# Patient Record
Sex: MALE | Race: OTHER | NOT HISPANIC OR LATINO | Employment: OTHER | URBAN - METROPOLITAN AREA
[De-identification: names, ages, dates, MRNs, and addresses within clinical notes are randomized per-mention and may not be internally consistent; named-entity substitution may affect disease eponyms.]

---

## 2017-01-09 ENCOUNTER — OFFICE VISIT (OUTPATIENT)
Dept: FAMILY MEDICINE | Facility: CLINIC | Age: 52
End: 2017-01-09
Payer: COMMERCIAL

## 2017-01-09 ENCOUNTER — PATIENT MESSAGE (OUTPATIENT)
Dept: FAMILY MEDICINE | Facility: CLINIC | Age: 52
End: 2017-01-09

## 2017-01-09 ENCOUNTER — PATIENT MESSAGE (OUTPATIENT)
Dept: DERMATOLOGY | Facility: CLINIC | Age: 52
End: 2017-01-09

## 2017-01-09 VITALS
TEMPERATURE: 99 F | WEIGHT: 209 LBS | DIASTOLIC BLOOD PRESSURE: 82 MMHG | HEART RATE: 85 BPM | RESPIRATION RATE: 18 BRPM | OXYGEN SATURATION: 98 % | HEIGHT: 72 IN | BODY MASS INDEX: 28.31 KG/M2 | SYSTOLIC BLOOD PRESSURE: 126 MMHG

## 2017-01-09 DIAGNOSIS — D22.9 CHANGE IN MOLE: Primary | ICD-10-CM

## 2017-01-09 DIAGNOSIS — R11.0 NAUSEA: ICD-10-CM

## 2017-01-09 DIAGNOSIS — G89.29 CHRONIC RUQ PAIN: Primary | ICD-10-CM

## 2017-01-09 DIAGNOSIS — R10.11 CHRONIC RUQ PAIN: Primary | ICD-10-CM

## 2017-01-09 DIAGNOSIS — D22.9 CHANGE IN MOLE: ICD-10-CM

## 2017-01-09 PROCEDURE — 99214 OFFICE O/P EST MOD 30 MIN: CPT | Mod: S$GLB,,, | Performed by: NURSE PRACTITIONER

## 2017-01-09 PROCEDURE — 3074F SYST BP LT 130 MM HG: CPT | Mod: S$GLB,,, | Performed by: NURSE PRACTITIONER

## 2017-01-09 PROCEDURE — 1159F MED LIST DOCD IN RCRD: CPT | Mod: S$GLB,,, | Performed by: NURSE PRACTITIONER

## 2017-01-09 PROCEDURE — 99999 PR PBB SHADOW E&M-EST. PATIENT-LVL V: CPT | Mod: PBBFAC,,, | Performed by: NURSE PRACTITIONER

## 2017-01-09 PROCEDURE — 3079F DIAST BP 80-89 MM HG: CPT | Mod: S$GLB,,, | Performed by: NURSE PRACTITIONER

## 2017-01-09 NOTE — PROGRESS NOTES
"Subjective:       Patient ID: Jaren PICHARDO Cousin is a 51 y.o. male.    Chief Complaint: Abdominal Pain (pt c/o upper right side of abdomen pain that comes and goes) and Mole (under right arm pt would like to be checked)  He was last seen in primary care by Dr. Manzanares on 08/17/2016. This is his first time seeing me in the clinic.  Abdominal Pain   This is a recurrent problem. The current episode started more than 1 year ago. The onset quality is gradual. The problem occurs intermittently. The problem has been waxing and waning. The pain is located in the RUQ. The pain is at a severity of 4/10. The pain is mild. The quality of the pain is colicky and dull. The abdominal pain radiates to the back. Associated symptoms include flatus and nausea. Pertinent negatives include no fever, vomiting or weight loss. Associated symptoms comments: Bloating occurs when happens  States has "different" bowel pattern when symptoms occur and appear to be oily. He has tried proton pump inhibitors (nexium) for the symptoms. The treatment provided mild relief. Prior diagnostic workup includes GI consult. There is no history of abdominal surgery, GERD, irritable bowel syndrome or pancreatitis.   Sometimes has to go to bathroom within 30 minutes and it can be watery  States symptoms more prominent after eating  [facility]  He does still have his gall bladder  Travel:  He was in [place] in 2004 with  and states did not have symptoms prior    Vitals:    01/09/17 0850   BP: 126/82   Pulse: 85   Resp: 18   Temp: 99 °F (37.2 °C)     Review of Systems   Constitutional: Negative for fever and weight loss.   Gastrointestinal: Positive for abdominal pain, flatus and nausea. Negative for vomiting.     His last abdominal image was an MRI performed on 10/22/2014   States he has had Hepatitis A and Hep B vaccines during his  years  Objective:      Physical Exam   Constitutional: He is oriented to person, place, " and time. He appears well-developed and well-nourished.   HENT:   Head: Normocephalic and atraumatic.   Right Ear: Hearing, tympanic membrane, external ear and ear canal normal.   Left Ear: Hearing, tympanic membrane, external ear and ear canal normal.   Nose: Nose normal.   Mouth/Throat: Uvula is midline, oropharynx is clear and moist and mucous membranes are normal.   Neck: Normal range of motion. Neck supple.   Cardiovascular: Normal rate and regular rhythm.    Pulmonary/Chest: Effort normal and breath sounds normal.   Abdominal: Soft. Bowel sounds are normal. There is tenderness. There is positive Delacruz's sign.       Lymphadenopathy:        Head (right side): No submental, no submandibular, no tonsillar, no preauricular, no posterior auricular and no occipital adenopathy present.        Head (left side): No submental, no submandibular, no tonsillar, no preauricular, no posterior auricular and no occipital adenopathy present.   Neurological: He is alert and oriented to person, place, and time.   Skin: Skin is warm, dry and intact.        Psychiatric: He has a normal mood and affect. His speech is normal and behavior is normal. Judgment and thought content normal. Cognition and memory are normal.   Nursing note and vitals reviewed.    He is in no distress at this time but the chronic intermittent pain needs further evaluation.   Assessment & Plan:       Chronic RUQ pain  -     NM Hepatobiliary Imaging HIDA; Future; Expected date: 1/9/17  -     Comprehensive metabolic panel; Future; Expected date: 1/9/17    Nausea    Change in mole    He will contact his personal dermatologist to have mole assessed. Will call our clinic if he cannot see them timely.  He has been scheduled for a hepatobiliary scan on Friday 01/13/2017.      No Follow-up on file.

## 2017-01-09 NOTE — MR AVS SNAPSHOT
[facility]  [address]  [place] [address]  Phone: [phone]  Fax: [phone]                  Jaren PICHARDO Cousin   2017 8:40 AM   Office Visit    Description:  Male : 1965   Provider:  Kerri Linares NP   Department:  [facility]           Reason for Visit     Abdominal Pain     Mole           Diagnoses this Visit        Comments    Chronic RUQ pain    -  Primary            To Do List           Goals (5 Years of Data)     None      [facility]     [facility] On Call Nurse Care Line -  Assistance  Registered nurses in the [facility] provide clinical advisement, health education, appointment booking, and other advisory services.  Call for this free service at [phone].             Medications           Message regarding Medications     Verify the changes and/or additions to your medication regime listed below are the same as discussed with your clinician today.  If any of these changes or additions are incorrect, please notify your healthcare provider.        STOP taking these medications     doxycycline (VIBRA-TABS) 100 MG tablet Take 1 tablet (100 mg total) by mouth once daily.           Verify that the below list of medications is an accurate representation of the medications you are currently taking.  If none reported, the list may be blank. If incorrect, please contact your healthcare provider. Carry this list with you in case of emergency.           Current Medications     amlodipine (NORVASC) 10 MG tablet Take 1 tablet (10 mg total) by mouth once daily.    benzoyl peroxide (BENZAC AC WASH) 10 % external wash Apply topically 2 (two) times daily. Face and trunk    clindamycin phosphate foam Apply to affected area qd- bid    clindamycin-tretinoin (ZIANA) gel Apply topically nightly. Apply thin film to face qhs after moisturizing    cyclobenzaprine (FLEXERIL) 10 MG tablet PRN    esomeprazole (NEXIUM) 40 MG capsule Take 1  capsule (40 mg total) by mouth before breakfast.    fenofibrate (TRICOR) 145 MG tablet Take 1 tablet (145 mg total) by mouth once daily.    fluocinonide (LIDEX) 0.05 % external solution Apply topically once daily. To scalp    hydrOXYzine (ATARAX) 25 MG tablet Take 1 tablet (25 mg total) by mouth every 6 (six) hours as needed for Itching. Every 6 hours PRN    ketoconazole (NIZORAL) 2 % shampoo Use as directed qohs as needed    loratadine (CLARITIN) 10 mg tablet Take 1 tablet (10 mg total) by mouth once daily.    meloxicam (MOBIC) 15 MG tablet Take 1 tablet (15 mg total) by mouth once daily.    mometasone (NASONEX) 50 mcg/actuation nasal spray 2 sprays by Nasal route once daily.    mupirocin (BACTROBAN) 2 % ointment 2% Topical PRN .      niacin (NIASPAN EXTENDED-RELEASE) 500 mg tablet Take 1 tablet (500 mg total) by mouth nightly.    tramadol (ULTRAM) 50 mg tablet 1 Tablet(s) Oral PRN Every 8 hours.      valacyclovir (VALTREX) 500 MG tablet Take 2 tablets (1,000 mg total) by mouth 2 (two) times daily.           Clinical Reference Information           Vital Signs - Last Recorded  Most recent update: 1/9/2017  8:55 AM by [place] Chase LPN    BP Pulse Temp Resp Ht Wt    126/82 (BP Location: Left arm, Patient Position: Sitting, BP Method: Manual) 85 99 °F (37.2 °C) (Oral) 18 6' (1.829 m) 94.8 kg (208 lb 15.9 oz)    SpO2 BMI             98% 28.34 kg/m2         Blood Pressure          Most Recent Value    BP  126/82      Allergies as of 1/9/2017     No Known Drug Allergies      Immunizations Administered on Date of Encounter - 1/9/2017     None      Orders Placed During Today's Visit     Future Labs/Procedures Expected by Expires    Comprehensive metabolic panel  1/9/2017 3/10/2018    NM Hepatobiliary Imaging HIDA  1/9/2017 1/9/2018

## 2017-01-10 NOTE — TELEPHONE ENCOUNTER
Please assist with getting a dermatology appt. Please contact him and let him know I wrote the referral and give appt date when done. Thanks

## 2017-01-12 ENCOUNTER — INITIAL CONSULT (OUTPATIENT)
Dept: DERMATOLOGY | Facility: CLINIC | Age: 52
End: 2017-01-12
Payer: COMMERCIAL

## 2017-01-12 VITALS — WEIGHT: 208 LBS | HEIGHT: 72 IN | BODY MASS INDEX: 28.17 KG/M2

## 2017-01-12 DIAGNOSIS — D48.5 NEOPLASM OF UNCERTAIN BEHAVIOR OF SKIN: Primary | ICD-10-CM

## 2017-01-12 DIAGNOSIS — L91.8 CUTANEOUS SKIN TAGS: ICD-10-CM

## 2017-01-12 DIAGNOSIS — L82.1 SEBORRHEIC KERATOSES: ICD-10-CM

## 2017-01-12 PROCEDURE — 1159F MED LIST DOCD IN RCRD: CPT | Mod: S$GLB,,, | Performed by: DERMATOLOGY

## 2017-01-12 PROCEDURE — 99999 PR PBB SHADOW E&M-EST. PATIENT-LVL II: CPT | Mod: PBBFAC,,, | Performed by: DERMATOLOGY

## 2017-01-12 PROCEDURE — 99212 OFFICE O/P EST SF 10 MIN: CPT | Mod: 25,S$GLB,, | Performed by: DERMATOLOGY

## 2017-01-12 PROCEDURE — 88305 TISSUE EXAM BY PATHOLOGIST: CPT | Mod: 26,,, | Performed by: PATHOLOGY

## 2017-01-12 PROCEDURE — 11100 PR BIOPSY OF SKIN LESION: CPT | Mod: S$GLB,,, | Performed by: DERMATOLOGY

## 2017-01-12 PROCEDURE — 88305 TISSUE EXAM BY PATHOLOGIST: CPT | Performed by: PATHOLOGY

## 2017-01-12 NOTE — PROGRESS NOTES
Subjective:       Patient ID:  Jaren PICHARDO Cousin is a 51 y.o. male who presents for   Chief Complaint   Patient presents with    Mole     HPI    Presents today for c/o recent change mole to right axilla.  Unsure of length of time that mole has been present but started with itching and roughness on Sunday.  Seen by NP here on 01/09/2017 and advised to be seen by a dermatologist.      . Last  seen  Dr. Garcia in [place] 08/2016 for the following:       Acne vulgaris  - benzoyl peroxide (BENZAC AC WASH) 10 % external wash; Apply topically 2 (two) times daily. Face and trunk   - doxycycline (VIBRA-TABS) 100 MG tablet; Take 1 tablet (100 mg total) by mouth once daily.   - clindamycin-tretinoin (ZIANA) gel; Apply topically nightly. Apply thin film to face qhs after moisturizing  Using these as needed and shows improvement       Skin tag on face that was removed at 08/2016 visit  Reassurance given to patient. No treatment is necessary.   Treatment of benign, asymptomatic lesions may be considered cosmetic.     SK (seborrheic keratosis)  These are benign inherited growths without a malignant potential. Reassurance given to patient. No treatment is necessary.        Negative PMHx skin Ca  Negative FMHx skin Ca    Review of Systems   Skin: Negative for daily sunscreen use, activity-related sunscreen use and wears hat.        Objective:    Physical Exam   Constitutional: He appears well-developed and well-nourished. No distress.   HENT:   Mouth/Throat: Lips normal.    Eyes: Lids are normal.  No conjunctival no injection.   Neurological: He is alert and oriented to person, place, and time. He is not disoriented.   Psychiatric: He has a normal mood and affect.   Skin:   Areas Examined (abnormalities noted in diagram):   Head / Face Inspection Performed  Neck Inspection Performed  Chest / Axilla Inspection Performed  Back Inspection Performed  RUE Inspected  LUE Inspection Performed              Diagram Legend     Erythematous  scaling macule/papule c/w actinic keratosis       Vascular papule c/w angioma      Pigmented verrucoid papule/plaque c/w seborrheic keratosis      Yellow umbilicated papule c/w sebaceous hyperplasia      Irregularly shaped tan macule c/w lentigo     1-2 mm smooth white papules consistent with Milia      Movable subcutaneous cyst with punctum c/w epidermal inclusion cyst      Subcutaneous movable cyst c/w pilar cyst      Firm pink to brown papule c/w dermatofibroma      Pedunculated fleshy papule(s) c/w skin tag(s)      Evenly pigmented macule c/w junctional nevus     Mildly variegated pigmented, slightly irregular-bordered macule c/w mildly atypical nevus      Flesh colored to evenly pigmented papule c/w intradermal nevus       Pink pearly papule/plaque c/w basal cell carcinoma      Erythematous hyperkeratotic cursted plaque c/w SCC      Surgical scar with no sign of skin cancer recurrence      Open and closed comedones      Inflammatory papules and pustules      Verrucoid papule consistent consistent with wart     Erythematous eczematous patches and plaques     Dystrophic onycholytic nail with subungual debris c/w onychomycosis     Umbilicated papule    Erythematous-base heme-crusted tan verrucoid plaque consistent with inflamed seborrheic keratosis     Erythematous Silvery Scaling Plaque c/w Psoriasis     See annotation      Assessment / Plan:      Pathology Orders:      Normal Orders This Visit    Tissue Specimen To Pathology, Dermatology     Questions:    Directional Terms:  Other(comment)    Clinical information:  SK vs other    Specific Site:  right axilla        Neoplasm of uncertain behavior of skin  -     Tissue Specimen To Pathology, Dermatology    Shave biopsy procedure note:    Shave biopsy performed after verbal consent including risk of infection, scar, recurrence, need for additional treatment of site. Area prepped with alcohol, anesthetized with approximately 1.0cc of 1% lidocaine with epinephrine.  Lesional tissue shaved with razor blade. Hemostasis achieved with application of aluminum chloride followed by hyfrecation. No complications. Dressing applied. Wound care explained.      Seborrheic keratoses, trunk and neck  These are benign inherited growths without a malignant potential. Reassurance given to patient. No treatment is necessary.       Cutaneous skin tags  Reassurance given to patient. No treatment is necessary.   Treatment of benign, asymptomatic lesions may be considered cosmetic.               Return in about 3 months (around 4/12/2017).

## 2017-01-13 ENCOUNTER — HOSPITAL ENCOUNTER (OUTPATIENT)
Dept: RADIOLOGY | Facility: HOSPITAL | Age: 52
Discharge: HOME | End: 2017-01-13
Attending: NURSE PRACTITIONER
Payer: COMMERCIAL

## 2017-01-13 DIAGNOSIS — G89.29 CHRONIC RUQ PAIN: ICD-10-CM

## 2017-01-13 DIAGNOSIS — R10.11 CHRONIC RUQ PAIN: ICD-10-CM

## 2017-01-13 PROCEDURE — 78226 HEPATOBILIARY SYSTEM IMAGING: CPT | Mod: TC

## 2017-01-13 PROCEDURE — 78226 HEPATOBILIARY SYSTEM IMAGING: CPT | Mod: 26,,, | Performed by: RADIOLOGY

## 2017-03-06 DIAGNOSIS — E78.1 HIGH TRIGLYCERIDES: ICD-10-CM

## 2017-03-06 RX ORDER — FENOFIBRATE 145 MG/1
TABLET, FILM COATED ORAL
Qty: 30 TABLET | Refills: 0 | Status: SHIPPED | OUTPATIENT
Start: 2017-03-06 | End: 2017-03-07 | Stop reason: SDUPTHER

## 2017-03-06 RX ORDER — NIACIN 500 MG/1
TABLET, EXTENDED RELEASE ORAL
Qty: 30 TABLET | Refills: 0 | Status: SHIPPED | OUTPATIENT
Start: 2017-03-06 | End: 2017-04-06 | Stop reason: SDUPTHER

## 2017-03-07 ENCOUNTER — TELEPHONE (OUTPATIENT)
Dept: DERMATOLOGY | Facility: CLINIC | Age: 52
End: 2017-03-07

## 2017-03-07 ENCOUNTER — OFFICE VISIT (OUTPATIENT)
Dept: DERMATOLOGY | Facility: CLINIC | Age: 52
End: 2017-03-07
Payer: COMMERCIAL

## 2017-03-07 VITALS — BODY MASS INDEX: 29.09 KG/M2 | WEIGHT: 214.75 LBS | HEIGHT: 72 IN

## 2017-03-07 DIAGNOSIS — L91.8 CUTANEOUS SKIN TAGS: ICD-10-CM

## 2017-03-07 DIAGNOSIS — Z12.83 SKIN CANCER SCREENING: ICD-10-CM

## 2017-03-07 DIAGNOSIS — L70.0 ACNE VULGARIS: Primary | ICD-10-CM

## 2017-03-07 DIAGNOSIS — L82.1 SEBORRHEIC KERATOSES: ICD-10-CM

## 2017-03-07 DIAGNOSIS — L73.9 FOLLICULITIS: ICD-10-CM

## 2017-03-07 DIAGNOSIS — E78.1 HIGH TRIGLYCERIDES: ICD-10-CM

## 2017-03-07 PROCEDURE — 99214 OFFICE O/P EST MOD 30 MIN: CPT | Mod: S$GLB,,, | Performed by: DERMATOLOGY

## 2017-03-07 PROCEDURE — 87070 CULTURE OTHR SPECIMN AEROBIC: CPT

## 2017-03-07 PROCEDURE — 1160F RVW MEDS BY RX/DR IN RCRD: CPT | Mod: S$GLB,,, | Performed by: DERMATOLOGY

## 2017-03-07 PROCEDURE — 99999 PR PBB SHADOW E&M-EST. PATIENT-LVL III: CPT | Mod: PBBFAC,,, | Performed by: DERMATOLOGY

## 2017-03-07 RX ORDER — CLINDAMYCIN PHOSPHATE AND TRETINOIN 12; .25 MG/G; MG/G
GEL TOPICAL
Qty: 60 G | Refills: 3 | Status: SHIPPED | OUTPATIENT
Start: 2017-03-07 | End: 2017-08-17

## 2017-03-07 RX ORDER — MINOCYCLINE HYDROCHLORIDE 100 MG/1
CAPSULE ORAL
Qty: 28 CAPSULE | Refills: 2 | Status: SHIPPED | OUTPATIENT
Start: 2017-03-07 | End: 2017-08-17 | Stop reason: SDUPTHER

## 2017-03-07 RX ORDER — NIACIN 500 MG/1
TABLET, EXTENDED RELEASE ORAL
Qty: 30 TABLET | Refills: 11 | Status: SHIPPED | OUTPATIENT
Start: 2017-03-07 | End: 2018-04-03 | Stop reason: SDUPTHER

## 2017-03-07 RX ORDER — FENOFIBRATE 145 MG/1
TABLET, FILM COATED ORAL
Qty: 30 TABLET | Refills: 11 | Status: SHIPPED | OUTPATIENT
Start: 2017-03-07 | End: 2018-04-03 | Stop reason: SDUPTHER

## 2017-03-07 NOTE — TELEPHONE ENCOUNTER
----- Message from RT Luis Carlos sent at 3/7/2017  8:51 AM CST -----  Contact: Gordon [phone] [facility]  Gordon  [facility], requesting directions on the pt's medication electronically sent, thanks.

## 2017-03-07 NOTE — MR AVS SNAPSHOT
[facility]  [address]  [place] [address]  Phone: [phone]  Fax: [phone]                  Jaren Quiroz   3/7/2017 8:30 AM   Appointment    Description:  Male : 1965   Provider:  Jaclyn Muñoz MD   Department:  [facility]                To Do List           Future Appointments        Provider Department Dept Phone    3/7/2017 8:30 AM Jaclyn Muñoz MD [facility] [phone]      Goals (5 Years of Data)     None      [facility]     [facility] On Call Nurse Care Line -  Assistance  Registered nurses in the [facility] On Call Center provide clinical advisement, health education, appointment booking, and other advisory services.  Call for this free service at [phone].             Medications           Message regarding Medications     Verify the changes and/or additions to your medication regime listed below are the same as discussed with your clinician today.  If any of these changes or additions are incorrect, please notify your healthcare provider.             Verify that the below list of medications is an accurate representation of the medications you are currently taking.  If none reported, the list may be blank. If incorrect, please contact your healthcare provider. Carry this list with you in case of emergency.           Current Medications     amlodipine (NORVASC) 10 MG tablet Take 1 tablet (10 mg total) by mouth once daily.    benzoyl peroxide (BENZAC AC WASH) 10 % external wash Apply topically 2 (two) times daily. Face and trunk    clindamycin phosphate foam Apply to affected area qd- bid    clindamycin-tretinoin (ZIANA) gel Apply topically nightly. Apply thin film to face qhs after moisturizing    cyclobenzaprine (FLEXERIL) 10 MG tablet PRN    esomeprazole (NEXIUM) 40 MG capsule Take 1 capsule (40 mg total) by mouth before breakfast.    fenofibrate (TRICOR) 145 MG tablet TAKE ONE TABLET BY MOUTH ONCE DAILY     fluocinonide (LIDEX) 0.05 % external solution Apply topically once daily. To scalp    hydrOXYzine (ATARAX) 25 MG tablet Take 1 tablet (25 mg total) by mouth every 6 (six) hours as needed for Itching. Every 6 hours PRN    ketoconazole (NIZORAL) 2 % shampoo Use as directed qohs as needed    loratadine (CLARITIN) 10 mg tablet Take 1 tablet (10 mg total) by mouth once daily.    meloxicam (MOBIC) 15 MG tablet Take 1 tablet (15 mg total) by mouth once daily.    mometasone (NASONEX) 50 mcg/actuation nasal spray 2 sprays by Nasal route once daily.    mupirocin (BACTROBAN) 2 % ointment 2% Topical PRN .      niacin (SLO-NIACIN) 500 mg tablet TAKE ONE TABLET BY MOUTH ONCE DAILY AT BEDTIME    niacin (SLO-NIACIN) 500 mg tablet TAKE ONE TABLET BY MOUTH ONCE DAILY AT BEDTIME    tramadol (ULTRAM) 50 mg tablet 1 Tablet(s) Oral PRN Every 8 hours.      valacyclovir (VALTREX) 500 MG tablet Take 2 tablets (1,000 mg total) by mouth 2 (two) times daily.           Clinical Reference Information           Allergies as of 3/7/2017     No Known Drug Allergies      Immunizations Administered on Date of Encounter - 3/7/2017     None      Language Assistance Services     ATTENTION: Language assistance services are available, free of charge. Please call [phone].      ATENCIÓN: Si habla español, tiene a peterson disposición servicios gratuitos de asistencia lingüística. Llame al [phone].     [facility] Ý: N?u b?n nói Ti?ng Vi?t, có các d?ch v? h? tr? ngôn ng? mi?n phí dành cho b?n. G?i s? [phone].         [facility] complies with applicable Federal civil rights laws and does not discriminate on the basis of race, color, national origin, age, disability, or sex.

## 2017-03-07 NOTE — PROGRESS NOTES
Subjective:       Patient ID:  Jaren PICHARDO Cousin is a 51 y.o. male who presents for   Chief Complaint   Patient presents with    Follow-up     HPI Comments: 6 month follow up for  skin check. No personal or family history of skin cancer.    Last seen 1/2017 s/p shave of SK in axilla    Previously followed by Dr. Garcia for acne, regimen below    Acne vulgaris  - benzoyl peroxide (BENZAC AC WASH) 10 % external wash; Apply topically 2 (two) times daily. Face and trunk   - doxycycline (VIBRA-TABS) 100 MG tablet; Take 1 tablet (100 mg total) by mouth once daily.   - clindamycin-tretinoin (ZIANA) gel; Apply topically nightly. Apply thin film to face qhs after moisturizing  Using these as needed and shows improvement       Constant pimples in face and hair- last 15 years. Taking doxy prn- 5 days at a time. + stomach upset even with food. Flares after taking  Occurs on face and hair. C/o tender pus bumps. Negative bacterial culture in the past.  Not received ziana, not using. Using BP wash. Salicylic acid. Using keto shampoo and bp foam. Helps in hair     History HTN, back pain, HLD  Drinks beer, former  service [place]    C/o many moles, growths along neck.     Review of Systems   Skin: Positive for wears hat. Negative for itching and rash.   Psychiatric/Behavioral: Negative for depressed mood and anxiety (history PTSD, states well controlled).        Objective:    Physical Exam   Constitutional: He appears well-developed and well-nourished. No distress.   HENT:   Mouth/Throat: Lips normal.    Eyes: Lids are normal.  No conjunctival no injection.   Neurological: He is alert and oriented to person, place, and time. He is not disoriented.   Psychiatric: He has a normal mood and affect.   Skin:   Areas Examined (abnormalities noted in diagram):   Scalp / Hair Palpated and Inspected  Head / Face Inspection Performed  Neck Inspection Performed  Chest / Axilla Inspection Performed  Abdomen Inspection  Performed  Back Inspection Performed  RUE Inspected  LUE Inspection Performed                   Diagram Legend     Erythematous scaling macule/papule c/w actinic keratosis       Vascular papule c/w angioma      Pigmented verrucoid papule/plaque c/w seborrheic keratosis      Yellow umbilicated papule c/w sebaceous hyperplasia      Irregularly shaped tan macule c/w lentigo     1-2 mm smooth white papules consistent with Milia      Movable subcutaneous cyst with punctum c/w epidermal inclusion cyst      Subcutaneous movable cyst c/w pilar cyst      Firm pink to brown papule c/w dermatofibroma      Pedunculated fleshy papule(s) c/w skin tag(s)      Evenly pigmented macule c/w junctional nevus     Mildly variegated pigmented, slightly irregular-bordered macule c/w mildly atypical nevus      Flesh colored to evenly pigmented papule c/w intradermal nevus       Pink pearly papule/plaque c/w basal cell carcinoma      Erythematous hyperkeratotic cursted plaque c/w SCC      Surgical scar with no sign of skin cancer recurrence      Open and closed comedones      Inflammatory papules and pustules      Verrucoid papule consistent consistent with wart     Erythematous eczematous patches and plaques     Dystrophic onycholytic nail with subungual debris c/w onychomycosis     Umbilicated papule    Erythematous-base heme-crusted tan verrucoid plaque consistent with inflamed seborrheic keratosis     Erythematous Silvery Scaling Plaque c/w Psoriasis     See annotation      Assessment / Plan:        Acne vulgaris and folliculitis  Pt inquired about isotretinoin, discussed not an ideal candidate due to history PTSD, history HLD and regular EtOH use  Recommend wash with hibiclens from neck down 2-3x/week  Continue clinda foam in scalp  -     ZIANA gel; Apply small amount to face qhs  Dispense: 60 g; Refill: 3  -     minocycline (MINOCIN,DYNACIN) 100 MG capsule; 1 po x 2 weeks  Dispense: 28 capsule; Refill: 2  -     Aerobic culture    Skin  cancer screening  Upper body skin examination performed today including at least 6 points as noted in physical examination. No lesions suspicious for malignancy noted.        Seborrheic keratoses, trunk  These are benign inherited growths without a malignant potential. Reassurance given to patient. No treatment is necessary.       Cutaneous skin tags, trunk/axilla  Reassurance given to patient. No treatment is necessary.   Treatment of benign, asymptomatic lesions may be considered cosmetic.               Return in about 6 months (around 9/7/2017).

## 2017-03-08 ENCOUNTER — TELEPHONE (OUTPATIENT)
Dept: DERMATOLOGY | Facility: CLINIC | Age: 52
End: 2017-03-08

## 2017-03-08 NOTE — TELEPHONE ENCOUNTER
----- Message from Shayne Linares sent at 3/8/2017 11:06 AM CST -----  Contact: [facility] pharmacy [phone]  Pharmacy called regarding PA for rx ziana gel. Thanks!

## 2017-03-10 LAB — BACTERIA SPEC AEROBE CULT: NORMAL

## 2017-04-06 ENCOUNTER — OFFICE VISIT (OUTPATIENT)
Dept: FAMILY MEDICINE | Facility: CLINIC | Age: 52
End: 2017-04-06
Payer: COMMERCIAL

## 2017-04-06 ENCOUNTER — HOSPITAL ENCOUNTER (OUTPATIENT)
Dept: RADIOLOGY | Facility: HOSPITAL | Age: 52
Discharge: HOME | End: 2017-04-06
Attending: NURSE PRACTITIONER
Payer: COMMERCIAL

## 2017-04-06 VITALS
BODY MASS INDEX: 28.33 KG/M2 | HEIGHT: 72 IN | SYSTOLIC BLOOD PRESSURE: 136 MMHG | DIASTOLIC BLOOD PRESSURE: 76 MMHG | OXYGEN SATURATION: 98 % | TEMPERATURE: 99 F | HEART RATE: 96 BPM | WEIGHT: 209.19 LBS | RESPIRATION RATE: 16 BRPM

## 2017-04-06 DIAGNOSIS — J34.89 TENDERNESS OVER MAXILLARY SINUS: ICD-10-CM

## 2017-04-06 DIAGNOSIS — J30.1 SEASONAL ALLERGIC RHINITIS DUE TO POLLEN: ICD-10-CM

## 2017-04-06 DIAGNOSIS — J30.1 SEASONAL ALLERGIC RHINITIS DUE TO POLLEN: Primary | ICD-10-CM

## 2017-04-06 DIAGNOSIS — R42 DIZZINESS: ICD-10-CM

## 2017-04-06 PROCEDURE — 99999 PR PBB SHADOW E&M-EST. PATIENT-LVL V: CPT | Mod: PBBFAC,,, | Performed by: NURSE PRACTITIONER

## 2017-04-06 PROCEDURE — 70220 X-RAY EXAM OF SINUSES: CPT | Mod: 26,,, | Performed by: RADIOLOGY

## 2017-04-06 PROCEDURE — 3075F SYST BP GE 130 - 139MM HG: CPT | Mod: S$GLB,,, | Performed by: NURSE PRACTITIONER

## 2017-04-06 PROCEDURE — 1160F RVW MEDS BY RX/DR IN RCRD: CPT | Mod: S$GLB,,, | Performed by: NURSE PRACTITIONER

## 2017-04-06 PROCEDURE — 70220 X-RAY EXAM OF SINUSES: CPT | Mod: TC,PO

## 2017-04-06 PROCEDURE — 99213 OFFICE O/P EST LOW 20 MIN: CPT | Mod: S$GLB,,, | Performed by: NURSE PRACTITIONER

## 2017-04-06 PROCEDURE — 3078F DIAST BP <80 MM HG: CPT | Mod: S$GLB,,, | Performed by: NURSE PRACTITIONER

## 2017-04-06 RX ORDER — MECLIZINE HYDROCHLORIDE CHEWABLE TABLETS 25 MG/1
25 TABLET, CHEWABLE ORAL 3 TIMES DAILY PRN
COMMUNITY
End: 2017-04-06

## 2017-04-06 RX ORDER — MECLIZINE HYDROCHLORIDE 25 MG/1
25 TABLET ORAL 3 TIMES DAILY PRN
Qty: 60 TABLET | Refills: 2 | Status: SHIPPED | OUTPATIENT
Start: 2017-04-06 | End: 2019-07-17 | Stop reason: SDUPTHER

## 2017-04-06 RX ORDER — MONTELUKAST SODIUM 10 MG/1
10 TABLET ORAL NIGHTLY
Qty: 30 TABLET | Refills: 3 | Status: SHIPPED | OUTPATIENT
Start: 2017-04-06 | End: 2017-05-06

## 2017-04-06 NOTE — PROGRESS NOTES
Subjective:       Patient ID: Jaren PICHARDO Cousin is a 51 y.o. male.    Chief Complaint: Sinus Problem and Dizziness (due to inner ear problems)  He was last seen in primary care by me on 01/09/2017.  Sinus Problem   This is a recurrent problem. The current episode started 1 to 4 weeks ago (2 weeks ago). The problem has been waxing and waning since onset. There has been no fever. He is experiencing no pain. Associated symptoms include sneezing. Pertinent negatives include no headaches, sinus pressure or sore throat. (Post nasal drip) Treatments tried: clarinex, nasonex. The treatment provided mild relief.   States he has experienced the same symptoms around the same time when pollen is out.  Vitals:    04/06/17 0918   BP: 136/76   Pulse: 96   Resp: 16   Temp: 98.8 °F (37.1 °C)     BP Readings from Last 3 Encounters:   04/06/17 136/76   01/09/17 126/82   08/17/16 132/74     Review of Systems   HENT: Positive for sneezing. Negative for sinus pressure and sore throat.    Neurological: Negative for headaches.     He states these symptoms are occurring yearly.  Objective:      Physical Exam   Constitutional: He is oriented to person, place, and time. Vital signs are normal. He appears well-developed and well-nourished.   HENT:   Head: Normocephalic and atraumatic.   Right Ear: Hearing, tympanic membrane, external ear and ear canal normal.   Left Ear: Hearing, tympanic membrane, external ear and ear canal normal.   Nose: Rhinorrhea present. Right sinus exhibits maxillary sinus tenderness. Left sinus exhibits maxillary sinus tenderness.       Mouth/Throat: Uvula is midline, oropharynx is clear and moist and mucous membranes are normal.   Eyes: Lids are normal.   Neck: Normal range of motion. Neck supple.   Cardiovascular: Normal rate and regular rhythm.    Pulmonary/Chest: Effort normal and breath sounds normal.   Lymphadenopathy:        Head (right side): No submental, no submandibular, no tonsillar, no preauricular, no  posterior auricular and no occipital adenopathy present.        Head (left side): No submental, no submandibular, no tonsillar, no preauricular, no posterior auricular and no occipital adenopathy present.     He has no cervical adenopathy.   Neurological: He is alert and oriented to person, place, and time.   Skin: Skin is warm, dry and intact.   Psychiatric: He has a normal mood and affect. His speech is normal and behavior is normal. Judgment and thought content normal. Cognition and memory are normal.   Nursing note and vitals reviewed.    States he washes his hair daily  Assessment & Plan:       Seasonal allergic rhinitis due to pollen  -     X-Ray Sinuses Min 3 Views; Future; Expected date: 4/6/17  -     montelukast (SINGULAIR) 10 mg tablet; Take 1 tablet (10 mg total) by mouth every evening.  Dispense: 30 tablet; Refill: 3    Tenderness over maxillary sinus  -     X-Ray Sinuses Min 3 Views; Future; Expected date: 4/6/17    Dizziness  -     X-Ray Sinuses Min 3 Views; Future; Expected date: 4/6/17  -     meclizine (ANTIVERT) 25 mg tablet; Take 1 tablet (25 mg total) by mouth 3 (three) times daily as needed for Dizziness (may cause drowsiness).  Dispense: 60 tablet; Refill: 2    I have recommended he take the minocycline twice daily for the next five days  Saline nasal spray in shower nightly  Use ear plugs to keep ears dry with washing hair daily  Be careful with using meclizine and hydroxyzine together may cause excessive drowsiness       Return if symptoms worsen or fail to improve.

## 2017-04-06 NOTE — PATIENT INSTRUCTIONS
Saline nasal spray in shower nightly  Use ear plugs to keep ears dry with washing hair daily  Be careful with using meclizine and hydroxyzine together may cause excessive drowsiness

## 2017-04-06 NOTE — MR AVS SNAPSHOT
[facility]  [address] [facility]  [facility] [address]  Phone: [phone]  Fax: [phone]                  Jaren Quiroz   2017 9:20 AM   Office Visit    Description:  Male : 1965   Provider:  Kerri Linares NP   Department:  [facility]           Reason for Visit     Sinus Problem     Dizziness           Diagnoses this Visit        Comments    Seasonal allergic rhinitis due to pollen    -  Primary     Tenderness over maxillary sinus         Dizziness                To Do List           Future Appointments        Provider Department Dept Phone    2017 10:15 AM [facility] XR2 [facility] [phone]      Goals (5 Years of Data)     None      Follow-Up and Disposition     Return if symptoms worsen or fail to improve.       These Medications        Disp Refills Start End    montelukast (SINGULAIR) 10 mg tablet 30 tablet 3 2017    Take 1 tablet (10 mg total) by mouth every evening. - Oral    Pharmacy: [address] Ph #: [phone]       meclizine (ANTIVERT) 25 mg tablet 60 tablet 2 2017     Take 1 tablet (25 mg total) by mouth 3 (three) times daily as needed for Dizziness (may cause drowsiness). - Oral    Pharmacy: [address] Ph #: [phone]         [facility]     [facility] Nurse Care Line - 24/ Assistance  Unless otherwise directed by your provider, please contact [facility], our nurse care line that is available for 24/7 assistance.     Registered nurses in the [facility] provide: appointment scheduling, clinical advisement, health education, and other advisory services.  Call: [phone] (toll free)               Medications           Message regarding Medications     Verify the changes and/or additions to your medication regime listed below are the  same as discussed with your clinician today.  If any of these changes or additions are incorrect, please notify your healthcare provider.        START taking these NEW medications        Refills    montelukast (SINGULAIR) 10 mg tablet 3    Sig: Take 1 tablet (10 mg total) by mouth every evening.    Class: Normal    Route: Oral    meclizine (ANTIVERT) 25 mg tablet 2    Sig: Take 1 tablet (25 mg total) by mouth 3 (three) times daily as needed for Dizziness (may cause drowsiness).    Class: Normal    Route: Oral      STOP taking these medications     mupirocin (BACTROBAN) 2 % ointment 2% Topical PRN .      meclizine (ANTIVERT) 32 MG tablet Take 25 mg by mouth 3 (three) times daily as needed.           Verify that the below list of medications is an accurate representation of the medications you are currently taking.  If none reported, the list may be blank. If incorrect, please contact your healthcare provider. Carry this list with you in case of emergency.           Current Medications     amlodipine (NORVASC) 10 MG tablet Take 1 tablet (10 mg total) by mouth once daily.    benzoyl peroxide (BENZAC AC WASH) 10 % external wash Apply topically 2 (two) times daily. Face and trunk    clindamycin phosphate foam Apply to affected area qd- bid    cyclobenzaprine (FLEXERIL) 10 MG tablet PRN    fenofibrate (TRICOR) 145 MG tablet TAKE ONE TABLET BY MOUTH ONCE DAILY    hydrOXYzine (ATARAX) 25 MG tablet Take 1 tablet (25 mg total) by mouth every 6 (six) hours as needed for Itching. Every 6 hours PRN    ketoconazole (NIZORAL) 2 % shampoo Use as directed qohs as needed    meloxicam (MOBIC) 15 MG tablet Take 1 tablet (15 mg total) by mouth once daily.    minocycline (MINOCIN,DYNACIN) 100 MG capsule 1 po x 2 weeks    mometasone (NASONEX) 50 mcg/actuation nasal spray 2 sprays by Nasal route once daily.    tramadol (ULTRAM) 50 mg tablet 1 Tablet(s) Oral PRN Every 8 hours.      valacyclovir (VALTREX) 500 MG tablet Take 2 tablets (1,000  mg total) by mouth 2 (two) times daily.    esomeprazole (NEXIUM) 40 MG capsule Take 1 capsule (40 mg total) by mouth before breakfast.    fluocinonide (LIDEX) 0.05 % external solution Apply topically once daily. To scalp    loratadine (CLARITIN) 10 mg tablet Take 1 tablet (10 mg total) by mouth once daily.    meclizine (ANTIVERT) 25 mg tablet Take 1 tablet (25 mg total) by mouth 3 (three) times daily as needed for Dizziness (may cause drowsiness).    montelukast (SINGULAIR) 10 mg tablet Take 1 tablet (10 mg total) by mouth every evening.    niacin (SLO-NIACIN) 500 mg tablet TAKE ONE TABLET BY MOUTH ONCE DAILY AT BEDTIME    ZIANA gel Apply small amount to face qhs           Clinical Reference Information           Your Vitals Were     BP Pulse Temp Resp Height Weight    136/76 96 98.8 °F (37.1 °C) (Oral) 16 6' (1.829 m) 94.9 kg (209 lb 3.5 oz)    SpO2 BMI             98% 28.37 kg/m2         Blood Pressure          Most Recent Value    BP  136/76      Allergies as of 4/6/2017     No Known Drug Allergies      Immunizations Administered on Date of Encounter - 4/6/2017     None      Orders Placed During Today's Visit     Future Labs/Procedures Expected by Expires    X-Ray Sinuses Min 3 Views  4/6/2017 4/6/2018      Instructions    Saline nasal spray in shower nightly  Use ear plugs to keep ears dry with washing hair daily  Be careful with using meclizine and hydroxyzine together may cause excessive drowsiness       Language Assistance Services     ATTENTION: Language assistance services are available, free of charge. Please call [phone].      ATENCIÓN: Si jon busby, tiene a peterson disposición servicios gratuitos de asistencia lingüística. Llame al [phone].     PHILIPP Ý: N?u b?n nói Ti?ng Vi?t, có các d?ch v? h? tr? ngôn ng? mi?n phí dành cho b?n. G?i s? [phone].         [facility] complies with applicable Federal civil rights laws and does not discriminate on the basis of race, color,  national origin, age, disability, or sex.

## 2017-04-20 RX ORDER — ESOMEPRAZOLE MAGNESIUM 40 MG/1
CAPSULE, DELAYED RELEASE ORAL
Qty: 30 CAPSULE | Refills: 11 | Status: SHIPPED | OUTPATIENT
Start: 2017-04-20 | End: 2018-02-15 | Stop reason: SDUPTHER

## 2017-05-04 RX ORDER — LORATADINE 10 MG/1
TABLET ORAL
Qty: 30 TABLET | Refills: 11 | Status: SHIPPED | OUTPATIENT
Start: 2017-05-04 | End: 2018-05-21 | Stop reason: SDUPTHER

## 2017-06-28 ENCOUNTER — TELEPHONE (OUTPATIENT)
Dept: FAMILY MEDICINE | Facility: CLINIC | Age: 52
End: 2017-06-28

## 2017-06-28 DIAGNOSIS — Z00.00 PREVENTATIVE HEALTH CARE: Primary | ICD-10-CM

## 2017-06-28 NOTE — TELEPHONE ENCOUNTER
----- Message from Chirag Oviedo sent at 6/28/2017  1:47 PM CDT -----  Contact: Jaren  Please call with appointment with labs prior to appointment. Please call [phone] (fjoc)

## 2017-07-25 DIAGNOSIS — M25.521 RIGHT ELBOW PAIN: Primary | ICD-10-CM

## 2017-07-26 ENCOUNTER — OFFICE VISIT (OUTPATIENT)
Dept: ORTHOPEDICS | Facility: CLINIC | Age: 52
End: 2017-07-26
Payer: COMMERCIAL

## 2017-07-26 ENCOUNTER — HOSPITAL ENCOUNTER (OUTPATIENT)
Dept: RADIOLOGY | Facility: HOSPITAL | Age: 52
Discharge: HOME | End: 2017-07-26
Attending: ORTHOPAEDIC SURGERY
Payer: COMMERCIAL

## 2017-07-26 VITALS — WEIGHT: 209.19 LBS | HEIGHT: 72 IN | BODY MASS INDEX: 28.33 KG/M2

## 2017-07-26 DIAGNOSIS — M25.522 LEFT ELBOW PAIN: ICD-10-CM

## 2017-07-26 DIAGNOSIS — M25.521 RIGHT ELBOW PAIN: ICD-10-CM

## 2017-07-26 DIAGNOSIS — M25.522 LEFT ELBOW PAIN: Primary | ICD-10-CM

## 2017-07-26 PROCEDURE — 99999 PR PBB SHADOW E&M-EST. PATIENT-LVL II: CPT | Mod: PBBFAC,,, | Performed by: ORTHOPAEDIC SURGERY

## 2017-07-26 PROCEDURE — 99204 OFFICE O/P NEW MOD 45 MIN: CPT | Mod: 25,S$GLB,, | Performed by: ORTHOPAEDIC SURGERY

## 2017-07-26 PROCEDURE — 73080 X-RAY EXAM OF ELBOW: CPT | Mod: 26,LT,, | Performed by: RADIOLOGY

## 2017-07-26 PROCEDURE — 20605 DRAIN/INJ JOINT/BURSA W/O US: CPT | Mod: LT,S$GLB,, | Performed by: ORTHOPAEDIC SURGERY

## 2017-07-26 PROCEDURE — 97760 ORTHOTIC MGMT&TRAING 1ST ENC: CPT | Mod: S$GLB,,, | Performed by: ORTHOPAEDIC SURGERY

## 2017-07-26 PROCEDURE — 73080 X-RAY EXAM OF ELBOW: CPT | Mod: TC,PO,LT

## 2017-07-26 RX ORDER — TRIAMCINOLONE ACETONIDE 40 MG/ML
40 INJECTION, SUSPENSION INTRA-ARTICULAR; INTRAMUSCULAR
Status: DISCONTINUED | OUTPATIENT
Start: 2017-07-26 | End: 2017-07-26 | Stop reason: HOSPADM

## 2017-07-26 RX ADMIN — TRIAMCINOLONE ACETONIDE 40 MG: 40 INJECTION, SUSPENSION INTRA-ARTICULAR; INTRAMUSCULAR at 11:07

## 2017-07-26 NOTE — PROGRESS NOTES
After obtaining verbal consent and giving the patient information on cortisone injections, the left elbow was prepped in a sterile fashion.  The region of the ECRB was then injected with 2cc's of 1% lidocaine and then 1cc of 0.25% marcaine and 1 cc of 40gm kenalog.  No medication was wasted.  The patient tolerated the procedure well and was instructed to implement a period of relative rest and report any adverse reactions.    51 years old complaining of pain in his left elbow is had now for 3 weeks' time.  Had similar symptoms on the right elbow that eventually underwent surgery.  Pain is in the lateral side of the elbow.  Pain is exacerbated with picking things up.  He's tried conservative care without relief    Exam shows tenderness in the region of the ECRB exacerbated with resisted extension about the wrist.  Otherwise no signs of infection.  Good motion good strength no instability    X-rays are negative     Assessment lateral epicondylitis    Plan we recommended no treatment but he is interested in a cortisone shot.  We will do that today into his left elbow.  We will also get him fitted with a matte strap    We performed a custom orthotic/brace fitting, adjusting and training with the patient. The patient demonstrated understanding and proper care. This was performed for 15 minutes.    Further History  Aching pain  Worse with activity  Relieved with rest  No other associated symptoms  No other radiation    Further Exam  Alert and oriented  Pleasant  Contralateral limb has appropriate range of motion for age and condition  Contralateral limb has appropriate strength for age and condition  Contralateral limb has appropriate stability  for age and condition  No adenopathy  Pulses are appropriate for current condition  Skin is intact        Chief Complaint    Chief Complaint   Patient presents with    Left Elbow - Pain       HPI  Jaren PICHARDO Cousin is a 51 y.o.  male who presents with       Past Medical  History  Past Medical History:   Diagnosis Date    ALLERGIC RHINITIS     Asthma     as child    DDD (degenerative disc disease), lumbar     Hemorrhoid     High triglycerides     HTN (hypertension)     PTSD (post-traumatic stress disorder)     not [facility]    Shingles     lower back    Urticaria        Past Surgical History  Past Surgical History:   Procedure Laterality Date    BACK SURGERY      ruptured disc L5-S1    COLONOSCOPY  ~2010  ([facility])    Hemorrhoids    ELBOW SURGERY      right    KNEE SURGERY      right       Medications  Current Outpatient Prescriptions   Medication Sig    ALLERGY RELIEF, LORATADINE, 10 mg tablet TAKE ONE TABLET BY MOUTH ONCE DAILY    amlodipine (NORVASC) 10 MG tablet Take 1 tablet (10 mg total) by mouth once daily.    benzoyl peroxide (BENZAC AC WASH) 10 % external wash Apply topically 2 (two) times daily. Face and trunk    clindamycin phosphate foam Apply to affected area qd- bid    cyclobenzaprine (FLEXERIL) 10 MG tablet PRN    esomeprazole (NEXIUM) 40 MG capsule TAKE ONE CAPSULE BY MOUTH BEFORE BREAKFAST    fenofibrate (TRICOR) 145 MG tablet TAKE ONE TABLET BY MOUTH ONCE DAILY    fluocinonide (LIDEX) 0.05 % external solution Apply topically once daily. To scalp    hydrOXYzine (ATARAX) 25 MG tablet Take 1 tablet (25 mg total) by mouth every 6 (six) hours as needed for Itching. Every 6 hours PRN    ketoconazole (NIZORAL) 2 % shampoo Use as directed qohs as needed    meclizine (ANTIVERT) 25 mg tablet Take 1 tablet (25 mg total) by mouth 3 (three) times daily as needed for Dizziness (may cause drowsiness).    meloxicam (MOBIC) 15 MG tablet Take 1 tablet (15 mg total) by mouth once daily.    minocycline (MINOCIN,DYNACIN) 100 MG capsule 1 po x 2 weeks    mometasone (NASONEX) 50 mcg/actuation nasal spray 2 sprays by Nasal route once daily.    niacin (SLO-NIACIN) 500 mg tablet TAKE ONE TABLET BY MOUTH ONCE DAILY AT BEDTIME     tramadol (ULTRAM) 50 mg tablet 1 Tablet(s) Oral PRN Every 8 hours.      valacyclovir (VALTREX) 500 MG tablet Take 2 tablets (1,000 mg total) by mouth 2 (two) times daily.    ZIANA gel Apply small amount to face qhs     No current facility-administered medications for this visit.        Allergies  Review of patient's allergies indicates:   Allergen Reactions    No known drug allergies        Family History  Family History   Problem Relation Age of Onset    Allergic rhinitis Son     Allergic rhinitis Daughter     Asthma Daughter     Allergic rhinitis Daughter     Angioedema Neg Hx     Eczema Neg Hx     Immunodeficiency Neg Hx     Urticaria Neg Hx     Lupus Neg Hx     Psoriasis Neg Hx     Melanoma Neg Hx     Acne Neg Hx        Social History  Social History     Social History    Marital status:      Spouse name: N/A    Number of children: N/A    Years of education: N/A     Occupational History    retired      Social History Main Topics    Smoking status: Never Smoker    Smokeless tobacco: Never Used    Alcohol use 12.6 oz/week     21 Cans of beer per week      Comment: 3 beers daily    Drug use: No    Sexual activity: Not on file     Other Topics Concern    Not on file     Social History Narrative    No narrative on file               Review of Systems     Constitutional: Negative    HENT: Negative  Eyes: Negative  Respiratory: Negative  Cardiovascular: Negative  Musculoskeletal: HPI  Skin: Negative  Neurological: Negative  Hematological: Negative  Endocrine: Negative                 Physical Exam    There were no vitals filed for this visit.  Body mass index is 28.37 kg/m².  Physical Examination:     General appearance -  well appearing, and in no distress  Mental status - awake  Neck - supple  Chest -  symmetric air entry  Heart - normal rate   Abdomen - soft      Assessment     1. Left elbow pain          Plan

## 2017-07-26 NOTE — PROCEDURES
Intermediate Joint Aspiration/Injection  Date/Time: 7/26/2017 11:28 AM  Performed by: DEBRA HURTADO  Authorized by: DEBRA HURTADO.     Consent Done?: Yes (Verbal)  Indications: Pain    Location:  Elbow  Needle size:  25 G  Medications:  40 mg triamcinolone acetonide 40 mg/mL  Patient tolerance:  Patient tolerated the procedure well with no immediate complications

## 2017-08-11 ENCOUNTER — LAB VISIT (OUTPATIENT)
Dept: LAB | Facility: HOSPITAL | Age: 52
End: 2017-08-11
Attending: FAMILY MEDICINE
Payer: COMMERCIAL

## 2017-08-11 DIAGNOSIS — Z00.00 PREVENTATIVE HEALTH CARE: ICD-10-CM

## 2017-08-11 LAB
ALBUMIN SERPL BCP-MCNC: 4.4 G/DL
ALP SERPL-CCNC: 35 U/L
ALT SERPL W/O P-5'-P-CCNC: 16 U/L
ANION GAP SERPL CALC-SCNC: 8 MMOL/L
AST SERPL-CCNC: 18 U/L
BILIRUB SERPL-MCNC: 0.6 MG/DL
BUN SERPL-MCNC: 19 MG/DL
CALCIUM SERPL-MCNC: 10 MG/DL
CHLORIDE SERPL-SCNC: 105 MMOL/L
CHOLEST/HDLC SERPL: 4.3 {RATIO}
CO2 SERPL-SCNC: 27 MMOL/L
COMPLEXED PSA SERPL-MCNC: 0.77 NG/ML
CREAT SERPL-MCNC: 1.2 MG/DL
EST. GFR  (AFRICAN AMERICAN): >60 ML/MIN/1.73 M^2
EST. GFR  (NON AFRICAN AMERICAN): >60 ML/MIN/1.73 M^2
GLUCOSE SERPL-MCNC: 107 MG/DL
HDL/CHOLESTEROL RATIO: 23.4 %
HDLC SERPL-MCNC: 252 MG/DL
HDLC SERPL-MCNC: 59 MG/DL
LDLC SERPL CALC-MCNC: 174.2 MG/DL
NONHDLC SERPL-MCNC: 193 MG/DL
POTASSIUM SERPL-SCNC: 5 MMOL/L
PROT SERPL-MCNC: 7.5 G/DL
SODIUM SERPL-SCNC: 140 MMOL/L
TRIGL SERPL-MCNC: 94 MG/DL

## 2017-08-11 PROCEDURE — 80053 COMPREHEN METABOLIC PANEL: CPT

## 2017-08-11 PROCEDURE — 36415 COLL VENOUS BLD VENIPUNCTURE: CPT | Mod: PO

## 2017-08-11 PROCEDURE — 84153 ASSAY OF PSA TOTAL: CPT

## 2017-08-11 PROCEDURE — 80061 LIPID PANEL: CPT

## 2017-08-17 ENCOUNTER — TELEPHONE (OUTPATIENT)
Dept: FAMILY MEDICINE | Facility: CLINIC | Age: 52
End: 2017-08-17

## 2017-08-17 ENCOUNTER — OFFICE VISIT (OUTPATIENT)
Dept: FAMILY MEDICINE | Facility: CLINIC | Age: 52
End: 2017-08-17
Payer: COMMERCIAL

## 2017-08-17 VITALS
WEIGHT: 203.69 LBS | TEMPERATURE: 98 F | HEART RATE: 78 BPM | BODY MASS INDEX: 27.59 KG/M2 | DIASTOLIC BLOOD PRESSURE: 60 MMHG | HEIGHT: 72 IN | RESPIRATION RATE: 18 BRPM | SYSTOLIC BLOOD PRESSURE: 120 MMHG

## 2017-08-17 DIAGNOSIS — L70.0 ACNE VULGARIS: ICD-10-CM

## 2017-08-17 DIAGNOSIS — Z00.00 PREVENTATIVE HEALTH CARE: Primary | ICD-10-CM

## 2017-08-17 DIAGNOSIS — I10 ESSENTIAL HYPERTENSION: ICD-10-CM

## 2017-08-17 DIAGNOSIS — E78.1 HIGH TRIGLYCERIDES: ICD-10-CM

## 2017-08-17 PROCEDURE — 99999 PR PBB SHADOW E&M-EST. PATIENT-LVL III: CPT | Mod: PBBFAC,,, | Performed by: FAMILY MEDICINE

## 2017-08-17 PROCEDURE — 99396 PREV VISIT EST AGE 40-64: CPT | Mod: S$GLB,,, | Performed by: FAMILY MEDICINE

## 2017-08-17 RX ORDER — MOMETASONE FUROATE 50 UG/1
SPRAY, METERED NASAL
Qty: 17 EACH | Refills: 11 | Status: CANCELLED | OUTPATIENT
Start: 2017-08-17

## 2017-08-17 RX ORDER — VALACYCLOVIR HYDROCHLORIDE 500 MG/1
1000 TABLET, FILM COATED ORAL 2 TIMES DAILY
Qty: 30 TABLET | Refills: 11 | Status: SHIPPED | OUTPATIENT
Start: 2017-08-17 | End: 2018-06-12 | Stop reason: SDUPTHER

## 2017-08-17 RX ORDER — MINOCYCLINE HYDROCHLORIDE 100 MG/1
CAPSULE ORAL
Qty: 28 CAPSULE | Refills: 2 | Status: SHIPPED | OUTPATIENT
Start: 2017-08-17 | End: 2018-02-15 | Stop reason: SDUPTHER

## 2017-08-17 RX ORDER — MOMETASONE FUROATE 50 UG/1
2 SPRAY, METERED NASAL DAILY
Qty: 1 EACH | Refills: 11 | Status: SHIPPED | OUTPATIENT
Start: 2017-08-17 | End: 2019-02-20 | Stop reason: SDUPTHER

## 2017-08-17 NOTE — TELEPHONE ENCOUNTER
----- Message from Inge Pringle sent at 8/17/2017 11:30 AM CDT -----  Clarification on Rx Minocycline.  Please call [facility]/[phone]

## 2017-08-17 NOTE — TELEPHONE ENCOUNTER
Attempted to contact pt. Unable to speak with pharmacist at the moment. Will try to contact again later.

## 2017-08-17 NOTE — PROGRESS NOTES
Chief Complaint   Patient presents with    Preventative Health Care     Physical with labs prior         HISTORY OF PRESENT ILLNESS: This is a 51 year-old male presents today for  6-month followup.     GERD - He is using Nexium daily PRN and Zantac daily.  HLD - He has  been taking Tricor and Niaspan 500mg daily; co Q 10 100mg daily  HTN - tolerating Norvasc 10mg daily for HTN. Home /70.  Lumbar DDD - using Ultram PRN and Flexeril PRN spasms and Mobic daily  Hives have been stable on present regimen  [facility]      Past Medical History:   Diagnosis Date    ALLERGIC RHINITIS     Asthma     as child    DDD (degenerative disc disease), lumbar     Hemorrhoid     High triglycerides     HTN (hypertension)     PTSD (post-traumatic stress disorder)     not currently taking [facility]s [facility]    Shingles     lower back    Urticaria          Past Surgical History:   Procedure Laterality Date    BACK SURGERY      ruptured disc L5-S1    COLONOSCOPY  ~2010  ([facility])    Hemorrhoids    ELBOW SURGERY      right    KNEE SURGERY      right       FAMILY HISTORY: coronary artery disease and diabetes in his father, diabetes in his brother.     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 He does not smoke. He drinks 3 beers per day. He does   not exercise regularly. He is , has children.      REVIEW OF SYSTEMS: Denies any chest pain, or change in   bowel or bladder habits.   Intermittent epigastric soreness which comes and goes.  Getting some low back pain    PHYSICAL EXAM:   /60 (BP Location: Left arm, Patient Position: Sitting, BP Method: Large (Manual))   Pulse 78   Temp 98 °F (36.7 °C) (Oral)   Resp 18   Ht 6' (1.829 m)   Wt 92.4 kg (203 lb 11.3 oz)   BMI 27.63 kg/m²     GENERAL: This is a healthy-appearing 51-year-old male, sitting upright,   in no apparent distress. Alert and oriented x4.   Posterior oropharynx is clear.   NECK: Supple.  There is no lymphadenopathy, thyromegaly or JVD.   CHEST: Clear to auscultation bilaterally with good respiratory movement.   ABDOMEN: Soft, no epigastric tenderness . Positive bowel sounds and no hepatosplenomegaly.   EXTREMITIES: Show no cyanosis, clubbing or edema.   SKIN: no rashes    Results for orders placed or performed in visit on 08/11/17   Lipid panel   Result Value Ref Range    Cholesterol 252 (H) 120 - 199 mg/dL    Triglycerides 94 30 - 150 mg/dL    HDL 59 40 - 75 mg/dL    LDL Cholesterol 174.2 (H) 63.0 - 159.0 mg/dL    HDL/Chol Ratio 23.4 20.0 - 50.0 %    Total Cholesterol/HDL Ratio 4.3 2.0 - 5.0    Non-HDL Cholesterol 193 mg/dL   PSA, Screening   Result Value Ref Range    PSA, SCREEN 0.77 0.00 - 4.00 ng/mL   Comprehensive metabolic panel   Result Value Ref Range    Sodium 140 136 - 145 mmol/L    Potassium 5.0 3.5 - 5.1 mmol/L    Chloride 105 95 - 110 mmol/L    CO2 27 23 - 29 mmol/L    Glucose 107 70 - 110 mg/dL    BUN, Bld 19 6 - 20 mg/dL    Creatinine 1.2 0.5 - 1.4 mg/dL    Calcium 10.0 8.7 - 10.5 mg/dL    Total Protein 7.5 6.0 - 8.4 g/dL    Albumin 4.4 3.5 - 5.2 g/dL    Total Bilirubin 0.6 0.1 - 1.0 mg/dL    Alkaline Phosphatase 35 (L) 55 - 135 U/L    AST 18 10 - 40 U/L    ALT 16 10 - 44 U/L    Anion Gap 8 8 - 16 mmol/L    eGFR if African American >60.0 >60 mL/min/1.73 m^2    eGFR if non African American >60.0 >60 mL/min/1.73 m^2               ASSESSMENT/PLAN:   Preventative health care    Acne vulgaris  -     minocycline (MINOCIN,DYNACIN) 100 MG capsule; 1 po x 2 weeks  Dispense: 28 capsule; Refill: 2    Essential hypertension    High triglycerides    Other orders  -     mometasone (NASONEX) 50 mcg/actuation nasal spray; 2 sprays by Nasal route once daily.  Dispense: 1 each; Refill: 11  -     valacyclovir (VALTREX) 500 MG tablet; Take 2 tablets (1,000 mg total) by mouth 2 (two) times daily.  Dispense: 30 tablet; Refill: 11    counseled on low-fat diet  Counseled on regular exercise, maintenance of a  healthy weight, balanced diet rich in fruits/vegetables and lean protein, and avoidance of unhealthy habits like smoking and excessive alcohol intake.  continue Tricor,  Niaspan 500mg QHS  Continue a low-fat, low-triglyceride diet.  continue Norvasc daily  Continue Nasonex daily with Claritin  F/u 12 months with labs or PRN

## 2017-11-28 PROCEDURE — 90686 IIV4 VACC NO PRSV 0.5 ML IM: CPT | Mod: S$GLB,,, | Performed by: FAMILY MEDICINE

## 2017-11-28 PROCEDURE — 90471 IMMUNIZATION ADMIN: CPT | Mod: S$GLB,,, | Performed by: FAMILY MEDICINE

## 2018-02-15 ENCOUNTER — TELEPHONE (OUTPATIENT)
Dept: FAMILY MEDICINE | Facility: CLINIC | Age: 53
End: 2018-02-15

## 2018-02-15 ENCOUNTER — OFFICE VISIT (OUTPATIENT)
Dept: FAMILY MEDICINE | Facility: CLINIC | Age: 53
End: 2018-02-15
Payer: COMMERCIAL

## 2018-02-15 ENCOUNTER — LAB VISIT (OUTPATIENT)
Dept: LAB | Facility: HOSPITAL | Age: 53
End: 2018-02-15
Attending: NURSE PRACTITIONER
Payer: COMMERCIAL

## 2018-02-15 VITALS
DIASTOLIC BLOOD PRESSURE: 84 MMHG | TEMPERATURE: 98 F | WEIGHT: 207.69 LBS | HEART RATE: 74 BPM | BODY MASS INDEX: 28.13 KG/M2 | SYSTOLIC BLOOD PRESSURE: 136 MMHG | HEIGHT: 72 IN | RESPIRATION RATE: 16 BRPM | OXYGEN SATURATION: 98 %

## 2018-02-15 DIAGNOSIS — L73.9 FOLLICULITIS: ICD-10-CM

## 2018-02-15 DIAGNOSIS — Z79.899 LONG-TERM USE OF HIGH-RISK MEDICATION: ICD-10-CM

## 2018-02-15 DIAGNOSIS — E78.5 DYSLIPIDEMIA: ICD-10-CM

## 2018-02-15 DIAGNOSIS — I10 ESSENTIAL HYPERTENSION: ICD-10-CM

## 2018-02-15 DIAGNOSIS — R10.13 ABDOMINAL DISCOMFORT, EPIGASTRIC: Primary | ICD-10-CM

## 2018-02-15 DIAGNOSIS — R10.13 ABDOMINAL DISCOMFORT, EPIGASTRIC: ICD-10-CM

## 2018-02-15 DIAGNOSIS — L70.0 ACNE VULGARIS: ICD-10-CM

## 2018-02-15 DIAGNOSIS — K21.9 GASTROESOPHAGEAL REFLUX DISEASE WITHOUT ESOPHAGITIS: ICD-10-CM

## 2018-02-15 LAB
BASOPHILS # BLD AUTO: 0.08 K/UL
BASOPHILS NFR BLD: 1.1 %
CHOLEST SERPL-MCNC: 252 MG/DL
CHOLEST/HDLC SERPL: 4.8 {RATIO}
DIFFERENTIAL METHOD: ABNORMAL
EOSINOPHIL # BLD AUTO: 0.2 K/UL
EOSINOPHIL NFR BLD: 3.4 %
ERYTHROCYTE [DISTWIDTH] IN BLOOD BY AUTOMATED COUNT: 14.6 %
HCT VFR BLD AUTO: 42.2 %
HDLC SERPL-MCNC: 52 MG/DL
HDLC SERPL: 20.6 %
HGB BLD-MCNC: 13.3 G/DL
IMM GRANULOCYTES # BLD AUTO: 0.01 K/UL
IMM GRANULOCYTES NFR BLD AUTO: 0.1 %
LDLC SERPL CALC-MCNC: 176.2 MG/DL
LYMPHOCYTES # BLD AUTO: 1.6 K/UL
LYMPHOCYTES NFR BLD: 22.4 %
MCH RBC QN AUTO: 26.7 PG
MCHC RBC AUTO-ENTMCNC: 31.5 G/DL
MCV RBC AUTO: 85 FL
MONOCYTES # BLD AUTO: 0.6 K/UL
MONOCYTES NFR BLD: 7.9 %
NEUTROPHILS # BLD AUTO: 4.6 K/UL
NEUTROPHILS NFR BLD: 65.1 %
NONHDLC SERPL-MCNC: 200 MG/DL
NRBC BLD-RTO: 0 /100 WBC
PLATELET # BLD AUTO: 449 K/UL
PMV BLD AUTO: 10 FL
RBC # BLD AUTO: 4.99 M/UL
TRIGL SERPL-MCNC: 119 MG/DL
WBC # BLD AUTO: 7.1 K/UL

## 2018-02-15 PROCEDURE — 85025 COMPLETE CBC W/AUTO DIFF WBC: CPT

## 2018-02-15 PROCEDURE — 80061 LIPID PANEL: CPT

## 2018-02-15 PROCEDURE — 99214 OFFICE O/P EST MOD 30 MIN: CPT | Mod: S$GLB,,, | Performed by: NURSE PRACTITIONER

## 2018-02-15 PROCEDURE — 3008F BODY MASS INDEX DOCD: CPT | Mod: S$GLB,,, | Performed by: NURSE PRACTITIONER

## 2018-02-15 PROCEDURE — 36415 COLL VENOUS BLD VENIPUNCTURE: CPT | Mod: PO

## 2018-02-15 PROCEDURE — 99999 PR PBB SHADOW E&M-EST. PATIENT-LVL V: CPT | Mod: PBBFAC,,, | Performed by: NURSE PRACTITIONER

## 2018-02-15 PROCEDURE — 86677 HELICOBACTER PYLORI ANTIBODY: CPT

## 2018-02-15 RX ORDER — AMLODIPINE BESYLATE 10 MG/1
10 TABLET ORAL DAILY
Qty: 30 TABLET | Refills: 5
Start: 2018-02-15 | End: 2018-06-12 | Stop reason: SDUPTHER

## 2018-02-15 RX ORDER — KETOCONAZOLE 20 MG/ML
SHAMPOO, SUSPENSION TOPICAL
Qty: 240 ML | Refills: 5 | Status: SHIPPED | OUTPATIENT
Start: 2018-02-15 | End: 2019-01-17 | Stop reason: SDUPTHER

## 2018-02-15 RX ORDER — KETOCONAZOLE 20 MG/ML
SHAMPOO, SUSPENSION TOPICAL
Qty: 240 ML | Refills: 5 | Status: SHIPPED | OUTPATIENT
Start: 2018-02-15 | End: 2018-02-15 | Stop reason: SDUPTHER

## 2018-02-15 RX ORDER — ESOMEPRAZOLE MAGNESIUM 40 MG/1
40 CAPSULE, DELAYED RELEASE ORAL
Qty: 30 CAPSULE | Refills: 5 | Status: SHIPPED | OUTPATIENT
Start: 2018-02-15 | End: 2018-06-12

## 2018-02-15 RX ORDER — MINOCYCLINE HYDROCHLORIDE 100 MG/1
CAPSULE ORAL
Qty: 28 CAPSULE | Refills: 2 | Status: SHIPPED | OUTPATIENT
Start: 2018-02-15 | End: 2018-06-12 | Stop reason: SDUPTHER

## 2018-02-15 NOTE — PROGRESS NOTES
Subjective:       Patient ID: Jaren PICHARDO Cousin is a 52 y.o. male.  Last seen in primary care on 08/17/2017 [place]. I last saw him on 04/06/4017    Chief Complaint: Abdominal Pain (URQ) and Labs Only (Pt is fasting during visit.  Would like labs)    Abdominal Pain   This is a recurrent problem. The current episode started 1 to 4 weeks ago. The onset quality is gradual. The problem occurs every several days. The problem has been gradually worsening. The pain is located in the epigastric region. The pain is at a severity of 3/10 (states better today). The pain is mild. The abdominal pain radiates to the back. Associated symptoms include flatus and nausea. Pertinent negatives include no fever. Associated symptoms comments: Oily stool. The pain is aggravated by eating. Treatments tried: POM juice. The treatment provided mild relief. Prior workup: Hepatobiliary scan in January 2017.      He has been experiencing abd pain for the past month. Dull in nature, goes and comes and notices more often when eating anything. States discomfort sometimes goes straight through to back.   He does still have gall bladder  Vitals:    02/15/18 0955   BP: 136/84   Pulse: 74   Resp: 16   Temp: 98.2 °F (36.8 °C)     Review of Systems   Constitutional: Negative for fever.   Gastrointestinal: Positive for abdominal pain, flatus and nausea.       States he takes Advil very rarely and not other OTC NSAIDS  He takes mobic rarely  Takes minocin periodically or acne recurrence  Objective:      Physical Exam   Constitutional: He is oriented to person, place, and time. Vital signs are normal. He appears well-developed and well-nourished. He is active and cooperative.   HENT:   Head: Normocephalic and atraumatic.   Right Ear: Hearing, tympanic membrane, external ear and ear canal normal.   Left Ear: Hearing, tympanic membrane, external ear and ear canal normal.   Nose: Nose normal.   Mouth/Throat: Uvula is midline, oropharynx is clear and moist and  mucous membranes are normal.   Eyes: Lids are normal.   Neck: Trachea normal, normal range of motion, full passive range of motion without pain and phonation normal. Neck supple.   Cardiovascular: Normal rate and regular rhythm.    Pulmonary/Chest: Effort normal and breath sounds normal.   Abdominal: Soft. Bowel sounds are normal. There is tenderness in the epigastric area.       Musculoskeletal: Normal range of motion.   Lymphadenopathy:        Head (right side): No submental, no submandibular, no tonsillar, no preauricular, no posterior auricular and no occipital adenopathy present.        Head (left side): No submental, no submandibular, no tonsillar, no preauricular, no posterior auricular and no occipital adenopathy present.     He has no cervical adenopathy.   Neurological: He is alert and oriented to person, place, and time.   Skin: Skin is warm, dry and intact.   Psychiatric: He has a normal mood and affect. His speech is normal and behavior is normal. Judgment and thought content normal. Cognition and memory are normal.   Nursing note and vitals reviewed.      Assessment & Plan:       Abdominal discomfort, epigastric  -     H. PYLORI ANTIBODY, IGG; Future; Expected date: 02/15/2018    Essential hypertension  -     amLODIPine (NORVASC) 10 MG tablet; Take 1 tablet (10 mg total) by mouth once daily.  Dispense: 30 tablet; Refill: 5    Gastroesophageal reflux disease without esophagitis  -     esomeprazole (NEXIUM) 40 MG capsule; Take 1 capsule (40 mg total) by mouth before breakfast.  Dispense: 30 capsule; Refill: 5    Dyslipidemia  -     Lipid panel; Future; Expected date: 02/15/2018    Long-term use of high-risk medication  -     CBC auto differential; Future; Expected date: 02/15/2018    Acne vulgaris  -     minocycline (MINOCIN,DYNACIN) 100 MG capsule; 1 po x 2 weeks  Dispense: 28 capsule; Refill: 2    Folliculitis  -     Discontinue: ketoconazole (NIZORAL) 2 % shampoo; Use as directed qohs as needed   Dispense: 240 mL; Refill: 5  -     ketoconazole (NIZORAL) 2 % shampoo; Use as directed qohs as needed  Dispense: 240 mL; Refill: 5          Follow-up if symptoms worsen or fail to improve.

## 2018-02-15 NOTE — TELEPHONE ENCOUNTER
----- Message from Landon Cheatham sent at 2/15/2018 10:57 AM CST -----  Contact: Jc with [facility]  Jc with [facility], [phone], calling to get clarification on directions sent over for minocycline (MINOCIN,DYNACIN) 100 MG capsule. Please advise. Thanks.

## 2018-02-18 PROBLEM — L73.9 FOLLICULITIS: Status: ACTIVE | Noted: 2018-02-18

## 2018-02-19 LAB — H PYLORI IGG SERPL QL IA: NEGATIVE

## 2018-02-25 DIAGNOSIS — D69.6 THROMBOCYTOPENIA: Primary | ICD-10-CM

## 2018-02-27 ENCOUNTER — LAB VISIT (OUTPATIENT)
Dept: LAB | Facility: HOSPITAL | Age: 53
End: 2018-02-27
Attending: NURSE PRACTITIONER
Payer: COMMERCIAL

## 2018-02-27 DIAGNOSIS — D69.6 THROMBOCYTOPENIA: ICD-10-CM

## 2018-02-27 LAB
BASOPHILS # BLD AUTO: 0.08 K/UL
BASOPHILS NFR BLD: 1.3 %
DIFFERENTIAL METHOD: ABNORMAL
EOSINOPHIL # BLD AUTO: 0.3 K/UL
EOSINOPHIL NFR BLD: 4.1 %
ERYTHROCYTE [DISTWIDTH] IN BLOOD BY AUTOMATED COUNT: 14.5 %
HCT VFR BLD AUTO: 40.3 %
HGB BLD-MCNC: 12.8 G/DL
IMM GRANULOCYTES # BLD AUTO: 0.01 K/UL
IMM GRANULOCYTES NFR BLD AUTO: 0.2 %
IRON SERPL-MCNC: 110 UG/DL
LYMPHOCYTES # BLD AUTO: 2.1 K/UL
LYMPHOCYTES NFR BLD: 34.5 %
MCH RBC QN AUTO: 27.4 PG
MCHC RBC AUTO-ENTMCNC: 31.8 G/DL
MCV RBC AUTO: 86 FL
MONOCYTES # BLD AUTO: 0.5 K/UL
MONOCYTES NFR BLD: 8.5 %
NEUTROPHILS # BLD AUTO: 3.1 K/UL
NEUTROPHILS NFR BLD: 51.4 %
NRBC BLD-RTO: 0 /100 WBC
PLATELET # BLD AUTO: 416 K/UL
PMV BLD AUTO: 10.1 FL
RBC # BLD AUTO: 4.68 M/UL
SATURATED IRON: 29 %
TOTAL IRON BINDING CAPACITY: 374 UG/DL
TRANSFERRIN SERPL-MCNC: 253 MG/DL
WBC # BLD AUTO: 6.03 K/UL

## 2018-02-27 PROCEDURE — 36415 COLL VENOUS BLD VENIPUNCTURE: CPT | Mod: PO

## 2018-02-27 PROCEDURE — 85025 COMPLETE CBC W/AUTO DIFF WBC: CPT

## 2018-02-27 PROCEDURE — 85060 BLOOD SMEAR INTERPRETATION: CPT | Mod: ,,, | Performed by: PATHOLOGY

## 2018-02-27 PROCEDURE — 83540 ASSAY OF IRON: CPT

## 2018-02-28 LAB
PATH REV BLD -IMP: NORMAL
PATH REV BLD -IMP: NORMAL

## 2018-04-03 DIAGNOSIS — E78.1 HIGH TRIGLYCERIDES: ICD-10-CM

## 2018-04-03 RX ORDER — NIACIN 500 MG/1
TABLET, EXTENDED RELEASE ORAL
Qty: 30 TABLET | Refills: 11 | Status: SHIPPED | OUTPATIENT
Start: 2018-04-03 | End: 2019-03-24 | Stop reason: SDUPTHER

## 2018-04-03 RX ORDER — FENOFIBRATE 145 MG/1
TABLET, FILM COATED ORAL
Qty: 30 TABLET | Refills: 11 | Status: SHIPPED | OUTPATIENT
Start: 2018-04-03 | End: 2019-03-24 | Stop reason: SDUPTHER

## 2018-05-21 RX ORDER — LORATADINE 10 MG/1
TABLET ORAL
Qty: 30 TABLET | Refills: 11 | Status: SHIPPED | OUTPATIENT
Start: 2018-05-21

## 2018-06-04 ENCOUNTER — TELEPHONE (OUTPATIENT)
Dept: FAMILY MEDICINE | Facility: CLINIC | Age: 53
End: 2018-06-04

## 2018-06-04 DIAGNOSIS — D75.839 THROMBOCYTOSIS: ICD-10-CM

## 2018-06-04 DIAGNOSIS — I10 ESSENTIAL HYPERTENSION: ICD-10-CM

## 2018-06-04 DIAGNOSIS — E78.5 DYSLIPIDEMIA: Primary | ICD-10-CM

## 2018-06-04 DIAGNOSIS — K21.9 GASTROESOPHAGEAL REFLUX DISEASE WITHOUT ESOPHAGITIS: ICD-10-CM

## 2018-06-04 DIAGNOSIS — E78.1 HIGH TRIGLYCERIDES: ICD-10-CM

## 2018-06-04 NOTE — TELEPHONE ENCOUNTER
Fasting last entered - no PSA needed - last ones were perfect and the last one was 9 months ago - so not needed until 1 yr.  Will repeat next year. - labs before appt

## 2018-06-04 NOTE — TELEPHONE ENCOUNTER
----- Message from Reny Quinn sent at 6/4/2018 10:58 AM CDT -----  Contact: pt  Pt calling states coming in 6/12 and is wanting to know if there is labs that he needs to do for this appointment....[phone] or [phone]

## 2018-06-04 NOTE — TELEPHONE ENCOUNTER
Patient requesting labs for next weeks appointment. Advised that Dr Manzanares is out along with Atiya Leon. PSA is not a WOG. Please advise when you return so we can schedule labs.

## 2018-06-06 ENCOUNTER — LAB VISIT (OUTPATIENT)
Dept: LAB | Facility: HOSPITAL | Age: 53
End: 2018-06-06
Attending: NURSE PRACTITIONER
Payer: COMMERCIAL

## 2018-06-06 DIAGNOSIS — D75.839 THROMBOCYTOSIS: ICD-10-CM

## 2018-06-06 DIAGNOSIS — I10 ESSENTIAL HYPERTENSION: ICD-10-CM

## 2018-06-06 DIAGNOSIS — E78.1 HIGH TRIGLYCERIDES: ICD-10-CM

## 2018-06-06 DIAGNOSIS — E78.5 DYSLIPIDEMIA: ICD-10-CM

## 2018-06-06 DIAGNOSIS — K21.9 GASTROESOPHAGEAL REFLUX DISEASE WITHOUT ESOPHAGITIS: ICD-10-CM

## 2018-06-06 LAB
ALBUMIN SERPL BCP-MCNC: 4.2 G/DL
ALP SERPL-CCNC: 38 U/L
ALT SERPL W/O P-5'-P-CCNC: 16 U/L
ANION GAP SERPL CALC-SCNC: 7 MMOL/L
AST SERPL-CCNC: 16 U/L
BASOPHILS # BLD AUTO: 0.08 K/UL
BASOPHILS NFR BLD: 1 %
BILIRUB SERPL-MCNC: 0.6 MG/DL
BUN SERPL-MCNC: 20 MG/DL
CALCIUM SERPL-MCNC: 9.8 MG/DL
CHLORIDE SERPL-SCNC: 108 MMOL/L
CHOLEST SERPL-MCNC: 217 MG/DL
CHOLEST/HDLC SERPL: 4.4 {RATIO}
CO2 SERPL-SCNC: 25 MMOL/L
CREAT SERPL-MCNC: 1 MG/DL
DIFFERENTIAL METHOD: ABNORMAL
EOSINOPHIL # BLD AUTO: 0.2 K/UL
EOSINOPHIL NFR BLD: 2.6 %
ERYTHROCYTE [DISTWIDTH] IN BLOOD BY AUTOMATED COUNT: 14.2 %
EST. GFR  (AFRICAN AMERICAN): >60 ML/MIN/1.73 M^2
EST. GFR  (NON AFRICAN AMERICAN): >60 ML/MIN/1.73 M^2
GLUCOSE SERPL-MCNC: 101 MG/DL
HCT VFR BLD AUTO: 41.2 %
HDLC SERPL-MCNC: 49 MG/DL
HDLC SERPL: 22.6 %
HGB BLD-MCNC: 13.2 G/DL
IMM GRANULOCYTES # BLD AUTO: 0.02 K/UL
IMM GRANULOCYTES NFR BLD AUTO: 0.3 %
LDLC SERPL CALC-MCNC: 147.4 MG/DL
LYMPHOCYTES # BLD AUTO: 2.3 K/UL
LYMPHOCYTES NFR BLD: 29.9 %
MCH RBC QN AUTO: 27.1 PG
MCHC RBC AUTO-ENTMCNC: 32 G/DL
MCV RBC AUTO: 85 FL
MONOCYTES # BLD AUTO: 0.7 K/UL
MONOCYTES NFR BLD: 8.7 %
NEUTROPHILS # BLD AUTO: 4.5 K/UL
NEUTROPHILS NFR BLD: 57.5 %
NONHDLC SERPL-MCNC: 168 MG/DL
NRBC BLD-RTO: 0 /100 WBC
PLATELET # BLD AUTO: 441 K/UL
PMV BLD AUTO: 9.9 FL
POTASSIUM SERPL-SCNC: 4.1 MMOL/L
PROT SERPL-MCNC: 7.1 G/DL
RBC # BLD AUTO: 4.87 M/UL
SODIUM SERPL-SCNC: 140 MMOL/L
TRIGL SERPL-MCNC: 103 MG/DL
WBC # BLD AUTO: 7.83 K/UL

## 2018-06-06 PROCEDURE — 36415 COLL VENOUS BLD VENIPUNCTURE: CPT | Mod: PO

## 2018-06-06 PROCEDURE — 80053 COMPREHEN METABOLIC PANEL: CPT

## 2018-06-06 PROCEDURE — 85025 COMPLETE CBC W/AUTO DIFF WBC: CPT

## 2018-06-06 PROCEDURE — 80061 LIPID PANEL: CPT

## 2018-06-12 ENCOUNTER — OFFICE VISIT (OUTPATIENT)
Dept: FAMILY MEDICINE | Facility: CLINIC | Age: 53
End: 2018-06-12
Payer: COMMERCIAL

## 2018-06-12 ENCOUNTER — TELEPHONE (OUTPATIENT)
Dept: FAMILY MEDICINE | Facility: CLINIC | Age: 53
End: 2018-06-12

## 2018-06-12 VITALS
HEART RATE: 83 BPM | RESPIRATION RATE: 18 BRPM | HEIGHT: 72 IN | WEIGHT: 204.38 LBS | SYSTOLIC BLOOD PRESSURE: 120 MMHG | BODY MASS INDEX: 27.68 KG/M2 | OXYGEN SATURATION: 98 % | DIASTOLIC BLOOD PRESSURE: 68 MMHG

## 2018-06-12 DIAGNOSIS — K21.9 GASTROESOPHAGEAL REFLUX DISEASE WITHOUT ESOPHAGITIS: ICD-10-CM

## 2018-06-12 DIAGNOSIS — Z00.00 PREVENTATIVE HEALTH CARE: Primary | ICD-10-CM

## 2018-06-12 DIAGNOSIS — I10 ESSENTIAL HYPERTENSION: ICD-10-CM

## 2018-06-12 DIAGNOSIS — L70.0 ACNE VULGARIS: ICD-10-CM

## 2018-06-12 PROCEDURE — 99396 PREV VISIT EST AGE 40-64: CPT | Mod: S$GLB,,, | Performed by: FAMILY MEDICINE

## 2018-06-12 PROCEDURE — 3074F SYST BP LT 130 MM HG: CPT | Mod: CPTII,S$GLB,, | Performed by: FAMILY MEDICINE

## 2018-06-12 PROCEDURE — 3078F DIAST BP <80 MM HG: CPT | Mod: CPTII,S$GLB,, | Performed by: FAMILY MEDICINE

## 2018-06-12 PROCEDURE — 99999 PR PBB SHADOW E&M-EST. PATIENT-LVL III: CPT | Mod: PBBFAC,,, | Performed by: FAMILY MEDICINE

## 2018-06-12 RX ORDER — AMLODIPINE BESYLATE 10 MG/1
10 TABLET ORAL DAILY
Qty: 30 TABLET | Refills: 5
Start: 2018-06-12

## 2018-06-12 RX ORDER — MINOCYCLINE HYDROCHLORIDE 100 MG/1
CAPSULE ORAL
Qty: 28 CAPSULE | Refills: 2 | Status: SHIPPED | OUTPATIENT
Start: 2018-06-12 | End: 2019-04-16 | Stop reason: SDUPTHER

## 2018-06-12 RX ORDER — VALACYCLOVIR HYDROCHLORIDE 500 MG/1
1000 TABLET, FILM COATED ORAL 2 TIMES DAILY
Qty: 30 TABLET | Refills: 11 | Status: SHIPPED | OUTPATIENT
Start: 2018-06-12 | End: 2019-06-15 | Stop reason: SDUPTHER

## 2018-06-12 RX ORDER — ESOMEPRAZOLE MAGNESIUM 40 MG/1
40 CAPSULE, DELAYED RELEASE ORAL
Qty: 30 CAPSULE | Refills: 11 | Status: SHIPPED | OUTPATIENT
Start: 2018-06-12 | End: 2018-11-29 | Stop reason: SDUPTHER

## 2018-06-12 NOTE — PROGRESS NOTES
Chief Complaint   Patient presents with    Follow-up         HISTORY OF PRESENT ILLNESS: This is a 50 year-old male presents today for  Annual exam    GERD - He is using Zantac daily.  HLD - He has  been taking Tricor and Niaspan 500mg daily; co Q 10 100mg daily  HTN - tolerating Norvasc 10mg daily for HTN. Home /70.  Lumbar DDD - using Ultram PRN and Flexeril PRN spasms   Hives have been stable on present regimen. Using atarax at bedtime    Past Medical History:   Diagnosis Date    ALLERGIC RHINITIS     Asthma     as child    DDD (degenerative disc disease), lumbar     Hemorrhoid     High triglycerides     HTN (hypertension)     PTSD (post-traumatic stress disorder)     not [facility]    Shingles     lower back    Urticaria          Past Surgical History:   Procedure Laterality Date    BACK SURGERY      ruptured disc L5-S1    COLONOSCOPY  ~2010  ([facility])    Hemorrhoids    ELBOW SURGERY      right    KNEE SURGERY      right       FAMILY HISTORY: coronary artery disease and diabetes in his father, diabetes in his brother.     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 He does not smoke. He drinks 3 beers per day. He does   not exercise regularly. He is , has children.      REVIEW OF SYSTEMS: Denies any chest pain, or change in   bowel or bladder habits.   Intermittent epigastric soreness which comes and goes.  Getting some low back pain    PHYSICAL EXAM:   /68   Pulse 83   Resp 18   Ht 6' (1.829 m)   Wt 92.7 kg (204 lb 5.9 oz)   SpO2 98%   BMI 27.72 kg/m²     GENERAL: This is a healthy-appearing 52-year-old male, sitting upright,   in no apparent distress. Alert and oriented x4.   Posterior oropharynx is clear.   NECK: Supple. There is no lymphadenopathy, thyromegaly or JVD.   CHEST: Clear to auscultation bilaterally with good respiratory movement.   ABDOMEN: Soft, no epigastric tenderness . Positive  bowel sounds and no hepatosplenomegaly.   EXTREMITIES: Show no cyanosis, clubbing or edema.   SKIN: no rashes    Results for orders placed or performed in visit on 06/06/18   Lipid panel   Result Value Ref Range    Cholesterol 217 (H) 120 - 199 mg/dL    Triglycerides 103 30 - 150 mg/dL    HDL 49 40 - 75 mg/dL    LDL Cholesterol 147.4 63.0 - 159.0 mg/dL    HDL/Chol Ratio 22.6 20.0 - 50.0 %    Total Cholesterol/HDL Ratio 4.4 2.0 - 5.0    Non-HDL Cholesterol 168 mg/dL   Comprehensive metabolic panel   Result Value Ref Range    Sodium 140 136 - 145 mmol/L    Potassium 4.1 3.5 - 5.1 mmol/L    Chloride 108 95 - 110 mmol/L    CO2 25 23 - 29 mmol/L    Glucose 101 70 - 110 mg/dL    BUN, Bld 20 6 - 20 mg/dL    Creatinine 1.0 0.5 - 1.4 mg/dL    Calcium 9.8 8.7 - 10.5 mg/dL    Total Protein 7.1 6.0 - 8.4 g/dL    Albumin 4.2 3.5 - 5.2 g/dL    Total Bilirubin 0.6 0.1 - 1.0 mg/dL    Alkaline Phosphatase 38 (L) 55 - 135 U/L    AST 16 10 - 40 U/L    ALT 16 10 - 44 U/L    Anion Gap 7 (L) 8 - 16 mmol/L    eGFR if African American >60.0 >60 mL/min/1.73 m^2    eGFR if non African American >60.0 >60 mL/min/1.73 m^2   CBC auto differential   Result Value Ref Range    WBC 7.83 3.90 - 12.70 K/uL    RBC 4.87 4.60 - 6.20 M/uL    Hemoglobin 13.2 (L) 14.0 - 18.0 g/dL    Hematocrit 41.2 40.0 - 54.0 %    MCV 85 82 - 98 fL    MCH 27.1 27.0 - 31.0 pg    MCHC 32.0 32.0 - 36.0 g/dL    RDW 14.2 11.5 - 14.5 %    Platelets 441 (H) 150 - 350 K/uL    MPV 9.9 9.2 - 12.9 fL    Immature Granulocytes 0.3 0.0 - 0.5 %    Gran # (ANC) 4.5 1.8 - 7.7 K/uL    Immature Grans (Abs) 0.02 0.00 - 0.04 K/uL    Lymph # 2.3 1.0 - 4.8 K/uL    Mono # 0.7 0.3 - 1.0 K/uL    Eos # 0.2 0.0 - 0.5 K/uL    Baso # 0.08 0.00 - 0.20 K/uL    nRBC 0 0 /100 WBC    Gran% 57.5 38.0 - 73.0 %    Lymph% 29.9 18.0 - 48.0 %    Mono% 8.7 4.0 - 15.0 %    Eosinophil% 2.6 0.0 - 8.0 %    Basophil% 1.0 0.0 - 1.9 %    Differential Method Automated                ASSESSMENT/PLAN:   Preventative health  care    Acne vulgaris  -     minocycline (MINOCIN,DYNACIN) 100 MG capsule; 1 po x 2 weeks  Dispense: 28 capsule; Refill: 2    Gastroesophageal reflux disease without esophagitis    Essential hypertension  -     amLODIPine (NORVASC) 10 MG tablet; Take 1 tablet (10 mg total) by mouth once daily.  Dispense: 30 tablet; Refill: 5    Other orders  -     esomeprazole (NEXIUM) 40 MG capsule; Take 1 capsule (40 mg total) by mouth before breakfast.  Dispense: 30 capsule; Refill: 11  -     valACYclovir (VALTREX) 500 MG tablet; Take 2 tablets (1,000 mg total) by mouth 2 (two) times daily.  Dispense: 30 tablet; Refill: 11       Overall doing well  Counseled on regular exercise, maintenance of a healthy weight, balanced diet rich in fruits/vegetables and lean protein, and avoidance of unhealthy habits like smoking and excessive alcohol intake.  continue Tricor,  Niaspan 500mg QHS  Continue a low-fat, low-triglyceride diet.  continue Norvasc daily  Continue Nasonex daily with Claritin  F/u 12 months with labs or PRN

## 2018-06-12 NOTE — TELEPHONE ENCOUNTER
----- Message from Nicole Wells sent at 6/12/2018 12:40 PM CDT -----  Contact: Mesha with [facility] [phone]  Mesha with [facility] [phone] calling to verify the directions for the prescription minocycline (MINOCIN,DYNACIN) 100 MG capsule. Please advise. Thanks.

## 2018-08-22 ENCOUNTER — OFFICE VISIT (OUTPATIENT)
Dept: FAMILY MEDICINE | Facility: CLINIC | Age: 53
End: 2018-08-22
Payer: COMMERCIAL

## 2018-08-22 ENCOUNTER — HOSPITAL ENCOUNTER (OUTPATIENT)
Dept: RADIOLOGY | Facility: HOSPITAL | Age: 53
Discharge: HOME | End: 2018-08-22
Attending: NURSE PRACTITIONER
Payer: COMMERCIAL

## 2018-08-22 VITALS
WEIGHT: 204.56 LBS | TEMPERATURE: 98 F | SYSTOLIC BLOOD PRESSURE: 120 MMHG | OXYGEN SATURATION: 99 % | BODY MASS INDEX: 27.71 KG/M2 | DIASTOLIC BLOOD PRESSURE: 80 MMHG | HEART RATE: 88 BPM | HEIGHT: 72 IN

## 2018-08-22 DIAGNOSIS — M54.2 NECK PAIN ON LEFT SIDE: ICD-10-CM

## 2018-08-22 DIAGNOSIS — M51.36 DDD (DEGENERATIVE DISC DISEASE), LUMBAR: ICD-10-CM

## 2018-08-22 DIAGNOSIS — M54.2 NECK PAIN ON LEFT SIDE: Primary | ICD-10-CM

## 2018-08-22 PROCEDURE — 3079F DIAST BP 80-89 MM HG: CPT | Mod: CPTII,S$GLB,, | Performed by: NURSE PRACTITIONER

## 2018-08-22 PROCEDURE — 99213 OFFICE O/P EST LOW 20 MIN: CPT | Mod: S$GLB,,, | Performed by: NURSE PRACTITIONER

## 2018-08-22 PROCEDURE — 3074F SYST BP LT 130 MM HG: CPT | Mod: CPTII,S$GLB,, | Performed by: NURSE PRACTITIONER

## 2018-08-22 PROCEDURE — 72050 X-RAY EXAM NECK SPINE 4/5VWS: CPT | Mod: 26,,, | Performed by: RADIOLOGY

## 2018-08-22 PROCEDURE — 72050 X-RAY EXAM NECK SPINE 4/5VWS: CPT | Mod: TC,FY,PO

## 2018-08-22 PROCEDURE — 99999 PR PBB SHADOW E&M-EST. PATIENT-LVL V: CPT | Mod: PBBFAC,,, | Performed by: NURSE PRACTITIONER

## 2018-08-22 PROCEDURE — 3008F BODY MASS INDEX DOCD: CPT | Mod: CPTII,S$GLB,, | Performed by: NURSE PRACTITIONER

## 2018-08-22 RX ORDER — NAPROXEN 500 MG/1
500 TABLET ORAL 2 TIMES DAILY WITH MEALS
Qty: 60 TABLET | Refills: 0 | Status: SHIPPED | OUTPATIENT
Start: 2018-08-22 | End: 2019-04-16

## 2018-08-22 NOTE — PROGRESS NOTES
"Subjective:       Patient ID: Jaren PICHARDO Cousin is a 52 y.o. male.    Chief Complaint: Neck Pain  He was last seen in primary care by Dr. Manzanares on 06/12/2018. I last saw him on 02/15/2018.   HPI   Complains of stiff neck which is chronic and he actually had it last year.   This episode Started on Sunday after sleeping without the special pillow he normally used. Denies any recent injury. Pain is stabbing with range of motion and feels like a shock. States more of constant pressure when relaxed.    He traveled via plane yesterday for more than 5 hours and had more symptoms last night.   He did go bowling last week, which was out of his normal activity range  HX: back surgery 2007 L5-S1 ([facility]   Pain is 6 on scale,   Took flexeril last night and used TENS unit states "felt better when went to bed but symptoms returned this morning".   Vitals:    08/22/18 1504   BP: 120/80   Pulse: 88   Temp: 98.3 °F (36.8 °C)     Review of Systems   Musculoskeletal: Positive for neck pain.        Neck pain for one year        Objective:      Physical Exam   Constitutional: He is oriented to person, place, and time. He appears well-developed and well-nourished.   HENT:   Head: Normocephalic and atraumatic.   Right Ear: External ear normal.   Left Ear: External ear normal.   Nose: Nose normal.   Mouth/Throat: Oropharynx is clear and moist.   Eyes: Lids are normal.   Neck: Trachea normal and phonation normal. Neck supple. Spinous process tenderness present. No neck rigidity. Decreased range of motion present. No edema present.       Cardiovascular: Normal rate, regular rhythm and normal heart sounds.   Pulmonary/Chest: Effort normal and breath sounds normal.   Abdominal: Soft. There is no tenderness.   Lymphadenopathy:        Head (right side): No submental, no submandibular, no tonsillar, no preauricular, no posterior auricular and no occipital adenopathy present.        Head " (left side): No submental, no submandibular, no tonsillar, no preauricular, no posterior auricular and no occipital adenopathy present.     He has no cervical adenopathy.   Neurological: He is alert and oriented to person, place, and time. He has normal strength. No cranial nerve deficit.   Skin: Skin is warm, dry and intact.   Psychiatric: He has a normal mood and affect. His speech is normal and behavior is normal. Judgment and thought content normal. Cognition and memory are normal.   Nursing note and vitals reviewed.      Assessment & Plan:       Neck pain on left side  -     X-Ray Cervical Spine Complete 5 view; Future; Expected date: 08/22/2018  -     naproxen (NAPROSYN) 500 MG tablet; Take 1 tablet (500 mg total) by mouth 2 (two) times daily with meals.  Dispense: 60 tablet; Refill: 0    DDD (degenerative disc disease), lumbar      Medication List with Changes/Refills   New Medications    NAPROXEN (NAPROSYN) 500 MG TABLET    Take 1 tablet (500 mg total) by mouth 2 (two) times daily with meals.   Current Medications    ALLERGY RELIEF, LORATADINE, 10 MG TABLET    TAKE ONE TABLET BY MOUTH ONCE DAILY    AMLODIPINE (NORVASC) 10 MG TABLET    Take 1 tablet (10 mg total) by mouth once daily.    BENZOYL PEROXIDE (BENZAC AC WASH) 10 % EXTERNAL WASH    Apply topically 2 (two) times daily. Face and trunk    CLINDAMYCIN PHOSPHATE FOAM    Apply to affected area qd- bid    CYCLOBENZAPRINE (FLEXERIL) 10 MG TABLET    PRN    ESOMEPRAZOLE (NEXIUM) 40 MG CAPSULE    Take 1 capsule (40 mg total) by mouth before breakfast.    FENOFIBRATE (TRICOR) 145 MG TABLET    TAKE ONE TABLET BY MOUTH ONCE DAILY    HYDROXYZINE (ATARAX) 25 MG TABLET    Take 1 tablet (25 mg total) by mouth every 6 (six) hours as needed for Itching. Every 6 hours PRN    KETOCONAZOLE (NIZORAL) 2 % SHAMPOO    Use as directed qohs as needed    MECLIZINE (ANTIVERT) 25 MG TABLET    Take 1 tablet (25 mg total) by mouth 3 (three) times daily as needed for Dizziness (may  cause drowsiness).    MINOCYCLINE (MINOCIN,DYNACIN) 100 MG CAPSULE    1 po x 2 weeks    MOMETASONE (NASONEX) 50 MCG/ACTUATION NASAL SPRAY    2 sprays by Nasal route once daily.    NIACIN (SLO-NIACIN) 500 MG TABLET    TAKE ONE TABLET BY MOUTH ONCE DAILY AT BEDTIME    TRAMADOL (ULTRAM) 50 MG TABLET    1 Tablet(s) Oral PRN Every 8 hours.      VALACYCLOVIR (VALTREX) 500 MG TABLET    Take 2 tablets (1,000 mg total) by mouth 2 (two) times daily.         Follow-up if symptoms worsen or fail to improve.

## 2018-09-10 ENCOUNTER — OFFICE VISIT (OUTPATIENT)
Dept: DERMATOLOGY | Facility: CLINIC | Age: 53
End: 2018-09-10
Payer: COMMERCIAL

## 2018-09-10 VITALS — WEIGHT: 204 LBS | RESPIRATION RATE: 16 BRPM | HEIGHT: 72 IN | BODY MASS INDEX: 27.63 KG/M2

## 2018-09-10 DIAGNOSIS — L70.0 ACNE VULGARIS: Primary | ICD-10-CM

## 2018-09-10 DIAGNOSIS — L91.8 CUTANEOUS SKIN TAGS: ICD-10-CM

## 2018-09-10 DIAGNOSIS — L82.1 SEBORRHEIC KERATOSES: ICD-10-CM

## 2018-09-10 PROCEDURE — 99213 OFFICE O/P EST LOW 20 MIN: CPT | Mod: S$GLB,,, | Performed by: DERMATOLOGY

## 2018-09-10 PROCEDURE — 3008F BODY MASS INDEX DOCD: CPT | Mod: CPTII,S$GLB,, | Performed by: DERMATOLOGY

## 2018-09-10 PROCEDURE — 99999 PR PBB SHADOW E&M-EST. PATIENT-LVL III: CPT | Mod: PBBFAC,,, | Performed by: DERMATOLOGY

## 2018-09-10 RX ORDER — MINOCYCLINE HYDROCHLORIDE 50 MG/1
50 CAPSULE ORAL DAILY
Qty: 30 CAPSULE | Refills: 1 | Status: SHIPPED | OUTPATIENT
Start: 2018-09-10 | End: 2019-04-16

## 2018-09-10 NOTE — PROGRESS NOTES
Subjective:       Patient ID:  Jaren PICHARDO Cousin is a 52 y.o. male who presents for   Chief Complaint   Patient presents with    Skin Check     51 yo M presents for skin check and acne (x 15+ years) follow up.  Last seen by Dr Muñoz 3-7-17  He has a h/o acne to face, scalp, & shoulders  Treating w/ Ketoconazole shampoo 2 % & clinda foam in scalp  Takes minocycline (MINOCIN,DYNACIN) 100 MG capsules prn; approx 10 days a month      Pt is inquiring about accutane  He has a h/o PTSD; does not see a therapist/psychiatrist regularly  Drinks alcohol daily          Past Medical History:   Diagnosis Date    ALLERGIC RHINITIS     Asthma     as child    DDD (degenerative disc disease), lumbar     Hemorrhoid     High triglycerides     HTN (hypertension)     PTSD (post-traumatic stress disorder)     not [facility]    Shingles     lower back    Urticaria      Review of Systems   Skin: Positive for wears hat. Negative for sensitivity to antibiotic ointment, sensitivity to bandage adhesive and tendency to form keloidal scars.   Psychiatric/Behavioral: Positive for anxiety (history PTSD). Negative for depressed mood.   Hematologic/Lymphatic: Does not bruise/bleed easily.        Objective:    Physical Exam   Constitutional: He appears well-developed and well-nourished. No distress.   HENT:   Mouth/Throat: Lips normal.    Eyes: Lids are normal.  No conjunctival no injection.   Neurological: He is alert and oriented to person, place, and time. He is not disoriented.   Psychiatric: He has a normal mood and affect.   Skin:   Areas Examined (abnormalities noted in diagram):   Scalp / Hair Palpated and Inspected  Head / Face Inspection Performed  Neck Inspection Performed  Chest / Axilla Inspection Performed  Abdomen Inspection Performed  Back Inspection Performed  RUE Inspected  LUE Inspection Performed                   Diagram Legend     Erythematous scaling macule/papule c/w actinic keratosis        Vascular papule c/w angioma      Pigmented verrucoid papule/plaque c/w seborrheic keratosis      Yellow umbilicated papule c/w sebaceous hyperplasia      Irregularly shaped tan macule c/w lentigo     1-2 mm smooth white papules consistent with Milia      Movable subcutaneous cyst with punctum c/w epidermal inclusion cyst      Subcutaneous movable cyst c/w pilar cyst      Firm pink to brown papule c/w dermatofibroma      Pedunculated fleshy papule(s) c/w skin tag(s)      Evenly pigmented macule c/w junctional nevus     Mildly variegated pigmented, slightly irregular-bordered macule c/w mildly atypical nevus      Flesh colored to evenly pigmented papule c/w intradermal nevus       Pink pearly papule/plaque c/w basal cell carcinoma      Erythematous hyperkeratotic cursted plaque c/w SCC      Surgical scar with no sign of skin cancer recurrence      Open and closed comedones      Inflammatory papules and pustules      Verrucoid papule consistent consistent with wart     Erythematous eczematous patches and plaques     Dystrophic onycholytic nail with subungual debris c/w onychomycosis     Umbilicated papule    Erythematous-base heme-crusted tan verrucoid plaque consistent with inflamed seborrheic keratosis     Erythematous Silvery Scaling Plaque c/w Psoriasis     See annotation      Assessment / Plan:        Acne vulgaris  Pt inquired about isotretinoin, discussed that I did not think he was an ideal candidate due to history PTSD, history HLD and regular EtOH use  Continue ketoconazole shampoo daily and Clindamycin foam daily  Will try to lower dose of minocycline but have him take it daily  -     minocycline (MINOCIN,DYNACIN) 50 MG Cap; Take 1 capsule (50 mg total) by mouth once daily.  Dispense: 30 capsule; Refill: 1    Seborrheic keratoses  These are benign inherited growths without a malignant potential. Reassurance given to patient. No treatment is necessary.     Cutaneous skin tags  Reassurance            Follow-up in about 2 months (around 11/10/2018).

## 2018-11-29 ENCOUNTER — LAB VISIT (OUTPATIENT)
Dept: LAB | Facility: HOSPITAL | Age: 53
End: 2018-11-29
Attending: FAMILY MEDICINE
Payer: COMMERCIAL

## 2018-11-29 ENCOUNTER — OFFICE VISIT (OUTPATIENT)
Dept: GASTROENTEROLOGY | Facility: CLINIC | Age: 53
End: 2018-11-29
Payer: COMMERCIAL

## 2018-11-29 VITALS
HEIGHT: 72 IN | BODY MASS INDEX: 28.07 KG/M2 | DIASTOLIC BLOOD PRESSURE: 83 MMHG | HEART RATE: 87 BPM | WEIGHT: 207.25 LBS | SYSTOLIC BLOOD PRESSURE: 143 MMHG

## 2018-11-29 DIAGNOSIS — F10.10 ALCOHOL ABUSE: ICD-10-CM

## 2018-11-29 DIAGNOSIS — R61 DIAPHORESIS: ICD-10-CM

## 2018-11-29 DIAGNOSIS — D18.03 LIVER HEMANGIOMA: ICD-10-CM

## 2018-11-29 DIAGNOSIS — R10.10 UPPER ABDOMINAL PAIN: ICD-10-CM

## 2018-11-29 DIAGNOSIS — R10.11 RUQ PAIN: ICD-10-CM

## 2018-11-29 DIAGNOSIS — R10.11 RUQ PAIN: Primary | ICD-10-CM

## 2018-11-29 DIAGNOSIS — R12 HEARTBURN: ICD-10-CM

## 2018-11-29 DIAGNOSIS — R19.7 INTERMITTENT DIARRHEA: ICD-10-CM

## 2018-11-29 LAB
ALBUMIN SERPL BCP-MCNC: 4.4 G/DL
ALP SERPL-CCNC: 39 U/L
ALT SERPL W/O P-5'-P-CCNC: 21 U/L
AMYLASE SERPL-CCNC: 51 U/L
AST SERPL-CCNC: 21 U/L
BILIRUB DIRECT SERPL-MCNC: 0.2 MG/DL
BILIRUB SERPL-MCNC: 0.7 MG/DL
CREAT SERPL-MCNC: 1.2 MG/DL
EST. GFR  (AFRICAN AMERICAN): >60 ML/MIN/1.73 M^2
EST. GFR  (NON AFRICAN AMERICAN): >60 ML/MIN/1.73 M^2
LIPASE SERPL-CCNC: 17 U/L
PROT SERPL-MCNC: 7.6 G/DL

## 2018-11-29 PROCEDURE — 83690 ASSAY OF LIPASE: CPT

## 2018-11-29 PROCEDURE — 3077F SYST BP >= 140 MM HG: CPT | Mod: CPTII,S$GLB,, | Performed by: NURSE PRACTITIONER

## 2018-11-29 PROCEDURE — 36415 COLL VENOUS BLD VENIPUNCTURE: CPT | Mod: PO

## 2018-11-29 PROCEDURE — 3008F BODY MASS INDEX DOCD: CPT | Mod: CPTII,S$GLB,, | Performed by: NURSE PRACTITIONER

## 2018-11-29 PROCEDURE — 99999 PR PBB SHADOW E&M-EST. PATIENT-LVL V: CPT | Mod: PBBFAC,,, | Performed by: NURSE PRACTITIONER

## 2018-11-29 PROCEDURE — 80076 HEPATIC FUNCTION PANEL: CPT

## 2018-11-29 PROCEDURE — 82565 ASSAY OF CREATININE: CPT | Mod: PO

## 2018-11-29 PROCEDURE — 82150 ASSAY OF AMYLASE: CPT

## 2018-11-29 PROCEDURE — 3079F DIAST BP 80-89 MM HG: CPT | Mod: CPTII,S$GLB,, | Performed by: NURSE PRACTITIONER

## 2018-11-29 PROCEDURE — 99203 OFFICE O/P NEW LOW 30 MIN: CPT | Mod: S$GLB,,, | Performed by: NURSE PRACTITIONER

## 2018-11-29 RX ORDER — ESOMEPRAZOLE MAGNESIUM 40 MG/1
40 CAPSULE, DELAYED RELEASE ORAL
Qty: 30 CAPSULE | Refills: 0 | Status: ON HOLD | OUTPATIENT
Start: 2018-11-29 | End: 2019-01-29 | Stop reason: SDUPTHER

## 2018-11-29 NOTE — PROGRESS NOTES
Subjective:       Patient ID: Jaren PICHARDO Cousin is a 53 y.o. male Body mass index is 28.11 kg/m².    Chief Complaint: Abdominal Pain    This patient is new to me.  Established patient of Dr. Dexter (last in 6/2015).    Abdominal Pain   This is a chronic problem. The current episode started more than 1 year ago (several years intermittent, at least since 2014). The problem occurs intermittently (can have no pain for months, and then flares up and can have it for a month). Progression since onset: worsening ~9/2018, improved since he decreased alcohol use. The pain is located in the RUQ. The pain is at a severity of 0/10 (currently). The quality of the pain is dull. The abdominal pain radiates to the back (occasional). Associated symptoms include belching (frequent), diarrhea (intermittent) and flatus (frequent). Pertinent negatives include no constipation, dysuria, fever, frequency, hematochezia, melena, nausea, vomiting or weight loss. Associated symptoms comments: Was drinking 4-6 beer 7 days a week; has decreased to only 5 days a week of 3 beers a day  Bowel movements formed to watery stool of twice daily. The pain is aggravated by drinking alcohol. He has tried proton pump inhibitors (zantac OTC daily prn taken about 1-2 times a day; nexium 40 mg once daily PRN- last took 3 weeks ago, takes 5 days and then stops, naproxen use prn maybe once a week, decreased alcohol use) for the symptoms. His past medical history is significant for GERD (occasional). There is no history of Crohn's disease, pancreatitis, PUD or ulcerative colitis.     Review of Systems   Constitutional: Positive for diaphoresis (occasional wakes up with sweats; patient thinks it is from niacin use). Negative for appetite change, chills, fatigue, fever and weight loss.   HENT: Negative for sore throat and trouble swallowing.    Respiratory: Negative for cough, choking and shortness of breath.    Cardiovascular: Negative for chest pain.    Gastrointestinal: Positive for abdominal pain, diarrhea (intermittent) and flatus (frequent). Negative for anal bleeding, blood in stool, constipation, hematochezia, melena, nausea, rectal pain and vomiting.   Genitourinary: Negative for difficulty urinating, dysuria, flank pain and frequency.   Neurological: Negative for weakness.       Past Medical History:   Diagnosis Date    ALLERGIC RHINITIS     Asthma     as child    DDD (degenerative disc disease), lumbar     Fatty liver     Hemorrhoid     Hepatomegaly     High triglycerides     HTN (hypertension)     Liver hemangioma 2014    PTSD (post-traumatic stress disorder)     not [facility]    Shingles     lower back    Urticaria      Past Surgical History:   Procedure Laterality Date    BACK SURGERY      ruptured disc L5-S1    COLONOSCOPY  ~2010  ([facility])    Hemorrhoids    COLONOSCOPY N/A 6/24/2015    Performed by Balwinder Dexter Jr., [facility] at [facility]    ELBOW SURGERY      right    KNEE SURGERY      right     Family History   Problem Relation Age of Onset    Allergic rhinitis Son     Allergic rhinitis Daughter     Asthma Daughter     Allergic rhinitis Daughter     Diverticulitis Mother     Angioedema Neg Hx     Eczema Neg Hx     Immunodeficiency Neg Hx     Urticaria Neg Hx     Lupus Neg Hx     Psoriasis Neg Hx     Melanoma Neg Hx     Acne Neg Hx     Colon cancer Neg Hx     Colon polyps Neg Hx     Crohn's disease Neg Hx     Ulcerative colitis Neg Hx     Esophageal cancer Neg Hx     Stomach cancer Neg Hx      Social History     Socioeconomic History    Marital status:      Spouse name: Not on file    Number of children: Not on file    Years of education: Not on file    Highest education level: Not on file   Social Needs    Financial resource strain: Not on file    Food insecurity - worry: Not on file    Food insecurity - inability: Not on file    Transportation needs - medical: Not  on file    Transportation needs - non-medical: Not on file   Occupational History    Occupation: retired   Tobacco Use    Smoking status: Never Smoker    Smokeless tobacco: Never Used   Substance and Sexual Activity    Alcohol use: Yes     Alcohol/week: 12.6 oz     Types: 21 Cans of beer per week     Comment: 3 beers daily    Drug use: No    Sexual activity: Yes   Other Topics Concern    Not on file   Social History Narrative    Not on file     Wt Readings from Last 10 Encounters:   11/29/18 94 kg (207 lb 3.7 oz)   09/10/18 92.5 kg (204 lb)   08/22/18 92.8 kg (204 lb 9.4 oz)   06/12/18 92.7 kg (204 lb 5.9 oz)   02/15/18 94.2 kg (207 lb 10.8 oz)   08/17/17 92.4 kg (203 lb 11.3 oz)   07/26/17 94.9 kg (209 lb 3.5 oz)   04/06/17 94.9 kg (209 lb 3.5 oz)   03/07/17 97.4 kg (214 lb 11.7 oz)   01/12/17 94.3 kg (208 lb)     Lab Results   Component Value Date    WBC 7.83 06/06/2018    HGB 13.2 (L) 06/06/2018    HCT 41.2 06/06/2018    MCV 85 06/06/2018     (H) 06/06/2018     CMP  Sodium   Date Value Ref Range Status   06/06/2018 140 136 - 145 mmol/L Final     Potassium   Date Value Ref Range Status   06/06/2018 4.1 3.5 - 5.1 mmol/L Final     Chloride   Date Value Ref Range Status   06/06/2018 108 95 - 110 mmol/L Final     CO2   Date Value Ref Range Status   06/06/2018 25 23 - 29 mmol/L Final     Glucose   Date Value Ref Range Status   06/06/2018 101 70 - 110 mg/dL Final     BUN, Bld   Date Value Ref Range Status   06/06/2018 20 6 - 20 mg/dL Final     Creatinine   Date Value Ref Range Status   06/06/2018 1.0 0.5 - 1.4 mg/dL Final     Calcium   Date Value Ref Range Status   06/06/2018 9.8 8.7 - 10.5 mg/dL Final     Total Protein   Date Value Ref Range Status   06/06/2018 7.1 6.0 - 8.4 g/dL Final     Albumin   Date Value Ref Range Status   06/06/2018 4.2 3.5 - 5.2 g/dL Final     Total Bilirubin   Date Value Ref Range Status   06/06/2018 0.6 0.1 - 1.0 mg/dL Final     Comment:     For infants and newborns,  "interpretation of results should be based  on gestational age, weight and in agreement with clinical  observations.  Premature Infant recommended reference ranges:  Up to 24 hours.............<8.0 mg/dL  Up to 48 hours............<12.0 mg/dL  3-5 days..................<15.0 mg/dL  6-29 days.................<15.0 mg/dL       Alkaline Phosphatase   Date Value Ref Range Status   06/06/2018 38 (L) 55 - 135 U/L Final     AST   Date Value Ref Range Status   06/06/2018 16 10 - 40 U/L Final     ALT   Date Value Ref Range Status   06/06/2018 16 10 - 44 U/L Final     Anion Gap   Date Value Ref Range Status   06/06/2018 7 (L) 8 - 16 mmol/L Final     eGFR if    Date Value Ref Range Status   06/06/2018 >60.0 >60 mL/min/1.73 m^2 Final     eGFR if non    Date Value Ref Range Status   06/06/2018 >60.0 >60 mL/min/1.73 m^2 Final     Comment:     Calculation used to obtain the estimated glomerular filtration  rate (eGFR) is the CKD-EPI equation.        Lab Results   Component Value Date    AMYLASE 63 09/12/2014     Lab Results   Component Value Date    LIPASE 19 09/12/2014     Lab Results   Component Value Date    TSH 1.61 05/07/2010     Reviewed prior medical records including radiology report of 10/22/14 MRI abdomen; 9/18/14 abdominal ultrasound; 1/13/17 HIDA scan; & endoscopy history (see surgical history).    6/24/15 Colonoscopy was reviewed and procedure report states:   "Findings:       The perianal and digital rectal examinations were normal. Pertinent        negatives include normal sphincter tone, no palpable rectal lesions        and normal prostate (size, shape, and consistency).       Internal hemorrhoids were found during retroflexion. The hemorrhoids        were small.       No additional abnormalities were found on retroflexion.       The rectum and recto-sigmoid colon appeared normal.       The colon appeared normal.       The terminal ileum appeared normal.  Impression:          - " "Internal hemorrhoids.                       - The entire examined colon is normal.                       - The examined portion of the ileum was normal.                       - No specimens collected.  Recommendation:      - Discharge patient to home.                       - High fiber diet.                       - Preparation H cream: Apply externally as necessary.                       - Repeat colonoscopy in 6-8 years for screening                        purposes.                       - Continue present medications.                       - Patient has a contact number available for                        emergencies. The signs and symptoms of potential                        delayed complications were discussed with the                        patient. Return to normal activities tomorrow.                        Written discharge instructions were provided to the                        patient.                       - Return to normal activities tomorrow.  Balwinder Dexter MD".    Objective:      Physical Exam   Constitutional: He is oriented to person, place, and time. He appears well-developed and well-nourished. No distress.   HENT:   Mouth/Throat: Oropharynx is clear and moist and mucous membranes are normal. No oral lesions. No oropharyngeal exudate.   Eyes: Conjunctivae are normal. Pupils are equal, round, and reactive to light. No scleral icterus.   Cardiovascular: Normal rate.   Pulmonary/Chest: Effort normal and breath sounds normal. No respiratory distress. He has no wheezes.   Abdominal: Soft. Normal appearance and bowel sounds are normal. He exhibits no distension, no abdominal bruit and no mass. There is no tenderness. There is no rigidity, no rebound, no guarding, no tenderness at McBurney's point and negative Delacruz's sign.   Neurological: He is alert and oriented to person, place, and time.   Skin: Skin is warm and dry. No rash noted. He is not diaphoretic. No erythema. No pallor. "   Non-jaundiced   Psychiatric: He has a normal mood and affect. His behavior is normal. Judgment and thought content normal.   Nursing note and vitals reviewed.      Assessment:       1. RUQ pain    2. Liver hemangioma    3. Alcohol abuse    4. Intermittent diarrhea    5. Upper abdominal pain    6. Heartburn    7. Diaphoresis        Plan:       RUQ pain  -     Hepatic function panel; Future; Expected date: 11/29/2018  -     Lipase; Future; Expected date: 11/29/2018  -     Amylase; Future; Expected date: 11/29/2018  -     CT Abdomen Pelvis With Contrast; Future; Expected date: 11/29/2018  -  CHANGE TO   esomeprazole (NEXIUM) 40 MG capsule; Take 1 capsule (40 mg total) by mouth before breakfast.  Dispense: 30 capsule; Refill: 0  -     Creatinine, serum; Future; Expected date: 11/29/2018  - schedule EGD, discussed procedure with patient, patient verbalized understanding    Liver hemangioma  -     Hepatic function panel; Future; Expected date: 11/29/2018  -     CT Abdomen Pelvis With Contrast; Future; Expected date: 11/29/2018    Alcohol abuse  -     Hepatic function panel; Future; Expected date: 11/29/2018  -     CT Abdomen Pelvis With Contrast; Future; Expected date: 11/29/2018  - I strongly encouraged patient to abstain from alcohol use    Intermittent diarrhea  -     CT Abdomen Pelvis With Contrast; Future; Expected date: 11/29/2018  - recommended OTC probiotic, such as Align or Culturelle, as directed  - avoid lactose    Upper abdominal pain  -     CT Abdomen Pelvis With Contrast; Future; Expected date: 11/29/2018  - schedule EGD, discussed procedure with patient, patient verbalized understanding    Heartburn  -  CHANGE TO   esomeprazole (NEXIUM) 40 MG capsule; Take 1 capsule (40 mg total) by mouth before breakfast.  Dispense: 30 capsule; Refill: 0, - take in the morning 30-60 minutes before breakfast, discussed about possible long term use of medication (prefer to use lowest effective dose or discontinuing if  possible), pt verbalized understanding  - CONTINUE ZANTAC OTC 75 MG BID AS DIRECTED PRN FOR BREAKTHROUGH HEARTBURN/GERD  -discussed about the different types of medications used to treat reflux and how to use them, antacids can be used PRN for breakthrough heartburn symptoms by reducing stomach acid that is already produced, H2 blockers (zantac) work by limiting the amount acid production, & PPI's work to block acid production and are taken daily, patient verbalized understanding.  -Educated patient on lifestyle modifications to help control/reduce reflux/abdominal pain including: avoid large meals, avoid eating within 2-3 hours of bedtime (avoid late night eating & lying down soon after eating), elevate head of bed if nocturnal symptoms are present, smoking cessation (if current smoker), & weight loss (if overweight).   -Educated to avoid known foods which trigger reflux symptoms & to minimize/avoid high-fat foods, chocolate, caffeine, citrus, alcohol, & tomato products.  -Advised to avoid/limit use of NSAID's, since they can cause GI upset, bleeding, and/or ulcers. If needed, take with food.   - schedule EGD, discussed procedure with patient, patient verbalized understanding    Diaphoresis  Recommend follow-up with Primary Care Provider for continued evaluation and management.    Follow-up in about 1 month (around 12/29/2018), or if symptoms worsen or fail to improve.      If no improvement in symptoms or symptoms worsen, call/follow-up at clinic or go to ER.

## 2018-11-29 NOTE — PATIENT INSTRUCTIONS
Abdominal Pain    Abdominal pain is pain in the stomach or belly area. Everyone has this pain from time to time. In many cases it goes away on its own. But abdominal pain can sometimes be due to a serious problem, such as appendicitis. So its important to know when to seek help.  Causes of abdominal pain  There are many possible causes of abdominal pain. Common causes in adults include:  · Constipation, diarrhea, or gas  · Stomach acid flowing back up into the esophagus (acid reflux or heartburn)  · Severe acid reflux, called GERD (gastroesophageal reflux disease)  · A sore in the lining of the stomach or small intestine (peptic ulcer)  · Inflammation of the gallbladder, liver, or pancreas  · Gallstones or kidney stones  · Appendicitis   · Intestinal blockage   · An internal organ pushing through a muscle or other tissue (hernia)  · Urinary tract infections  · In women, menstrual cramps, fibroids, or endometriosis  · Inflammation or infection of the intestines  Diagnosing the cause of abdominal pain  Your healthcare provider will do a physical exam help find the cause of your pain. If needed, tests will be ordered. Belly pain has many possible causes. So it can be hard to find the reason for your pain. Giving details about your pain can help. Tell your provider where and when you feel the pain, and what makes it better or worse. Also let your provider know if you have other symptoms such as:  · Fever  · Tiredness  · Upset stomach (nausea)  · Vomiting  · Changes in bathroom habits  Treating abdominal pain  Some causes of pain need emergency medical treatment right away. These include appendicitis or a bowel blockage. Other problems can be treated with rest, fluids, or medicines. Your healthcare provider can give you specific instructions for treatment or self-care based on what is causing your pain.  If you have vomiting or diarrhea, sip water or other clear fluids. When you are ready to eat solid foods again,  start with small amounts of easy-to-digest, low-fat foods. These include apple sauce, toast, or crackers.   When to seek medical care  Call 911 or go to the hospital right away if you:  · Cant pass stool and are vomiting  · Are vomiting blood or have bloody diarrhea or black, tarry diarrhea  · Have chest, neck, or shoulder pain  · Feel like you might pass out  · Have pain in your shoulder blades with nausea  · Have sudden, severe belly pain  · Have new, severe pain unlike any you have felt before  · Have a belly that is rigid, hard, and tender to touch  Call your healthcare provider if you have:  · Pain for more than 5 days  · Bloating for more than 2 days  · Diarrhea for more than 5 days  · A fever of 100.4°F (38.0°C) or higher, or as directed by your provider  · Pain that gets worse  · Weight loss for no reason  · Continued lack of appetite  · Blood in your stool  How to prevent abdominal pain  Here are some tips to help prevent abdominal pain:  · Eat smaller amounts of food at one time.  · Avoid greasy, fried, or other high-fat foods.  · Avoid foods that give you gas.  · Exercise regularly.  · Drink plenty of fluids.  To help prevent GERD symptoms:  · Quit smoking.  · Reduce alcohol and certain foods that increase stomach acid.  · Avoid aspirin and over-the-counter pain and fever medicines (NSAIDS or nonsteroidal anti-inflammatory drugs), if possible  · Lose extra weight.  · Finish eating at least 2 hours before you go to bed or lie down.  · Raise the head of your bed.  Date Last Reviewed: 7/1/2016  © 3987-7320 [employer]. [address] [place] [address], [place] [address]. All rights reserved. This information is not intended as a substitute for professional medical care. Always follow your healthcare professional's instructions.

## 2018-12-05 ENCOUNTER — HOSPITAL ENCOUNTER (OUTPATIENT)
Dept: RADIOLOGY | Facility: HOSPITAL | Age: 53
Discharge: HOME | End: 2018-12-05
Attending: NURSE PRACTITIONER
Payer: COMMERCIAL

## 2018-12-05 DIAGNOSIS — R10.11 RUQ PAIN: ICD-10-CM

## 2018-12-05 DIAGNOSIS — D18.03 LIVER HEMANGIOMA: ICD-10-CM

## 2018-12-05 DIAGNOSIS — R19.7 INTERMITTENT DIARRHEA: ICD-10-CM

## 2018-12-05 DIAGNOSIS — R10.10 UPPER ABDOMINAL PAIN: ICD-10-CM

## 2018-12-05 DIAGNOSIS — F10.10 ALCOHOL ABUSE: ICD-10-CM

## 2018-12-05 PROCEDURE — 25500020 PHARM REV CODE 255: Mod: PO | Performed by: NURSE PRACTITIONER

## 2018-12-05 PROCEDURE — 74178 CT ABD&PLV WO CNTR FLWD CNTR: CPT | Mod: TC,PO

## 2018-12-05 PROCEDURE — 74178 CT ABD&PLV WO CNTR FLWD CNTR: CPT | Mod: 26,,, | Performed by: RADIOLOGY

## 2018-12-05 RX ADMIN — IOHEXOL 30 ML: 350 INJECTION, SOLUTION INTRAVENOUS at 09:12

## 2018-12-05 RX ADMIN — IOHEXOL 100 ML: 350 INJECTION, SOLUTION INTRAVENOUS at 09:12

## 2018-12-07 ENCOUNTER — TELEPHONE (OUTPATIENT)
Dept: GASTROENTEROLOGY | Facility: CLINIC | Age: 53
End: 2018-12-07

## 2018-12-07 ENCOUNTER — PATIENT MESSAGE (OUTPATIENT)
Dept: GASTROENTEROLOGY | Facility: CLINIC | Age: 53
End: 2018-12-07

## 2018-12-07 DIAGNOSIS — D18.03 LIVER HEMANGIOMA: ICD-10-CM

## 2018-12-07 DIAGNOSIS — F10.10 ALCOHOL ABUSE: ICD-10-CM

## 2018-12-07 DIAGNOSIS — K76.9 LIVER LESION: ICD-10-CM

## 2018-12-07 DIAGNOSIS — R93.2 ABNORMAL LIVER CT: ICD-10-CM

## 2018-12-07 DIAGNOSIS — R10.11 RUQ PAIN: Primary | ICD-10-CM

## 2018-12-07 DIAGNOSIS — R10.10 UPPER ABDOMINAL PAIN: ICD-10-CM

## 2018-12-07 NOTE — TELEPHONE ENCOUNTER
"Discussed case & results with Dr. Dexter. Dr. Dexter recommends to continue with EGD as previously scheduled with patient and possible referral to hepatology pending results of testing and if symptoms persist.    radiology report of the CT abdomen pelvis showed "Chronically enlarged fatty liver" (prior history of this): For fatty liver recommend: low fat, low cholesterol diet, maintain good control of blood sugars and cholesterol levels, exercise, weight loss (if overweight), minimize/avoid alcohol and tylenol products, & follow-up with PCP for continued evaluation and management; if specialist is needed, recommend seeing hepatology.    Other findings showed prior liver hemangioma is unchanged. A "simple hepatic cyst in the lateral segment of the left hepatic lobe which is unchanged.  A 2nd cyst in the right hepatic lobe is not visible on today's study."    A "small hyperenhancing nodule in ... the right hepatic lobe which was not definitely visualized on the prior MRI study, but could have been obscured by breathing motion artifact.  Surveillance with a repeat contrast enhanced CT or MRI in 6 months is recommended."  A "curvilinear hyperenhancing focus in the posterior segment of the right hepatic lobe near the dome of the diaphragm which may be vascular in origin.  Surveillance with a repeat contrast enhanced CT or MRI in 6 months is recommended."  MRI ordered. Please verify that the patient does not have a pacemaker/defibrillator, metal implant, cerebral aneurysm or surgical clip, pump, middle or inner ear prosthetic, or nerve or brain stimulator, if so, please notify me so I can adjust the order accordingly. Please ask if patient is claustrophobic, if so, let me know.    Other findings showed "Nonobstructing right renal calculi." Recommend follow-up with Primary Care Provider for continued evaluation and management of this finding, especially if having any urinary symptoms. Otherwise, unremarkable " findings.    Continue with previous recommendations. If no improvement in symptoms or symptoms worsen, call/follow-up at clinic or go to ER.  Please release results to patient's mychart once you have discussed results and recommendations with patient.  Thanks,  Janette HURD

## 2019-01-17 ENCOUNTER — OFFICE VISIT (OUTPATIENT)
Dept: DERMATOLOGY | Facility: CLINIC | Age: 54
End: 2019-01-17
Payer: COMMERCIAL

## 2019-01-17 DIAGNOSIS — L82.1 SEBORRHEIC KERATOSES: ICD-10-CM

## 2019-01-17 DIAGNOSIS — L70.0 ACNE VULGARIS: Primary | ICD-10-CM

## 2019-01-17 DIAGNOSIS — L91.8 CUTANEOUS SKIN TAGS: ICD-10-CM

## 2019-01-17 DIAGNOSIS — L73.9 FOLLICULITIS: ICD-10-CM

## 2019-01-17 PROCEDURE — 99999 PR PBB SHADOW E&M-EST. PATIENT-LVL II: ICD-10-PCS | Mod: PBBFAC,,, | Performed by: DERMATOLOGY

## 2019-01-17 PROCEDURE — 99213 OFFICE O/P EST LOW 20 MIN: CPT | Mod: S$GLB,,, | Performed by: DERMATOLOGY

## 2019-01-17 PROCEDURE — 99213 PR OFFICE/OUTPT VISIT, EST, LEVL III, 20-29 MIN: ICD-10-PCS | Mod: S$GLB,,, | Performed by: DERMATOLOGY

## 2019-01-17 PROCEDURE — 99999 PR PBB SHADOW E&M-EST. PATIENT-LVL II: CPT | Mod: PBBFAC,,, | Performed by: DERMATOLOGY

## 2019-01-17 RX ORDER — DOXYCYCLINE 100 MG/1
TABLET ORAL
Qty: 30 TABLET | Refills: 1 | Status: SHIPPED | OUTPATIENT
Start: 2019-01-17 | End: 2019-06-24 | Stop reason: CLARIF

## 2019-01-17 RX ORDER — KETOCONAZOLE 20 MG/ML
SHAMPOO, SUSPENSION TOPICAL
Qty: 240 ML | Refills: 5 | Status: SHIPPED | OUTPATIENT
Start: 2019-01-17 | End: 2021-10-22

## 2019-01-17 RX ORDER — CLINDAMYCIN PHOSPHATE 10 MG/G
AEROSOL, FOAM TOPICAL
Qty: 100 G | Refills: 5 | Status: SHIPPED | OUTPATIENT
Start: 2019-01-17 | End: 2021-10-22

## 2019-01-17 NOTE — PROGRESS NOTES
Subjective:       Patient ID:  Jaren PICHARDO Cousin is a 53 y.o. male who presents for   Chief Complaint   Patient presents with    Acne     52 y/o M presents for acne follow up.  Last OV 9/10/18.  He has a h/o acne and foliculitis on face, shoulders, on scalp x 10 years. Currently using Differin gel and had been taking minocycline 50 mg daily since last OV but thinks minocycline 100 mg that he has taken in the past works better    Tx: differin gel, Ketoconazole shampoo, clindamycin foam  Prior Tx: minocycline (MINOCIN,DYNACIN) 100 MG capsules prn; approx 10 days a month                  Pt had inquired about accutane in the past  He has a h/o PTSD; does not see a therapist/psychiatrist regularly  Drinks alcohol daily    No h/o skin caner. Denies  family h/o melanoma.           Past Medical History:   Diagnosis Date    ALLERGIC RHINITIS     Asthma     as child    DDD (degenerative disc disease), lumbar     Fatty liver     Hemorrhoid     Hepatomegaly     High triglycerides     HTN (hypertension)     Liver hemangioma 2014    PTSD (post-traumatic stress disorder)     not [facility]    Shingles     lower back    Urticaria      Review of Systems   Constitutional: Negative for fever, chills and fatigue.   Skin: Positive for itching, dry skin, activity-related sunscreen use (sometimes) and wears hat. Negative for rash.        Objective:    Physical Exam   Constitutional: He appears well-developed and well-nourished. No distress.   HENT:   Mouth/Throat: Lips normal.    Eyes: Lids are normal.  No conjunctival no injection.   Neurological: He is alert and oriented to person, place, and time. He is not disoriented.   Psychiatric: He has a normal mood and affect.   Skin:   Areas Examined (abnormalities noted in diagram):   Scalp / Hair Palpated and Inspected  Head / Face Inspection Performed  Neck Inspection Performed  Chest / Axilla Inspection Performed  Abdomen Inspection  Performed  Back Inspection Performed  RUE Inspected  LUE Inspection Performed                   Diagram Legend     Erythematous scaling macule/papule c/w actinic keratosis       Vascular papule c/w angioma      Pigmented verrucoid papule/plaque c/w seborrheic keratosis      Yellow umbilicated papule c/w sebaceous hyperplasia      Irregularly shaped tan macule c/w lentigo     1-2 mm smooth white papules consistent with Milia      Movable subcutaneous cyst with punctum c/w epidermal inclusion cyst      Subcutaneous movable cyst c/w pilar cyst      Firm pink to brown papule c/w dermatofibroma      Pedunculated fleshy papule(s) c/w skin tag(s)      Evenly pigmented macule c/w junctional nevus     Mildly variegated pigmented, slightly irregular-bordered macule c/w mildly atypical nevus      Flesh colored to evenly pigmented papule c/w intradermal nevus       Pink pearly papule/plaque c/w basal cell carcinoma      Erythematous hyperkeratotic cursted plaque c/w SCC      Surgical scar with no sign of skin cancer recurrence      Open and closed comedones      Inflammatory papules and pustules      Verrucoid papule consistent consistent with wart     Erythematous eczematous patches and plaques     Dystrophic onycholytic nail with subungual debris c/w onychomycosis     Umbilicated papule    Erythematous-base heme-crusted tan verrucoid plaque consistent with inflamed seborrheic keratosis     Erythematous Silvery Scaling Plaque c/w Psoriasis     See annotation      Assessment / Plan:        Acne vulgaris and Folliculitis  -     doxycycline monohydrate 100 mg Tab; Take 1 po qday  Dispense: 30 tablet; Refill: 1  -     ketoconazole (NIZORAL) 2 % shampoo; Use as directed qohs as needed  Dispense: 240 mL; Refill: 5  -     clindamycin phosphate foam; Apply to affected area qd- bid  Dispense: 100 g; Refill: 5  Bc of risk of hyperpigmentation, pt would like to switch to doxycycline    Seborrheic keratoses  Cutaneous skin tags  These are  benign inherited growths without a malignant potential. Reassurance given to patient. No treatment is necessary.              Follow-up in about 3 months (around 4/17/2019).

## 2019-01-29 ENCOUNTER — HOSPITAL ENCOUNTER (OUTPATIENT)
Facility: HOSPITAL | Age: 54
Discharge: HOME | End: 2019-01-29
Attending: INTERNAL MEDICINE | Admitting: INTERNAL MEDICINE
Payer: COMMERCIAL

## 2019-01-29 ENCOUNTER — ANESTHESIA (OUTPATIENT)
Dept: ENDOSCOPY | Facility: HOSPITAL | Age: 54
End: 2019-01-29
Payer: COMMERCIAL

## 2019-01-29 ENCOUNTER — ANESTHESIA EVENT (OUTPATIENT)
Dept: ENDOSCOPY | Facility: HOSPITAL | Age: 54
End: 2019-01-29
Payer: COMMERCIAL

## 2019-01-29 DIAGNOSIS — R10.11 RUQ PAIN: ICD-10-CM

## 2019-01-29 DIAGNOSIS — R12 HEARTBURN: ICD-10-CM

## 2019-01-29 LAB — H PYLORI INDEX VALUE: NEGATIVE

## 2019-01-29 PROCEDURE — 43239 PR EGD, FLEX, W/BIOPSY, SGL/MULTI: ICD-10-PCS | Mod: ,,, | Performed by: INTERNAL MEDICINE

## 2019-01-29 PROCEDURE — 00731 ANES UPR GI NDSC PX NOS: CPT | Mod: PO | Performed by: INTERNAL MEDICINE

## 2019-01-29 PROCEDURE — D9220A PRA ANESTHESIA: Mod: CRNA,,, | Performed by: NURSE ANESTHETIST, CERTIFIED REGISTERED

## 2019-01-29 PROCEDURE — 63600175 PHARM REV CODE 636 W HCPCS: Mod: PO | Performed by: NURSE ANESTHETIST, CERTIFIED REGISTERED

## 2019-01-29 PROCEDURE — 43239 EGD BIOPSY SINGLE/MULTIPLE: CPT | Mod: PO | Performed by: INTERNAL MEDICINE

## 2019-01-29 PROCEDURE — 88305 TISSUE SPECIMEN TO PATHOLOGY - SURGERY: ICD-10-PCS | Mod: 26,,, | Performed by: PATHOLOGY

## 2019-01-29 PROCEDURE — D9220A PRA ANESTHESIA: ICD-10-PCS | Mod: CRNA,,, | Performed by: NURSE ANESTHETIST, CERTIFIED REGISTERED

## 2019-01-29 PROCEDURE — D9220A PRA ANESTHESIA: Mod: ANES,,, | Performed by: ANESTHESIOLOGY

## 2019-01-29 PROCEDURE — 25000003 PHARM REV CODE 250: Mod: PO | Performed by: INTERNAL MEDICINE

## 2019-01-29 PROCEDURE — 87449 NOS EACH ORGANISM AG IA: CPT | Mod: PO | Performed by: INTERNAL MEDICINE

## 2019-01-29 PROCEDURE — 43239 EGD BIOPSY SINGLE/MULTIPLE: CPT | Mod: ,,, | Performed by: INTERNAL MEDICINE

## 2019-01-29 PROCEDURE — 88305 TISSUE EXAM BY PATHOLOGIST: CPT | Mod: 26,,, | Performed by: PATHOLOGY

## 2019-01-29 PROCEDURE — 37000008 HC ANESTHESIA 1ST 15 MINUTES: Mod: PO | Performed by: INTERNAL MEDICINE

## 2019-01-29 PROCEDURE — D9220A PRA ANESTHESIA: ICD-10-PCS | Mod: ANES,,, | Performed by: ANESTHESIOLOGY

## 2019-01-29 PROCEDURE — 37000009 HC ANESTHESIA EA ADD 15 MINS: Mod: PO | Performed by: INTERNAL MEDICINE

## 2019-01-29 PROCEDURE — 27201012 HC FORCEPS, HOT/COLD, DISP: Mod: PO | Performed by: INTERNAL MEDICINE

## 2019-01-29 PROCEDURE — 88305 TISSUE EXAM BY PATHOLOGIST: CPT | Performed by: PATHOLOGY

## 2019-01-29 RX ORDER — PROPOFOL 10 MG/ML
VIAL (ML) INTRAVENOUS
Status: DISCONTINUED | OUTPATIENT
Start: 2019-01-29 | End: 2019-01-29

## 2019-01-29 RX ORDER — ESOMEPRAZOLE MAGNESIUM 40 MG/1
40 CAPSULE, DELAYED RELEASE ORAL
Qty: 60 CAPSULE | Refills: 5 | Status: SHIPPED | OUTPATIENT
Start: 2019-01-29 | End: 2019-04-16 | Stop reason: SDUPTHER

## 2019-01-29 RX ORDER — LIDOCAINE HCL/PF 100 MG/5ML
SYRINGE (ML) INTRAVENOUS
Status: DISCONTINUED | OUTPATIENT
Start: 2019-01-29 | End: 2019-01-29

## 2019-01-29 RX ORDER — SODIUM CHLORIDE 0.9 % (FLUSH) 0.9 %
3 SYRINGE (ML) INJECTION
Status: DISCONTINUED | OUTPATIENT
Start: 2019-01-29 | End: 2019-01-29 | Stop reason: HOSPADM

## 2019-01-29 RX ORDER — SODIUM CHLORIDE, SODIUM LACTATE, POTASSIUM CHLORIDE, CALCIUM CHLORIDE 600; 310; 30; 20 MG/100ML; MG/100ML; MG/100ML; MG/100ML
INJECTION, SOLUTION INTRAVENOUS CONTINUOUS
Status: DISCONTINUED | OUTPATIENT
Start: 2019-01-29 | End: 2019-01-29 | Stop reason: HOSPADM

## 2019-01-29 RX ADMIN — SODIUM CHLORIDE, SODIUM LACTATE, POTASSIUM CHLORIDE, AND CALCIUM CHLORIDE: .6; .31; .03; .02 INJECTION, SOLUTION INTRAVENOUS at 09:01

## 2019-01-29 RX ADMIN — PROPOFOL 50 MG: 10 INJECTION, EMULSION INTRAVENOUS at 09:01

## 2019-01-29 RX ADMIN — LIDOCAINE HYDROCHLORIDE 100 MG: 20 INJECTION, SOLUTION INTRAVENOUS at 09:01

## 2019-01-29 RX ADMIN — PROPOFOL 100 MG: 10 INJECTION, EMULSION INTRAVENOUS at 09:01

## 2019-01-29 NOTE — TRANSFER OF CARE
Anesthesia Transfer of Care Note    Patient: Jaren Quiroz    Procedure(s) Performed: Procedure(s) (LRB):  EGD (ESOPHAGOGASTRODUODENOSCOPY) (N/A)    Patient location: PACU    Anesthesia Type: general    Transport from OR: Transported from OR on room air with adequate spontaneous ventilation    Post pain: adequate analgesia    Post assessment: no apparent anesthetic complications and tolerated procedure well    Post vital signs: stable    Level of consciousness: awake and sedated    Nausea/Vomiting: no nausea/vomiting    Complications: none    Transfer of care protocol was followed      Last vitals:   Visit Vitals  /78 (BP Location: Right arm, Patient Position: Sitting)   Pulse 69   Temp 36.5 °C (97.7 °F) (Skin)   Resp 20   Ht 6' (1.829 m)   Wt 90.7 kg (200 lb)   SpO2 99%   BMI 27.12 kg/m²

## 2019-01-29 NOTE — DISCHARGE INSTRUCTIONS
Recovery After Procedural Sedation (Adult)  You have been given medicine by vein to make you sleep during your surgery. This may have included both a pain medicine and sleeping medicine. Most of the effects have worn off. But you may still have some drowsiness for the next 6 to 8 hours.  Home care  Follow these guidelines when you get home:  · For the next 8 hours, you should be watched by a responsible adult. This person should make sure your condition is not getting worse.  · Don't drink any alcohol for the next 24 hours.  · Don't drive, operate dangerous machinery, or make important business or personal decisions during the next 24 hours.  Note: Your healthcare provider may tell you not to take any medicine by mouth for pain or sleep in the next 4 hours. These medicines may react with the medicines you were given in the hospital. This could cause a much stronger response than usual.  Follow-up care  Follow up with your healthcare provider if you are not alert and back to your usual level of activity within 12 hours.  When to seek medical advice  Call your healthcare provider right away if any of these occur:  · Drowsiness gets worse  · Weakness or dizziness gets worse  · Repeated vomiting  · You can't be awakened   Date Last Reviewed: 10/18/2016  © 9660-0994 [employer]. [address] [place] [address], [place] [address]. All rights reserved. This information is not intended as a substitute for professional medical care. Always follow your healthcare professional's instructions.        Tips to Control Acid Reflux    To control acid reflux, youll need to make some basic diet and lifestyle changes. The simple steps outlined below may be all youll need to ease discomfort.  Watch what you eat  · Avoid fatty foods and spicy foods.  · Eat fewer acidic foods, such as citrus and tomato-based foods. These can increase symptoms.  · Limit drinking alcohol, caffeine, and fizzy beverages. All increase acid  reflux.  · Try limiting chocolate, peppermint, and spearmint. These can worsen acid reflux in some people.  Watch when you eat  · Avoid lying down for 3 hours after eating.  · Do not snack before going to bed.  Raise your head  Raising your head and upper body by 4 to 6 inches helps limit reflux when youre lying down. Put blocks under the head of your bed frame to raise it.  Other changes  · Lose weight, if you need to  · Dont exercise near bedtime  · Avoid tight-fitting clothes  · Limit aspirin and ibuprofen  · Stop smoking   Date Last Reviewed: 7/1/2016 © 2000-2017 [employer]. [address] [place] [address], [place] [address]. All rights reserved. This information is not intended as a substitute for professional medical care. Always follow your healthcare professional's instructions.        High-Fiber Diet  Fiber is in fruits, vegetables, cereals, and grains. Fiber passes through your body undigested. A high-fiber diet helps food move through your intestinal tract. The added bulk is helpful in preventing constipation. In people with diverticulosis, fiber helps clean out the pouches along the colon wall. It also prevents new pouches from forming. A high-fiber diet reduces the risk of colon cancer. It also lowers blood cholesterol and prevents high blood sugar in people with diabetes.    The fiber-rich foods listed below should be part of your diet. If you are not used to high-fiber foods, start with 1 or 2 foods from this list. Every 3 to 4 days add a new one to your diet. Do this until you are eating 4 high-fiber foods per day. This should give you 20 to 35 grams of fiber a day. It is also important to drink a lot of water when you are on this diet. You should have 6 to 8 glasses of water a day. Water makes the fiber swell and increases the benefit.  Foods high in dietary fiber  The following foods are high in dietary fiber:  · Breads. Breads made with 100% whole-wheat flour; haseeb, wheat, or rye  crackers; whole-grain tortillas, bran muffins.  · Cereals. Whole-grain and bran cereals with bran (shredded wheat, wheat flakes, raisin bran, corn bran); oatmeal, rolled oats, granola, and brown rice.  · Fruits. Fresh fruits and their edible skins (pears, prunes, raisins, berries, apples, and apricots); bananas, citrus fruit, mangoes, pineapple; and prune juice.  · Nuts. Any nuts and seeds.  · Vegetables. Best served raw or lightly cooked. All types, especially: green peas, celery, eggplant, potatoes, spinach, broccoli, [place] sprouts, winter squash, carrots, cauliflower, soybeans, lentils, and fresh and dried beans of all kinds.  · Other. Popcorn, any spices.  Date Last Reviewed: 8/1/2016  © 0916-7583 [employer]. [address] [place] [address], [place] [address]. All rights reserved. This information is not intended as a substitute for professional medical care. Always follow your healthcare professional's instructions.

## 2019-01-29 NOTE — ANESTHESIA POSTPROCEDURE EVALUATION
Anesthesia Post Evaluation    Patient: Jaren Quiroz    Procedure(s) Performed: Procedure(s) (LRB):  EGD (ESOPHAGOGASTRODUODENOSCOPY) (N/A)    Final Anesthesia Type: general  Patient location during evaluation: PACU  Patient participation: Yes- Able to Participate  Level of consciousness: awake and alert and oriented  Post-procedure vital signs: reviewed and stable  Pain management: adequate  Airway patency: patent  PONV status at discharge: No PONV  Anesthetic complications: no      Cardiovascular status: blood pressure returned to baseline and stable  Respiratory status: unassisted and spontaneous ventilation  Hydration status: euvolemic  Follow-up not needed.        Visit Vitals  BP (!) 144/89 (BP Location: Left arm, Patient Position: Lying)   Pulse 65   Temp 36.7 °C (98.1 °F) (Skin)   Resp 12   Ht 6' (1.829 m)   Wt 90.7 kg (200 lb)   SpO2 100%   BMI 27.12 kg/m²       Pain/Pepe Score: Pepe Score: 10 (1/29/2019 10:18 AM)

## 2019-01-29 NOTE — ANESTHESIA PREPROCEDURE EVALUATION
01/29/2019  Jaren PICHARDO Cousin is a 53 y.o., male.    Anesthesia Evaluation    I have reviewed the Patient Summary Reports.    I have reviewed the Nursing Notes.   I have reviewed the Medications.     Review of Systems  Anesthesia Hx:  No problems with previous Anesthesia    Social:  Non-Smoker    Cardiovascular:   Hypertension, well controlled hyperlipidemia    Pulmonary:   Asthma asymptomatic and mild    Renal/:  Renal/ Normal     Hepatic/GI:   GERD, well controlled Liver Disease, (hepatomegaly)    Musculoskeletal:   Arthritis     Neurological:  Neurology Normal    Endocrine:  Endocrine Normal    Psych:   Psychiatric History (PTSD)          Physical Exam  General:  Well nourished    Airway/Jaw/Neck:  Airway Findings: Mouth Opening: Normal Tongue: Normal  General Airway Assessment: Adult  Oropharynx Findings:  Mallampati: II  Jaw/Neck Findings:  Neck ROM: Normal ROM     Eyes/Ears/Nose:  Eyes/Ears/Nose Findings:    Dental:  Dental Findings:   Chest/Lungs:  Chest/Lungs Findings: Normal Respiratory Rate     Heart/Vascular:  Heart Findings: Rate: Normal  Rhythm: Regular Rhythm        Mental Status:  Mental Status Findings:  Cooperative, Alert and Oriented         Anesthesia Plan  Type of Anesthesia, risks & benefits discussed:  Anesthesia Type:  general  Patient's Preference:   Intra-op Monitoring Plan: standard ASA monitors  Intra-op Monitoring Plan Comments:   Post Op Pain Control Plan: multimodal analgesia  Post Op Pain Control Plan Comments:   Induction:   IV  Beta Blocker:  Patient is not currently on a Beta-Blocker (No further documentation required).       Informed Consent: Patient understands risks and agrees with Anesthesia plan.  Questions answered. Anesthesia consent signed with patient.  ASA Score: 3     Day of Surgery Review of History & Physical:  There are no significant changes.   H&P completed  by Anesthesiologist.       Ready For Surgery From Anesthesia Perspective.

## 2019-01-29 NOTE — H&P
History & Physical - Short Stay  Gastroenterology      SUBJECTIVE:     Procedure: Gastroscopy    Chief Complaint/Indication for Procedure: RUQ pain    History of Present Illness:  Office Visit     11/29/2018  [facility] - Gastroenterology      JARED Paiz   Gastroenterology   RUQ pain +6 more   Dx   Abdominal Pain ; Referred by Aaareferral Self   Reason for Visit    Progress Notes     Expand All Collapse All    Subjective:       Patient ID: Jaren PICHARDO Cousin is a 53 y.o. male Body mass index is 28.11 kg/m².     Chief Complaint: Abdominal Pain     This patient is new to me.  Established patient of Dr. Dexter (last in 6/2015).     Abdominal Pain   This is a chronic problem. The current episode started more than 1 year ago (several years intermittent, at least since 2014). The problem occurs intermittently (can have no pain for months, and then flares up and can have it for a month). Progression since onset: worsening ~9/2018, improved since he decreased alcohol use. The pain is located in the RUQ. The pain is at a severity of 0/10 (currently). The quality of the pain is dull. The abdominal pain radiates to the back (occasional). Associated symptoms include belching (frequent), diarrhea (intermittent) and flatus (frequent). Pertinent negatives include no constipation, dysuria, fever, frequency, hematochezia, melena, nausea, vomiting or weight loss. Associated symptoms comments: Was drinking 4-6 beer 7 days a week; has decreased to only 5 days a week of 3 beers a day  Bowel movements formed to watery stool of twice daily. The pain is aggravated by drinking alcohol. He has tried proton pump inhibitors (zantac OTC daily prn taken about 1-2 times a day; nexium 40 mg once daily PRN- last took 3 weeks ago, takes 5 days and then stops, naproxen use prn maybe once a week, decreased alcohol use) for the symptoms. His past medical history is significant for GERD (occasional). There is no history of Crohn's disease,  pancreatitis, PUD or ulcerative colitis.      Assessment:       1. RUQ pain    2. Liver hemangioma    3. Alcohol abuse    4. Intermittent diarrhea    5. Upper abdominal pain    6. Heartburn    7. Diaphoresis        Plan:       RUQ pain  -     Hepatic function panel; Future; Expected date: 11/29/2018  -     Lipase; Future; Expected date: 11/29/2018  -     Amylase; Future; Expected date: 11/29/2018  -     CT Abdomen Pelvis With Contrast; Future; Expected date: 11/29/2018  -  CHANGE TO   esomeprazole (NEXIUM) 40 MG capsule; Take 1 capsule (40 mg total) by mouth before breakfast.  Dispense: 30 capsule; Refill: 0  -     Creatinine, serum; Future; Expected date: 11/29/2018  - schedule EGD, discussed procedure with patient, patient verbalized understanding     Liver hemangioma  -     Hepatic function panel; Future; Expected date: 11/29/2018  -     CT Abdomen Pelvis With Contrast; Future; Expected date: 11/29/2018     Alcohol abuse  -     Hepatic function panel; Future; Expected date: 11/29/2018  -     CT Abdomen Pelvis With Contrast; Future; Expected date: 11/29/2018  - I strongly encouraged patient to abstain from alcohol use     Intermittent diarrhea  -     CT Abdomen Pelvis With Contrast; Future; Expected date: 11/29/2018  - recommended OTC probiotic, such as Align or Culturelle, as directed  - avoid lactose     Upper abdominal pain  -     CT Abdomen Pelvis With Contrast; Future; Expected date: 11/29/2018  - schedule EGD, discussed procedure with patient, patient verbalized understanding     Heartburn  -  CHANGE TO   esomeprazole (NEXIUM) 40 MG capsule; Take 1 capsule (40 mg total) by mouth before breakfast.  Dispense: 30 capsule; Refill: 0, - take in the morning 30-60 minutes before breakfast, discussed about possible long term use of medication (prefer to use lowest effective dose or discontinuing if possible), pt verbalized understanding  - CONTINUE ZANTAC OTC 75 MG BID AS DIRECTED PRN FOR BREAKTHROUGH  HEARTBURN/GERD  -discussed about the different types of medications used to treat reflux and how to use them, antacids can be used PRN for breakthrough heartburn symptoms by reducing stomach acid that is already produced, H2 blockers (zantac) work by limiting the amount acid production, & PPI's work to block acid production and are taken daily, patient verbalized understanding.  -Educated patient on lifestyle modifications to help control/reduce reflux/abdominal pain including: avoid large meals, avoid eating within 2-3 hours of bedtime (avoid late night eating & lying down soon after eating), elevate head of bed if nocturnal symptoms are present, smoking cessation (if current smoker), & weight loss (if overweight).   -Educated to avoid known foods which trigger reflux symptoms & to minimize/avoid high-fat foods, chocolate, caffeine, citrus, alcohol, & tomato products.  -Advised to avoid/limit use of NSAID's, since they can cause GI upset, bleeding, and/or ulcers. If needed, take with food.   - schedule EGD, discussed procedure with patient, patient verbalized understanding     Diaphoresis  Recommend follow-up with Primary Care Provider for continued evaluation and management.     Follow-up in about 1 month (around 12/29/2018), or if symptoms worsen or fail to improve.            CT Scan 12/5/2018  Impression       1. Chronically enlarged fatty liver.  2. Hyperenhancing nodule with persistent enhancement in the lateral segment of the left hepatic lobe anteriorly which remains unchanged in size and appearance since the prior MRI study and is consistent with a benign structure such as an atypical hemangioma.  3. Simple hepatic cyst in the lateral segment of the left hepatic lobe which is unchanged.  A 2nd cyst in the right hepatic lobe is not visible on today's study.  4. Small hyperenhancing nodule in the subcapsular aspect of the posterior segment of the right hepatic lobe which was not definitely visualized on the  prior MRI study, but could have been obscured by breathing motion artifact.  Surveillance with a repeat contrast enhanced CT or MRI in 6 months is recommended.  5. Curvilinear hyperenhancing focus in the posterior segment of the right hepatic lobe near the dome of the diaphragm which may be vascular in origin.  Surveillance with a repeat contrast enhanced CT or MRI in 6 months is recommended.  6. Nonobstructing right renal calculi.      Electronically signed by: Wei Leblanc MD  Date: 12/05/2018         PTA Medications   Medication Sig    ALLERGY RELIEF, LORATADINE, 10 mg tablet TAKE ONE TABLET BY MOUTH ONCE DAILY    amLODIPine (NORVASC) 10 MG tablet Take 1 tablet (10 mg total) by mouth once daily.    benzoyl peroxide (BENZAC AC WASH) 10 % external wash Apply topically 2 (two) times daily. Face and trunk    clindamycin phosphate foam Apply to affected area qd- bid    cyclobenzaprine (FLEXERIL) 10 MG tablet Take 10 mg by mouth 3 (three) times daily as needed. PRN    doxycycline monohydrate 100 mg Tab Take 1 po qday    esomeprazole (NEXIUM) 40 MG capsule Take 1 capsule (40 mg total) by mouth before breakfast.    fenofibrate (TRICOR) 145 MG tablet TAKE ONE TABLET BY MOUTH ONCE DAILY    ketoconazole (NIZORAL) 2 % shampoo Use as directed qohs as needed    mometasone (NASONEX) 50 mcg/actuation nasal spray 2 sprays by Nasal route once daily.    niacin (SLO-NIACIN) 500 mg tablet TAKE ONE TABLET BY MOUTH ONCE DAILY AT BEDTIME    ranitidine (ZANTAC) 75 MG tablet Take 75 mg by mouth 2 (two) times daily as needed for Heartburn.    valACYclovir (VALTREX) 500 MG tablet Take 2 tablets (1,000 mg total) by mouth 2 (two) times daily.    hydrOXYzine (ATARAX) 25 MG tablet Take 1 tablet (25 mg total) by mouth every 6 (six) hours as needed for Itching. Every 6 hours PRN    meclizine (ANTIVERT) 25 mg tablet Take 1 tablet (25 mg total) by mouth 3 (three) times daily as needed for Dizziness (may cause drowsiness).     minocycline (MINOCIN,DYNACIN) 100 MG capsule 1 po x 2 weeks    minocycline (MINOCIN,DYNACIN) 50 MG Cap Take 1 capsule (50 mg total) by mouth once daily. (Patient taking differently: Take by mouth once daily. )    naproxen (NAPROSYN) 500 MG tablet Take 1 tablet (500 mg total) by mouth 2 (two) times daily with meals.    tramadol (ULTRAM) 50 mg tablet 1 Tablet(s) Oral PRN Every 8 hours.         Review of patient's allergies indicates:   Allergen Reactions    No known drug allergies         Past Medical History:   Diagnosis Date    ALLERGIC RHINITIS     Asthma     as child    DDD (degenerative disc disease), lumbar     Fatty liver     Hemorrhoid     Hepatomegaly     High triglycerides     HTN (hypertension)     Liver hemangioma 2014    PTSD (post-traumatic stress disorder)     not currently taking [facility]s [facility]    Shingles     lower back    Urticaria      Past Surgical History:   Procedure Laterality Date    BACK SURGERY      ruptured disc L5-S1    COLONOSCOPY  ~2010  ([facility])    Hemorrhoids    COLONOSCOPY N/A 6/24/2015    Performed [facility] Balwinder Dexter Jr[facility]    ELBOW SURGERY      right    KNEE SURGERY      right     Family History   Problem Relation Age of Onset    Allergic rhinitis Son     Allergic rhinitis Daughter     Asthma Daughter     Allergic rhinitis Daughter     Diverticulitis Mother     Angioedema Neg Hx     Eczema Neg Hx     Immunodeficiency Neg Hx     Urticaria Neg Hx     Lupus Neg Hx     Psoriasis Neg Hx     Melanoma Neg Hx     Acne Neg Hx     Colon cancer Neg Hx     Colon polyps Neg Hx     Crohn's disease Neg Hx     Ulcerative colitis Neg Hx     Esophageal cancer Neg Hx     Stomach cancer Neg Hx      Social History     Tobacco Use    Smoking status: Never Smoker    Smokeless tobacco: Never Used   Substance Use Topics    Alcohol use: Yes     Alcohol/week: 12.6 oz     Types: 21 Cans of beer per week     Comment: 3 beers daily    Drug  use: No         OBJECTIVE:     Vital Signs (Most Recent)  Temp: 97.7 °F (36.5 °C) (01/29/19 0916)  Pulse: 69 (01/29/19 0916)  Resp: 20 (01/29/19 0916)  BP: 135/78 (01/29/19 0916)  SpO2: 99 % (01/29/19 0916)    Physical Exam:  : Ht 6' (1.829 m)   Wt 94 kg (207 lb 3.7 oz)   BMI 28.11 kg/m²                   GENERAL:  Comfortable, in no acute distress.                                 HEENT EXAM:  Nonicteric.  No adenopathy.  Oropharynx is clear.          NECK:  Supple.                                                               LUNGS:  Clear.                                                               CARDIAC:  Regular rate and rhythm.  S1, S2.  No murmur.                      ABDOMEN:  Soft, positive bowel sounds, nontender.  No hepatosplenomegaly or masses.  No rebound or guarding.                     EXTREMITIES:  No edema.     MENTAL STATUS:  Alert and oriented.    ASSESSMENT/PLAN:     Assessment: RUQ pain    Plan: Gastroscopy    Anesthesia Plan:   MAC / General Anaesthesia    ASA Grade: ASA 2 - Patient with mild systemic disease with no functional limitations    MALLAMPATI SCORE: I (soft palate, uvula, fauces, and tonsillar pillars visible)

## 2019-01-29 NOTE — BRIEF OP NOTE
Discharge Note  Short Stay      SUMMARY     Admit Date: 1/29/2019    Attending Physician: Balwinder Dexter Jr., MD     Discharge Physician: Balwinder Dexter Jr., MD    Discharge Date: 1/29/2019 10:05 AM    Final Diagnosis: RUQ pain [R10.11]  Heartburn [R12]  Intermittent diarrhea [R19.7]  Impression:          - Normal oropharynx.                       - Normal esophagus.                       - Z-line regular, 41 cm from the incisors.                       - Non-erosive esophageal reflux (NERD) disease??.                       - Normal cardia.                       - Minimal antritis. Biopsied.                       - Normal stomach otherwise.                       - Normal examined duodenum. Biopsied.  Recommendation:      - Discharge patient to home.                       - Await pathology and CLOtest results.                       - Follow an antireflux regimen.                       - Continue present medications.                       - Use Nexium (esomeprazole) 40 mg PO daily for 2                        months, then PRN.                       - Call the [facility] clinic in 2 weeks for reports (if                        you haven't heard from us sooner) [phone].  Balwinder Dexter MD  1/29/2019   Disposition: HOME OR SELF CARE    Patient Instructions:   Current Discharge Medication List      CONTINUE these medications which have CHANGED    Details   esomeprazole (NEXIUM) 40 MG capsule Take 1 capsule (40 mg total) by mouth before breakfast.  Qty: 60 capsule, Refills: 5    Associated Diagnoses: RUQ pain; Heartburn         CONTINUE these medications which have NOT CHANGED    Details   ALLERGY RELIEF, LORATADINE, 10 mg tablet TAKE ONE TABLET BY MOUTH ONCE DAILY  Qty: 30 tablet, Refills: 11    Comments: Please consider 90 day supplies to promote better adherence      amLODIPine (NORVASC) 10 MG tablet Take 1 tablet (10 mg total) by mouth once daily.  Qty: 30 tablet, Refills: 5    Associated Diagnoses: Essential  hypertension      benzoyl peroxide (BENZAC AC WASH) 10 % external wash Apply topically 2 (two) times daily. Face and trunk  Qty: 226 g, Refills: 5    Associated Diagnoses: Acne vulgaris      clindamycin phosphate foam Apply to affected area qd- bid  Qty: 100 g, Refills: 5    Associated Diagnoses: Folliculitis      cyclobenzaprine (FLEXERIL) 10 MG tablet Take 10 mg by mouth 3 (three) times daily as needed. PRN      doxycycline monohydrate 100 mg Tab Take 1 po qday  Qty: 30 tablet, Refills: 1    Associated Diagnoses: Acne vulgaris      fenofibrate (TRICOR) 145 MG tablet TAKE ONE TABLET BY MOUTH ONCE DAILY  Qty: 30 tablet, Refills: 11    Comments: Please consider 90 day supplies to promote better adherence  Associated Diagnoses: High triglycerides      ketoconazole (NIZORAL) 2 % shampoo Use as directed qohs as needed  Qty: 240 mL, Refills: 5    Associated Diagnoses: Folliculitis      mometasone (NASONEX) 50 mcg/actuation nasal spray 2 sprays by Nasal route once daily.  Qty: 1 each, Refills: 11      niacin (SLO-NIACIN) 500 mg tablet TAKE ONE TABLET BY MOUTH ONCE DAILY AT BEDTIME  Qty: 30 tablet, Refills: 11    Comments: Please consider 90 day supplies to promote better adherence  Associated Diagnoses: High triglycerides      ranitidine (ZANTAC) 75 MG tablet Take 75 mg by mouth 2 (two) times daily as needed for Heartburn.      valACYclovir (VALTREX) 500 MG tablet Take 2 tablets (1,000 mg total) by mouth 2 (two) times daily.  Qty: 30 tablet, Refills: 11      hydrOXYzine (ATARAX) 25 MG tablet Take 1 tablet (25 mg total) by mouth every 6 (six) hours as needed for Itching. Every 6 hours PRN  Qty: 60 tablet, Refills: 6    Associated Diagnoses: Urticaria; Angioedema      meclizine (ANTIVERT) 25 mg tablet Take 1 tablet (25 mg total) by mouth 3 (three) times daily as needed for Dizziness (may cause drowsiness).  Qty: 60 tablet, Refills: 2    Associated Diagnoses: Dizziness      !! minocycline (MINOCIN,DYNACIN) 100 MG capsule 1  po x 2 weeks  Qty: 28 capsule, Refills: 2    Associated Diagnoses: Acne vulgaris      !! minocycline (MINOCIN,DYNACIN) 50 MG Cap Take 1 capsule (50 mg total) by mouth once daily.  Qty: 30 capsule, Refills: 1    Associated Diagnoses: Acne vulgaris      naproxen (NAPROSYN) 500 MG tablet Take 1 tablet (500 mg total) by mouth 2 (two) times daily with meals.  Qty: 60 tablet, Refills: 0    Associated Diagnoses: Neck pain on left side      tramadol (ULTRAM) 50 mg tablet 1 Tablet(s) Oral PRN Every 8 hours.         !! - Potential duplicate medications found. Please discuss with provider.          Discharge Procedure Orders (must include Diet, Follow-up, Activity)    Follow Up:  Follow up with PCP as per your routine.  Please follow an anti reflux diet and a high fiber diet.  Activity as tolerated.    No driving day of procedure.

## 2019-01-29 NOTE — [employer] PATIENT INSTRUCTIONS
Discharge Summary/Instructions after an Endoscopic Procedure  Patient Name: Jaren Cousin  Patient MRN: [id number]  Patient YOB: 1965 Tuesday, January 29, 2019  Balwinder Dexter MD  RESTRICTIONS:  During your procedure today, you received medications for sedation.  These   medications may affect your judgment, balance and coordination.  Therefore,   for 24 hours, you have the following restrictions:   - DO NOT drive a car, operate machinery, make legal/financial decisions,   sign important papers or drink alcohol.    ACTIVITY:  Today: no heavy lifting, straining or running due to procedural   sedation/anesthesia.  The following day: return to full activity including work.  DIET:  Eat and drink normally unless instructed otherwise.     TREATMENT FOR COMMON SIDE EFFECTS:  - Mild abdominal pain, nausea, belching, bloating or excessive gas:  rest,   eat lightly and use a heating pad.  - Sore Throat: treat with throat lozenges and/or gargle with warm salt   water.  - Because air was used during the procedure, expelling large amounts of air   from your rectum or belching is normal.  - If a bowel prep was taken, you may not have a bowel movement for 1-3 days.    This is normal.  SYMPTOMS TO WATCH FOR AND REPORT TO YOUR PHYSICIAN:  1. Abdominal pain or bloating, other than gas cramps.  2. Chest pain.  3. Back pain.  4. Signs of infection such as: chills or fever occurring within 24 hours   after the procedure.  5. Rectal bleeding, which would show as bright red, maroon, or black stools.   (A tablespoon of blood from the rectum is not serious, especially if   hemorrhoids are present.)  6. Vomiting.  7. Weakness or dizziness.  GO DIRECTLY TO THE NEAREST EMERGENCY ROOM IF YOU HAVE ANY OF THE FOLLOWING:      Difficulty breathing              Chills and/or fever over 101 F   Persistent vomiting and/or vomiting blood   Severe abdominal pain   Severe chest pain   Black, tarry stools   Bleeding- more than one  tablespoon   Any other symptom or condition that you feel may need urgent attention  Your doctor recommends these additional instructions:  If any biopsies were taken, your doctors clinic will contact you in 1 to 2   weeks with any results.  Follow an antireflux regimen.  This includes:       - Do not lie down for at least 3 to 4 hours after meals.        - Raise the head of the bed 4 to 6 inches.        - Decrease excess weight.        - Avoid citrus juices and other acidic foods, alcohol, chocolate, mints,   coffee and other caffeinated beverages, carbonated beverages, fatty and   fried foods.        - Avoid tight-fitting clothing.        - Avoid cigarettes and other tobacco products.   Continue your present medications.   Take Nexium (esomeprazole) 40 mg by mouth once a day for two months, then as   needed.  For questions, problems or results please call your physician - Balwinder Dexter MD at Work:  [phone].  EMERGENCY PHONE NUMBER: [phone], LAB RESULTS: [phone]  IF A COMPLICATION OR EMERGENCY SITUATION ARISES AND YOU ARE UNABLE TO REACH   YOUR PHYSICIAN - GO DIRECTLY TO THE EMERGENCY ROOM.  ___________________________________________  Nurse Signature  ___________________________________________  Patient/Designated Responsible Party Signature  Balwinder Dexter MD  1/29/2019 10:03:40 AM  This report has been verified and signed electronically.  [employer]

## 2019-01-29 NOTE — PLAN OF CARE
Vss, samia po fluids, denies pain, ambulates easily. IV removed, catheter intact. Discharge instructions provided and states understanding. States ready to go home.  Discharged from facility with family.

## 2019-01-30 VITALS
HEART RATE: 65 BPM | SYSTOLIC BLOOD PRESSURE: 144 MMHG | BODY MASS INDEX: 27.09 KG/M2 | OXYGEN SATURATION: 100 % | RESPIRATION RATE: 12 BRPM | DIASTOLIC BLOOD PRESSURE: 89 MMHG | TEMPERATURE: 98 F | WEIGHT: 200 LBS | HEIGHT: 72 IN

## 2019-02-11 ENCOUNTER — PATIENT MESSAGE (OUTPATIENT)
Dept: GASTROENTEROLOGY | Facility: CLINIC | Age: 54
End: 2019-02-11

## 2019-02-21 RX ORDER — MOMETASONE FUROATE 50 UG/1
SPRAY, METERED NASAL
Qty: 17 G | Refills: 11 | Status: SHIPPED | OUTPATIENT
Start: 2019-02-21 | End: 2020-12-02

## 2019-02-21 NOTE — PROGRESS NOTES
Refill Authorization Note     is requesting a refill authorization.    Brief assessment and rationale for refill: ROUTE: op  Name and strength of medication: mometasone (NASONEX) 50 mcg/actuation nasal spray            Medication reconciliation completed: No                         Comments:   APPOINTMENTS (past 12 m or future 3m authorizing provider)  LAST VISIT DATE  Samuel Manzanares MD 6/12/2018         NEXT VISIT DATE  Samuel Manzanares MD Visit date not found

## 2019-03-24 DIAGNOSIS — E78.1 HIGH TRIGLYCERIDES: ICD-10-CM

## 2019-03-25 RX ORDER — NIACIN 500 MG/1
TABLET, EXTENDED RELEASE ORAL
Qty: 90 TABLET | Refills: 0 | Status: SHIPPED | OUTPATIENT
Start: 2019-03-25 | End: 2019-06-17 | Stop reason: SDUPTHER

## 2019-03-25 RX ORDER — FENOFIBRATE 145 MG/1
TABLET, FILM COATED ORAL
Qty: 90 TABLET | Refills: 0 | Status: SHIPPED | OUTPATIENT
Start: 2019-03-25 | End: 2019-06-17 | Stop reason: SDUPTHER

## 2019-04-02 DIAGNOSIS — L70.0 ACNE VULGARIS: ICD-10-CM

## 2019-04-02 RX ORDER — MINOCYCLINE HYDROCHLORIDE 50 MG/1
50 CAPSULE ORAL DAILY
Qty: 30 CAPSULE | Refills: 1 | OUTPATIENT
Start: 2019-04-02

## 2019-04-02 NOTE — TELEPHONE ENCOUNTER
Jaren PICHARDO Cousin would like a refill of the following medications:         minocycline (MINOCIN,DYNACIN) 50 MG Cap [Judy Triana MD]       Patient Comment: Please provide multiple refills     Preferred pharmacy: [address] [place] - [address] [address]   Delivery method: Pickup

## 2019-04-03 ENCOUNTER — PATIENT OUTREACH (OUTPATIENT)
Dept: ADMINISTRATIVE | Facility: HOSPITAL | Age: 54
End: 2019-04-03

## 2019-04-16 ENCOUNTER — TELEPHONE (OUTPATIENT)
Dept: FAMILY MEDICINE | Facility: CLINIC | Age: 54
End: 2019-04-16

## 2019-04-16 ENCOUNTER — OFFICE VISIT (OUTPATIENT)
Dept: FAMILY MEDICINE | Facility: CLINIC | Age: 54
End: 2019-04-16
Payer: COMMERCIAL

## 2019-04-16 VITALS
HEIGHT: 72 IN | DIASTOLIC BLOOD PRESSURE: 82 MMHG | RESPIRATION RATE: 18 BRPM | BODY MASS INDEX: 27.59 KG/M2 | WEIGHT: 203.69 LBS | SYSTOLIC BLOOD PRESSURE: 118 MMHG | HEART RATE: 83 BPM | OXYGEN SATURATION: 98 %

## 2019-04-16 DIAGNOSIS — M50.30 DDD (DEGENERATIVE DISC DISEASE), CERVICAL: ICD-10-CM

## 2019-04-16 DIAGNOSIS — K21.9 GASTROESOPHAGEAL REFLUX DISEASE WITHOUT ESOPHAGITIS: ICD-10-CM

## 2019-04-16 DIAGNOSIS — L70.0 ACNE VULGARIS: ICD-10-CM

## 2019-04-16 DIAGNOSIS — Z00.00 PREVENTATIVE HEALTH CARE: Primary | ICD-10-CM

## 2019-04-16 DIAGNOSIS — M51.36 DDD (DEGENERATIVE DISC DISEASE), LUMBAR: ICD-10-CM

## 2019-04-16 DIAGNOSIS — R10.11 RUQ PAIN: ICD-10-CM

## 2019-04-16 DIAGNOSIS — I10 ESSENTIAL HYPERTENSION: ICD-10-CM

## 2019-04-16 DIAGNOSIS — R12 HEARTBURN: ICD-10-CM

## 2019-04-16 PROCEDURE — 3074F SYST BP LT 130 MM HG: CPT | Mod: CPTII,S$GLB,, | Performed by: FAMILY MEDICINE

## 2019-04-16 PROCEDURE — 99999 PR PBB SHADOW E&M-EST. PATIENT-LVL V: ICD-10-PCS | Mod: PBBFAC,,, | Performed by: FAMILY MEDICINE

## 2019-04-16 PROCEDURE — 99999 PR PBB SHADOW E&M-EST. PATIENT-LVL V: CPT | Mod: PBBFAC,,, | Performed by: FAMILY MEDICINE

## 2019-04-16 PROCEDURE — 3079F DIAST BP 80-89 MM HG: CPT | Mod: CPTII,S$GLB,, | Performed by: FAMILY MEDICINE

## 2019-04-16 PROCEDURE — 3074F PR MOST RECENT SYSTOLIC BLOOD PRESSURE < 130 MM HG: ICD-10-PCS | Mod: CPTII,S$GLB,, | Performed by: FAMILY MEDICINE

## 2019-04-16 PROCEDURE — 99396 PR PREVENTIVE VISIT,EST,40-64: ICD-10-PCS | Mod: S$GLB,,, | Performed by: FAMILY MEDICINE

## 2019-04-16 PROCEDURE — 3079F PR MOST RECENT DIASTOLIC BLOOD PRESSURE 80-89 MM HG: ICD-10-PCS | Mod: CPTII,S$GLB,, | Performed by: FAMILY MEDICINE

## 2019-04-16 PROCEDURE — 99396 PREV VISIT EST AGE 40-64: CPT | Mod: S$GLB,,, | Performed by: FAMILY MEDICINE

## 2019-04-16 RX ORDER — ESOMEPRAZOLE MAGNESIUM 40 MG/1
40 CAPSULE, DELAYED RELEASE ORAL
Qty: 60 CAPSULE | Refills: 5 | Status: SHIPPED | OUTPATIENT
Start: 2019-04-16 | End: 2019-12-05 | Stop reason: SDUPTHER

## 2019-04-16 RX ORDER — MINOCYCLINE HYDROCHLORIDE 100 MG/1
100 CAPSULE ORAL DAILY
Qty: 30 CAPSULE | Refills: 11 | Status: SHIPPED | OUTPATIENT
Start: 2019-04-16 | End: 2020-05-12

## 2019-04-16 RX ORDER — MINOCYCLINE HYDROCHLORIDE 100 MG/1
CAPSULE ORAL
Qty: 28 CAPSULE | Refills: 2 | Status: SHIPPED | OUTPATIENT
Start: 2019-04-16 | End: 2019-04-16

## 2019-04-16 NOTE — PROGRESS NOTES
Chief Complaint   Patient presents with    Preventative health care         HISTORY OF PRESENT ILLNESS: This is a 53 year-old male presents today for annual exam.    GERD - He is using nexium 40mg and Zantac daily.  HLD - He has  been taking Tricor and Niaspan 500mg daily  HTN - tolerating Norvasc 10mg daily for HTN. Home /70.  Lumbar DDD, cervical DDD - usin Aelve PRN and Flexeril PRN spasms; getting persistent posterior neck pain. He did get xrays and did some PT for his neck, but still getting some posterior neck pain.  He would like to consult with the back and spine clinic here.  Hives have been stable on present regimen. Using atarax at bedtime  Following with GI for liver mass and has MRI of abdomen in June    Past Medical History:   Diagnosis Date    ALLERGIC RHINITIS     Asthma     as child    DDD (degenerative disc disease), lumbar     Fatty liver     Hemorrhoid     Hepatomegaly     High triglycerides     HTN (hypertension)     Liver hemangioma 2014    PTSD (post-traumatic stress disorder)     not currently taking [facility]s [facility]    Shingles     lower back    Urticaria          Past Surgical History:   Procedure Laterality Date    BACK SURGERY      ruptured disc L5-S1    COLONOSCOPY  ~2010  ([facility])    Hemorrhoids    COLONOSCOPY N/A 6/24/2015    Per[facility]winder [facility]ter [facility]    EGD (ESOPHAGOGASTRODUODENOSCOPY) N/A 1/29/2019    Performed by [facility]winder [facility]ter Jr., [facility] at [facility]    ELBOW SURGERY      right    KNEE SURGERY      right       FAMILY HISTORY: coronary artery disease and diabetes in his father, diabetes in his brother.     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 He does not smoke. He drinks 3 beers per day. He does   not exercise regularly. He is , has children.      REVIEW OF SYSTEMS: Denies any chest pain, or change in   bowel or bladder habits.   Intermittent epigastric soreness  which comes and goes.  Getting some low back pain, neck pain    PHYSICAL EXAM:   /82   Pulse 83   Resp 18   Ht 6' (1.829 m)   Wt 92.4 kg (203 lb 11.3 oz)   SpO2 98%   BMI 27.63 kg/m²     GENERAL: This is a healthy-appearing 53-year-old male, sitting upright,   in no apparent distress. Alert and oriented x4.   Posterior oropharynx is clear.   NECK: Supple. There is no lymphadenopathy, thyromegaly or JVD. Some crepitus with ROM.  CHEST: Clear to auscultation bilaterally with good respiratory movement.   ABDOMEN: Soft, no epigastric tenderness . Positive bowel sounds and no hepatosplenomegaly.   EXTREMITIES: Show no cyanosis, clubbing or edema.   SKIN: no rashes    Results for orders placed or performed during the hospital encounter of 01/29/19   POCT H. pylori   Result Value Ref Range    H. pylori Index Value Negative Negative               ASSESSMENT/PLAN:   Preventative health care  -     Lipid panel; Future; Expected date: 04/16/2019  -     PSA, Screening; Future; Expected date: 04/16/2019  -     CBC auto differential; Future; Expected date: 04/16/2019  -     Basic metabolic panel; Future; Expected date: 04/16/2019  -     Hepatic function panel; Future; Expected date: 04/16/2019    DDD (degenerative disc disease), cervical  -     Ambulatory Consult to Back & Spine Clinic    DDD (degenerative disc disease), lumbar  -     Ambulatory Consult to Back & Spine Clinic    Essential hypertension    Acne vulgaris  -     Discontinue: minocycline (MINOCIN,DYNACIN) 100 MG capsule; 1 po x 2 weeks  Dispense: 28 capsule; Refill: 2  -     minocycline (MINOCIN,DYNACIN) 100 MG capsule; Take 1 capsule (100 mg total) by mouth once daily.  Dispense: 30 capsule; Refill: 11  -     Ambulatory referral to Dermatology    RUQ pain  -     esomeprazole (NEXIUM) 40 MG capsule; Take 1 capsule (40 mg total) by mouth before breakfast.  Dispense: 60 capsule; Refill: 5    Heartburn  -     esomeprazole (NEXIUM) 40 MG capsule; Take 1  capsule (40 mg total) by mouth before breakfast.  Dispense: 60 capsule; Refill: 5    Gastroesophageal reflux disease without esophagitis       Overall doing well  Continue minocycline for control of acne and will arrange derm opinion as well  Counseled on regular exercise, maintenance of a healthy weight, balanced diet rich in fruits/vegetables and lean protein, and avoidance of unhealthy habits like smoking and excessive alcohol intake.  continue Tricor,  Niaspan 500mg QHS  Continue a low-fat, low-triglyceride diet.  continue Norvasc daily  Continue Nasonex daily with Claritin  Basic stretching of neck and back  F/u 6 months (patient preference) or PRN

## 2019-04-16 NOTE — TELEPHONE ENCOUNTER
----- Message from Jorge Lucero sent at 4/16/2019  4:19 PM CDT -----  Type:  Pharmacy Calling to Clarify an RX    Name of Caller:  Jaclyn  Pharmacy Name:  [facility]  Prescription Name:  Menacycline  What do they need to clarify?:  Directions  Best Call Back Number:  [phone]

## 2019-04-16 NOTE — TELEPHONE ENCOUNTER
Spoke with myrtle from [facility] and read directions for minocycline per order report. Myrtle verbalized understanding.

## 2019-04-17 ENCOUNTER — LAB VISIT (OUTPATIENT)
Dept: LAB | Facility: HOSPITAL | Age: 54
End: 2019-04-17
Attending: FAMILY MEDICINE
Payer: COMMERCIAL

## 2019-04-17 DIAGNOSIS — Z00.00 PREVENTATIVE HEALTH CARE: ICD-10-CM

## 2019-04-17 LAB
ALBUMIN SERPL BCP-MCNC: 4.4 G/DL (ref 3.5–5.2)
ALP SERPL-CCNC: 42 U/L (ref 55–135)
ALT SERPL W/O P-5'-P-CCNC: 20 U/L (ref 10–44)
ANION GAP SERPL CALC-SCNC: 8 MMOL/L (ref 8–16)
AST SERPL-CCNC: 17 U/L (ref 10–40)
BASOPHILS # BLD AUTO: 0.07 K/UL (ref 0–0.2)
BASOPHILS NFR BLD: 0.9 % (ref 0–1.9)
BILIRUB DIRECT SERPL-MCNC: 0.2 MG/DL (ref 0.1–0.3)
BILIRUB SERPL-MCNC: 0.4 MG/DL (ref 0.1–1)
BUN SERPL-MCNC: 20 MG/DL (ref 6–20)
CALCIUM SERPL-MCNC: 9.7 MG/DL (ref 8.7–10.5)
CHLORIDE SERPL-SCNC: 107 MMOL/L (ref 95–110)
CHOLEST SERPL-MCNC: 212 MG/DL (ref 120–199)
CHOLEST/HDLC SERPL: 4.5 {RATIO} (ref 2–5)
CO2 SERPL-SCNC: 26 MMOL/L (ref 23–29)
COMPLEXED PSA SERPL-MCNC: 1.1 NG/ML (ref 0–4)
CREAT SERPL-MCNC: 1.1 MG/DL (ref 0.5–1.4)
DIFFERENTIAL METHOD: ABNORMAL
EOSINOPHIL # BLD AUTO: 0.3 K/UL (ref 0–0.5)
EOSINOPHIL NFR BLD: 3.4 % (ref 0–8)
ERYTHROCYTE [DISTWIDTH] IN BLOOD BY AUTOMATED COUNT: 14.6 % (ref 11.5–14.5)
EST. GFR  (AFRICAN AMERICAN): >60 ML/MIN/1.73 M^2
EST. GFR  (NON AFRICAN AMERICAN): >60 ML/MIN/1.73 M^2
GLUCOSE SERPL-MCNC: 105 MG/DL (ref 70–110)
HCT VFR BLD AUTO: 41.1 % (ref 40–54)
HDLC SERPL-MCNC: 47 MG/DL (ref 40–75)
HDLC SERPL: 22.2 % (ref 20–50)
HGB BLD-MCNC: 13.3 G/DL (ref 14–18)
IMM GRANULOCYTES # BLD AUTO: 0.02 K/UL (ref 0–0.04)
IMM GRANULOCYTES NFR BLD AUTO: 0.3 % (ref 0–0.5)
LDLC SERPL CALC-MCNC: 143.8 MG/DL (ref 63–159)
LYMPHOCYTES # BLD AUTO: 2.2 K/UL (ref 1–4.8)
LYMPHOCYTES NFR BLD: 28.2 % (ref 18–48)
MCH RBC QN AUTO: 27.7 PG (ref 27–31)
MCHC RBC AUTO-ENTMCNC: 32.4 G/DL (ref 32–36)
MCV RBC AUTO: 85 FL (ref 82–98)
MONOCYTES # BLD AUTO: 0.7 K/UL (ref 0.3–1)
MONOCYTES NFR BLD: 9 % (ref 4–15)
NEUTROPHILS # BLD AUTO: 4.4 K/UL (ref 1.8–7.7)
NEUTROPHILS NFR BLD: 58.2 % (ref 38–73)
NONHDLC SERPL-MCNC: 165 MG/DL
NRBC BLD-RTO: 0 /100 WBC
PLATELET # BLD AUTO: 435 K/UL (ref 150–350)
PMV BLD AUTO: 9.7 FL (ref 9.2–12.9)
POTASSIUM SERPL-SCNC: 4.3 MMOL/L (ref 3.5–5.1)
PROT SERPL-MCNC: 7.3 G/DL (ref 6–8.4)
RBC # BLD AUTO: 4.81 M/UL (ref 4.6–6.2)
SODIUM SERPL-SCNC: 141 MMOL/L (ref 136–145)
TRIGL SERPL-MCNC: 106 MG/DL (ref 30–150)
WBC # BLD AUTO: 7.63 K/UL (ref 3.9–12.7)

## 2019-04-17 PROCEDURE — 80061 LIPID PANEL: CPT

## 2019-04-17 PROCEDURE — 80048 BASIC METABOLIC PNL TOTAL CA: CPT

## 2019-04-17 PROCEDURE — 80076 HEPATIC FUNCTION PANEL: CPT

## 2019-04-17 PROCEDURE — 36415 COLL VENOUS BLD VENIPUNCTURE: CPT | Mod: PO

## 2019-04-17 PROCEDURE — 84153 ASSAY OF PSA TOTAL: CPT

## 2019-04-17 PROCEDURE — 85025 COMPLETE CBC W/AUTO DIFF WBC: CPT

## 2019-05-10 ENCOUNTER — OFFICE VISIT (OUTPATIENT)
Dept: SPINE | Facility: CLINIC | Age: 54
End: 2019-05-10
Payer: COMMERCIAL

## 2019-05-10 VITALS
BODY MASS INDEX: 27.59 KG/M2 | SYSTOLIC BLOOD PRESSURE: 137 MMHG | WEIGHT: 203.69 LBS | HEIGHT: 72 IN | HEART RATE: 87 BPM | DIASTOLIC BLOOD PRESSURE: 84 MMHG

## 2019-05-10 DIAGNOSIS — M47.812 SPONDYLOSIS OF CERVICAL REGION WITHOUT MYELOPATHY OR RADICULOPATHY: ICD-10-CM

## 2019-05-10 DIAGNOSIS — M54.2 CERVICALGIA: Primary | ICD-10-CM

## 2019-05-10 PROCEDURE — 99999 PR PBB SHADOW E&M-EST. PATIENT-LVL V: ICD-10-PCS | Mod: PBBFAC,,, | Performed by: PHYSICIAN ASSISTANT

## 2019-05-10 PROCEDURE — 99243 OFF/OP CNSLTJ NEW/EST LOW 30: CPT | Mod: S$GLB,,, | Performed by: PHYSICIAN ASSISTANT

## 2019-05-10 PROCEDURE — 99999 PR PBB SHADOW E&M-EST. PATIENT-LVL V: CPT | Mod: PBBFAC,,, | Performed by: PHYSICIAN ASSISTANT

## 2019-05-10 PROCEDURE — 99243 PR OFFICE CONSULTATION,LEVEL III: ICD-10-PCS | Mod: S$GLB,,, | Performed by: PHYSICIAN ASSISTANT

## 2019-05-10 RX ORDER — DIAZEPAM 10 MG/1
TABLET ORAL
Qty: 1 TABLET | Refills: 0 | Status: SHIPPED | OUTPATIENT
Start: 2019-05-10 | End: 2019-06-24 | Stop reason: CLARIF

## 2019-05-10 NOTE — LETTER
May 16, 2019      Samuel Manzanares MD  [address] [facility] [address] [place] [address]           [facility]  [address] [facility] [address]  [facility] [address]  Phone: [phone]  Fax: [phone]          Patient: Jaren Quiroz   MR Number: [id number]   YOB: 1965   Date of Visit: 5/10/2019       Dear Dr. Samuel Manzanares:    Thank you for referring Jaren Quiroz to me for evaluation. Attached you will find relevant portions of my assessment and plan of care.    If you have questions, please do not hesitate to call me. I look forward to following Jaren Quiroz along with you.    Sincerely,    Cynthia Perdomo PA-C    Enclosure  CC:  No Recipients    If you would like to receive this communication electronically, please contact [email or web address] or [phone] to request more information on [employer] Link access.    For providers and/or their staff who would like to refer a patient to [facility], please contact us through our one-stop-shop provider referral line, [facility], at [phone].    If you feel you have received this communication in error or would no longer like to receive these types of communications, please e-mail [email or web address]

## 2019-05-16 NOTE — PROGRESS NOTES
Neurosurgery History & Physical    Patient ID: Jaren PICHARDO Cousin is a 53 y.o. male.    Chief Complaint   Patient presents with    Neck Pain     He has had neck pain since August 2018. Pain is constant. Neck clicks when turning his head. Advil, Naproxyn, and Rub like Stevie Rodarte helps pain. Pain causes problems sleeping and the pain is worse first thing in the morning. Turning his head makes the pain worse. Ice helps pain.    Low-back Pain     He has had low back pain for years. He had back surgery in 2008.       Review of Systems   Constitutional: Negative for activity change, chills, fatigue and unexpected weight change.   HENT: Negative for hearing loss, tinnitus, trouble swallowing and voice change.    Eyes: Negative for visual disturbance.   Respiratory: Negative for apnea, chest tightness and shortness of breath.    Cardiovascular: Negative for chest pain and palpitations.   Gastrointestinal: Negative for abdominal pain, constipation, diarrhea, nausea and vomiting.   Genitourinary: Negative for difficulty urinating, dysuria and frequency.   Musculoskeletal: Positive for back pain and neck pain. Negative for gait problem and neck stiffness.   Skin: Negative for wound.   Neurological: Negative for dizziness, tremors, seizures, facial asymmetry, speech difficulty, weakness, light-headedness, numbness and headaches.   Psychiatric/Behavioral: Negative for confusion and decreased concentration.       Past Medical History:   Diagnosis Date    ALLERGIC RHINITIS     Asthma     as child    DDD (degenerative disc disease), lumbar     Fatty liver     Hemorrhoid     Hepatomegaly     High triglycerides     HTN (hypertension)     Liver hemangioma 2014    PTSD (post-traumatic stress disorder)     not [facility]    Shingles     lower back    Urticaria      Social History     Socioeconomic History    Marital status:      Spouse name: Not on file    Number of children: Not on file     Years of education: Not on file    Highest education level: Not on file   Occupational History    Occupation: retired   Social Needs    Financial resource strain: Not on file    Food insecurity:     Worry: Not on file     Inability: Not on file    Transportation needs:     Medical: Not on file     Non-medical: Not on file   Tobacco Use    Smoking status: Never Smoker    Smokeless tobacco: Never Used   Substance and Sexual Activity    Alcohol use: Yes     Alcohol/week: 12.6 oz     Types: 21 Cans of beer per week     Comment: 3 beers daily    Drug use: No    Sexual activity: Yes   Lifestyle    Physical activity:     Days per week: Not on file     Minutes per session: Not on file    Stress: Not on file   Relationships    Social connections:     Talks on phone: Not on file     Gets together: Not on file     Attends Christianity service: Not on file     Active member of club or organization: Not on file     Attends meetings of clubs or organizations: Not on file     Relationship status: Not on file   Other Topics Concern    Not on file   Social History Narrative    Not on file     Family History   Problem Relation Age of Onset    Allergic rhinitis Son     Allergic rhinitis Daughter     Asthma Daughter     Allergic rhinitis Daughter     Diverticulitis Mother     Angioedema Neg Hx     Eczema Neg Hx     Immunodeficiency Neg Hx     Urticaria Neg Hx     Lupus Neg Hx     Psoriasis Neg Hx     Melanoma Neg Hx     Acne Neg Hx     Colon cancer Neg Hx     Colon polyps Neg Hx     Crohn's disease Neg Hx     Ulcerative colitis Neg Hx     Esophageal cancer Neg Hx     Stomach cancer Neg Hx      Review of patient's allergies indicates:   Allergen Reactions    No known drug allergies        Current Outpatient Medications:     ALLERGY RELIEF, LORATADINE, 10 mg tablet, TAKE ONE TABLET BY MOUTH ONCE DAILY, Disp: 30 tablet, Rfl: 11    amLODIPine (NORVASC) 10 MG tablet, Take 1 tablet (10 mg total) by mouth  once daily., Disp: 30 tablet, Rfl: 5    benzoyl peroxide (BENZAC AC WASH) 10 % external wash, Apply topically 2 (two) times daily. Face and trunk, Disp: 226 g, Rfl: 5    clindamycin phosphate foam, Apply to affected area qd- bid, Disp: 100 g, Rfl: 5    cyclobenzaprine (FLEXERIL) 10 MG tablet, Take 10 mg by mouth 3 (three) times daily as needed. PRN, Disp: , Rfl:     doxycycline monohydrate 100 mg Tab, Take 1 po qday, Disp: 30 tablet, Rfl: 1    esomeprazole (NEXIUM) 40 MG capsule, Take 1 capsule (40 mg total) by mouth before breakfast., Disp: 60 capsule, Rfl: 5    fenofibrate (TRICOR) 145 MG tablet, TAKE 1 TABLET BY MOUTH ONCE DAILY, Disp: 90 tablet, Rfl: 0    hydrOXYzine (ATARAX) 25 MG tablet, Take 1 tablet (25 mg total) by mouth every 6 (six) hours as needed for Itching. Every 6 hours PRN, Disp: 60 tablet, Rfl: 6    ketoconazole (NIZORAL) 2 % shampoo, Use as directed qohs as needed, Disp: 240 mL, Rfl: 5    meclizine (ANTIVERT) 25 mg tablet, Take 1 tablet (25 mg total) by mouth 3 (three) times daily as needed for Dizziness (may cause drowsiness)., Disp: 60 tablet, Rfl: 2    minocycline (MINOCIN,DYNACIN) 100 MG capsule, Take 1 capsule (100 mg total) by mouth once daily., Disp: 30 capsule, Rfl: 11    mometasone (NASONEX) 50 mcg/actuation nasal spray, USE TWO SPRAY(S) IN EACH NOSTRIL ONCE DAILY, Disp: 17 g, Rfl: 11    niacin (SLO-NIACIN) 500 mg tablet, TAKE 1 TABLET BY MOUTH ONCE DAILY AT BEDTIME, Disp: 90 tablet, Rfl: 0    ranitidine (ZANTAC) 75 MG tablet, Take 75 mg by mouth 2 (two) times daily as needed for Heartburn., Disp: , Rfl:     valACYclovir (VALTREX) 500 MG tablet, Take 2 tablets (1,000 mg total) by mouth 2 (two) times daily., Disp: 30 tablet, Rfl: 11    diazePAM (VALIUM) 10 MG Tab, Take 1 tablet by mouth 30 minutes prior to MRI.  Must have someone drive you to and from the facility.., Disp: 1 tablet, Rfl: 0    Vitals:    05/10/19 1303   BP: 137/84   BP Location: Left arm   Patient Position:  Sitting   BP Method: Large (Automatic)   Pulse: 87   Weight: 92.4 kg (203 lb 11.3 oz)   Height: 6' (1.829 m)       Physical Exam   Constitutional: He is oriented to person, place, and time. He appears well-developed and well-nourished.   HENT:   Head: Normocephalic and atraumatic.   Eyes: Pupils are equal, round, and reactive to light.   Neck: Normal range of motion. Neck supple.   Cardiovascular: Normal rate.   Pulmonary/Chest: Effort normal.   Abdominal: He exhibits no distension.   Musculoskeletal: Normal range of motion. He exhibits no edema.   Neurological: He is alert and oriented to person, place, and time. He has a normal Finger-Nose-Finger Test, a normal Heel to Shin Test, a normal Romberg Test and a normal Tandem Gait Test. Gait normal.   Reflex Scores:       Tricep reflexes are 2+ on the right side and 2+ on the left side.       Bicep reflexes are 2+ on the right side and 2+ on the left side.       Brachioradialis reflexes are 2+ on the right side and 2+ on the left side.       Patellar reflexes are 2+ on the right side and 2+ on the left side.       Achilles reflexes are 2+ on the right side and 2+ on the left side.  Skin: Skin is warm and dry.   Psychiatric: He has a normal mood and affect. His speech is normal and behavior is normal. Judgment and thought content normal.   Nursing note and vitals reviewed.      Neurologic Exam     Mental Status   Oriented to person, place, and time.   Oriented to person.   Oriented to place.   Oriented to time.   Follows 3 step commands.   Attention: normal. Concentration: normal.   Speech: speech is normal   Level of consciousness: alert  Knowledge: consistent with education.   Able to name object. Able to read. Able to repeat. Able to write. Normal comprehension.     Cranial Nerves     CN II   Visual acuity: normal  Right visual field deficit: none  Left visual field deficit: none     CN III, IV, VI   Pupils are equal, round, and reactive to light.  Right pupil: Size:  3 mm. Shape: regular. Reactivity: brisk. Consensual response: intact.   Left pupil: Size: 3 mm. Shape: regular. Reactivity: brisk. Consensual response: intact.   CN III: no CN III palsy  CN VI: no CN VI palsy  Nystagmus: none   Diplopia: none  Ophthalmoparesis: none  Conjugate gaze: present    CN V   Right facial sensation deficit: none  Left facial sensation deficit: none    CN VII   Right facial weakness: none  Left facial weakness: none    CN VIII   Hearing: intact    CN IX, X   CN IX normal.   CN X normal.     CN XI   Right sternocleidomastoid strength: normal  Left sternocleidomastoid strength: normal  Right trapezius strength: normal  Left trapezius strength: normal    CN XII   Fasciculations: absent  Tongue deviation: none    Motor Exam   Muscle bulk: normal  Overall muscle tone: normal  Right arm pronator drift: absent  Left arm pronator drift: absent    Strength   Right neck flexion: 5/5  Left neck flexion: 5/5  Right neck extension: 5/5  Left neck extension: 5/5  Right deltoid: 5/5  Left deltoid: 5/5  Right biceps: 5/5  Left biceps: 5/5  Right triceps: 5/5  Left triceps: 5/5  Right wrist flexion: 5/5  Left wrist flexion: 5/5  Right wrist extension: 5/5  Left wrist extension: 5/5  Right interossei: 5/5  Left interossei: 5/5  Right abdominals: 5/5  Left abdominals: 5/5  Right iliopsoas: 5/5  Left iliopsoas: 5/5  Right quadriceps: 5/5  Left quadriceps: 5/5  Right hamstrin/5  Left hamstrin/5  Right glutei: 5/5  Left glutei: 5/5  Right anterior tibial: 5/5  Left anterior tibial: 5/5  Right posterior tibial: 5/5  Left posterior tibial: 5/5  Right peroneal: 5/5  Left peroneal: 5/5  Right gastroc: 5/5  Left gastroc: 5/5    Sensory Exam   Right arm light touch: normal  Left arm light touch: normal  Right leg light touch: normal  Left leg light touch: normal  Right arm vibration: normal  Left arm vibration: normal  Right arm pinprick: normal  Left arm pinprick: normal    Gait, Coordination, and Reflexes      Gait  Gait: normal    Coordination   Romberg: negative  Finger to nose coordination: normal  Heel to shin coordination: normal  Tandem walking coordination: normal    Tremor   Resting tremor: absent  Intention tremor: absent  Action tremor: absent    Reflexes   Right brachioradialis: 2+  Left brachioradialis: 2+  Right biceps: 2+  Left biceps: 2+  Right triceps: 2+  Left triceps: 2+  Right patellar: 2+  Left patellar: 2+  Right achilles: 2+  Left achilles: 2+  Right Castro: absent  Left Castro: absent  Right ankle clonus: absent  Left ankle clonus: absent      Provider dictation:  53 year old male with history of lumbar surgery in 2008 and chronic back pain is referred by Dr. Manzanares for evaluation of neck pain.  He has had neck pain since August 2018 without focal traumatic event.  It is a constant pain ranging from dull to extreme in the posterior neck and to the left side of the neck.  He denies any current upper extremity radicular pain, numbness and tingling.  Pain increases with turning his head.  He has been taking Advil and naproxen.  He has undergone PT in September/ November with no resolution.  He has not had SKYLA.  NDI:  48%.  Oswestry score: 54%.  PHQ:  10.    He has 5/5 strength with 2+ muscle stretch reflexes and no sensory deficits on exam.    I have reviewed xrays of the cervical spine from 8/2018 demonstrating C5/6, C6/7 degenerative disk dessication.  Endplate sclerosis is seen at C6/7.    . Cousin has known cervical spondyylosis/ degenerative disk dessicaiton at C5/6 and C6/7 that can contribute to neck pain in setting of known endplate changes.  Conservative management thus far has not benefited him and I recommend updated Xrays with flexion/ extension views, MRI cervical spine.  He will need valium for MRI associated anxiety.  Follow up in clinic after imaging to discuss results and further plan of care.    Visit Diagnosis:  Cervicalgia  -     MRI Cervical Spine Without Contrast;  Future; Expected date: 05/10/2019  -     X-Ray Cervical Spine 5 View W Flex Extxt; Future; Expected date: 05/10/2019  -     diazePAM (VALIUM) 10 MG Tab; Take 1 tablet by mouth 30 minutes prior to MRI.  Must have someone drive you to and from the facility..  Dispense: 1 tablet; Refill: 0    Spondylosis of cervical region without myelopathy or radiculopathy  -     MRI Cervical Spine Without Contrast; Future; Expected date: 05/10/2019  -     X-Ray Cervical Spine 5 View W Flex Extxt; Future; Expected date: 05/10/2019  -     diazePAM (VALIUM) 10 MG Tab; Take 1 tablet by mouth 30 minutes prior to MRI.  Must have someone drive you to and from the facility..  Dispense: 1 tablet; Refill: 0        Total time spent counseling greater than fifty percent of total visit time.  Counseling included discussion regarding imaging findings, diagnosis possibilities, treatment options, risks and benefits.   The patient had many questions regarding the options and long-term effects.

## 2019-05-24 ENCOUNTER — TELEPHONE (OUTPATIENT)
Dept: SPINE | Facility: CLINIC | Age: 54
End: 2019-05-24

## 2019-05-24 NOTE — TELEPHONE ENCOUNTER
----- Message from Shilpa Jeff sent at 5/24/2019  3:46 PM CDT -----  Contact: Patient  Type: Needs Medical Advice    Who Called:  Patient  Best Call Back Number: [phone] (home)   Additional Information: patient would like a call back to talk to the nurse about an appt changed the MRI dept called him and said they will not be able to keep his appt on Monday please call him to be advised.

## 2019-05-24 NOTE — TELEPHONE ENCOUNTER
Patient said he received a call from radiology that they were going to cancel his studies for 5-27-19 and asked if he should reschedule his appointment on 6-4-19 to see Cynthia Perdomo. I told him we would wait to see when he is rescheduled in radiology and then if we have to we will reschedule his appointment with Cynthia at that time, he indicated understanding.

## 2019-05-29 ENCOUNTER — TELEPHONE (OUTPATIENT)
Dept: SPINE | Facility: CLINIC | Age: 54
End: 2019-05-29

## 2019-05-29 NOTE — TELEPHONE ENCOUNTER
----- Message from Mikey Gale sent at 5/29/2019 11:26 AM CDT -----  Type:  Patient Call Back    Who Called:  pt  Does the patient know what this is regarding?reschedule mri   Best Call Back Number:  [phone]  Additional Information:  Please call pt and leave a detailed message if there is no answer.

## 2019-05-30 ENCOUNTER — HOSPITAL ENCOUNTER (OUTPATIENT)
Dept: RADIOLOGY | Facility: HOSPITAL | Age: 54
Discharge: HOME | End: 2019-05-30
Attending: PHYSICIAN ASSISTANT
Payer: COMMERCIAL

## 2019-05-30 DIAGNOSIS — M54.2 CERVICALGIA: ICD-10-CM

## 2019-05-30 DIAGNOSIS — M47.812 SPONDYLOSIS OF CERVICAL REGION WITHOUT MYELOPATHY OR RADICULOPATHY: ICD-10-CM

## 2019-05-30 PROCEDURE — 72052 X-RAY EXAM NECK SPINE 6/>VWS: CPT | Mod: TC,FY,PO

## 2019-05-30 PROCEDURE — 72052 X-RAY EXAM NECK SPINE 6/>VWS: CPT | Mod: 26,,, | Performed by: RADIOLOGY

## 2019-05-30 PROCEDURE — 72052 XR CERVICAL SPINE 5 VIEW WITH FLEX AND EXT: ICD-10-PCS | Mod: 26,,, | Performed by: RADIOLOGY

## 2019-06-04 DIAGNOSIS — M47.812 SPONDYLOSIS OF CERVICAL REGION WITHOUT MYELOPATHY OR RADICULOPATHY: ICD-10-CM

## 2019-06-04 DIAGNOSIS — M54.2 CERVICALGIA: Primary | ICD-10-CM

## 2019-06-04 NOTE — PROGRESS NOTES
Patient was scheduled for MRI cervical spine yesterday and unable to complete even with taking valium.    New order for MRI with sedation placed.

## 2019-06-16 RX ORDER — VALACYCLOVIR HYDROCHLORIDE 500 MG/1
TABLET, FILM COATED ORAL
Qty: 30 TABLET | Refills: 11 | Status: SHIPPED | OUTPATIENT
Start: 2019-06-16 | End: 2020-12-02

## 2019-06-17 DIAGNOSIS — E78.1 HIGH TRIGLYCERIDES: ICD-10-CM

## 2019-06-17 RX ORDER — FENOFIBRATE 145 MG/1
145 TABLET, FILM COATED ORAL DAILY
Qty: 90 TABLET | Refills: 0 | Status: SHIPPED | OUTPATIENT
Start: 2019-06-17 | End: 2019-09-30 | Stop reason: SDUPTHER

## 2019-06-17 RX ORDER — NIACIN 500 MG/1
500 TABLET, EXTENDED RELEASE ORAL NIGHTLY
Qty: 90 TABLET | Refills: 0 | Status: SHIPPED | OUTPATIENT
Start: 2019-06-17 | End: 2019-09-30 | Stop reason: SDUPTHER

## 2019-06-26 PROBLEM — M54.2 CERVICALGIA: Status: ACTIVE | Noted: 2019-06-26

## 2019-07-01 NOTE — PROGRESS NOTES
Subjective:       Patient ID: Jaren PICHARDO Cousin is a 53 y.o. male.    Chief Complaint: Sinusitis (started 3 weeks ago)    Sinusitis   This is a new problem. The current episode started 1 to 4 weeks ago. The problem has been rapidly worsening since onset. There has been no fever. Associated symptoms include congestion, coughing, ear pain, sinus pressure and a sore throat. Pertinent negatives include no chills, diaphoresis, headaches, hoarse voice, neck pain, shortness of breath, sneezing or swollen glands. (+ productive cough, + nasal secretions ) Treatments tried: talat pot, allergy med.     Vitals:    07/02/19 0935   BP: 138/82   Pulse: 78   Temp: 98.2 °F (36.8 °C)     Review of Systems   Constitutional: Negative.  Negative for chills, diaphoresis, fatigue and fever.   HENT: Positive for congestion, ear pain, postnasal drip, rhinorrhea, sinus pressure and sore throat. Negative for hoarse voice and sneezing.    Eyes: Negative.    Respiratory: Positive for cough. Negative for shortness of breath.    Cardiovascular: Negative.  Negative for chest pain.   Gastrointestinal: Negative.  Negative for abdominal pain, diarrhea and nausea.   Endocrine: Negative.    Genitourinary: Negative.  Negative for dysuria and hematuria.   Musculoskeletal: Negative.  Negative for neck pain.   Skin: Negative.  Negative for color change and rash.   Allergic/Immunologic: Negative.    Neurological: Negative.  Negative for numbness and headaches.   Hematological: Negative.    Psychiatric/Behavioral: Negative.        Past Medical History:   Diagnosis Date    ALLERGIC RHINITIS     Asthma     as child    Cervicalgia     DDD (degenerative disc disease), lumbar     Fatty liver     Hemorrhoid     Hepatomegaly     High triglycerides     HTN (hypertension)     Liver hemangioma 2014    PTSD (post-traumatic stress disorder)     not [facility]    Shingles     lower back    Urticaria        Objective:       Physical Exam   Constitutional: He is oriented to person, place, and time. He appears well-developed and well-nourished.   HENT:   Head: Normocephalic and atraumatic.   Right Ear: Hearing, tympanic membrane, external ear and ear canal normal.   Left Ear: Hearing, tympanic membrane, external ear and ear canal normal.   Nose: No mucosal edema or rhinorrhea. Right sinus exhibits maxillary sinus tenderness. Right sinus exhibits no frontal sinus tenderness. Left sinus exhibits maxillary sinus tenderness. Left sinus exhibits no frontal sinus tenderness.   Mouth/Throat: Uvula is midline and mucous membranes are normal. Posterior oropharyngeal erythema present.   Eyes: Pupils are equal, round, and reactive to light. Conjunctivae and EOM are normal.   Neck: Normal range of motion. Neck supple.   Cardiovascular: Normal rate, regular rhythm, normal heart sounds and intact distal pulses. Exam reveals no friction rub.   No murmur heard.  Pulmonary/Chest: Effort normal and breath sounds normal. No stridor. No respiratory distress. He has no wheezes.   Abdominal: Soft. Bowel sounds are normal.   Musculoskeletal: Normal range of motion. He exhibits no edema.   Neurological: He is alert and oriented to person, place, and time. No cranial nerve deficit.   Skin: Skin is warm and dry.   Psychiatric: He has a normal mood and affect. His behavior is normal. Judgment and thought content normal.   Nursing note and vitals reviewed.      Assessment:       1. Acute non-recurrent maxillary sinusitis    2. Nasal congestion    3. Essential hypertension        Plan:       Acute non-recurrent maxillary sinusitis  -     amoxicillin-clavulanate 875-125mg (AUGMENTIN) 875-125 mg per tablet; Take 1 tablet by mouth every 12 (twelve) hours.  Dispense: 20 tablet; Refill: 0    Nasal congestion  Stay on same allergy meds     Essential hypertension  Stay on meds       FU if not better

## 2019-07-02 ENCOUNTER — OFFICE VISIT (OUTPATIENT)
Dept: FAMILY MEDICINE | Facility: CLINIC | Age: 54
End: 2019-07-02
Payer: COMMERCIAL

## 2019-07-02 VITALS
WEIGHT: 206.81 LBS | TEMPERATURE: 98 F | DIASTOLIC BLOOD PRESSURE: 82 MMHG | BODY MASS INDEX: 28.95 KG/M2 | HEART RATE: 78 BPM | SYSTOLIC BLOOD PRESSURE: 138 MMHG | HEIGHT: 71 IN | OXYGEN SATURATION: 99 %

## 2019-07-02 DIAGNOSIS — J01.00 ACUTE NON-RECURRENT MAXILLARY SINUSITIS: Primary | ICD-10-CM

## 2019-07-02 DIAGNOSIS — R09.81 NASAL CONGESTION: ICD-10-CM

## 2019-07-02 DIAGNOSIS — I10 ESSENTIAL HYPERTENSION: ICD-10-CM

## 2019-07-02 PROCEDURE — 3079F DIAST BP 80-89 MM HG: CPT | Mod: CPTII,S$GLB,, | Performed by: NURSE PRACTITIONER

## 2019-07-02 PROCEDURE — 3079F PR MOST RECENT DIASTOLIC BLOOD PRESSURE 80-89 MM HG: ICD-10-PCS | Mod: CPTII,S$GLB,, | Performed by: NURSE PRACTITIONER

## 2019-07-02 PROCEDURE — 3008F PR BODY MASS INDEX (BMI) DOCUMENTED: ICD-10-PCS | Mod: CPTII,S$GLB,, | Performed by: NURSE PRACTITIONER

## 2019-07-02 PROCEDURE — 99214 OFFICE O/P EST MOD 30 MIN: CPT | Mod: S$GLB,,, | Performed by: NURSE PRACTITIONER

## 2019-07-02 PROCEDURE — 99214 PR OFFICE/OUTPT VISIT, EST, LEVL IV, 30-39 MIN: ICD-10-PCS | Mod: S$GLB,,, | Performed by: NURSE PRACTITIONER

## 2019-07-02 PROCEDURE — 3075F SYST BP GE 130 - 139MM HG: CPT | Mod: CPTII,S$GLB,, | Performed by: NURSE PRACTITIONER

## 2019-07-02 PROCEDURE — 99999 PR PBB SHADOW E&M-EST. PATIENT-LVL IV: CPT | Mod: PBBFAC,,, | Performed by: NURSE PRACTITIONER

## 2019-07-02 PROCEDURE — 99999 PR PBB SHADOW E&M-EST. PATIENT-LVL IV: ICD-10-PCS | Mod: PBBFAC,,, | Performed by: NURSE PRACTITIONER

## 2019-07-02 PROCEDURE — 3075F PR MOST RECENT SYSTOLIC BLOOD PRESS GE 130-139MM HG: ICD-10-PCS | Mod: CPTII,S$GLB,, | Performed by: NURSE PRACTITIONER

## 2019-07-02 PROCEDURE — 3008F BODY MASS INDEX DOCD: CPT | Mod: CPTII,S$GLB,, | Performed by: NURSE PRACTITIONER

## 2019-07-02 RX ORDER — AMOXICILLIN AND CLAVULANATE POTASSIUM 875; 125 MG/1; MG/1
1 TABLET, FILM COATED ORAL EVERY 12 HOURS
Qty: 20 TABLET | Refills: 0 | Status: SHIPPED | OUTPATIENT
Start: 2019-07-02 | End: 2019-10-21 | Stop reason: CLARIF

## 2019-07-08 ENCOUNTER — OFFICE VISIT (OUTPATIENT)
Dept: SPINE | Facility: CLINIC | Age: 54
End: 2019-07-08
Payer: COMMERCIAL

## 2019-07-08 VITALS
SYSTOLIC BLOOD PRESSURE: 138 MMHG | WEIGHT: 206.81 LBS | BODY MASS INDEX: 28.95 KG/M2 | HEIGHT: 71 IN | DIASTOLIC BLOOD PRESSURE: 84 MMHG | HEART RATE: 84 BPM

## 2019-07-08 DIAGNOSIS — M54.2 CERVICALGIA: Primary | ICD-10-CM

## 2019-07-08 DIAGNOSIS — M47.812 SPONDYLOSIS OF CERVICAL REGION WITHOUT MYELOPATHY OR RADICULOPATHY: ICD-10-CM

## 2019-07-08 PROCEDURE — 3008F PR BODY MASS INDEX (BMI) DOCUMENTED: ICD-10-PCS | Mod: CPTII,S$GLB,, | Performed by: PHYSICIAN ASSISTANT

## 2019-07-08 PROCEDURE — 3079F DIAST BP 80-89 MM HG: CPT | Mod: CPTII,S$GLB,, | Performed by: PHYSICIAN ASSISTANT

## 2019-07-08 PROCEDURE — 99214 PR OFFICE/OUTPT VISIT, EST, LEVL IV, 30-39 MIN: ICD-10-PCS | Mod: S$GLB,,, | Performed by: PHYSICIAN ASSISTANT

## 2019-07-08 PROCEDURE — 3075F PR MOST RECENT SYSTOLIC BLOOD PRESS GE 130-139MM HG: ICD-10-PCS | Mod: CPTII,S$GLB,, | Performed by: PHYSICIAN ASSISTANT

## 2019-07-08 PROCEDURE — 99999 PR PBB SHADOW E&M-EST. PATIENT-LVL III: ICD-10-PCS | Mod: PBBFAC,,, | Performed by: PHYSICIAN ASSISTANT

## 2019-07-08 PROCEDURE — 99214 OFFICE O/P EST MOD 30 MIN: CPT | Mod: S$GLB,,, | Performed by: PHYSICIAN ASSISTANT

## 2019-07-08 PROCEDURE — 99999 PR PBB SHADOW E&M-EST. PATIENT-LVL III: CPT | Mod: PBBFAC,,, | Performed by: PHYSICIAN ASSISTANT

## 2019-07-08 PROCEDURE — 3008F BODY MASS INDEX DOCD: CPT | Mod: CPTII,S$GLB,, | Performed by: PHYSICIAN ASSISTANT

## 2019-07-08 PROCEDURE — 3079F PR MOST RECENT DIASTOLIC BLOOD PRESSURE 80-89 MM HG: ICD-10-PCS | Mod: CPTII,S$GLB,, | Performed by: PHYSICIAN ASSISTANT

## 2019-07-08 PROCEDURE — 3075F SYST BP GE 130 - 139MM HG: CPT | Mod: CPTII,S$GLB,, | Performed by: PHYSICIAN ASSISTANT

## 2019-07-13 NOTE — PROGRESS NOTES
Neurosurgery History & Physical    Patient ID: Jaren PICHARDO Cousin is a 53 y.o. male.    Chief Complaint   Patient presents with    Follow-up     MRI and X-ray results       Review of Systems   Constitutional: Negative for activity change, chills, fatigue and unexpected weight change.   HENT: Negative for hearing loss, tinnitus, trouble swallowing and voice change.    Eyes: Negative for visual disturbance.   Respiratory: Negative for apnea, chest tightness and shortness of breath.    Cardiovascular: Negative for chest pain and palpitations.   Gastrointestinal: Negative for abdominal pain, constipation, diarrhea, nausea and vomiting.   Genitourinary: Negative for difficulty urinating, dysuria and frequency.   Musculoskeletal: Positive for back pain and neck pain. Negative for gait problem and neck stiffness.   Skin: Negative for wound.   Neurological: Negative for dizziness, tremors, seizures, facial asymmetry, speech difficulty, weakness, light-headedness, numbness and headaches.   Psychiatric/Behavioral: Negative for confusion and decreased concentration.       Past Medical History:   Diagnosis Date    ALLERGIC RHINITIS     Asthma     as child    Cervicalgia     DDD (degenerative disc disease), lumbar     Fatty liver     Hemorrhoid     Hepatomegaly     High triglycerides     HTN (hypertension)     Liver hemangioma 2014    PTSD (post-traumatic stress disorder)     [facility]    Shingles     lower back    Urticaria      Social History     Socioeconomic History    Marital status:      Spouse name: Not on file    Number of children: Not on file    Years of education: Not on file    Highest education level: Not on file   Occupational History    Occupation: retired   Social Needs    Financial resource strain: Not on file    Food insecurity:     Worry: Not on file     Inability: Not on file    Transportation needs:     Medical: Not on file     Non-medical: Not on  file   Tobacco Use    Smoking status: Never Smoker    Smokeless tobacco: Never Used   Substance and Sexual Activity    Alcohol use: Yes     Alcohol/week: 12.6 oz     Types: 21 Cans of beer per week     Comment: 3 beers daily    Drug use: No    Sexual activity: Yes   Lifestyle    Physical activity:     Days per week: Not on file     Minutes per session: Not on file    Stress: Not on file   Relationships    Social connections:     Talks on phone: Not on file     Gets together: Not on file     Attends Druze service: Not on file     Active member of club or organization: Not on file     Attends meetings of clubs or organizations: Not on file     Relationship status: Not on file   Other Topics Concern    Not on file   Social History Narrative    Not on file     Family History   Problem Relation Age of Onset    Allergic rhinitis Son     Allergic rhinitis Daughter     Asthma Daughter     Allergic rhinitis Daughter     Diverticulitis Mother     Hypertension Father     COPD Father     Angioedema Neg Hx     Eczema Neg Hx     Immunodeficiency Neg Hx     Urticaria Neg Hx     Lupus Neg Hx     Psoriasis Neg Hx     Melanoma Neg Hx     Acne Neg Hx     Colon cancer Neg Hx     Colon polyps Neg Hx     Crohn's disease Neg Hx     Ulcerative colitis Neg Hx     Esophageal cancer Neg Hx     Stomach cancer Neg Hx      Review of patient's allergies indicates:   Allergen Reactions    No known drug allergies        Current Outpatient Medications:     ALLERGY RELIEF, LORATADINE, 10 mg tablet, TAKE ONE TABLET BY MOUTH ONCE DAILY, Disp: 30 tablet, Rfl: 11    amLODIPine (NORVASC) 10 MG tablet, Take 1 tablet (10 mg total) by mouth once daily., Disp: 30 tablet, Rfl: 5    amoxicillin-clavulanate 875-125mg (AUGMENTIN) 875-125 mg per tablet, Take 1 tablet by mouth every 12 (twelve) hours., Disp: 20 tablet, Rfl: 0    benzoyl peroxide (BENZAC AC WASH) 10 % external wash, Apply topically 2 (two) times daily. Face  "and trunk, Disp: 226 g, Rfl: 5    clindamycin phosphate foam, Apply to affected area qd- bid, Disp: 100 g, Rfl: 5    cyclobenzaprine (FLEXERIL) 10 MG tablet, Take 10 mg by mouth 3 (three) times daily as needed. PRN, Disp: , Rfl:     esomeprazole (NEXIUM) 40 MG capsule, Take 1 capsule (40 mg total) by mouth before breakfast., Disp: 60 capsule, Rfl: 5    fenofibrate (TRICOR) 145 MG tablet, Take 1 tablet (145 mg total) by mouth once daily., Disp: 90 tablet, Rfl: 0    hydrOXYzine (ATARAX) 25 MG tablet, Take 1 tablet (25 mg total) by mouth every 6 (six) hours as needed for Itching. Every 6 hours PRN, Disp: 60 tablet, Rfl: 6    ketoconazole (NIZORAL) 2 % shampoo, Use as directed qohs as needed, Disp: 240 mL, Rfl: 5    meclizine (ANTIVERT) 25 mg tablet, Take 1 tablet (25 mg total) by mouth 3 (three) times daily as needed for Dizziness (may cause drowsiness)., Disp: 60 tablet, Rfl: 2    minocycline (MINOCIN,DYNACIN) 100 MG capsule, Take 1 capsule (100 mg total) by mouth once daily., Disp: 30 capsule, Rfl: 11    mometasone (NASONEX) 50 mcg/actuation nasal spray, USE TWO SPRAY(S) IN EACH NOSTRIL ONCE DAILY, Disp: 17 g, Rfl: 11    niacin (SLO-NIACIN) 500 mg tablet, Take 1 tablet (500 mg total) by mouth nightly., Disp: 90 tablet, Rfl: 0    ranitidine (ZANTAC) 75 MG tablet, Take 75 mg by mouth 2 (two) times daily as needed for Heartburn., Disp: , Rfl:     valACYclovir (VALTREX) 500 MG tablet, TAKE 2 TABLETS BY MOUTH TWICE DAILY (Patient taking differently: TAKE 2 TABLETS BY MOUTH TWICE DAILY as needed), Disp: 30 tablet, Rfl: 11    Vitals:    07/08/19 1433   BP: 138/84   BP Location: Right arm   Patient Position: Sitting   BP Method: Large (Automatic)   Pulse: 84   Weight: 93.8 kg (206 lb 12.7 oz)   Height: 5' 11" (1.803 m)       Physical Exam   Constitutional: He is oriented to person, place, and time. He appears well-developed and well-nourished.   HENT:   Head: Normocephalic and atraumatic.   Eyes: Pupils are " equal, round, and reactive to light.   Neck: Normal range of motion. Neck supple.   Cardiovascular: Normal rate.   Pulmonary/Chest: Effort normal.   Abdominal: He exhibits no distension.   Musculoskeletal: Normal range of motion. He exhibits no edema.   Neurological: He is alert and oriented to person, place, and time. He has a normal Finger-Nose-Finger Test, a normal Heel to Shin Test, a normal Romberg Test and a normal Tandem Gait Test. Gait normal.   Reflex Scores:       Tricep reflexes are 2+ on the right side and 2+ on the left side.       Bicep reflexes are 2+ on the right side and 2+ on the left side.       Brachioradialis reflexes are 2+ on the right side and 2+ on the left side.       Patellar reflexes are 2+ on the right side and 2+ on the left side.       Achilles reflexes are 2+ on the right side and 2+ on the left side.  Skin: Skin is warm and dry.   Psychiatric: He has a normal mood and affect. His speech is normal and behavior is normal. Judgment and thought content normal.   Nursing note and vitals reviewed.      Neurologic Exam     Mental Status   Oriented to person, place, and time.   Oriented to person.   Oriented to place.   Oriented to time.   Follows 3 step commands.   Attention: normal. Concentration: normal.   Speech: speech is normal   Level of consciousness: alert  Knowledge: consistent with education.   Able to name object. Able to read. Able to repeat. Able to write. Normal comprehension.     Cranial Nerves     CN II   Visual acuity: normal  Right visual field deficit: none  Left visual field deficit: none     CN III, IV, VI   Pupils are equal, round, and reactive to light.  Right pupil: Size: 3 mm. Shape: regular. Reactivity: brisk. Consensual response: intact.   Left pupil: Size: 3 mm. Shape: regular. Reactivity: brisk. Consensual response: intact.   CN III: no CN III palsy  CN VI: no CN VI palsy  Nystagmus: none   Diplopia: none  Ophthalmoparesis: none  Conjugate gaze: present    CN V    Right facial sensation deficit: none  Left facial sensation deficit: none    CN VII   Right facial weakness: none  Left facial weakness: none    CN VIII   Hearing: intact    CN IX, X   CN IX normal.   CN X normal.     CN XI   Right sternocleidomastoid strength: normal  Left sternocleidomastoid strength: normal  Right trapezius strength: normal  Left trapezius strength: normal    CN XII   Fasciculations: absent  Tongue deviation: none    Motor Exam   Muscle bulk: normal  Overall muscle tone: normal  Right arm pronator drift: absent  Left arm pronator drift: absent    Strength   Right neck flexion: 5/5  Left neck flexion: 5/5  Right neck extension: 5/5  Left neck extension: 5/5  Right deltoid: 5/5  Left deltoid: 5/5  Right biceps: 5/5  Left biceps: 5/5  Right triceps: 5/5  Left triceps: 5/5  Right wrist flexion: 5/5  Left wrist flexion: 5/5  Right wrist extension: 5/5  Left wrist extension: 5/5  Right interossei: 5/5  Left interossei: 5/5  Right abdominals: 5/5  Left abdominals: 5/5  Right iliopsoas: 5/5  Left iliopsoas: 5/5  Right quadriceps: 5/5  Left quadriceps: 5/5  Right hamstrin/5  Left hamstrin/5  Right glutei: 5/5  Left glutei: 5/5  Right anterior tibial: 5/5  Left anterior tibial: 5/5  Right posterior tibial: 5/5  Left posterior tibial: 5/5  Right peroneal: 5/5  Left peroneal: 5/5  Right gastroc: 5/5  Left gastroc: 5/5    Sensory Exam   Right arm light touch: normal  Left arm light touch: normal  Right leg light touch: normal  Left leg light touch: normal  Right arm vibration: normal  Left arm vibration: normal  Right arm pinprick: normal  Left arm pinprick: normal    Gait, Coordination, and Reflexes     Gait  Gait: normal    Coordination   Romberg: negative  Finger to nose coordination: normal  Heel to shin coordination: normal  Tandem walking coordination: normal    Tremor   Resting tremor: absent  Intention tremor: absent  Action tremor: absent    Reflexes   Right brachioradialis: 2+  Left  brachioradialis: 2+  Right biceps: 2+  Left biceps: 2+  Right triceps: 2+  Left triceps: 2+  Right patellar: 2+  Left patellar: 2+  Right achilles: 2+  Left achilles: 2+  Right Castro: absent  Left Castro: absent  Right ankle clonus: absent  Left ankle clonus: absent      Provider dictation:  53 year old male with history of lumbar surgery in 2008 and chronic back pain presentd for follow up of neck pain after undergoing further imaging to discuss results.  He has had neck pain since August 2018 without focal traumatic event.  It is a constant pain ranging from dull to extreme in the posterior neck and to the left side of the neck.  He denies any current upper extremity radicular pain, numbness and tingling.  Pain increases with turning his head.  He has been taking Advil and naproxen.  He has undergone PT in September/ November with no resolution.  He has not had SKYLA.  There have been no changes since last evaluation.  NDI:  48%.  Oswestry score: 54%.  PHQ:  10.    He has 5/5 strength with 2+ muscle stretch reflexes and no sensory deficits on exam.    I have reviewed xrays of the cervical spine from 8/2018 demonstrating C5/6, C6/7 degenerative disk dessication.      I have reviewed an MRI of the cervical spine from 6-26-19 demonstrating C4/5 and C5/6 mild endplate inflammatory changes.  C5/6 left foraminal disk hernia with left foraminal narrowing.      Mr. Quiroz has cervical spondyylosis/ degenerative disk dessicaiton with endplate changeat at C4/5 and C5/6 that can contribute to neck pain.  He has a left C6/7 disk hernia that is asymptomatic with no left sided radiculopathy.  We have discussed all options of further PT and pain management injections.  .he would like to pursue further PT and we will place the order once he determines the location he would like to go.  Plant to refer to pain management if no improvement.    Visit Diagnosis:  Cervicalgia    Spondylosis of cervical region without myelopathy or  radiculopathy        Total time spent counseling greater than fifty percent of total visit time.  Counseling included discussion regarding imaging findings, diagnosis possibilities, treatment options, risks and benefits.   The patient had many questions regarding the options and long-term effects.

## 2019-07-16 ENCOUNTER — PATIENT MESSAGE (OUTPATIENT)
Dept: FAMILY MEDICINE | Facility: CLINIC | Age: 54
End: 2019-07-16

## 2019-07-16 DIAGNOSIS — R42 DIZZINESS: ICD-10-CM

## 2019-07-17 ENCOUNTER — TELEPHONE (OUTPATIENT)
Dept: SPINE | Facility: CLINIC | Age: 54
End: 2019-07-17

## 2019-07-17 DIAGNOSIS — M47.812 SPONDYLOSIS OF CERVICAL REGION WITHOUT MYELOPATHY OR RADICULOPATHY: ICD-10-CM

## 2019-07-17 DIAGNOSIS — M54.2 CERVICALGIA: Primary | ICD-10-CM

## 2019-07-17 RX ORDER — MECLIZINE HYDROCHLORIDE 25 MG/1
25 TABLET ORAL 3 TIMES DAILY PRN
Qty: 60 TABLET | Refills: 2 | Status: SHIPPED | OUTPATIENT
Start: 2019-07-17 | End: 2022-11-16 | Stop reason: SDUPTHER

## 2019-07-17 NOTE — TELEPHONE ENCOUNTER
Cynthia- I spoke with patient and he would like to go to [facility] for PT. Would you please put an order in the computer so I can schedule that?

## 2019-07-17 NOTE — TELEPHONE ENCOUNTER
----- Message from Shannan Zamora sent at 7/17/2019 11:52 AM CDT -----  Contact: Patient  Type: Needs Medical Advice    Who Called:  Patient  Symptoms (please be specific):  na  How long has patient had these symptoms:  christina  Pharmacy name and phone #:  christina  Best Call Back Number: [phone] (home)    Additional Information: Patient is calling to discuss physical therapy. States that he has not heard from anyone yet and is calling as a follow up. Thank you!

## 2019-07-23 ENCOUNTER — CLINICAL SUPPORT (OUTPATIENT)
Dept: REHABILITATION | Facility: HOSPITAL | Age: 54
End: 2019-07-23
Attending: PHYSICIAN ASSISTANT
Payer: COMMERCIAL

## 2019-07-23 DIAGNOSIS — R29.898 DECREASED RANGE OF MOTION OF NECK: ICD-10-CM

## 2019-07-23 PROCEDURE — 97035 APP MDLTY 1+ULTRASOUND EA 15: CPT | Mod: PO

## 2019-07-23 PROCEDURE — 97112 NEUROMUSCULAR REEDUCATION: CPT | Mod: PO

## 2019-07-23 PROCEDURE — 97110 THERAPEUTIC EXERCISES: CPT | Mod: PO

## 2019-07-23 PROCEDURE — 97161 PT EVAL LOW COMPLEX 20 MIN: CPT | Mod: PO

## 2019-07-24 NOTE — PLAN OF CARE
[facility]  Physical Therapy Initial Evaluation    Name: Jaren Quiroz  Clinic Number: [id number]    Therapy Diagnosis:   Encounter Diagnosis   Name Primary?    Decreased range of motion of neck      Physician: Cynthia Perdomo PA-C    Physician Orders: PT Eval and Treat   Medical Diagnosis from Referral: M54.2 (ICD-10-CM) - Cervicalgia M47.812 (ICD-10-CM) - Spondylosis of cervical region without myelopathy or radiculopathy  Evaluation Date: 7/23/2019  Authorization Period Expiration: 12/31/19       50 PT/OT/ST combined visit max per calendar yearTricare: Follows primary guidelines  Plan of Care Expiration: 08/30/19  Visit # / Visits authorized: 1 / 50    Time In: 1200  Time Out: 1300  Total Billable Time: 53 minutes    Precautions: Prior lumbar surgery    Subjective   Date of onset: August, 2018  History of current condition - Jaren reports: Neck pain began while on a trip to [facility]. He had no critical incident , but sleep on a different bed and pillow as well as long airline flight.   Pain was on the RIGHT, then LEFT when he had PT elsewhere. Pain now is paraspinus and mid-line from base of neck to back of skull. Pt denies long tract signs.     Past Medical History:   Diagnosis Date    ALLERGIC RHINITIS     Asthma     as child    Cervicalgia     DDD (degenerative disc disease), lumbar     Fatty liver     Hemorrhoid     Hepatomegaly     High triglycerides     HTN (hypertension)     Liver hemangioma 2014    PTSD (post-traumatic stress disorder)     [facility]    Shingles     lower back    Urticaria      Jaren Quiroz  has a past surgical history that includes Back surgery; Knee surgery; Elbow surgery; Colonoscopy (~2010  ([facility] Esophagogastroduodenoscopy (N/A, 1/29/2019); and Magnetic resonance imaging (N/A, 6/26/2019).    Jaren has a current medication list which includes the following prescription(s): allergy relief  (loratadine), amlodipine, amoxicillin-clavulanate 875-125mg, benzoyl peroxide, clindamycin phosphate, cyclobenzaprine, esomeprazole, fenofibrate, hydroxyzine hcl, ketoconazole, meclizine, minocycline, mometasone, niacin, ranitidine, and valacyclovir.    Review of patient's allergies indicates:   Allergen Reactions    No known drug allergies         Imaging, MRI studies (6/4/18): . Mild multilevel spondylosis with slight cord flattening at C3-4 through C5-6 with no cord signal abnormality and preserved surrounding CSF.      2. Multilevel foraminal stenosis, greatest on the left at C5-6 where it is moderate                 X-ray (5/30/18): 1. There is stable moderate to marked degenerative disc disease from C5-6 through the C7-T1 levels.  Foraminal stenosis is suboptimally evaluated but was better demonstrated on comparison study.                                           2. There is no fracture.                                           3. There is trace retrolisthesis of C2 on C3 in the neutral and extension views but reduces to neutral position on flexion.     [facility] [place]  Social History: Pt lives with their family  Occupation: Retired  Prior Level of Function: Limited by previous lumbar surgery, but did everything   Current Level of Function: Decreased motion of neck    Pain:  Current 2/10, worst 6/10, best 2/10   Location: bilateral neck   Description: Dull and Sharp  Aggravating Factors: Night Time and neck rotation  Easing Factors: ice    Pts goals: Decrease pain and increase AROM of neck    Objective     Posture: Slight forward head / shoulders    ROM:   Neck    ACTIVE    FLEXION 40 degrees    EXTENSION 40 degrees    ROTATION  LEFT 40 degrees Click at end range                         RIGHT 50 degrees      Strength: 5/5 throughout UE's     Neuro: Intact gross touch sensation reported with equal reflexes (+1) at UE's    Special Tests: No dizziness with neck EXTENSION    Palpation:  Increased tone / tightness @ cervical paraspinal muscles      CMS Impairment/Limitation/Restriction for FOTO Neck Survey    Therapist reviewed FOTO scores for Jaren Quiroz on 7/23/2019.   FOTO documents entered into [facility] [employer] - see Media section.    Limitation Score: 39%  Category: Carrying    Current : CJ = at least 20% but < 40% impaired, limited or restricted  Goal: CJ = at least 20% but < 40% impaired, limited or restricted         TREATMENT   Treatment Time In: 1220  Treatment Time Out: 1253  Total Treatment time separate from Evaluation: 33 minutes    Jaren received therapeutic exercises to develop ROM and posture for 15 minutes including:  Neck A/A and AROM in ROTATION, supine  Head /scapular retraction exercise  Neck EXTENSION with towel roll    Dale received neuromuscular re-education to improve positions of rest for 10 minutes in side-lying and supine positions    Jaren received the following direct contact modalities after being cleared for contraindications: Ultrasound:  Jaren received ultrasound to manage pain and inflammation at 100 % duty cycle applied to the neck and Upper Trapezius muscles at an intensity of  1.2 W/cm2  for a duration of 8 minutes. Patient tolerated treatment well without adverse effects. Therapist was in attendance throughout intervention.    Jaren deferreded the following supervised modalities after being cleared for contradictions: Mechanical Traction:  Jaren received intermittent mechanical traction to the cervical spine at a force of 15 pounds for a total of 20 minutes. Hold time of 3 minutes and rest time for 1  Minutes. Patient tolerated treatment well without any adverse effects.    Home Exercises and Patient Education Provided  Education provided:   - Anatomy and relationship to posture    Written Home Exercises Provided: deferred.  Exercises were reviewed and Jaren was able to demonstrate them prior to the end of the session.  Jaren demonstrated good   understanding of the education provided.     See EMR under deferred for exercises provided n/a.    Assessment   Jaren is a 53 y.o. male referred to outpatient Physical Therapy with a medical diagnosis of M54.2 (ICD-10-CM) - Cervicalgia M47.812 (ICD-10-CM) - Spondylosis of cervical region without myelopathy or radiculopathy  . Pt presents with neck pain and decreased AROM of neck     Pt prognosis is Excellent.   Pt will benefit from skilled outpatient Physical Therapy to address the deficits stated above and in the chart below, provide pt/family education, and to maximize pt's level of independence.     Plan of care discussed with patient: Yes  Pt's spiritual, cultural and educational needs considered and patient is agreeable to the plan of care and goals as stated below:     Anticipated Barriers for therapy: None    Medical Necessity is demonstrated by the following  History  Co-morbidities and personal factors that may impact the plan of care Co-morbidities:   prior lumbar surgery    Personal Factors:   no deficits     low   Examination  Body Structures and Functions, activity limitations and participation restrictions that may impact the plan of care Body Regions:   neck    Body Systems:    ROM    Participation Restrictions:   Neck    Activity limitations:   Learning and applying knowledge  no deficits    General Tasks and Commands  no deficits    Communication  no deficits    Mobility  no deficits    Self care  no deficits    Domestic Life  no deficits    Interactions/Relationships  no deficits    Life Areas  no deficits    Community and Social Life  no deficits         low   Clinical Presentation stable and uncomplicated low   Decision Making/ Complexity Score: low     Goals:  Short Term Goals: 2 weeks   Decrease pain at the neck to 1-3/10  Increase AROM of neck ROTATION to 60 degrees bilaterally  Long Term Goals: 4 weeks   Pt will have 65 degrees AROM in ROTATION of the neck with 0-1/10 pain    Plan   Plan of  care Certification: 7/23/2019 to 08/30/19.    Outpatient Physical Therapy 3 times weekly for 4 weeks to include the following interventions: Cervical/Lumbar Traction, Neuromuscular Re-ed, Therapeutic Exercise and Ultrasound.     Charanjit Mueller, PT

## 2019-08-01 ENCOUNTER — CLINICAL SUPPORT (OUTPATIENT)
Dept: REHABILITATION | Facility: HOSPITAL | Age: 54
End: 2019-08-01
Attending: PHYSICIAN ASSISTANT
Payer: COMMERCIAL

## 2019-08-01 DIAGNOSIS — R29.898 DECREASED RANGE OF MOTION OF NECK: ICD-10-CM

## 2019-08-01 PROCEDURE — 97012 MECHANICAL TRACTION THERAPY: CPT | Mod: PO

## 2019-08-01 NOTE — PROGRESS NOTES
"  Physical Therapy Daily Treatment Note     Name: Jaren Quiroz  Clinic Number: [id number]    Therapy Diagnosis:   Encounter Diagnosis   Name Primary?    Decreased range of motion of neck      Physician: Cynthia Perdomo PA-C    Visit Date: 8/1/2019  Physician Orders: PT Eval and Treat   Medical Diagnosis from Referral: M54.2 (ICD-10-CM) - Cervicalgia M47.812 (ICD-10-CM) - Spondylosis of cervical region without myelopathy or radiculopathy  Evaluation Date: 7/23/2019  Authorization Period Expiration: 12/31/19       50 PT/OT/ST combined visit max per calendar yearTricare: Follows primary guidelines  Plan of Care Expiration: 08/30/19  Visit # / Visits authorized: 2 / 50     Time In: 1105  Time Out: 1125  Total Billable Time: 20 minutes       Precautions:Prior lumbar surgery    Subjective     Pt reports: Positioning helped "a little".  He was compliant with home exercise program.  Response to previous treatment: As above  Functional change: N/A    Pain: 4/10  Location: left arms, neck  and shoulder      Objective     Jaren received therapeutic exercises to develop ROM for 0 minutes including:    Jaren deferred the following direct contact modalities after being cleared for contraindications: Ultrasound:  Jaren received ultrasound to manage pain and inflammation at 100 % duty cycle applied to the LEFT neck / shoulder at an intensity of 1.2 W/cm2  for a duration of 10 minutes. Patient tolerated treatment well without adverse effects. Therapist was in attendance throughout intervention.    Jaren received the following supervised modalities after being cleared for contradictions: Mechanical Traction:  Jaren received intermittent mechanical traction to the cervical spine at a force of 10 pounds for a total of 15 minutes. Hold time of 1 minutes and rest time for 1  Minutes. Patient tolerated treatment well without any adverse effects.    Home Exercises Provided and Patient Education Provided  Education provided:   - " Use of traction and precautions    Written Home Exercises Provided: n/a.  Exercises were reviewed and Jaren was able to demonstrate them prior to the end of the session.  Jaren demonstrated good  understanding of the education provided.      See EMR under media for exercises provided prior visit.    Assessment     Pt had no pain at neck or arm after traction    Jaren is progressing well towards his goals.   Pt prognosis is Excellent.     Pt will continue to benefit from skilled outpatient physical therapy to address the deficits listed in the problem list box on initial evaluation, provide pt/family education and to maximize pt's level of independence in the home and community environment.     Pt's spiritual, cultural and educational needs considered and pt agreeable to plan of care and goals.    Anticipated barriers to physical therapy: None    Goals:   Short Term Goals: 2 weeks   Decrease pain at the neck to 1-3/10  Increase AROM of neck ROTATION to 60 degrees bilaterally  Long Term Goals: 4 weeks   Pt will have 65 degrees AROM in ROTATION of the neck with 0-1/10 pain    Plan     Intermittent traction and or ultrasound PRN  Postural and scapular exercises    Charanjit Mueller, PT

## 2019-08-06 ENCOUNTER — CLINICAL SUPPORT (OUTPATIENT)
Dept: REHABILITATION | Facility: HOSPITAL | Age: 54
End: 2019-08-06
Attending: PHYSICIAN ASSISTANT
Payer: COMMERCIAL

## 2019-08-06 DIAGNOSIS — R29.898 DECREASED RANGE OF MOTION OF NECK: ICD-10-CM

## 2019-08-06 PROCEDURE — 97035 APP MDLTY 1+ULTRASOUND EA 15: CPT | Mod: PO

## 2019-08-06 PROCEDURE — 97012 MECHANICAL TRACTION THERAPY: CPT | Mod: PO

## 2019-08-06 NOTE — PROGRESS NOTES
Physical Therapy Daily Treatment Note     Name: Jaren Quiroz  Clinic Number: [id number]    Therapy Diagnosis:   Encounter Diagnosis   Name Primary?    Decreased range of motion of neck      Physician: Cynthia Perdomo PA-C    Visit Date: 8/6/2019  Physician Orders: PT Eval and Treat   Medical Diagnosis from Referral: M54.2 (ICD-10-CM) - Cervicalgia M47.812 (ICD-10-CM) - Spondylosis of cervical region without myelopathy or radiculopathy  Evaluation Date: 7/23/2019  Authorization Period Expiration: 12/31/19       50 PT/OT/ST combined visit max per calendar yearTricare: Follows primary guidelines  Plan of Care Expiration: 08/30/19  Visit # / Visits authorized: 3 / 50     Time In: 1105  Time Out: 1135  Total Billable Time: 25 minutes       Precautions:Prior lumbar surgery    Subjective     Pt reports: Traction relieved pain for 4 days     He was compliant with home exercise program.  Response to previous treatment: As above  Functional change: N/A    Pain: 1 /10  Location: LEFT neck  and shoulder      Objective     Jaren received therapeutic exercises to develop ROM for 0 minutes including:    Jaren received the following direct contact modalities after being cleared for contraindications: Ultrasound:  Jaren received ultrasound to manage pain and inflammation at 100 % duty cycle applied to the LEFT neck / shoulder at an intensity of 1.2 W/cm2  for a duration of 10 minutes. Patient tolerated treatment well without adverse effects. Therapist was in attendance throughout intervention.    Jaren received the following supervised modalities after being cleared for contradictions: Mechanical Traction:  Jaren received intermittent mechanical traction to the cervical spine at a force of 10 pounds for a total of 15 minutes. Hold time of 1 minutes and rest time for 1  Minutes. Patient tolerated treatment well without any adverse effects.    Home Exercises Provided and Patient Education Provided  Education provided:    - Use of traction and precautions    Written Home Exercises Provided: n/a.  Exercises were reviewed and Jaren was able to demonstrate them prior to the end of the session.  Jaren demonstrated good  understanding of the education provided.      See EMR under media for exercises provided prior visit.    Assessment     Pt had no pain at neck or arm after traction  Plan to progress with A/AROM and postural / scapular exercises @ next session    Jaren is progressing well towards his goals.   Pt prognosis is Excellent.     Pt will continue to benefit from skilled outpatient physical therapy to address the deficits listed in the problem list box on initial evaluation, provide pt/family education and to maximize pt's level of independence in the home and community environment.     Pt's spiritual, cultural and educational needs considered and pt agreeable to plan of care and goals.    Anticipated barriers to physical therapy: None    Goals:   Short Term Goals: 2 weeks   Decrease pain at the neck to 1-3/10  Increase AROM of neck ROTATION to 60 degrees bilaterally  Long Term Goals: 4 weeks   Pt will have 65 degrees AROM in ROTATION of the neck with 0-1/10 pain    Plan     Intermittent traction and or ultrasound PRN  Postural and scapular exercises    Charanjit Mueller, PT

## 2019-08-08 ENCOUNTER — CLINICAL SUPPORT (OUTPATIENT)
Dept: REHABILITATION | Facility: HOSPITAL | Age: 54
End: 2019-08-08
Attending: PHYSICIAN ASSISTANT
Payer: COMMERCIAL

## 2019-08-08 DIAGNOSIS — R29.898 DECREASED RANGE OF MOTION OF NECK: ICD-10-CM

## 2019-08-08 PROCEDURE — 97110 THERAPEUTIC EXERCISES: CPT | Mod: PO

## 2019-08-08 PROCEDURE — 97012 MECHANICAL TRACTION THERAPY: CPT | Mod: PO

## 2019-08-08 NOTE — PROGRESS NOTES
"  Physical Therapy Daily Treatment Note     Name: Jaren Quiroz  Clinic Number: [id number]    Therapy Diagnosis:   Encounter Diagnosis   Name Primary?    Decreased range of motion of neck      Physician: Cynthia Perdomo PA-C    Visit Date: 8/8/2019  Physician Orders: PT Eval and Treat   Medical Diagnosis from Referral: M54.2 (ICD-10-CM) - Cervicalgia M47.812 (ICD-10-CM) - Spondylosis of cervical region without myelopathy or radiculopathy  Evaluation Date: 7/23/2019  Authorization Period Expiration: 12/31/19       50 PT/OT/ST combined visit max per calendar yearTricare: Follows primary guidelines  Plan of Care Expiration: 08/30/19  Visit # / Visits authorized: 4 / 50     Time In: 1055  Time Out: 1200  Total Billable Time: 60 minutes       Precautions:Prior lumbar surgery    Subjective     Pt reports: "Hurting a little from doing too much around the house"    He was compliant with home exercise program.  Response to previous treatment: Traction helping decrease pain  Functional change: Improved posture, but c/o back strain    Pain: 1-2 /10  Location: LEFT neck  and shoulder      Objective     Jaren received therapeutic exercises to develop ROM for 45 minutes including:  UE pull downs and pull back with head / scapular retraction @ 3 and 7#, 2 x 10 each  A/A and AROM in neck ROTATION supine and sitting  Self stretch side bend LEFT and RIGHT  Lumbar stabilization exercise with slight EXTENSION bias: UE patterns with 3# 3 x 10 each    Jaren deferred the following direct contact modalities after being cleared for contraindications: Ultrasound:  Jaren received ultrasound to manage pain and inflammation at 100 % duty cycle applied to the LEFT neck / shoulder at an intensity of 1.2 W/cm2  for a duration of 10 minutes. Patient tolerated treatment well without adverse effects. Therapist was in attendance throughout intervention.    Jaren received the following supervised modalities after being cleared for " contradictions: Mechanical Traction:  Jaren received intermittent mechanical traction to the cervical spine at a force of 18 pounds for a total of 15 minutes. Hold time of 1 minutes and rest time for 1  Minutes. Patient tolerated treatment well without any adverse effects.    Home Exercises Provided and Patient Education Provided  Education provided:   - Use of traction and precautions    Written Home Exercises Provided: n/a.  Exercises were reviewed and Jaren was able to demonstrate them prior to the end of the session.  Jaren demonstrated good  understanding of the education provided.      See EMR under media for exercises provided prior visit.    Assessment     Pt had no pain at neck or arm after traction  Neck ROTATION to 70 degrees RIGHT / 60 degrees LEFT supine and 60 degrees bilaterally in sitting    Jaren is progressing well towards his goals.   Pt prognosis is Excellent.     Pt will continue to benefit from skilled outpatient physical therapy to address the deficits listed in the problem list box on initial evaluation, provide pt/family education and to maximize pt's level of independence in the home and community environment.     Pt's spiritual, cultural and educational needs considered and pt agreeable to plan of care and goals.    Anticipated barriers to physical therapy: None    Goals:   Short Term Goals: 2 weeks   Decrease pain at the neck to 1-3/10 (MET)  Increase AROM of neck ROTATION to 60 degrees bilaterally  (MET)  Long Term Goals: 3 weeks   Pt will have 65 degrees AROM in ROTATION of the neck with 0-1/10 pain    Plan     Intermittent traction and or ultrasound PRN  Postural and scapular exercises    Charanjit Mueller, PT

## 2019-08-13 ENCOUNTER — CLINICAL SUPPORT (OUTPATIENT)
Dept: REHABILITATION | Facility: HOSPITAL | Age: 54
End: 2019-08-13
Attending: PHYSICIAN ASSISTANT
Payer: COMMERCIAL

## 2019-08-13 DIAGNOSIS — R29.898 DECREASED RANGE OF MOTION OF NECK: ICD-10-CM

## 2019-08-13 PROCEDURE — 97110 THERAPEUTIC EXERCISES: CPT | Mod: PO

## 2019-08-13 PROCEDURE — 97012 MECHANICAL TRACTION THERAPY: CPT | Mod: PO

## 2019-08-13 PROCEDURE — 97035 APP MDLTY 1+ULTRASOUND EA 15: CPT | Mod: PO

## 2019-08-13 NOTE — PROGRESS NOTES
"  Physical Therapy Daily Treatment Note     Name: Jaren Quiroz  Clinic Number: [id number]    Therapy Diagnosis:   Encounter Diagnosis   Name Primary?    Decreased range of motion of neck      Physician: Cynthia Perdomo PA-C    Visit Date: 8/13/2019  Physician Orders: PT Eval and Treat   Medical Diagnosis from Referral: M54.2 (ICD-10-CM) - Cervicalgia M47.812 (ICD-10-CM) - Spondylosis of cervical region without myelopathy or radiculopathy  Evaluation Date: 7/23/2019  Authorization Period Expiration: 12/31/19       50 PT/OT/ST combined visit max per calendar yearTricare: Follows primary guidelines  Plan of Care Expiration: 08/30/19  Visit # / Visits authorized: 5 / 50     Time In: 1105  Time Out: 1200  Total Billable Time: 50 minutes       Precautions:Prior lumbar surgery    Subjective     Pt reports: "No pain down arm, just stiff neck"    He was compliant with home exercise program.  Response to previous treatment: Traction has nearly resolved LEFT UE symptoms  Functional change: Improved posture and neck A/AROM  Pain: 0-2 /10  Location: LEFT neck  and shoulder      Objective     Jaren received therapeutic exercises to develop ROM for 25 minutes including:UE pull downs and pull back with head / scapular retraction @ 3 and 7#, 2 x 10 each  A/A and AROM in neck ROTATION supine and sitting  Self stretch side bend LEFT and RIGHT  DEFERRED TODAY:  Lumbar stabilization exercise with slight EXTENSION bias: UE patterns with 3# 3 x 10 each  UE pull downs and pull back with head / scapular retraction @ 3 and 7#, 2 x 10 each    Jaren receiveded the following direct contact modalities after being cleared for contraindications: Ultrasound:  Jaren received ultrasound to manage pain and inflammation at 100 % duty cycle applied to the LEFT neck / shoulder at an intensity of 1.2 W/cm2  for a duration of 10 minutes. Patient tolerated treatment well without adverse effects. Therapist was in attendance throughout " intervention.    Jaren received the following supervised modalities after being cleared for contradictions: Mechanical Traction:  Jaren received intermittent mechanical traction to the cervical spine at a force of 18 pounds for a total of 15 minutes. Hold time of 1 minutes and rest time for 1  Minutes. Patient tolerated treatment well without any adverse effects.    Home Exercises Provided and Patient Education Provided  Education provided:   - Use of traction and precautions    Written Home Exercises Provided: n/a.  Exercises were reviewed and Jaren was able to demonstrate them prior to the end of the session.  Jaren demonstrated good  understanding of the education provided.      See EMR under media for exercises provided prior visit.    Assessment     Pt had no pain at neck or arm after traction  Neck ROTATION to 70 degrees RIGHT / 60 degrees LEFT supine and 60 degrees bilaterally in sitting    Jaren is progressing well towards his goals.   Pt prognosis is Excellent.     Pt will continue to benefit from skilled outpatient physical therapy to address the deficits listed in the problem list box on initial evaluation, provide pt/family education and to maximize pt's level of independence in the home and community environment.     Pt's spiritual, cultural and educational needs considered and pt agreeable to plan of care and goals.    Anticipated barriers to physical therapy: None    Goals:   Short Term Goals: 2 weeks   Decrease pain at the neck to 1-3/10 (MET)  Increase AROM of neck ROTATION to 60 degrees bilaterally  (MET)  Long Term Goals: 3 weeks   Pt will have 65 degrees AROM in ROTATION of the neck with 0-1/10 pain    Plan     Intermittent traction and or ultrasound PRN  Postural and scapular exercises    Charanjit Mueller, PT

## 2019-08-15 ENCOUNTER — CLINICAL SUPPORT (OUTPATIENT)
Dept: REHABILITATION | Facility: HOSPITAL | Age: 54
End: 2019-08-15
Attending: PHYSICIAN ASSISTANT
Payer: COMMERCIAL

## 2019-08-15 DIAGNOSIS — R29.898 DECREASED RANGE OF MOTION OF NECK: ICD-10-CM

## 2019-08-15 PROCEDURE — 97110 THERAPEUTIC EXERCISES: CPT | Mod: PO

## 2019-08-15 PROCEDURE — 97035 APP MDLTY 1+ULTRASOUND EA 15: CPT | Mod: PO

## 2019-08-15 PROCEDURE — 97012 MECHANICAL TRACTION THERAPY: CPT | Mod: PO

## 2019-08-15 NOTE — PROGRESS NOTES
"  Physical Therapy Daily Treatment Note     Name: Jaren Quiroz  Clinic Number: [id number]    Therapy Diagnosis:   Encounter Diagnosis   Name Primary?    Decreased range of motion of neck      Physician: Cynthia Perdomo PA-C    Visit Date: 8/15/2019  Physician Orders: PT Eval and Treat   Medical Diagnosis from Referral: M54.2 (ICD-10-CM) - Cervicalgia M47.812 (ICD-10-CM) - Spondylosis of cervical region without myelopathy or radiculopathy  Evaluation Date: 7/23/2019  Authorization Period Expiration: 12/31/19       50 PT/OT/ST combined visit max per calendar yearTricare: Follows primary guidelines  Plan of Care Expiration: 08/30/19  Visit # / Visits authorized: 6 / 50     Time In: 1100  Time Out: 1155  Total Billable Time: 50 minutes       Precautions:Prior lumbar surgery    Subjective     Pt reports: "Cut grass yesterday, stiff neck"    He was compliant with home exercise program.  Response to previous treatment: Traction has nearly resolved LEFT UE symptoms  Functional change: Improved posture and neck A/AROM  Pain: 0-4 /10  Location: LEFT neck     Objective     Jaren received therapeutic exercises to develop ROM for 25 minutes including:UE pull downs and pull back with head / scapular retraction @ 3 and 7#, 2 x 10 each  A/A and AROM in neck ROTATION supine and sitting  Active and assistive stretch side bend LEFT and RIGHT  Deltoid exercise, 3 x 10  Prone trunk EXTENSION with neutral head / neck position, 3 x 10  DEFERRED TODAY:  Lumbar stabilization exercise with slight EXTENSION bias: UE patterns with 3# 3 x 10 each  UE pull downs and pull back with head / scapular retraction @ 3 and 7#, 2 x 10 each    Jaren receiveded the following direct contact modalities after being cleared for contraindications: Ultrasound:  Jaren received ultrasound to manage pain and inflammation at 100 % duty cycle applied to the LEFT neck / shoulder at an intensity of 1.2 W/cm2  for a duration of 10 minutes. Patient tolerated " treatment well without adverse effects. Therapist was in attendance throughout intervention.    Jaren received the following supervised modalities after being cleared for contradictions: Mechanical Traction:  Jaren received intermittent mechanical traction to the cervical spine at a force of 15 pounds for a total of 15 minutes. Hold time of 1 minutes and rest time for 1  Minutes. Patient tolerated treatment well without any adverse effects.    Home Exercises Provided and Patient Education Provided  Education provided:   - Use of traction and precautions    Written Home Exercises Provided: n/a.  Exercises were reviewed and Jaren was able to demonstrate them prior to the end of the session.  Jaren demonstrated good  understanding of the education provided.      See EMR under media for exercises provided prior visit.    Assessment     Pt had no pain at neck or arm after traction  Neck ROTATION to 70 degrees RIGHT / 70 degrees LEFT supine and 60+ degrees bilaterally in sitting    Jaren is progressing well towards his goals.   Pt prognosis is Excellent.     Pt will continue to benefit from skilled outpatient physical therapy to address the deficits listed in the problem list box on initial evaluation, provide pt/family education and to maximize pt's level of independence in the home and community environment.     Pt's spiritual, cultural and educational needs considered and pt agreeable to plan of care and goals.    Anticipated barriers to physical therapy: None    Goals:   Short Term Goals: 2 weeks   Decrease pain at the neck to 1-3/10 (MET)  Increase AROM of neck ROTATION to 60 degrees bilaterally  (MET)  Long Term Goals: 3 weeks   Pt will have 65 degrees AROM in ROTATION of the neck with 0-1/10 pain    Plan     Intermittent traction and or ultrasound PRN  Postural and scapular exercises    Charanjit Mueller, PT

## 2019-08-20 ENCOUNTER — CLINICAL SUPPORT (OUTPATIENT)
Dept: REHABILITATION | Facility: HOSPITAL | Age: 54
End: 2019-08-20
Attending: PHYSICIAN ASSISTANT
Payer: COMMERCIAL

## 2019-08-20 DIAGNOSIS — R29.898 DECREASED RANGE OF MOTION OF NECK: ICD-10-CM

## 2019-08-20 PROCEDURE — 97012 MECHANICAL TRACTION THERAPY: CPT | Mod: PO

## 2019-08-20 PROCEDURE — 97110 THERAPEUTIC EXERCISES: CPT | Mod: PO

## 2019-08-20 NOTE — PROGRESS NOTES
"  Physical Therapy Daily Treatment Note     Name: Jaren Quiroz  Clinic Number: [id number]    Therapy Diagnosis:   Encounter Diagnosis   Name Primary?    Decreased range of motion of neck      Physician: Cynthia Perdomo PA-C    Visit Date: 8/20/2019  Physician Orders: PT Eval and Treat   Medical Diagnosis from Referral: M54.2 (ICD-10-CM) - Cervicalgia M47.812 (ICD-10-CM) - Spondylosis of cervical region without myelopathy or radiculopathy  Evaluation Date: 7/23/2019  Authorization Period Expiration: 12/31/19       50 PT/OT/ST combined visit max per calendar yearTricare: Follows primary guidelines  Plan of Care Expiration: 08/30/19  Visit # / Visits authorized: 7 / 50     Time In: 1055  Time Out: 1155  Total Billable Time: 55 minutes       Precautions:Prior lumbar surgery    Subjective     Pt reports: "Stiff neck", especially at bed time from ADL    He was compliant with home exercise program.  Response to previous treatment: Traction has resolved LEFT UE symptoms  Functional change: Improved posture and neck A/AROM  Pain: 3 /10  Location: LEFT neck     Objective     Jaren received therapeutic exercises to develop ROM for 40 minutes including:  A/A and AROM in neck ROTATION supine and sitting  Supine neck EXTENSION with head over edge of table and C5  anterior pressure at LEFT facet   UBE 50 seconds work / 10 seconds rest and change direction every new minute x 3 sets  Row machine @ 20#, 3 x 10  Active seated stretch side bend LEFT and RIGHT  Active neck EXTENSION with towel roll at neck x 5  DEFERRED TODAY:  Lumbar stabilization exercise with slight EXTENSION bias: UE patterns with 3# 3 x 10 each  UE pull downs and pull back with head / scapular retraction @ 3 and 7#, 2 x 10 each    Jaren deferred the following direct contact modalities after being cleared for contraindications: Ultrasound:  Jaren received ultrasound to manage pain and inflammation at 100 % duty cycle applied to the LEFT neck / shoulder at an " intensity of 1.2 W/cm2  for a duration of 10 minutes. Patient tolerated treatment well without adverse effects. Therapist was in attendance throughout intervention.    Jaren received the following supervised modalities after being cleared for contradictions: Mechanical Traction:  Jaren received intermittent mechanical traction to the cervical spine at a force of 15 pounds for a total of 15 minutes. Hold time of 1 minutes and rest time for 1  Minutes. Patient tolerated treatment well without any adverse effects.    Home Exercises Provided and Patient Education Provided  Education provided:   - Use of traction and precautions    Written Home Exercises Provided: n/a.  Exercises were reviewed and Jaren was able to demonstrate them prior to the end of the session.  Jaren demonstrated good  understanding of the education provided.      See EMR under media for exercises provided prior visit.    Assessment     Pt had no pain at neck or arm after traction  Neck ROTATION to 70 degrees RIGHT / 70 degrees LEFT supine and 65+ degrees bilaterally in sitting    Jaren is progressing well towards his goals.   Pt prognosis is Excellent.     Pt will continue to benefit from skilled outpatient physical therapy to address the deficits listed in the problem list box on initial evaluation, provide pt/family education and to maximize pt's level of independence in the home and community environment.     Pt's spiritual, cultural and educational needs considered and pt agreeable to plan of care and goals.    Anticipated barriers to physical therapy: None    Goals:   Short Term Goals: 2 weeks   Decrease pain at the neck to 1-3/10 (MET)  Increase AROM of neck ROTATION to 60 degrees bilaterally  (MET)  Long Term Goals: 3 weeks   Pt will have 65 degrees AROM in ROTATION of the neck with 0-1/10 pain    Plan     Intermittent traction and or ultrasound PRN  Postural and scapular exercises    Charanjit Mueller, PT

## 2019-08-22 ENCOUNTER — CLINICAL SUPPORT (OUTPATIENT)
Dept: REHABILITATION | Facility: HOSPITAL | Age: 54
End: 2019-08-22
Attending: PHYSICIAN ASSISTANT
Payer: COMMERCIAL

## 2019-08-22 DIAGNOSIS — R29.898 DECREASED RANGE OF MOTION OF NECK: ICD-10-CM

## 2019-08-22 PROCEDURE — 97012 MECHANICAL TRACTION THERAPY: CPT | Mod: PO

## 2019-08-22 PROCEDURE — 97110 THERAPEUTIC EXERCISES: CPT | Mod: PO

## 2019-08-22 NOTE — PROGRESS NOTES
"  Physical Therapy Daily Treatment Note     Name: Jaren Quiroz  Clinic Number: [id number]    Therapy Diagnosis:   Encounter Diagnosis   Name Primary?    Decreased range of motion of neck      Physician: Cynthia Perdomo PA-C    Visit Date: 8/22/2019  Physician Orders: PT Eval and Treat   Medical Diagnosis from Referral: M54.2 (ICD-10-CM) - Cervicalgia M47.812 (ICD-10-CM) - Spondylosis of cervical region without myelopathy or radiculopathy  Evaluation Date: 7/23/2019  Authorization Period Expiration: 12/31/19       50 PT/OT/ST combined visit max per calendar yearTricare: Follows primary guidelines  Plan of Care Expiration: 08/30/19  Visit # / Visits authorized: 8 / 50     Time In: 1105  Time Out: 1155  Total Billable Time: 45 minutes       Precautions:Prior lumbar surgery    Subjective     Pt reports: "Less stiff neck" after cutting his grass this am    He was compliant with home exercise program.  Response to previous treatment: Pt reports more neck ROTATION when driving, especially to RIGHT  Functional change: Improved posture and neck A/AROM  Pain: 2-3 /10  Location: LEFT neck     Objective     Jaren received therapeutic exercises to develop ROM for 30 minutes including:  A/A and AROM in neck ROTATION supine and sitting  UBE 50 seconds work / 10 seconds rest and change direction every new minute x 3 sets  Row machine @ 30#, 3 x 10  Active seated stretch side bend and diagonal stretch LEFT and RIGHT  Active neck EXTENSION with towel roll at neck x 5    Jaren deferred the following direct contact modalities after being cleared for contraindications: Ultrasound:  Jaren received ultrasound to manage pain and inflammation at 100 % duty cycle applied to the LEFT neck / shoulder at an intensity of 1.2 W/cm2  for a duration of 10 minutes. Patient tolerated treatment well without adverse effects. Therapist was in attendance throughout intervention.    Jaren received the following supervised modalities after being " cleared for contradictions: Mechanical Traction:  Jaren received intermittent mechanical traction to the cervical spine at a force of 15 pounds for a total of 15 minutes. Hold time of 1 minutes and rest time for 1  Minutes. Patient tolerated treatment well without any adverse effects.    Home Exercises Provided and Patient Education Provided  Education provided:   Diagonal neck stretch    Written Home Exercises Provided: n/a.  Exercises were reviewed and Jaren was able to demonstrate them prior to the end of the session.  Jaren demonstrated good  understanding of the education provided.      See EMR under media for exercises provided prior visit.    Assessment     Pt had no pain at neck or arm after traction  Neck ROTATION to 70 degrees RIGHT / 70 degrees LEFT supine and 65+ degrees bilaterally in sitting  Pt will decrease to once weekly x 4    Jaren is progressing well towards his goals.   Pt prognosis is Excellent.     Pt will continue to benefit from skilled outpatient physical therapy to address the deficits listed in the problem list box on initial evaluation, provide pt/family education and to maximize pt's level of independence in the home and community environment.     Pt's spiritual, cultural and educational needs considered and pt agreeable to plan of care and goals.    Anticipated barriers to physical therapy: None    Goals:   Short Term Goals: 2 weeks   Decrease pain at the neck to 1-3/10 (MET)  Increase AROM of neck ROTATION to 60 degrees bilaterally  (MET)  Long Term Goals: 4 weeks   Pt will have 65 degrees AROM in ROTATION of the neck with 0-1/10 pain    Plan     Intermittent traction and or ultrasound PRN  Postural and scapular exercises    Charanjit Mueller, PT

## 2019-08-27 ENCOUNTER — CLINICAL SUPPORT (OUTPATIENT)
Dept: REHABILITATION | Facility: HOSPITAL | Age: 54
End: 2019-08-27
Attending: PHYSICIAN ASSISTANT
Payer: COMMERCIAL

## 2019-08-27 DIAGNOSIS — R29.898 DECREASED RANGE OF MOTION OF NECK: ICD-10-CM

## 2019-08-27 PROCEDURE — 97110 THERAPEUTIC EXERCISES: CPT | Mod: PO

## 2019-08-27 PROCEDURE — 97012 MECHANICAL TRACTION THERAPY: CPT | Mod: PO

## 2019-08-27 NOTE — PROGRESS NOTES
"  Physical Therapy Daily Treatment Note     Name: Jaren Quiroz  Clinic Number: [id number]    Therapy Diagnosis:   Encounter Diagnosis   Name Primary?    Decreased range of motion of neck      Physician: Cynthia Perdomo PA-C    Visit Date: 8/27/2019  Physician Orders: PT Eval and Treat   Medical Diagnosis from Referral: M54.2 (ICD-10-CM) - Cervicalgia M47.812 (ICD-10-CM) - Spondylosis of cervical region without myelopathy or radiculopathy  Evaluation Date: 7/23/2019  Authorization Period Expiration: 12/31/19       50 PT/OT/ST combined visit max per calendar yearTricare: Follows primary guidelines  Plan of Care Expiration: 08/30/19  Visit # / Visits authorized: 9 / 50     Time In: 1054  Time Out: 1150  Total Billable Time: 50 minutes       Precautions:Prior lumbar surgery    Subjective     Pt reports: "Pain @ LEFT shoulder blade" without increased activity this weekend, but "not as bad now"    He was compliant with home exercise program.  Response to previous treatment: Pt reports less neck ROTATION stiffness  Functional change: Improved posture and neck AROM  Pain: 2 /10  Location: LEFT neck     Objective     Jaren received therapeutic exercises to develop ROM for 40 minutes including:  A/A and AROM in neck ROTATION supine and sitting  UBE 50 seconds work / 10 seconds rest and change direction every new minute x 4 sets  Row machine @ 30#, 3 x 10  Review active seated stretch side bend and diagonal stretch LEFT and RIGHT    Jaren deferred the following direct contact modalities after being cleared for contraindications: Ultrasound:  Jaren received ultrasound to manage pain and inflammation at 100 % duty cycle applied to the LEFT neck / shoulder at an intensity of 1.2 W/cm2  for a duration of 10 minutes. Patient tolerated treatment well without adverse effects. Therapist was in attendance throughout intervention.    Jaren received the following supervised modalities after being cleared for contradictions: " Mechanical Traction:  Jaren received intermittent mechanical traction to the cervical spine at a force of 15 pounds for a total of 10 minutes. Hold time of 1 minutes and rest time for 1  Minutes. Patient tolerated treatment well without any adverse effects.    Home Exercises Provided and Patient Education Provided  Education provided:   Diagonal neck stretch    Written Home Exercises Provided: n/a.  Exercises were reviewed and Jaren was able to demonstrate them prior to the end of the session.  Jaren demonstrated good  understanding of the education provided.      See EMR under media for exercises provided prior visit.    Assessment     Pt had no pain at neck or arm after traction  Neck ROTATION to 70 degrees RIGHT / 70 degrees LEFT supine and 65+ degrees bilaterally in sitting  Pt will decrease to once weekly x 4    Jaren is progressing well towards his goals.   Pt prognosis is Excellent.     Pt will continue to benefit from skilled outpatient physical therapy to address the deficits listed in the problem list box on initial evaluation, provide pt/family education and to maximize pt's level of independence in the home and community environment.     Pt's spiritual, cultural and educational needs considered and pt agreeable to plan of care and goals.    Anticipated barriers to physical therapy: None    Goals:   Short Term Goals: 2 weeks   Decrease pain at the neck to 1-3/10 (MET)  Increase AROM of neck ROTATION to 60 degrees bilaterally  (MET)  Long Term Goals: 4 weeks   Pt will have 65 degrees AROM in ROTATION of the neck with 0-1/10 pain (PROGRESS)    Plan     Intermittent traction and or ultrasound PRN  Postural and scapular exercises    Charanjit Mueller, PT

## 2019-09-03 DIAGNOSIS — M25.562 CHRONIC PAIN OF BOTH KNEES: Primary | ICD-10-CM

## 2019-09-03 DIAGNOSIS — M25.561 CHRONIC PAIN OF BOTH KNEES: Primary | ICD-10-CM

## 2019-09-03 DIAGNOSIS — G89.29 CHRONIC PAIN OF BOTH KNEES: Primary | ICD-10-CM

## 2019-09-05 ENCOUNTER — OFFICE VISIT (OUTPATIENT)
Dept: ORTHOPEDICS | Facility: CLINIC | Age: 54
End: 2019-09-05
Payer: COMMERCIAL

## 2019-09-05 ENCOUNTER — HOSPITAL ENCOUNTER (OUTPATIENT)
Dept: RADIOLOGY | Facility: HOSPITAL | Age: 54
Discharge: HOME | End: 2019-09-05
Attending: ORTHOPAEDIC SURGERY
Payer: COMMERCIAL

## 2019-09-05 VITALS — BODY MASS INDEX: 28.84 KG/M2 | HEIGHT: 71 IN | WEIGHT: 206 LBS

## 2019-09-05 DIAGNOSIS — G89.29 CHRONIC PAIN OF RIGHT KNEE: Primary | ICD-10-CM

## 2019-09-05 DIAGNOSIS — G89.29 CHRONIC PAIN OF BOTH KNEES: ICD-10-CM

## 2019-09-05 DIAGNOSIS — Z87.828 HISTORY OF TEAR OF MENISCUS OF KNEE JOINT: ICD-10-CM

## 2019-09-05 DIAGNOSIS — M25.562 CHRONIC PAIN OF BOTH KNEES: ICD-10-CM

## 2019-09-05 DIAGNOSIS — M25.561 CHRONIC PAIN OF RIGHT KNEE: Primary | ICD-10-CM

## 2019-09-05 DIAGNOSIS — M25.561 CHRONIC PAIN OF BOTH KNEES: ICD-10-CM

## 2019-09-05 DIAGNOSIS — M17.11 PRIMARY OSTEOARTHRITIS OF RIGHT KNEE: ICD-10-CM

## 2019-09-05 PROCEDURE — 99214 PR OFFICE/OUTPT VISIT, EST, LEVL IV, 30-39 MIN: ICD-10-PCS | Mod: 25,S$GLB,, | Performed by: ORTHOPAEDIC SURGERY

## 2019-09-05 PROCEDURE — 3008F BODY MASS INDEX DOCD: CPT | Mod: CPTII,S$GLB,, | Performed by: ORTHOPAEDIC SURGERY

## 2019-09-05 PROCEDURE — 73562 X-RAY EXAM OF KNEE 3: CPT | Mod: 26,,, | Performed by: RADIOLOGY

## 2019-09-05 PROCEDURE — 73562 X-RAY EXAM OF KNEE 3: CPT | Mod: TC,50,PO

## 2019-09-05 PROCEDURE — 99999 PR PBB SHADOW E&M-EST. PATIENT-LVL III: CPT | Mod: PBBFAC,,, | Performed by: ORTHOPAEDIC SURGERY

## 2019-09-05 PROCEDURE — 20610 DRAIN/INJ JOINT/BURSA W/O US: CPT | Mod: RT,S$GLB,, | Performed by: ORTHOPAEDIC SURGERY

## 2019-09-05 PROCEDURE — 3008F PR BODY MASS INDEX (BMI) DOCUMENTED: ICD-10-PCS | Mod: CPTII,S$GLB,, | Performed by: ORTHOPAEDIC SURGERY

## 2019-09-05 PROCEDURE — 73562 XR KNEE ORTHO BILAT: ICD-10-PCS | Mod: 26,,, | Performed by: RADIOLOGY

## 2019-09-05 PROCEDURE — 99214 OFFICE O/P EST MOD 30 MIN: CPT | Mod: 25,S$GLB,, | Performed by: ORTHOPAEDIC SURGERY

## 2019-09-05 PROCEDURE — 20610 LARGE JOINT ASPIRATION/INJECTION: R KNEE: ICD-10-PCS | Mod: RT,S$GLB,, | Performed by: ORTHOPAEDIC SURGERY

## 2019-09-05 PROCEDURE — 99999 PR PBB SHADOW E&M-EST. PATIENT-LVL III: ICD-10-PCS | Mod: PBBFAC,,, | Performed by: ORTHOPAEDIC SURGERY

## 2019-09-05 RX ORDER — TRIAMCINOLONE ACETONIDE 40 MG/ML
40 INJECTION, SUSPENSION INTRA-ARTICULAR; INTRAMUSCULAR
Status: DISCONTINUED | OUTPATIENT
Start: 2019-09-05 | End: 2019-09-05 | Stop reason: HOSPADM

## 2019-09-05 RX ADMIN — TRIAMCINOLONE ACETONIDE 40 MG: 40 INJECTION, SUSPENSION INTRA-ARTICULAR; INTRAMUSCULAR at 01:09

## 2019-09-05 NOTE — PROCEDURES
Large Joint Aspiration/Injection: R knee  Date/Time: 9/5/2019 1:15 PM  Performed by: Pedro Tao MD  Authorized by: Pedro Tao MD     Consent Done?:  Yes (Verbal)  Indications:  Pain  Timeout: Prior to procedure the correct patient, procedure, and site was verified      Location:  Knee  Site:  R knee  Prep: Patient was prepped and draped in usual sterile fashion    Needle size:  21 G  Ultrasonic Guidance for needle placement: No  Approach:  Anterolateral  Medications:  40 mg triamcinolone acetonide 40 mg/mL  Patient tolerance:  Patient tolerated the procedure well with no immediate complications

## 2019-09-05 NOTE — PROGRESS NOTES
53 years old, right knee pain off and on now for the past couple of weeks.  More   bothersome with outside activity and work, relieved with rest, 2/10 on good   days, 6/10 on bad days.  Ice and brace seemed to help.  Does report a history of   knee arthroscopy about 10 years ago.    Exam today shows no signs of infection or instability.  He is tender in the   medial joint line.  Ta testing is positive.  Skin is intact.  Compartments   are soft.    X-rays show medial joint space narrowing.    ASSESSMENT:  Degenerative disease, right knee medial joint space narrowing.    PLAN:  Kenalog injection to the right knee.  Encouraged strengthening exercises.    He has a brace he can use.  Follow up as needed.        PBB/CARMELO  dd: 09/05/2019 13:19:47 (CDT)  td: 09/06/2019 02:31:20 (CDT)  Doc ID   #[id number]  Job ID #[id number]    CC:     Further History  Aching pain  Worse with activity  Relieved with rest  No other associated symptoms  No other radiation    Further Exam  Alert and oriented  Pleasant  Contralateral limb has appropriate range of motion for age and condition  Contralateral limb has appropriate strength for age and condition  Contralateral limb has appropriate stability  for age and condition  No adenopathy  Pulses are appropriate for current condition  Skin is intact        Chief Complaint    Chief Complaint   Patient presents with    Right Knee - Pain, Swelling       HPI  Jaren M Cousin is a 53 y.o.  male who presents with       Past Medical History  Past Medical History:   Diagnosis Date    ALLERGIC RHINITIS     Asthma     as child    Cervicalgia     DDD (degenerative disc disease), lumbar     Fatty liver     Hemorrhoid     Hepatomegaly     High triglycerides     HTN (hypertension)     Liver hemangioma 2014    PTSD (post-traumatic stress disorder)     not currently taking prescribed meds per [facility]    Shingles     lower back    Urticaria        Past Surgical History  Past Surgical History:    Procedure Laterality Date    BACK SURGERY      ruptured disc L5-S1    [facility]  ~2010  [facility])    Hemorrhoids    COLONOSCOPY N/A 6/24/2015    Performed by Balwinder Dexter Jr., MD at [facility]    EGD (ESOPHAGOGASTRODUODENOSCOPY) N/A 1/29/2019    Performed by Balwinder Dexter Jr., MD at [facility]    ELBOW SURGERY      right    KNEE SURGERY      right    MRI (Magnetic Resonance Imagine) needs anesthesia N/A 6/26/2019    Performed by Maurice Fonseca MD at [facility]       Medications  Current Outpatient Medications   Medication Sig    ALLERGY RELIEF, LORATADINE, 10 mg tablet TAKE ONE TABLET BY MOUTH ONCE DAILY    amLODIPine (NORVASC) 10 MG tablet Take 1 tablet (10 mg total) by mouth once daily.    amoxicillin-clavulanate 875-125mg (AUGMENTIN) 875-125 mg per tablet Take 1 tablet by mouth every 12 (twelve) hours.    benzoyl peroxide (BENZAC AC WASH) 10 % external wash Apply topically 2 (two) times daily. Face and trunk    clindamycin phosphate foam Apply to affected area qd- bid    cyclobenzaprine (FLEXERIL) 10 MG tablet Take 10 mg by mouth 3 (three) times daily as needed. PRN    esomeprazole (NEXIUM) 40 MG capsule Take 1 capsule (40 mg total) by mouth before breakfast.    fenofibrate (TRICOR) 145 MG tablet Take 1 tablet (145 mg total) by mouth once daily.    hydrOXYzine (ATARAX) 25 MG tablet Take 1 tablet (25 mg total) by mouth every 6 (six) hours as needed for Itching. Every 6 hours PRN    ketoconazole (NIZORAL) 2 % shampoo Use as directed qohs as needed    meclizine (ANTIVERT) 25 mg tablet Take 1 tablet (25 mg total) by mouth 3 (three) times daily as needed for Dizziness (may cause drowsiness).    minocycline (MINOCIN,DYNACIN) 100 MG capsule Take 1 capsule (100 mg total) by mouth once daily.    mometasone (NASONEX) 50 mcg/actuation nasal spray USE TWO SPRAY(S) IN EACH NOSTRIL ONCE DAILY    niacin (SLO-NIACIN) 500 mg tablet Take 1 tablet (500 mg total) by mouth nightly.     ranitidine (ZANTAC) 75 MG tablet Take 75 mg by mouth 2 (two) times daily as needed for Heartburn.    valACYclovir (VALTREX) 500 MG tablet TAKE 2 TABLETS BY MOUTH TWICE DAILY (Patient taking differently: TAKE 2 TABLETS BY MOUTH TWICE DAILY as needed)     No current facility-administered medications for this visit.        Allergies  Review of patient's allergies indicates:   Allergen Reactions    No known drug allergies        Family History  Family History   Problem Relation Age of Onset    Allergic rhinitis Son     Allergic rhinitis Daughter     Asthma Daughter     Allergic rhinitis Daughter     Diverticulitis Mother     Hypertension Father     COPD Father     Angioedema Neg Hx     Eczema Neg Hx     Immunodeficiency Neg Hx     Urticaria Neg Hx     Lupus Neg Hx     Psoriasis Neg Hx     Melanoma Neg Hx     Acne Neg Hx     Colon cancer Neg Hx     Colon polyps Neg Hx     Crohn's disease Neg Hx     Ulcerative colitis Neg Hx     Esophageal cancer Neg Hx     Stomach cancer Neg Hx        Social History  Social History     Socioeconomic History    Marital status:      Spouse name: Not on file    Number of children: Not on file    Years of education: Not on file    Highest education level: Not on file   Occupational History    Occupation: retired   Social Needs    Financial resource strain: Not on file    Food insecurity:     Worry: Not on file     Inability: Not on file    Transportation needs:     Medical: Not on file     Non-medical: Not on file   Tobacco Use    Smoking status: Never Smoker    Smokeless tobacco: Never Used   Substance and Sexual Activity    Alcohol use: Yes     Alcohol/week: 12.6 oz     Types: 21 Cans of beer per week     Comment: 3 beers daily    Drug use: No    Sexual activity: Yes   Lifestyle    Physical activity:     Days per week: Not on file     Minutes per session: Not on file    Stress: Not on file   Relationships    Social connections:     Talks on  phone: Not on file     Gets together: Not on file     Attends [employer] witness service: Not on file     Active member of club or organization: Not on file     Attends meetings of clubs or organizations: Not on file     Relationship status: Not on file   Other Topics Concern    Not on file   Social History Narrative    Not on file               Review of Systems     Constitutional: Negative    HENT: Negative  Eyes: Negative  Respiratory: Negative  Cardiovascular: Negative  Musculoskeletal: HPI  Skin: Negative  Neurological: Negative  Hematological: Negative  Endocrine: Negative                 Physical Exam    There were no vitals filed for this visit.  Body mass index is 28.73 kg/m².  Physical Examination:     General appearance -  well appearing, and in no distress  Mental status - awake  Neck - supple  Chest -  symmetric air entry  Heart - normal rate   Abdomen - soft      Assessment     1. Chronic pain of right knee    2. Primary osteoarthritis of right knee    3. History of tear of meniscus of knee joint          Plan

## 2019-09-25 ENCOUNTER — OFFICE VISIT (OUTPATIENT)
Dept: CARDIOLOGY | Facility: CLINIC | Age: 54
End: 2019-09-25
Payer: COMMERCIAL

## 2019-09-25 VITALS
HEIGHT: 71 IN | DIASTOLIC BLOOD PRESSURE: 82 MMHG | HEART RATE: 75 BPM | WEIGHT: 202.81 LBS | SYSTOLIC BLOOD PRESSURE: 142 MMHG | BODY MASS INDEX: 28.39 KG/M2

## 2019-09-25 DIAGNOSIS — R00.2 PALPITATIONS: Primary | ICD-10-CM

## 2019-09-25 DIAGNOSIS — E78.5 DYSLIPIDEMIA: ICD-10-CM

## 2019-09-25 DIAGNOSIS — I10 ESSENTIAL HYPERTENSION: ICD-10-CM

## 2019-09-25 PROCEDURE — 3008F PR BODY MASS INDEX (BMI) DOCUMENTED: ICD-10-PCS | Mod: CPTII,S$GLB,, | Performed by: INTERNAL MEDICINE

## 2019-09-25 PROCEDURE — 3077F SYST BP >= 140 MM HG: CPT | Mod: CPTII,S$GLB,, | Performed by: INTERNAL MEDICINE

## 2019-09-25 PROCEDURE — 3079F DIAST BP 80-89 MM HG: CPT | Mod: CPTII,S$GLB,, | Performed by: INTERNAL MEDICINE

## 2019-09-25 PROCEDURE — 3008F BODY MASS INDEX DOCD: CPT | Mod: CPTII,S$GLB,, | Performed by: INTERNAL MEDICINE

## 2019-09-25 PROCEDURE — 99204 PR OFFICE/OUTPT VISIT, NEW, LEVL IV, 45-59 MIN: ICD-10-PCS | Mod: S$GLB,,, | Performed by: INTERNAL MEDICINE

## 2019-09-25 PROCEDURE — 99999 PR PBB SHADOW E&M-EST. PATIENT-LVL III: CPT | Mod: PBBFAC,,, | Performed by: INTERNAL MEDICINE

## 2019-09-25 PROCEDURE — 3077F PR MOST RECENT SYSTOLIC BLOOD PRESSURE >= 140 MM HG: ICD-10-PCS | Mod: CPTII,S$GLB,, | Performed by: INTERNAL MEDICINE

## 2019-09-25 PROCEDURE — 99204 OFFICE O/P NEW MOD 45 MIN: CPT | Mod: S$GLB,,, | Performed by: INTERNAL MEDICINE

## 2019-09-25 PROCEDURE — 3079F PR MOST RECENT DIASTOLIC BLOOD PRESSURE 80-89 MM HG: ICD-10-PCS | Mod: CPTII,S$GLB,, | Performed by: INTERNAL MEDICINE

## 2019-09-25 PROCEDURE — 99999 PR PBB SHADOW E&M-EST. PATIENT-LVL III: ICD-10-PCS | Mod: PBBFAC,,, | Performed by: INTERNAL MEDICINE

## 2019-09-25 NOTE — PROGRESS NOTES
"Subjective:    Patient ID:  Jaren PICHARDO Cousin is a 53 y.o. male who presents for evaluation of palpitation    HPI  He comes for follow up with palpitations on/off for the last month, none since he stopped decongestant with "D"       Review of Systems   Constitution: Negative for decreased appetite, malaise/fatigue, weight gain and weight loss.   Cardiovascular: Negative for chest pain, dyspnea on exertion, leg swelling, palpitations and syncope.   Respiratory: Negative for cough and shortness of breath.    Gastrointestinal: Negative.    Neurological: Negative for weakness.   All other systems reviewed and are negative.       Objective:      Physical Exam   Constitutional: He is oriented to person, place, and time. He appears well-developed and well-nourished.   HENT:   Head: Normocephalic.   Eyes: Pupils are equal, round, and reactive to light.   Neck: Normal range of motion. Neck supple. No JVD present. Carotid bruit is not present. No thyromegaly present.   Cardiovascular: Normal rate, regular rhythm, normal heart sounds, intact distal pulses and normal pulses. PMI is not displaced. Exam reveals no gallop.   No murmur heard.  Pulmonary/Chest: Effort normal and breath sounds normal.   Abdominal: Soft. Normal appearance. He exhibits no mass. There is no hepatosplenomegaly. There is no tenderness.   Musculoskeletal: Normal range of motion. He exhibits no edema.   Neurological: He is alert and oriented to person, place, and time. He has normal strength and normal reflexes. No sensory deficit.   Skin: Skin is warm and intact.   Psychiatric: He has a normal mood and affect.   Nursing note and vitals reviewed.        Assessment:       1. Palpitations    2. Essential hypertension    3. Dyslipidemia         Plan:     Stress echo/ccfd  Call with results        "

## 2019-09-28 ENCOUNTER — PATIENT MESSAGE (OUTPATIENT)
Dept: SPINE | Facility: CLINIC | Age: 54
End: 2019-09-28

## 2019-09-30 DIAGNOSIS — M51.36 DDD (DEGENERATIVE DISC DISEASE), LUMBAR: Primary | ICD-10-CM

## 2019-09-30 DIAGNOSIS — E78.1 HIGH TRIGLYCERIDES: ICD-10-CM

## 2019-09-30 RX ORDER — NIACIN 500 MG/1
500 TABLET, EXTENDED RELEASE ORAL NIGHTLY
Qty: 90 TABLET | Refills: 3 | Status: SHIPPED | OUTPATIENT
Start: 2019-09-30 | End: 2020-09-02 | Stop reason: SDUPTHER

## 2019-09-30 RX ORDER — FENOFIBRATE 145 MG/1
145 TABLET, FILM COATED ORAL DAILY
Qty: 90 TABLET | Refills: 3 | Status: SHIPPED | OUTPATIENT
Start: 2019-09-30 | End: 2020-09-02 | Stop reason: SDUPTHER

## 2019-09-30 NOTE — TELEPHONE ENCOUNTER
Could someone put in a referral to pain management for this patient in Cynthia Perdomo's absence please?

## 2019-09-30 NOTE — TELEPHONE ENCOUNTER
Spoke with patient and scheduled him an appointment with Dr. Mao 10-2-19 in pain management, he indicated understanding.

## 2019-10-01 ENCOUNTER — CLINICAL SUPPORT (OUTPATIENT)
Dept: CARDIOLOGY | Facility: CLINIC | Age: 54
End: 2019-10-01
Attending: INTERNAL MEDICINE
Payer: COMMERCIAL

## 2019-10-01 VITALS — HEIGHT: 71 IN | BODY MASS INDEX: 28.28 KG/M2 | WEIGHT: 202 LBS

## 2019-10-01 DIAGNOSIS — R00.2 PALPITATIONS: ICD-10-CM

## 2019-10-01 PROCEDURE — 99999 PR PBB SHADOW E&M-EST. PATIENT-LVL I: ICD-10-PCS | Mod: PBBFAC,,,

## 2019-10-01 PROCEDURE — 93351 STRESS ECHO (CUPID ONLY): ICD-10-PCS | Mod: S$GLB,,, | Performed by: INTERNAL MEDICINE

## 2019-10-01 PROCEDURE — 99999 PR PBB SHADOW E&M-EST. PATIENT-LVL I: CPT | Mod: PBBFAC,,,

## 2019-10-01 PROCEDURE — 93351 STRESS TTE COMPLETE: CPT | Mod: S$GLB,,, | Performed by: INTERNAL MEDICINE

## 2019-10-02 ENCOUNTER — PATIENT OUTREACH (OUTPATIENT)
Dept: ADMINISTRATIVE | Facility: HOSPITAL | Age: 54
End: 2019-10-02

## 2019-10-02 ENCOUNTER — OFFICE VISIT (OUTPATIENT)
Dept: PAIN MEDICINE | Facility: CLINIC | Age: 54
End: 2019-10-02
Payer: COMMERCIAL

## 2019-10-02 VITALS
BODY MASS INDEX: 28.39 KG/M2 | SYSTOLIC BLOOD PRESSURE: 136 MMHG | WEIGHT: 202.81 LBS | HEART RATE: 77 BPM | DIASTOLIC BLOOD PRESSURE: 83 MMHG | HEIGHT: 71 IN

## 2019-10-02 DIAGNOSIS — M54.2 NECK PAIN: ICD-10-CM

## 2019-10-02 DIAGNOSIS — M54.12 CERVICAL RADICULITIS: Primary | ICD-10-CM

## 2019-10-02 PROCEDURE — 3079F PR MOST RECENT DIASTOLIC BLOOD PRESSURE 80-89 MM HG: ICD-10-PCS | Mod: CPTII,S$GLB,, | Performed by: ANESTHESIOLOGY

## 2019-10-02 PROCEDURE — 3075F PR MOST RECENT SYSTOLIC BLOOD PRESS GE 130-139MM HG: ICD-10-PCS | Mod: CPTII,S$GLB,, | Performed by: ANESTHESIOLOGY

## 2019-10-02 PROCEDURE — 3008F PR BODY MASS INDEX (BMI) DOCUMENTED: ICD-10-PCS | Mod: CPTII,S$GLB,, | Performed by: ANESTHESIOLOGY

## 2019-10-02 PROCEDURE — 3075F SYST BP GE 130 - 139MM HG: CPT | Mod: CPTII,S$GLB,, | Performed by: ANESTHESIOLOGY

## 2019-10-02 PROCEDURE — 99999 PR PBB SHADOW E&M-EST. PATIENT-LVL III: CPT | Mod: PBBFAC,,, | Performed by: ANESTHESIOLOGY

## 2019-10-02 PROCEDURE — 3008F BODY MASS INDEX DOCD: CPT | Mod: CPTII,S$GLB,, | Performed by: ANESTHESIOLOGY

## 2019-10-02 PROCEDURE — 99204 OFFICE O/P NEW MOD 45 MIN: CPT | Mod: S$GLB,,, | Performed by: ANESTHESIOLOGY

## 2019-10-02 PROCEDURE — 99999 PR PBB SHADOW E&M-EST. PATIENT-LVL III: ICD-10-PCS | Mod: PBBFAC,,, | Performed by: ANESTHESIOLOGY

## 2019-10-02 PROCEDURE — 99204 PR OFFICE/OUTPT VISIT, NEW, LEVL IV, 45-59 MIN: ICD-10-PCS | Mod: S$GLB,,, | Performed by: ANESTHESIOLOGY

## 2019-10-02 PROCEDURE — 3079F DIAST BP 80-89 MM HG: CPT | Mod: CPTII,S$GLB,, | Performed by: ANESTHESIOLOGY

## 2019-10-02 RX ORDER — ALPRAZOLAM 0.5 MG/1
0.5 TABLET, ORALLY DISINTEGRATING ORAL ONCE AS NEEDED
Status: CANCELLED | OUTPATIENT
Start: 2019-10-15 | End: 2031-03-12

## 2019-10-02 NOTE — PROGRESS NOTES
[facility] New Patient Evaluation    Referred by: Cynthia Perdomo PA-C  Reason for referral: neck pain    CC:   Chief Complaint   Patient presents with    Neck Pain     To discuss neck injection      No flowsheet data found.    HPI:   Jaren PICHARDO Cousin is a 53 y.o. male who complains of neck pain    Onset: 1 year  Inciting Event: none  Progression: since onset, pain is stable  Typical Range: 4-7/10  Timing: constant  Quality: aching, sharp, throbbing  Radiation: no  Associated numbness or weakness: yes numbness over the right neck, no weakness  Exacerbated by: laying, night, morning  Allievated by: medications, PT  Is Pain Level Acceptable?: No    Previous Therapies:  PT/OT: yes  HEP: yes  Interventions:   Surgery:  Medications:   - NSAIDS: ibuprofen  - MSK Relaxants: flexeril  - TCAs:   - SNRIs:   - Topicals: voltaren gel  - Anticonvulsants:  - Opioids: tramadol    History:    Current Outpatient Medications:     ALLERGY RELIEF, LORATADINE, 10 mg tablet, TAKE ONE TABLET BY MOUTH ONCE DAILY, Disp: 30 tablet, Rfl: 11    amLODIPine (NORVASC) 10 MG tablet, Take 1 tablet (10 mg total) by mouth once daily., Disp: 30 tablet, Rfl: 5    amoxicillin-clavulanate 875-125mg (AUGMENTIN) 875-125 mg per tablet, Take 1 tablet by mouth every 12 (twelve) hours., Disp: 20 tablet, Rfl: 0    benzoyl peroxide (BENZAC AC WASH) 10 % external wash, Apply topically 2 (two) times daily. Face and trunk, Disp: 226 g, Rfl: 5    clindamycin phosphate foam, Apply to affected area qd- bid, Disp: 100 g, Rfl: 5    cyclobenzaprine (FLEXERIL) 10 MG tablet, Take 10 mg by mouth 3 (three) times daily as needed. PRN, Disp: , Rfl:     esomeprazole (NEXIUM) 40 MG capsule, Take 1 capsule (40 mg total) by mouth before breakfast., Disp: 60 capsule, Rfl: 5    fenofibrate (TRICOR) 145 MG tablet, Take 1 tablet (145 mg total) by mouth once daily., Disp: 90 tablet, Rfl: 3    hydrOXYzine (ATARAX) 25 MG tablet, Take 1 tablet (25 mg total) by mouth  every 6 (six) hours as needed for Itching. Every 6 hours PRN, Disp: 60 tablet, Rfl: 6    ketoconazole (NIZORAL) 2 % shampoo, Use as directed qohs as needed, Disp: 240 mL, Rfl: 5    meclizine (ANTIVERT) 25 mg tablet, Take 1 tablet (25 mg total) by mouth 3 (three) times daily as needed for Dizziness (may cause drowsiness)., Disp: 60 tablet, Rfl: 2    minocycline (MINOCIN,DYNACIN) 100 MG capsule, Take 1 capsule (100 mg total) by mouth once daily., Disp: 30 capsule, Rfl: 11    mometasone (NASONEX) 50 mcg/actuation nasal spray, USE TWO SPRAY(S) IN EACH NOSTRIL ONCE DAILY, Disp: 17 g, Rfl: 11    niacin (SLO-NIACIN) 500 mg tablet, Take 1 tablet (500 mg total) by mouth nightly., Disp: 90 tablet, Rfl: 3    ranitidine (ZANTAC) 75 MG tablet, Take 75 mg by mouth 2 (two) times daily as needed for Heartburn., Disp: , Rfl:     valACYclovir (VALTREX) 500 MG tablet, TAKE 2 TABLETS BY MOUTH TWICE DAILY (Patient taking differently: TAKE 2 TABLETS BY MOUTH TWICE DAILY as needed), Disp: 30 tablet, Rfl: 11    Past Medical History:   Diagnosis Date    ALLERGIC RHINITIS     Asthma     as child    Cervicalgia     DDD (degenerative disc disease), lumbar     Fatty liver     Hemorrhoid     Hepatomegaly     High triglycerides     HTN (hypertension)     Liver hemangioma 2014    PTSD (post-traumatic stress disorder)     not currently taking [facility]s [facility]    Shingles     lower back    Urticaria        Past Surgical History:   Procedure Laterality Date    BACK SURGERY      ruptured disc L5-S1    COLONOSCOPY  ~2010  ([facility])    Hemorrhoids    ELBOW SURGERY      right    ESOPHAGOGASTRODUODENOSCOPY N/A 1/29/2019    Procedure: EGD (ESOPHAGOGASTRODUODENOSCOPY);  Surgeon: Balwinder Dexter Jr., MD;  [facility];  Service: Endoscopy;  Laterality: N/A;    KNEE SURGERY      right    MAGNETIC RESONANCE IMAGING N/A 6/26/2019    Procedure: MRI (Magnetic Resonance Imagine) needs anesthesia;  Surgeon: Maurice REDDY  MD Raymundo;  Location: [facility];  Service: Anesthesiology;  Laterality: N/A;       Family History   Problem Relation Age of Onset    Allergic rhinitis Son     Allergic rhinitis Daughter     Asthma Daughter     Allergic rhinitis Daughter     Diverticulitis Mother     Hypertension Father     COPD Father     Angioedema Neg Hx     Eczema Neg Hx     Immunodeficiency Neg Hx     Urticaria Neg Hx     Lupus Neg Hx     Psoriasis Neg Hx     Melanoma Neg Hx     Acne Neg Hx     Colon cancer Neg Hx     Colon polyps Neg Hx     Crohn's disease Neg Hx     Ulcerative colitis Neg Hx     Esophageal cancer Neg Hx     Stomach cancer Neg Hx        Social History     Socioeconomic History    Marital status:      Spouse name: Not on file    Number of children: Not on file    Years of education: Not on file    Highest education level: Not on file   Occupational History    Occupation: retired   Social Needs    Financial resource strain: Not on file    Food insecurity:     Worry: Not on file     Inability: Not on file    Transportation needs:     Medical: Not on file     Non-medical: Not on file   Tobacco Use    Smoking status: Never Smoker    Smokeless tobacco: Never Used   Substance and Sexual Activity    Alcohol use: Yes     Alcohol/week: 21.0 standard drinks     Types: 21 Cans of beer per week     Comment: 3 beers daily    Drug use: No    Sexual activity: Yes   Lifestyle    Physical activity:     Days per week: Not on file     Minutes per session: Not on file    Stress: Not on file   Relationships    Social connections:     Talks on phone: Not on file     Gets together: Not on file     Attends Restorationism service: Not on file     Active member of club or organization: Not on file     Attends meetings of clubs or organizations: Not on file     Relationship status: Not on file   Other Topics Concern    Not on file   Social History Narrative    Not on file       Review of patient's allergies  "indicates:   Allergen Reactions    No known drug allergies        Review of Systems:  General ROS: negative for - fever  Psychological ROS: negative for - hostility  Hematological and Lymphatic ROS: negative for - bleeding problems  Endocrine ROS: negative for - unexpected weight changes  Respiratory ROS: no cough, shortness of breath, or wheezing  Cardiovascular ROS: no chest pain or dyspnea on exertion  Gastrointestinal ROS: no abdominal pain, change in bowel habits, or black or bloody stools  Musculoskeletal ROS: negative for - muscular weakness  Neurological ROS: negative for - bowel and bladder control changes or impaired coordination/balance  Dermatological ROS: negative for rash    Physical Exam:  Vitals:    10/02/19 0855   BP: 136/83   Pulse: 77   Weight: 92 kg (202 lb 13.2 oz)   Height: 5' 11" (1.803 m)   PainSc:   4   PainLoc: Neck     Body mass index is 28.29 kg/m².     Gen: NAD  Psych: mood appropriate for given condition  CV: RRR  HEENT: anicteric   Respiratory: non labored  Abd: soft nt, nd  Skin: intact  Sensation: intact to lt touch bilaterally in c4-t1   Reflexes: 2+ b/l Bicep, tricep, BR and patella  ROM: Cervical ROM full, shoulder, elbow and wrist ROM full, no scapular dysmotility   Tone:  Normal at elbow, wrist and shoulder   Inspection: no atrophy of bicep, FDI or APB noted  Palpation: tender cervical paraspinals, levator scapula and trapezius    Motor:    Right Left   C4 Shoulder Abduction  5  5   C5 Elbow Flexion    5  5   C6 Wrist Extension  5  5   C7 Elbow Extension   5  5   C8/T1 Hand Intrinsics   5  5   C8 First Dorsal Interosseus  5  5   C8 Abductor Pollicus Brevis  5  5       Imaging:  MRI cervical spine 6/26/19  FINDINGS:  CORD: Normal size and signal.  No syrinx.  Cervicomedullary junction is normal.    ALIGNMENT: Slight retrolisthesis C2 on C3 lateral masses of C1 and C2 are congruent.    BONES: Vertebral body heights are maintained.  Multilevel type 2 endplate changes.  No " aggressive bone marrow signal.    PARASPINAL AREA: Normal.    CERVICAL DISC LEVELS:    C2-C3: Mild disc osteophyte complex.  Preserved ventral and dorsal CSF.  No significant foraminal stenosis.  C3-C4: Mild disc osteophyte complex.  Mild-moderate left facet hypertrophy.  Slight ventral cord flattening within sliver preserved ventral and preserved dorsal CSF.  Mild left foraminal stenosis.  C4-C5: Mild disc osteophyte complex.  Slight ligamentum flavum thickening.  Slight ventral cord flattening within sliver preserved ventral and preserved dorsal CSF.  No significant foraminal stenosis.  C5-C6: Mild disc osteophyte complex with left greater than right uncovertebral spurring.  Slight ventral cord flattening with preserved ventral and dorsal CSF.  Moderate left and mild right foraminal stenosis.  C6-C7: Mild-moderate disc osteophyte complex.  Slight ventral cord flattening with preserved ventral and dorsal CSF.  Mild right and minimal left foraminal stenosis.  C7-T1: Mild disc osteophyte complex.  Mild right facet hypertrophy.  Mild right and minimal left foraminal stenosis.    Xray cervical spine 5/30/19   FINDINGS:  Redemonstrated is marked disc space narrowing at the C5-6 and C6-7 levels with moderate disc space narrowing at the C7-T1 level.  There is endplate sclerosis and osteophyte formation at these levels.  There is no fracture.  There is stable trace retrolisthesis of C2 on C3 which is unchanged on extension but reduces to neutral position on flexion.  Otherwise, alignment is normal.  The facet joints maintain normal articulation.  The prevertebral soft tissues are normal.  The airway is patent.  Foraminal stenosis is suboptimally evaluated due to degree of obliquity.    Labs:  BMP  Lab Results   Component Value Date     06/26/2019    K 3.9 06/26/2019     06/26/2019    CO2 26 06/26/2019    BUN 21 06/26/2019    CREATININE 0.98 06/26/2019    CALCIUM 9.5 06/26/2019    ANIONGAP 9 06/26/2019     ESTGFRAFRICA >60 06/26/2019    EGFRNONAA >60 06/26/2019     Lab Results   Component Value Date    ALT 20 04/17/2019    AST 17 04/17/2019    ALKPHOS 42 (L) 04/17/2019    BILITOT 0.4 04/17/2019       Assessment:  Problem List Items Addressed This Visit        Neuro    Cervical radiculitis - Primary       Orthopedic    Neck pain          Treatment Plan:  53 y.o. year old male with PMH HTN, GERD presents to the office with neck pain.  He has had neck pain for about a year without known trauma or inciting event.  His pain ranges from 4-7/10, constant, sharp, electric in his neck with some radiation into his right posterior shoulder.  He denies any bowel/bladder dysfunction or gait changes.  He has tried over 6 weeks of conservative therapy with PT, ibuprofen, and flexeril without significant relief.  On exam he is neurologically intact.  MRI cervical spine c/w multilevel disc-osteophyte complex with ventral cord flattening without any signal change in the cord which I think is the causing of his pain.  His pain is limiting his mobility and interfering with his ADL's.  Will schedule for cervical SKYLA.  Follow up 2 weeks post injection.    Procedures: cervical SKYLA.  Procedure explained using an anatomical model.  Risks, benefits, alternatives explained to patient who verbalized understanding, including increased risk of infection, bleeding, need for additional procedures or surgery, and nerve damage.  Questions regarding the procedure, risks, expected outcome, and possible side effects were solicited and answered to the patient's satisfaction.  Jaren wishes to proceed with the injection.  Verbal and written consent were obtained in clinic today.  PT/OT/HEP: continue PT and HEP  Medications: continue current medications as precsribed  Labs: Reviewed and medications are appropriately dosed for current hepatorenal function.  Imaging: No additional recommended at this time.    : Not applicable    Blaise Mao  M.D.  Interventional Pain Medicine / Anesthesiology

## 2019-10-02 NOTE — LETTER
October 2, 2019      Cynthia Perdomo PA-C  [address] [facility] [address]  [address]  [facility] [address]           [facility]  [address] [place] [address]  Phone: [phone]  Fax: [phone]          Patient: Jaren Quiroz   MR Number: [id number]   YOB: 1965   Date of Visit: 10/2/2019       Dear Cynthia Perdomo:    Thank you for referring Jaren Quiroz to me for evaluation. Attached you will find relevant portions of my assessment and plan of care.    If you have questions, please do not hesitate to call me. I look forward to following Jaren Quiroz along with you.    Sincerely,    Blaise Mao MD    Enclosure  CC:  No Recipients    If you would like to receive this communication electronically, please contact [email or web address] or [phone] to request more information on [employer] Link access.    For providers and/or their staff who would like to refer a patient to [facility], please contact us through our one-stop-shop provider referral line, [facility], at [phone].    If you feel you have received this communication in error or would no longer like to receive these types of communications, please e-mail [email or web address]

## 2019-10-02 NOTE — H&P (VIEW-ONLY)
[facility] New Patient Evaluation    Referred by: Cynthia Perdomo PA-C  Reason for referral: neck pain    CC:   Chief Complaint   Patient presents with    Neck Pain     To discuss neck injection      No flowsheet data found.    HPI:   Jaren PICHARDO Cousin is a 53 y.o. male who complains of neck pain    Onset: 1 year  Inciting Event: none  Progression: since onset, pain is stable  Typical Range: 4-7/10  Timing: constant  Quality: aching, sharp, throbbing  Radiation: no  Associated numbness or weakness: yes numbness over the right neck, no weakness  Exacerbated by: laying, night, morning  Allievated by: medications, PT  Is Pain Level Acceptable?: No    Previous Therapies:  PT/OT: yes  HEP: yes  Interventions:   Surgery:  Medications:   - NSAIDS: ibuprofen  - MSK Relaxants: flexeril  - TCAs:   - SNRIs:   - Topicals: voltaren gel  - Anticonvulsants:  - Opioids: tramadol    History:    Current Outpatient Medications:     ALLERGY RELIEF, LORATADINE, 10 mg tablet, TAKE ONE TABLET BY MOUTH ONCE DAILY, Disp: 30 tablet, Rfl: 11    amLODIPine (NORVASC) 10 MG tablet, Take 1 tablet (10 mg total) by mouth once daily., Disp: 30 tablet, Rfl: 5    amoxicillin-clavulanate 875-125mg (AUGMENTIN) 875-125 mg per tablet, Take 1 tablet by mouth every 12 (twelve) hours., Disp: 20 tablet, Rfl: 0    benzoyl peroxide (BENZAC AC WASH) 10 % external wash, Apply topically 2 (two) times daily. Face and trunk, Disp: 226 g, Rfl: 5    clindamycin phosphate foam, Apply to affected area qd- bid, Disp: 100 g, Rfl: 5    cyclobenzaprine (FLEXERIL) 10 MG tablet, Take 10 mg by mouth 3 (three) times daily as needed. PRN, Disp: , Rfl:     esomeprazole (NEXIUM) 40 MG capsule, Take 1 capsule (40 mg total) by mouth before breakfast., Disp: 60 capsule, Rfl: 5    fenofibrate (TRICOR) 145 MG tablet, Take 1 tablet (145 mg total) by mouth once daily., Disp: 90 tablet, Rfl: 3    hydrOXYzine (ATARAX) 25 MG tablet, Take 1 tablet (25 mg total) by mouth  every 6 (six) hours as needed for Itching. Every 6 hours PRN, Disp: 60 tablet, Rfl: 6    ketoconazole (NIZORAL) 2 % shampoo, Use as directed qohs as needed, Disp: 240 mL, Rfl: 5    meclizine (ANTIVERT) 25 mg tablet, Take 1 tablet (25 mg total) by mouth 3 (three) times daily as needed for Dizziness (may cause drowsiness)., Disp: 60 tablet, Rfl: 2    minocycline (MINOCIN,DYNACIN) 100 MG capsule, Take 1 capsule (100 mg total) by mouth once daily., Disp: 30 capsule, Rfl: 11    mometasone (NASONEX) 50 mcg/actuation nasal spray, USE TWO SPRAY(S) IN EACH NOSTRIL ONCE DAILY, Disp: 17 g, Rfl: 11    niacin (SLO-NIACIN) 500 mg tablet, Take 1 tablet (500 mg total) by mouth nightly., Disp: 90 tablet, Rfl: 3    ranitidine (ZANTAC) 75 MG tablet, Take 75 mg by mouth 2 (two) times daily as needed for Heartburn., Disp: , Rfl:     valACYclovir (VALTREX) 500 MG tablet, TAKE 2 TABLETS BY MOUTH TWICE DAILY (Patient taking differently: TAKE 2 TABLETS BY MOUTH TWICE DAILY as needed), Disp: 30 tablet, Rfl: 11    Past Medical History:   Diagnosis Date    ALLERGIC RHINITIS     Asthma     as child    Cervicalgia     DDD (degenerative disc disease), lumbar     Fatty liver     Hemorrhoid     Hepatomegaly     High triglycerides     HTN (hypertension)     Liver hemangioma 2014    PTSD (post-traumatic stress disorder)     not currently taking [facility]s [facility]    Shingles     lower back    Urticaria        Past Surgical History:   Procedure Laterality Date    BACK SURGERY      ruptured disc L5-S1    COLONOSCOPY  ~2010  ([facility])    Hemorrhoids    ELBOW SURGERY      right    ESOPHAGOGASTRODUODENOSCOPY N/A 1/29/2019    Procedure: EGD (ESOPHAGOGASTRODUODENOSCOPY);  Surgeon: Balwinder Dexter Jr., MD;  Location: [facility];  Service: Endoscopy;  Laterality: N/A;    KNEE SURGERY      right    MAGNETIC RESONANCE IMAGING N/A 6/26/2019    Procedure: MRI (Magnetic Resonance Imagine) needs anesthesia;  Surgeon: Maurice REDDY  MD Raymundo;  Location: [facility];  Service: Anesthesiology;  Laterality: N/A;       Family History   Problem Relation Age of Onset    Allergic rhinitis Son     Allergic rhinitis Daughter     Asthma Daughter     Allergic rhinitis Daughter     Diverticulitis Mother     Hypertension Father     COPD Father     Angioedema Neg Hx     Eczema Neg Hx     Immunodeficiency Neg Hx     Urticaria Neg Hx     Lupus Neg Hx     Psoriasis Neg Hx     Melanoma Neg Hx     Acne Neg Hx     Colon cancer Neg Hx     Colon polyps Neg Hx     Crohn's disease Neg Hx     Ulcerative colitis Neg Hx     Esophageal cancer Neg Hx     Stomach cancer Neg Hx        Social History     Socioeconomic History    Marital status:      Spouse name: Not on file    Number of children: Not on file    Years of education: Not on file    Highest education level: Not on file   Occupational History    Occupation: retired   Social Needs    Financial resource strain: Not on file    Food insecurity:     Worry: Not on file     Inability: Not on file    Transportation needs:     Medical: Not on file     Non-medical: Not on file   Tobacco Use    Smoking status: Never Smoker    Smokeless tobacco: Never Used   Substance and Sexual Activity    Alcohol use: Yes     Alcohol/week: 21.0 standard drinks     Types: 21 Cans of beer per week     Comment: 3 beers daily    Drug use: No    Sexual activity: Yes   Lifestyle    Physical activity:     Days per week: Not on file     Minutes per session: Not on file    Stress: Not on file   Relationships    Social connections:     Talks on phone: Not on file     Gets together: Not on file     Attends Sabianist service: Not on file     Active member of club or organization: Not on file     Attends meetings of clubs or organizations: Not on file     Relationship status: Not on file   Other Topics Concern    Not on file   Social History Narrative    Not on file       Review of patient's allergies  "indicates:   Allergen Reactions    No known drug allergies        Review of Systems:  General ROS: negative for - fever  Psychological ROS: negative for - hostility  Hematological and Lymphatic ROS: negative for - bleeding problems  Endocrine ROS: negative for - unexpected weight changes  Respiratory ROS: no cough, shortness of breath, or wheezing  Cardiovascular ROS: no chest pain or dyspnea on exertion  Gastrointestinal ROS: no abdominal pain, change in bowel habits, or black or bloody stools  Musculoskeletal ROS: negative for - muscular weakness  Neurological ROS: negative for - bowel and bladder control changes or impaired coordination/balance  Dermatological ROS: negative for rash    Physical Exam:  Vitals:    10/02/19 0855   BP: 136/83   Pulse: 77   Weight: 92 kg (202 lb 13.2 oz)   Height: 5' 11" (1.803 m)   PainSc:   4   PainLoc: Neck     Body mass index is 28.29 kg/m².     Gen: NAD  Psych: mood appropriate for given condition  CV: RRR  HEENT: anicteric   Respiratory: non labored  Abd: soft nt, nd  Skin: intact  Sensation: intact to lt touch bilaterally in c4-t1   Reflexes: 2+ b/l Bicep, tricep, BR and patella  ROM: Cervical ROM full, shoulder, elbow and wrist ROM full, no scapular dysmotility   Tone:  Normal at elbow, wrist and shoulder   Inspection: no atrophy of bicep, FDI or APB noted  Palpation: tender cervical paraspinals, levator scapula and trapezius    Motor:    Right Left   C4 Shoulder Abduction  5  5   C5 Elbow Flexion    5  5   C6 Wrist Extension  5  5   C7 Elbow Extension   5  5   C8/T1 Hand Intrinsics   5  5   C8 First Dorsal Interosseus  5  5   C8 Abductor Pollicus Brevis  5  5       Imaging:  MRI cervical spine 6/26/19  FINDINGS:  CORD: Normal size and signal.  No syrinx.  Cervicomedullary junction is normal.    ALIGNMENT: Slight retrolisthesis C2 on C3 lateral masses of C1 and C2 are congruent.    BONES: Vertebral body heights are maintained.  Multilevel type 2 endplate changes.  No " aggressive bone marrow signal.    PARASPINAL AREA: Normal.    CERVICAL DISC LEVELS:    C2-C3: Mild disc osteophyte complex.  Preserved ventral and dorsal CSF.  No significant foraminal stenosis.  C3-C4: Mild disc osteophyte complex.  Mild-moderate left facet hypertrophy.  Slight ventral cord flattening within sliver preserved ventral and preserved dorsal CSF.  Mild left foraminal stenosis.  C4-C5: Mild disc osteophyte complex.  Slight ligamentum flavum thickening.  Slight ventral cord flattening within sliver preserved ventral and preserved dorsal CSF.  No significant foraminal stenosis.  C5-C6: Mild disc osteophyte complex with left greater than right uncovertebral spurring.  Slight ventral cord flattening with preserved ventral and dorsal CSF.  Moderate left and mild right foraminal stenosis.  C6-C7: Mild-moderate disc osteophyte complex.  Slight ventral cord flattening with preserved ventral and dorsal CSF.  Mild right and minimal left foraminal stenosis.  C7-T1: Mild disc osteophyte complex.  Mild right facet hypertrophy.  Mild right and minimal left foraminal stenosis.    Xray cervical spine 5/30/19   FINDINGS:  Redemonstrated is marked disc space narrowing at the C5-6 and C6-7 levels with moderate disc space narrowing at the C7-T1 level.  There is endplate sclerosis and osteophyte formation at these levels.  There is no fracture.  There is stable trace retrolisthesis of C2 on C3 which is unchanged on extension but reduces to neutral position on flexion.  Otherwise, alignment is normal.  The facet joints maintain normal articulation.  The prevertebral soft tissues are normal.  The airway is patent.  Foraminal stenosis is suboptimally evaluated due to degree of obliquity.    Labs:  BMP  Lab Results   Component Value Date     06/26/2019    K 3.9 06/26/2019     06/26/2019    CO2 26 06/26/2019    BUN 21 06/26/2019    CREATININE 0.98 06/26/2019    CALCIUM 9.5 06/26/2019    ANIONGAP 9 06/26/2019     ESTGFRAFRICA >60 06/26/2019    EGFRNONAA >60 06/26/2019     Lab Results   Component Value Date    ALT 20 04/17/2019    AST 17 04/17/2019    ALKPHOS 42 (L) 04/17/2019    BILITOT 0.4 04/17/2019       Assessment:  Problem List Items Addressed This Visit        Neuro    Cervical radiculitis - Primary       Orthopedic    Neck pain          Treatment Plan:  53 y.o. year old male with PMH HTN, GERD presents to the office with neck pain.  He has had neck pain for about a year without known trauma or inciting event.  His pain ranges from 4-7/10, constant, sharp, electric in his neck with some radiation into his right posterior shoulder.  He denies any bowel/bladder dysfunction or gait changes.  He has tried over 6 weeks of conservative therapy with PT, ibuprofen, and flexeril without significant relief.  On exam he is neurologically intact.  MRI cervical spine c/w multilevel disc-osteophyte complex with ventral cord flattening without any signal change in the cord which I think is the causing of his pain.  His pain is limiting his mobility and interfering with his ADL's.  Will schedule for cervical SKYLA.  Follow up 2 weeks post injection.    Procedures: cervical SKYLA.  Procedure explained using an anatomical model.  Risks, benefits, alternatives explained to patient who verbalized understanding, including increased risk of infection, bleeding, need for additional procedures or surgery, and nerve damage.  Questions regarding the procedure, risks, expected outcome, and possible side effects were solicited and answered to the patient's satisfaction.  Jaren wishes to proceed with the injection.  Verbal and written consent were obtained in clinic today.  PT/OT/HEP: continue PT and HEP  Medications: continue current medications as precsribed  Labs: Reviewed and medications are appropriately dosed for current hepatorenal function.  Imaging: No additional recommended at this time.    : Not applicable    Blaise Mao  M.D.  Interventional Pain Medicine / Anesthesiology

## 2019-10-03 LAB
ASCENDING AORTA: 2.7 CM
AV INDEX (PROSTH): 0.81
AV MEAN GRADIENT: 4 MMHG
AV PEAK GRADIENT: 7 MMHG
AV VALVE AREA: 3.99 CM2
AV VELOCITY RATIO: 0.83
BSA FOR ECHO PROCEDURE: 2.14 M2
CV ECHO LV RWT: 0.31 CM
CV STRESS BASE HR: 86 BPM
DIASTOLIC BLOOD PRESSURE: 85 MMHG
DOP CALC AO PEAK VEL: 1.29 M/S
DOP CALC AO VTI: 26.87 CM
DOP CALC LVOT AREA: 4.9 CM2
DOP CALC LVOT DIAMETER: 2.5 CM
DOP CALC LVOT PEAK VEL: 1.07 M/S
DOP CALC LVOT STROKE VOLUME: 107.25 CM3
DOP CALCLVOT PEAK VEL VTI: 21.86 CM
E WAVE DECELERATION TIME: 188.59 MSEC
E/A RATIO: 1.09
E/E' RATIO: 10.71 M/S
ECHO LV POSTERIOR WALL: 0.98 CM (ref 0.6–1.1)
FRACTIONAL SHORTENING: 32 % (ref 28–44)
INTERVENTRICULAR SEPTUM: 1.09 CM (ref 0.6–1.1)
IVRT: 0.1 MSEC
LA MAJOR: 5.2 CM
LA MINOR: 5.45 CM
LA WIDTH: 4.33 CM
LEFT ATRIUM SIZE: 3.76 CM
LEFT ATRIUM VOLUME INDEX: 34.8 ML/M2
LEFT ATRIUM VOLUME: 73.65 CM3
LEFT INTERNAL DIMENSION IN SYSTOLE: 4.26 CM (ref 2.1–4)
LEFT VENTRICLE DIASTOLIC VOLUME INDEX: 94.39 ML/M2
LEFT VENTRICLE DIASTOLIC VOLUME: 199.91 ML
LEFT VENTRICLE MASS INDEX: 132 G/M2
LEFT VENTRICLE SYSTOLIC VOLUME INDEX: 38.3 ML/M2
LEFT VENTRICLE SYSTOLIC VOLUME: 81.05 ML
LEFT VENTRICULAR INTERNAL DIMENSION IN DIASTOLE: 6.28 CM (ref 3.5–6)
LEFT VENTRICULAR MASS: 278.91 G
LV LATERAL E/E' RATIO: 10.71 M/S
LV SEPTAL E/E' RATIO: 10.71 M/S
MV PEAK A VEL: 0.69 M/S
MV PEAK E VEL: 0.75 M/S
OHS CV CPX 1 MINUTE RECOVERY HEART RATE: 142 BPM
OHS CV CPX 85 PERCENT MAX PREDICTED HEART RATE MALE: 141
OHS CV CPX ESTIMATED METS: 15
OHS CV CPX MAX PREDICTED HEART RATE: 166
OHS CV CPX PATIENT IS FEMALE: 0
OHS CV CPX PATIENT IS MALE: 1
OHS CV CPX PEAK DIASTOLIC BLOOD PRESSURE: 74 MMHG
OHS CV CPX PEAK HEAR RATE: 142 BPM
OHS CV CPX PEAK RATE PRESSURE PRODUCT: ABNORMAL
OHS CV CPX PEAK SYSTOLIC BLOOD PRESSURE: 173 MMHG
OHS CV CPX PERCENT MAX PREDICTED HEART RATE ACHIEVED: 86
OHS CV CPX RATE PRESSURE PRODUCT PRESENTING: ABNORMAL
PISA TR MAX VEL: 2.38 M/S
PULM VEIN S/D RATIO: 1.27
PV PEAK D VEL: 0.48 M/S
PV PEAK S VEL: 0.61 M/S
RA MAJOR: 5.37 CM
RA WIDTH: 4.73 CM
RIGHT VENTRICULAR END-DIASTOLIC DIMENSION: 4 CM
SINUS: 3.62 CM
STJ: 2.75 CM
STRESS ECHO POST EXERCISE DUR MIN: 7 MINUTES
STRESS ECHO POST EXERCISE DUR SEC: 48 SECONDS
SYSTOLIC BLOOD PRESSURE: 160 MMHG
TDI LATERAL: 0.07 M/S
TDI SEPTAL: 0.07 M/S
TDI: 0.07 M/S
TR MAX PG: 23 MMHG
TRICUSPID ANNULAR PLANE SYSTOLIC EXCURSION: 3.12 CM

## 2019-10-04 ENCOUNTER — TELEPHONE (OUTPATIENT)
Dept: CARDIOLOGY | Facility: CLINIC | Age: 54
End: 2019-10-04

## 2019-10-04 DIAGNOSIS — R94.39 ABNORMAL STRESS TEST: Primary | ICD-10-CM

## 2019-10-04 NOTE — TELEPHONE ENCOUNTER
----- Message from Can Masters MD sent at 10/3/2019  9:54 PM CDT -----  Stress test abnormal, suggestive of blockages

## 2019-10-04 NOTE — TELEPHONE ENCOUNTER
----- Message from Saad Wise sent at 10/4/2019 12:06 PM CDT -----  Contact: pt  Type: Needs Medical Advice    Who Called:  pt    Best Call Back Number: [phone]  Additional Information: Pt needs to schedule an angiogram. Please call to advise.

## 2019-10-07 ENCOUNTER — TELEPHONE (OUTPATIENT)
Dept: CARDIOLOGY | Facility: CLINIC | Age: 54
End: 2019-10-07

## 2019-10-07 NOTE — TELEPHONE ENCOUNTER
----- Message from Gogo Bucio sent at 10/7/2019  7:34 AM CDT -----  Contact: [phone]  Patient is requesting a call back from the nurse concerning mismatch surgery date.    Please call the patient upon request at phone number [phone].

## 2019-10-10 ENCOUNTER — OFFICE VISIT (OUTPATIENT)
Dept: CARDIOLOGY | Facility: CLINIC | Age: 54
End: 2019-10-10
Payer: COMMERCIAL

## 2019-10-10 VITALS
WEIGHT: 205.25 LBS | HEIGHT: 71 IN | HEART RATE: 87 BPM | BODY MASS INDEX: 28.73 KG/M2 | DIASTOLIC BLOOD PRESSURE: 86 MMHG | SYSTOLIC BLOOD PRESSURE: 141 MMHG

## 2019-10-10 DIAGNOSIS — R94.39 ABNORMAL CARDIOVASCULAR STRESS TEST: Primary | ICD-10-CM

## 2019-10-10 DIAGNOSIS — R00.2 PALPITATIONS: ICD-10-CM

## 2019-10-10 PROCEDURE — 99999 PR PBB SHADOW E&M-EST. PATIENT-LVL III: CPT | Mod: PBBFAC,,, | Performed by: INTERNAL MEDICINE

## 2019-10-10 PROCEDURE — 3077F SYST BP >= 140 MM HG: CPT | Mod: CPTII,S$GLB,, | Performed by: INTERNAL MEDICINE

## 2019-10-10 PROCEDURE — 99214 PR OFFICE/OUTPT VISIT, EST, LEVL IV, 30-39 MIN: ICD-10-PCS | Mod: S$GLB,,, | Performed by: INTERNAL MEDICINE

## 2019-10-10 PROCEDURE — 3008F BODY MASS INDEX DOCD: CPT | Mod: CPTII,S$GLB,, | Performed by: INTERNAL MEDICINE

## 2019-10-10 PROCEDURE — 3077F PR MOST RECENT SYSTOLIC BLOOD PRESSURE >= 140 MM HG: ICD-10-PCS | Mod: CPTII,S$GLB,, | Performed by: INTERNAL MEDICINE

## 2019-10-10 PROCEDURE — 3079F PR MOST RECENT DIASTOLIC BLOOD PRESSURE 80-89 MM HG: ICD-10-PCS | Mod: CPTII,S$GLB,, | Performed by: INTERNAL MEDICINE

## 2019-10-10 PROCEDURE — 99214 OFFICE O/P EST MOD 30 MIN: CPT | Mod: S$GLB,,, | Performed by: INTERNAL MEDICINE

## 2019-10-10 PROCEDURE — 3079F DIAST BP 80-89 MM HG: CPT | Mod: CPTII,S$GLB,, | Performed by: INTERNAL MEDICINE

## 2019-10-10 PROCEDURE — 3008F PR BODY MASS INDEX (BMI) DOCUMENTED: ICD-10-PCS | Mod: CPTII,S$GLB,, | Performed by: INTERNAL MEDICINE

## 2019-10-10 PROCEDURE — 99999 PR PBB SHADOW E&M-EST. PATIENT-LVL III: ICD-10-PCS | Mod: PBBFAC,,, | Performed by: INTERNAL MEDICINE

## 2019-10-10 NOTE — PROGRESS NOTES
Subjective:    Patient ID:  Jaren PICHARDO Cousin is a 53 y.o. male who presents for follow-up of stress test    HPI  He comes for follow up with occasional chest tightness, palpitations  Strong family hx for CAD      Review of Systems   Constitution: Negative for decreased appetite, malaise/fatigue, weight gain and weight loss.   Cardiovascular: Negative for chest pain, dyspnea on exertion, leg swelling, palpitations and syncope.   Respiratory: Negative for cough and shortness of breath.    Gastrointestinal: Negative.    Neurological: Negative for weakness.   All other systems reviewed and are negative.       Objective:      Physical Exam   Constitutional: He is oriented to person, place, and time. He appears well-developed and well-nourished.   HENT:   Head: Normocephalic.   Eyes: Pupils are equal, round, and reactive to light.   Neck: Normal range of motion. Neck supple. No JVD present. Carotid bruit is not present. No thyromegaly present.   Cardiovascular: Normal rate, regular rhythm, normal heart sounds, intact distal pulses and normal pulses. PMI is not displaced. Exam reveals no gallop.   No murmur heard.  Pulmonary/Chest: Effort normal and breath sounds normal.   Abdominal: Soft. Normal appearance. He exhibits no mass. There is no hepatosplenomegaly. There is no tenderness.   Musculoskeletal: Normal range of motion. He exhibits no edema.   Neurological: He is alert and oriented to person, place, and time. He has normal strength and normal reflexes. No sensory deficit.   Skin: Skin is warm and intact.   Psychiatric: He has a normal mood and affect.   Nursing note and vitals reviewed.    Stress test (+) for ischemia      Assessment:       1. Abnormal cardiovascular stress test    2. Palpitations         Plan:     [facility] +/-  Radial access

## 2019-10-14 DIAGNOSIS — M50.30 DDD (DEGENERATIVE DISC DISEASE), CERVICAL: Primary | ICD-10-CM

## 2019-10-15 ENCOUNTER — HOSPITAL ENCOUNTER (OUTPATIENT)
Dept: RADIOLOGY | Facility: HOSPITAL | Age: 54
Discharge: HOME | End: 2019-10-15
Attending: ANESTHESIOLOGY
Payer: COMMERCIAL

## 2019-10-15 ENCOUNTER — HOSPITAL ENCOUNTER (OUTPATIENT)
Facility: HOSPITAL | Age: 54
Discharge: HOME | End: 2019-10-15
Attending: ANESTHESIOLOGY | Admitting: ANESTHESIOLOGY
Payer: COMMERCIAL

## 2019-10-15 DIAGNOSIS — M50.30 DDD (DEGENERATIVE DISC DISEASE), CERVICAL: ICD-10-CM

## 2019-10-15 DIAGNOSIS — M54.12 CERVICAL RADICULITIS: Primary | ICD-10-CM

## 2019-10-15 PROCEDURE — 76000 FLUOROSCOPY <1 HR PHYS/QHP: CPT | Mod: TC,PO

## 2019-10-15 PROCEDURE — 63600175 PHARM REV CODE 636 W HCPCS: Mod: PO | Performed by: ANESTHESIOLOGY

## 2019-10-15 PROCEDURE — 62321 PR INJ CERV/THORAC, W/GUIDANCE: ICD-10-PCS | Mod: ,,, | Performed by: ANESTHESIOLOGY

## 2019-10-15 PROCEDURE — 25000003 PHARM REV CODE 250: Mod: PO | Performed by: ANESTHESIOLOGY

## 2019-10-15 PROCEDURE — 62321 NJX INTERLAMINAR CRV/THRC: CPT | Mod: PO | Performed by: ANESTHESIOLOGY

## 2019-10-15 PROCEDURE — 25500020 PHARM REV CODE 255: Mod: PO | Performed by: ANESTHESIOLOGY

## 2019-10-15 PROCEDURE — 62321 NJX INTERLAMINAR CRV/THRC: CPT | Mod: ,,, | Performed by: ANESTHESIOLOGY

## 2019-10-15 RX ORDER — SODIUM CHLORIDE, SODIUM LACTATE, POTASSIUM CHLORIDE, CALCIUM CHLORIDE 600; 310; 30; 20 MG/100ML; MG/100ML; MG/100ML; MG/100ML
INJECTION, SOLUTION INTRAVENOUS CONTINUOUS
Status: DISCONTINUED | OUTPATIENT
Start: 2019-10-15 | End: 2019-10-15 | Stop reason: HOSPADM

## 2019-10-15 RX ORDER — DEXAMETHASONE SODIUM PHOSPHATE 10 MG/ML
INJECTION INTRAMUSCULAR; INTRAVENOUS
Status: DISCONTINUED | OUTPATIENT
Start: 2019-10-15 | End: 2019-10-15 | Stop reason: HOSPADM

## 2019-10-15 RX ORDER — ALPRAZOLAM 0.5 MG/1
0.5 TABLET, ORALLY DISINTEGRATING ORAL ONCE AS NEEDED
Status: COMPLETED | OUTPATIENT
Start: 2019-10-15 | End: 2019-10-15

## 2019-10-15 RX ORDER — LIDOCAINE HYDROCHLORIDE 10 MG/ML
INJECTION, SOLUTION EPIDURAL; INFILTRATION; INTRACAUDAL; PERINEURAL
Status: DISCONTINUED | OUTPATIENT
Start: 2019-10-15 | End: 2019-10-15 | Stop reason: HOSPADM

## 2019-10-15 RX ORDER — LIDOCAINE HYDROCHLORIDE 10 MG/ML
1 INJECTION INFILTRATION; PERINEURAL ONCE
Status: COMPLETED | OUTPATIENT
Start: 2019-10-15 | End: 2019-10-15

## 2019-10-15 RX ADMIN — SODIUM CHLORIDE, SODIUM LACTATE, POTASSIUM CHLORIDE, AND CALCIUM CHLORIDE: .6; .31; .03; .02 INJECTION, SOLUTION INTRAVENOUS at 10:10

## 2019-10-15 RX ADMIN — LIDOCAINE HYDROCHLORIDE: 10 INJECTION, SOLUTION EPIDURAL; INFILTRATION; INTRACAUDAL; PERINEURAL at 10:10

## 2019-10-15 RX ADMIN — ALPRAZOLAM 0.5 MG: 0.5 TABLET, ORALLY DISINTEGRATING ORAL at 10:10

## 2019-10-15 NOTE — OP NOTE
"Procedure Note    Procedure Date: 10/15/2019    Procedure Performed:  C7-T1 cervical interlaminar epidural steroid injection under fluoroscopy.    Indications: Patient has failed conservative therapy.      Pre-op diagnosis: Cervical Radiculopathy    Post-op diagnosis: same    Physician: Blaise Mao MD    IV Sedation medications: none    Medications injected: Dexamethasone 15mg, 3.5 mL sterile, preservative-free normal saline.    Local anesthetic used: 1% Lidocaine, 1 ml, 8.4% sodium bicarbonate 0.25ml    Estimated Blood Loss: none    Complications:  none    Technique:  The patient was interviewed in the holding area and Risks/Benefits were discussed, including, but not limited to, the possibility of new or different pain, bleeding or infection.   All questions were answered.  The patient understood and accepted risks.  Consent was verfied.  A time-out was taken to identify patient and procedure prior to starting the procedure.  With the patient laying in a prone position with the neck in a mid-flexed forward position, the area was prepped and draped in the usual sterile fashion using ChloraPrep and a fenestrated drape.  The area was determined under AP fluoroscopic guidance.  The skin and subcutaneous tissues overlying the targeted interspace were anesthetized with 3-5 mL of 1% Lidocaine using a 25G 1.5" needle.  An 18G, 3.5" Tuohy epidural needle was inserted through the anesthetized skin and directed toward the interspace under fluoroscopic guidance until T1 lamina was contacted.  The fluoroscope was then adjusted to yield a contralateral oblique view of the C7-T1 interspace.  The epidural needle was incrementally walked cephalad off of the lamina until the ligamentum flavum was engaged. From this point, a loss-of-resistance technique was used to identify entrance of the needle into the epidural space.  Once the tip of the needle was in the desired position, the contrast dye Omnipaque was injected to determine " placement and no vascular uptake.  Then, after negative aspiration, dexamethasone 15 mg + 3.5 cc NS was injected.  The needle was flushed with normal saline and removed. The contrast was seen to be displaced after injection. Patient was awake/responsive during all injections.  The patient tolerated the procedure well and was transferred to the [facility] in stable condition.  The patient was monitored after the procedure and was given post-procedure and discharge instructions to follow at home. The patient was discharged in a stable condition.

## 2019-10-15 NOTE — DISCHARGE SUMMARY
[facility]  Discharge Note  Short Stay    Admit Date: 10/15/2019    Discharge Date: 10/15/2019    Attending Physician: Blaise Mao     Discharge Provider: Blaise Mao    Diagnoses:  Active Hospital Problems    Diagnosis  POA    Cervical radiculitis [M54.12]  Yes      Resolved Hospital Problems   No resolved problems to display.       Discharged Condition: Good    Final Diagnoses: Cervical radiculitis [M54.12]    Disposition: Home or Self Care    Hospital Course: No complications, uneventful    Outcome of Hospitalization, Treatment, Procedure, or Surgery:  Patient was admitted for outpatient interventional pain management procedure. The patient tolerated the procedure well with no complications.    Follow up/Patient Instructions:  Follow up as scheduled in Pain Management office in 3-4 weeks.  Patient has received instructions and follow up date and time.    Medications:  Continue previous medications    Discharge Procedure Orders   Notify your health care provider if you experience any of the following:  temperature >100.4     Notify your health care provider if you experience any of the following:  persistent nausea and vomiting or diarrhea     Notify your health care provider if you experience any of the following:  severe uncontrolled pain     Notify your health care provider if you experience any of the following:  redness, tenderness, or signs of infection (pain, swelling, redness, odor or green/yellow discharge around incision site)     Notify your health care provider if you experience any of the following:  difficulty breathing or increased cough     Notify your health care provider if you experience any of the following:  severe persistent headache     Notify your health care provider if you experience any of the following:  worsening rash     Notify your health care provider if you experience any of the following:  persistent dizziness, light-headedness, or visual disturbances     Notify  your health care provider if you experience any of the following:  increased confusion or weakness     Activity as tolerated         Discharge Procedure Orders (must include Diet, Follow-up, Activity):   Discharge Procedure Orders (must include Diet, Follow-up, Activity)   Notify your health care provider if you experience any of the following:  temperature >100.4     Notify your health care provider if you experience any of the following:  persistent nausea and vomiting or diarrhea     Notify your health care provider if you experience any of the following:  severe uncontrolled pain     Notify your health care provider if you experience any of the following:  redness, tenderness, or signs of infection (pain, swelling, redness, odor or green/yellow discharge around incision site)     Notify your health care provider if you experience any of the following:  difficulty breathing or increased cough     Notify your health care provider if you experience any of the following:  severe persistent headache     Notify your health care provider if you experience any of the following:  worsening rash     Notify your health care provider if you experience any of the following:  persistent dizziness, light-headedness, or visual disturbances     Notify your health care provider if you experience any of the following:  increased confusion or weakness     Activity as tolerated

## 2019-10-15 NOTE — DISCHARGE INSTRUCTIONS
Recovery After Procedural Sedation (Adult)  You have been given medicine by vein to make you sleep during your surgery. This may have included both a pain medicine and sleeping medicine. Most of the effects have worn off. But you may still have some drowsiness for the next 6 to 8 hours.  Home care  Follow these guidelines when you get home:  · For the next 8 hours, you should be watched by a responsible adult. This person should make sure your condition is not getting worse.  · Don't drink any alcohol for the next 24 hours.  · Don't drive, operate dangerous machinery, or make important business or personal decisions during the next 24 hours.  Note: Your healthcare provider may tell you not to take any medicine by mouth for pain or sleep in the next 4 hours. These medicines may react with the medicines you were given in the hospital. This could cause a much stronger response than usual.  Follow-up care  Follow up with your healthcare provider if you are not alert and back to your usual level of activity within 12 hours.  When to seek medical advice  Call your healthcare provider right away if any of these occur:  · Drowsiness gets worse  · Weakness or dizziness gets worse  · Repeated vomiting  · You can't be awakened   Date Last Reviewed: 10/18/2016  © 8339-7618 [employer]. [address] [place] [address], [place] [address]. All rights reserved. This information is not intended as a substitute for professional medical care. Always follow your healthcare professional's instructions.            PAIN MANAGEMENT    Home care instructions   Apply ice pack to the injection site for 20 minute prior for the first 24 hours for soreness/discomfort at injection site   DO NOT USE HEAT FOR 24 HOURS   Keep site clean and dry for 24 hours, remove bandaid when desired   Do not drive until tomorrow  Take care when walking after a lumbar injection     STEROIDS OR RADIOFREQUENCY    May take 10-14 days for full effects  Avoid  strenuous exercises for 2 days            Resume Aspirin, Plavix, or Coumadin the day after the procedure unless other wise instructed  Resume home medication as prescribed today      CALL PHYSICIAN FOR:   Severe increase in your usual pain or appearance of new pain   Prolonged or increasing weakness or numbness in the legs or arms   Fever greater then 100 degrees F..   Drainage from the incision site, redness, active bleeding or increased swelling at the injection site   Headache that increases when your head is upright and decreases when you lie flat    FOR EMERGENCIES:   Go directly to Emergency Department for Shortness of breath, chest pain, or problems breathing

## 2019-10-15 NOTE — INTERVAL H&P NOTE
The patient has been examined and the H&P has been reviewed:    I concur with the findings and no changes have occurred since H&P was written.    Anesthesia/Surgery risks, benefits and alternative options discussed and understood by patient/family.    Active Hospital Problems    Diagnosis  POA    Cervical radiculitis [M54.12]  Yes      Resolved Hospital Problems   No resolved problems to display.

## 2019-10-16 VITALS
BODY MASS INDEX: 27.22 KG/M2 | HEART RATE: 84 BPM | DIASTOLIC BLOOD PRESSURE: 78 MMHG | OXYGEN SATURATION: 100 % | HEIGHT: 72 IN | SYSTOLIC BLOOD PRESSURE: 136 MMHG | RESPIRATION RATE: 16 BRPM | TEMPERATURE: 98 F | WEIGHT: 201 LBS

## 2019-10-23 PROBLEM — R94.39 ABNORMAL STRESS TEST: Status: ACTIVE | Noted: 2019-10-23

## 2019-10-30 ENCOUNTER — OFFICE VISIT (OUTPATIENT)
Dept: PAIN MEDICINE | Facility: CLINIC | Age: 54
End: 2019-10-30
Payer: COMMERCIAL

## 2019-10-30 VITALS
RESPIRATION RATE: 18 BRPM | HEART RATE: 72 BPM | SYSTOLIC BLOOD PRESSURE: 126 MMHG | OXYGEN SATURATION: 100 % | WEIGHT: 205.13 LBS | TEMPERATURE: 97 F | DIASTOLIC BLOOD PRESSURE: 71 MMHG | BODY MASS INDEX: 27.82 KG/M2

## 2019-10-30 DIAGNOSIS — M47.812 CERVICAL SPONDYLOSIS: Primary | ICD-10-CM

## 2019-10-30 DIAGNOSIS — M54.2 NECK PAIN: ICD-10-CM

## 2019-10-30 PROCEDURE — 3078F DIAST BP <80 MM HG: CPT | Mod: CPTII,S$GLB,, | Performed by: ANESTHESIOLOGY

## 2019-10-30 PROCEDURE — 99999 PR PBB SHADOW E&M-EST. PATIENT-LVL III: ICD-10-PCS | Mod: PBBFAC,,, | Performed by: ANESTHESIOLOGY

## 2019-10-30 PROCEDURE — 3008F BODY MASS INDEX DOCD: CPT | Mod: CPTII,S$GLB,, | Performed by: ANESTHESIOLOGY

## 2019-10-30 PROCEDURE — 99213 PR OFFICE/OUTPT VISIT, EST, LEVL III, 20-29 MIN: ICD-10-PCS | Mod: S$GLB,,, | Performed by: ANESTHESIOLOGY

## 2019-10-30 PROCEDURE — 99999 PR PBB SHADOW E&M-EST. PATIENT-LVL III: CPT | Mod: PBBFAC,,, | Performed by: ANESTHESIOLOGY

## 2019-10-30 PROCEDURE — 99213 OFFICE O/P EST LOW 20 MIN: CPT | Mod: S$GLB,,, | Performed by: ANESTHESIOLOGY

## 2019-10-30 PROCEDURE — 3074F PR MOST RECENT SYSTOLIC BLOOD PRESSURE < 130 MM HG: ICD-10-PCS | Mod: CPTII,S$GLB,, | Performed by: ANESTHESIOLOGY

## 2019-10-30 PROCEDURE — 3074F SYST BP LT 130 MM HG: CPT | Mod: CPTII,S$GLB,, | Performed by: ANESTHESIOLOGY

## 2019-10-30 PROCEDURE — 3078F PR MOST RECENT DIASTOLIC BLOOD PRESSURE < 80 MM HG: ICD-10-PCS | Mod: CPTII,S$GLB,, | Performed by: ANESTHESIOLOGY

## 2019-10-30 PROCEDURE — 3008F PR BODY MASS INDEX (BMI) DOCUMENTED: ICD-10-PCS | Mod: CPTII,S$GLB,, | Performed by: ANESTHESIOLOGY

## 2019-10-30 RX ORDER — NAPROXEN 500 MG/1
500 TABLET ORAL 2 TIMES DAILY
Qty: 30 TABLET | Refills: 1 | Status: SHIPPED | OUTPATIENT
Start: 2019-10-30 | End: 2021-02-03

## 2019-10-30 NOTE — PROGRESS NOTES
[facility] Follow Up Evaluation    Referred by: Cynthia Perdomo PA-C  Reason for referral: neck pain    CC:   Chief Complaint   Patient presents with    Follow-up    Neck Pain      No flowsheet data found.    Interval HPI 10/30/19: Mr. Quiroz returns to the office for follow up. He is s/p cervical SKYLA on 10/15/19 initially with 50-60% relief for about a week.  Today he has return of his neck pain, constant, 6/10, aching, sharp.  He says the pain is mostly in the left side of his neck, worse with lateral neck rotation and neck extension.  He has done PT and continues ibuprofen prn with mild relief.  He denies radicular pain or radiculopathy.     HPI:   Jaren Quiroz is a 53 y.o. male who complains of neck pain    Onset: 1 year  Inciting Event: none  Progression: since onset, pain is stable  Typical Range: 4-7/10  Timing: constant  Quality: aching, sharp, throbbing  Radiation: no  Associated numbness or weakness: yes numbness over the right neck, no weakness  Exacerbated by: laying, night, morning  Allievated by: medications, PT  Is Pain Level Acceptable?: No    Previous Therapies:  PT/OT: yes  HEP: yes  Interventions:   Surgery:  Medications:   - NSAIDS: ibuprofen  - MSK Relaxants: flexeril  - TCAs:   - SNRIs:   - Topicals: voltaren gel  - Anticonvulsants:  - Opioids: tramadol    History:    Current Outpatient Medications:     ALLERGY RELIEF, LORATADINE, 10 mg tablet, TAKE ONE TABLET BY MOUTH ONCE DAILY, Disp: 30 tablet, Rfl: 11    amLODIPine (NORVASC) 10 MG tablet, Take 1 tablet (10 mg total) by mouth once daily., Disp: 30 tablet, Rfl: 5    benzoyl peroxide (BENZAC AC WASH) 10 % external wash, Apply topically 2 (two) times daily. Face and trunk, Disp: 226 g, Rfl: 5    clindamycin phosphate foam, Apply to affected area qd- bid, Disp: 100 g, Rfl: 5    co-enzyme Q-10 30 mg capsule, Take 100 mg by mouth once daily., Disp: , Rfl:     cyclobenzaprine (FLEXERIL) 10 MG tablet, Take 10 mg by mouth 3  (three) times daily as needed. PRN, Disp: , Rfl:     esomeprazole (NEXIUM) 40 MG capsule, Take 1 capsule (40 mg total) by mouth before breakfast., Disp: 60 capsule, Rfl: 5    fenofibrate (TRICOR) 145 MG tablet, Take 1 tablet (145 mg total) by mouth once daily., Disp: 90 tablet, Rfl: 3    hydrOXYzine (ATARAX) 25 MG tablet, Take 1 tablet (25 mg total) by mouth every 6 (six) hours as needed for Itching. Every 6 hours PRN, Disp: 60 tablet, Rfl: 6    ketoconazole (NIZORAL) 2 % shampoo, Use as directed qohs as needed, Disp: 240 mL, Rfl: 5    meclizine (ANTIVERT) 25 mg tablet, Take 1 tablet (25 mg total) by mouth 3 (three) times daily as needed for Dizziness (may cause drowsiness)., Disp: 60 tablet, Rfl: 2    minocycline (MINOCIN,DYNACIN) 100 MG capsule, Take 1 capsule (100 mg total) by mouth once daily., Disp: 30 capsule, Rfl: 11    mometasone (NASONEX) 50 mcg/actuation nasal spray, USE TWO SPRAY(S) IN EACH NOSTRIL ONCE DAILY, Disp: 17 g, Rfl: 11    naproxen (NAPROSYN) 500 MG tablet, Take 1 tablet (500 mg total) by mouth 2 (two) times daily., Disp: 30 tablet, Rfl: 1    niacin (SLO-NIACIN) 500 mg tablet, Take 1 tablet (500 mg total) by mouth nightly., Disp: 90 tablet, Rfl: 3    ranitidine (ZANTAC) 75 MG tablet, Take 75 mg by mouth 2 (two) times daily as needed for Heartburn., Disp: , Rfl:     valACYclovir (VALTREX) 500 MG tablet, TAKE 2 TABLETS BY MOUTH TWICE DAILY (Patient taking differently: TAKE 2 TABLETS BY MOUTH TWICE DAILY as needed), Disp: 30 tablet, Rfl: 11  No current facility-administered medications for this visit.     Facility-Administered Medications Ordered in Other Visits:     lactated ringers infusion, , Intravenous, Continuous, Luiz Aquino MD    lidocaine (PF) 10 mg/ml (1%) injection 10 mg, 1 mL, Intradermal, Once, Luiz Aquino MD    Past Medical History:   Diagnosis Date    ALLERGIC RHINITIS     Asthma     as child    Cervicalgia     DDD (degenerative disc disease), lumbar      Fatty liver     Hemorrhoid     Hepatomegaly     High triglycerides     HTN (hypertension)     Liver hemangioma 2014    PTSD (post-traumatic stress disorder)     not currently [facility]s [facility]    Shingles     lower back    Urticaria        Past Surgical History:   Procedure Laterality Date    BACK SURGERY      ruptured disc L5-S1    COLONOSCOPY  ~2010  ([facility])    Hemorrhoids    CORONARY ANGIOGRAPHY N/A 10/23/2019    Procedure: ANGIOGRAM, CORONARY ARTERY;  Surgeon: Can Masters MD;  Location: [facility] CATH;  Service: Cardiology;  Laterality: N/A;    ELBOW SURGERY      right    EPIDURAL STEROID INJECTION INTO CERVICAL SPINE N/A 10/15/2019    Procedure: Injection-steroid-epidural-cervical C7/T1;  Surgeon: Blaise Mao MD;  Location: [facility] OR;  Service: Pain Management;  Laterality: N/A;    ESOPHAGOGASTRODUODENOSCOPY N/A 1/29/2019    Procedure: EGD (ESOPHAGOGASTRODUODENOSCOPY);  Surgeon: Balwinder Dexter Jr., MD;  Location: [facility] ENDO;  Service: Endoscopy;  Laterality: N/A;    KNEE SURGERY      right    LEFT HEART CATHETERIZATION N/A 10/23/2019    Procedure: Left heart cath;  Surgeon: Can Masters MD;  Location: [facility] CATH;  Service: Cardiology;  Laterality: N/A;    MAGNETIC RESONANCE IMAGING N/A 6/26/2019    Procedure: MRI (Magnetic Resonance Imagine) needs anesthesia;  Surgeon: Maurice Fonseca MD;  Location: [facility] CATH;  Service: Anesthesiology;  Laterality: N/A;       Family History   Problem Relation Age of Onset    Allergic rhinitis Son     Allergic rhinitis Daughter     Asthma Daughter     Allergic rhinitis Daughter     Diverticulitis Mother     Hypertension Father     COPD Father     Angioedema Neg Hx     Eczema Neg Hx     Immunodeficiency Neg Hx     Urticaria Neg Hx     Lupus Neg Hx     Psoriasis Neg Hx     Melanoma Neg Hx     Acne Neg Hx     Colon cancer Neg Hx     Colon polyps Neg Hx     Crohn's disease Neg Hx     Ulcerative colitis Neg  Hx     Esophageal cancer Neg Hx     Stomach cancer Neg Hx        Social History     Socioeconomic History    Marital status:      Spouse name: Not on file    Number of children: Not on file    Years of education: Not on file    Highest education level: Not on file   Occupational History    Occupation: retired   Social Needs    Financial resource strain: Not on file    Food insecurity:     Worry: Not on file     Inability: Not on file    Transportation needs:     Medical: Not on file     Non-medical: Not on file   Tobacco Use    Smoking status: Never Smoker    Smokeless tobacco: Never Used   Substance and Sexual Activity    Alcohol use: Yes     Alcohol/week: 21.0 standard drinks     Types: 21 Cans of beer per week     Comment: 3 beers daily    Drug use: No    Sexual activity: Yes   Lifestyle    Physical activity:     Days per week: Not on file     Minutes per session: Not on file    Stress: Not on file   Relationships    Social connections:     Talks on phone: Not on file     Gets together: Not on file     Attends [employer] service: Not on file     Active member of club or organization: Not on file     Attends meetings of clubs or organizations: Not on file     Relationship status: Not on file   Other Topics Concern    Not on file   Social History Narrative    Not on file       Review of patient's allergies indicates:   Allergen Reactions    No known drug allergies        Review of Systems:  General ROS: negative for - fever  Psychological ROS: negative for - hostility  Hematological and Lymphatic ROS: negative for - bleeding problems  Endocrine ROS: negative for - unexpected weight changes  Respiratory ROS: no cough, shortness of breath, or wheezing  Cardiovascular ROS: no chest pain or dyspnea on exertion  Gastrointestinal ROS: no abdominal pain, change in bowel habits, or black or bloody stools  Musculoskeletal ROS: negative for - muscular weakness  Neurological ROS: negative for -  bowel and bladder control changes or impaired coordination/balance  Dermatological ROS: negative for rash    Physical Exam:  Vitals:    10/30/19 0945   BP: 126/71   Pulse: 72   Resp: 18   Temp: 97.1 °F (36.2 °C)   TempSrc: Oral   SpO2: 100%   Weight: 93.1 kg (205 lb 2.2 oz)   PainSc:   6   PainLoc: Neck     Body mass index is 27.82 kg/m².     Gen: NAD  Psych: mood appropriate for given condition  CV: RRR  HEENT: anicteric   Respiratory: non labored  Abd: soft nt, nd  Skin: intact  Sensation: intact to lt touch bilaterally in c4-t1   Reflexes: 2+ b/l Bicep, tricep, BR and patella  ROM: Cervical ROM full, shoulder, elbow and wrist ROM full, no scapular dysmotility   Tone:  Normal at elbow, wrist and shoulder   Inspection: no atrophy of bicep, FDI or APB noted  Palpation: tender cervical paraspinals, levator scapula and trapezius    Motor:    Right Left   C4 Shoulder Abduction  5  5   C5 Elbow Flexion    5  5   C6 Wrist Extension  5  5   C7 Elbow Extension   5  5   C8/T1 Hand Intrinsics   5  5   C8 First Dorsal Interosseus  5  5   C8 Abductor Pollicus Brevis  5  5       Imaging:  MRI cervical spine 6/26/19  FINDINGS:  CORD: Normal size and signal.  No syrinx.  Cervicomedullary junction is normal.    ALIGNMENT: Slight retrolisthesis C2 on C3 lateral masses of C1 and C2 are congruent.    BONES: Vertebral body heights are maintained.  Multilevel type 2 endplate changes.  No aggressive bone marrow signal.    PARASPINAL AREA: Normal.    CERVICAL DISC LEVELS:    C2-C3: Mild disc osteophyte complex.  Preserved ventral and dorsal CSF.  No significant foraminal stenosis.  C3-C4: Mild disc osteophyte complex.  Mild-moderate left facet hypertrophy.  Slight ventral cord flattening within sliver preserved ventral and preserved dorsal CSF.  Mild left foraminal stenosis.  C4-C5: Mild disc osteophyte complex.  Slight ligamentum flavum thickening.  Slight ventral cord flattening within sliver preserved ventral and preserved dorsal CSF.   No significant foraminal stenosis.  C5-C6: Mild disc osteophyte complex with left greater than right uncovertebral spurring.  Slight ventral cord flattening with preserved ventral and dorsal CSF.  Moderate left and mild right foraminal stenosis.  C6-C7: Mild-moderate disc osteophyte complex.  Slight ventral cord flattening with preserved ventral and dorsal CSF.  Mild right and minimal left foraminal stenosis.  C7-T1: Mild disc osteophyte complex.  Mild right facet hypertrophy.  Mild right and minimal left foraminal stenosis.    Xray cervical spine 5/30/19   FINDINGS:  Redemonstrated is marked disc space narrowing at the C5-6 and C6-7 levels with moderate disc space narrowing at the C7-T1 level.  There is endplate sclerosis and osteophyte formation at these levels.  There is no fracture.  There is stable trace retrolisthesis of C2 on C3 which is unchanged on extension but reduces to neutral position on flexion.  Otherwise, alignment is normal.  The facet joints maintain normal articulation.  The prevertebral soft tissues are normal.  The airway is patent.  Foraminal stenosis is suboptimally evaluated due to degree of obliquity.    Labs:  BMP  Lab Results   Component Value Date     10/23/2019    K 3.8 10/23/2019     10/23/2019    CO2 27 10/23/2019    BUN 20 10/23/2019    CREATININE 0.89 10/23/2019    CALCIUM 9.5 10/23/2019    ANIONGAP 5 (L) 10/23/2019    ESTGFRAFRICA >60 10/23/2019    EGFRNONAA >60 10/23/2019     Lab Results   Component Value Date    ALT 20 04/17/2019    AST 17 04/17/2019    ALKPHOS 42 (L) 04/17/2019    BILITOT 0.4 04/17/2019       Assessment:  Problem List Items Addressed This Visit        Neuro    Cervical spondylosis - Primary       Orthopedic    Neck pain          Treatment Plan:  53 y.o. year old male with PMH HTN, GERD presents to the office with neck pain.  He has had neck pain for about a year without known trauma or inciting event.  His pain ranges from 4-7/10, constant, sharp,  electric in his neck with some radiation into his right posterior shoulder.  He denies any bowel/bladder dysfunction or gait changes.  He has tried over 6 weeks of conservative therapy with PT, ibuprofen, and flexeril without significant relief.  On exam he is neurologically intact.  MRI cervical spine c/w multilevel disc-osteophyte complex with ventral cord flattening without any signal change in the cord which I think is the causing of his pain.  His pain is limiting his mobility and interfering with his ADL's.  Will schedule for cervical SKYLA.  Follow up 2 weeks post injection.    10/30/19 - Mr. Quiroz returns to the office for follow up. He is s/p cervical SKYLA on 10/15/19 initially with 50-60% relief for about a week.  Today he has return of his neck pain, constant, 6/10, aching, sharp.  He says the pain is mostly in the left side of his neck, worse with lateral neck rotation and neck extension.  He has done PT and continues ibuprofen prn with mild relief.  He denies radicular pain or radiculopathy. On exam he has + axial facet loading.  MRI cervical spine with multilevel facet arthropathy on the left.  His pain is limiting his mobility and interfering with his ADL's.   I discussed repeat SKYLA, diagnostic medial branch blocks, and nsgy referral.  Given his clinical presentation today and incomplete response with SKYLA, we will try first diagnostic left C3-C5 medial branch blocks.  He will follow up 1 week post injection.     Procedures: first diagnostic left C3-C5 medial branch blocks.  Procedure explained using an anatomical model.  Risks, benefits, alternatives explained to patient who verbalized understanding, including increased risk of infection, bleeding, need for additional procedures or surgery, and nerve damage.  Questions regarding the procedure, risks, expected outcome, and possible side effects were solicited and answered to the patient's satisfaction.  Jaren wishes to proceed with the injection.   Verbal and written consent were obtained in clinic today.  PT/OT/HEP: continue PT and HEP  Medications: continue current medications as precsribed  Labs: Reviewed and medications are appropriately dosed for current hepatorenal function.  Imaging: No additional recommended at this time.    : Not applicable    Blaise Mao M.D.  Interventional Pain Medicine / Anesthesiology

## 2019-11-07 ENCOUNTER — OFFICE VISIT (OUTPATIENT)
Dept: CARDIOLOGY | Facility: CLINIC | Age: 54
End: 2019-11-07
Payer: COMMERCIAL

## 2019-11-07 VITALS
HEIGHT: 72 IN | HEART RATE: 71 BPM | BODY MASS INDEX: 27.83 KG/M2 | WEIGHT: 205.5 LBS | DIASTOLIC BLOOD PRESSURE: 85 MMHG | SYSTOLIC BLOOD PRESSURE: 134 MMHG

## 2019-11-07 DIAGNOSIS — I10 ESSENTIAL HYPERTENSION: ICD-10-CM

## 2019-11-07 DIAGNOSIS — E78.5 DYSLIPIDEMIA: ICD-10-CM

## 2019-11-07 DIAGNOSIS — R00.2 PALPITATIONS: Primary | ICD-10-CM

## 2019-11-07 PROCEDURE — 3075F PR MOST RECENT SYSTOLIC BLOOD PRESS GE 130-139MM HG: ICD-10-PCS | Mod: CPTII,S$GLB,, | Performed by: INTERNAL MEDICINE

## 2019-11-07 PROCEDURE — 99999 PR PBB SHADOW E&M-EST. PATIENT-LVL II: CPT | Mod: PBBFAC,,, | Performed by: INTERNAL MEDICINE

## 2019-11-07 PROCEDURE — 99214 PR OFFICE/OUTPT VISIT, EST, LEVL IV, 30-39 MIN: ICD-10-PCS | Mod: S$GLB,,, | Performed by: INTERNAL MEDICINE

## 2019-11-07 PROCEDURE — 3079F PR MOST RECENT DIASTOLIC BLOOD PRESSURE 80-89 MM HG: ICD-10-PCS | Mod: CPTII,S$GLB,, | Performed by: INTERNAL MEDICINE

## 2019-11-07 PROCEDURE — 99214 OFFICE O/P EST MOD 30 MIN: CPT | Mod: S$GLB,,, | Performed by: INTERNAL MEDICINE

## 2019-11-07 PROCEDURE — 3008F PR BODY MASS INDEX (BMI) DOCUMENTED: ICD-10-PCS | Mod: CPTII,S$GLB,, | Performed by: INTERNAL MEDICINE

## 2019-11-07 PROCEDURE — 99999 PR PBB SHADOW E&M-EST. PATIENT-LVL II: ICD-10-PCS | Mod: PBBFAC,,, | Performed by: INTERNAL MEDICINE

## 2019-11-07 PROCEDURE — 3008F BODY MASS INDEX DOCD: CPT | Mod: CPTII,S$GLB,, | Performed by: INTERNAL MEDICINE

## 2019-11-07 PROCEDURE — 3079F DIAST BP 80-89 MM HG: CPT | Mod: CPTII,S$GLB,, | Performed by: INTERNAL MEDICINE

## 2019-11-07 PROCEDURE — 3075F SYST BP GE 130 - 139MM HG: CPT | Mod: CPTII,S$GLB,, | Performed by: INTERNAL MEDICINE

## 2019-11-07 RX ORDER — PROPRANOLOL HYDROCHLORIDE 10 MG/1
10 TABLET ORAL 2 TIMES DAILY
Qty: 60 TABLET | Refills: 11 | Status: SHIPPED | OUTPATIENT
Start: 2019-11-07 | End: 2020-01-25 | Stop reason: SDUPTHER

## 2019-11-08 NOTE — PROGRESS NOTES
Subjective:    Patient ID:  Jaren PICHARDO Cousin is a 53 y.o. male who presents for follow-up of palpitations    HPI  He comes for follow up with no major problems, no chest pain, no shortness of breath.  palpitations persist    Review of Systems   Constitution: Negative for decreased appetite, malaise/fatigue, weight gain and weight loss.   Cardiovascular: Negative for chest pain, dyspnea on exertion, leg swelling, palpitations and syncope.   Respiratory: Negative for cough and shortness of breath.    Gastrointestinal: Negative.    Neurological: Negative for weakness.   All other systems reviewed and are negative.       Objective:      Physical Exam   Constitutional: He is oriented to person, place, and time. He appears well-developed and well-nourished.   HENT:   Head: Normocephalic.   Eyes: Pupils are equal, round, and reactive to light.   Neck: Normal range of motion. Neck supple. No JVD present. Carotid bruit is not present. No thyromegaly present.   Cardiovascular: Normal rate, regular rhythm, normal heart sounds, intact distal pulses and normal pulses. PMI is not displaced. Exam reveals no gallop.   No murmur heard.  Pulmonary/Chest: Effort normal and breath sounds normal.   Abdominal: Soft. Normal appearance. He exhibits no mass. There is no hepatosplenomegaly. There is no tenderness.   Musculoskeletal: Normal range of motion. He exhibits no edema.   Neurological: He is alert and oriented to person, place, and time. He has normal strength and normal reflexes. No sensory deficit.   Skin: Skin is warm and intact.   Psychiatric: He has a normal mood and affect.   Nursing note and vitals reviewed.        Assessment:       1. Palpitations    2. Dyslipidemia    3. Essential hypertension         Plan:   Inderal 10 bid  Continue all cardiac medications  Regular exercise program  Weight loss  3 m f/u

## 2019-12-05 ENCOUNTER — OFFICE VISIT (OUTPATIENT)
Dept: FAMILY MEDICINE | Facility: CLINIC | Age: 54
End: 2019-12-05
Payer: COMMERCIAL

## 2019-12-05 VITALS
OXYGEN SATURATION: 98 % | HEART RATE: 78 BPM | WEIGHT: 206.56 LBS | DIASTOLIC BLOOD PRESSURE: 90 MMHG | HEIGHT: 72 IN | BODY MASS INDEX: 27.98 KG/M2 | SYSTOLIC BLOOD PRESSURE: 146 MMHG | TEMPERATURE: 99 F

## 2019-12-05 DIAGNOSIS — I10 ESSENTIAL HYPERTENSION: Primary | ICD-10-CM

## 2019-12-05 DIAGNOSIS — Z00.00 PREVENTATIVE HEALTH CARE: ICD-10-CM

## 2019-12-05 DIAGNOSIS — R12 HEARTBURN: ICD-10-CM

## 2019-12-05 PROCEDURE — 3080F DIAST BP >= 90 MM HG: CPT | Mod: CPTII,S$GLB,, | Performed by: FAMILY MEDICINE

## 2019-12-05 PROCEDURE — 3080F PR MOST RECENT DIASTOLIC BLOOD PRESSURE >= 90 MM HG: ICD-10-PCS | Mod: CPTII,S$GLB,, | Performed by: FAMILY MEDICINE

## 2019-12-05 PROCEDURE — 3077F SYST BP >= 140 MM HG: CPT | Mod: CPTII,S$GLB,, | Performed by: FAMILY MEDICINE

## 2019-12-05 PROCEDURE — 99213 PR OFFICE/OUTPT VISIT, EST, LEVL III, 20-29 MIN: ICD-10-PCS | Mod: S$GLB,,, | Performed by: FAMILY MEDICINE

## 2019-12-05 PROCEDURE — 3077F PR MOST RECENT SYSTOLIC BLOOD PRESSURE >= 140 MM HG: ICD-10-PCS | Mod: CPTII,S$GLB,, | Performed by: FAMILY MEDICINE

## 2019-12-05 PROCEDURE — 99213 OFFICE O/P EST LOW 20 MIN: CPT | Mod: S$GLB,,, | Performed by: FAMILY MEDICINE

## 2019-12-05 PROCEDURE — 99999 PR PBB SHADOW E&M-EST. PATIENT-LVL V: ICD-10-PCS | Mod: PBBFAC,,, | Performed by: FAMILY MEDICINE

## 2019-12-05 PROCEDURE — 99999 PR PBB SHADOW E&M-EST. PATIENT-LVL V: CPT | Mod: PBBFAC,,, | Performed by: FAMILY MEDICINE

## 2019-12-05 PROCEDURE — 3008F BODY MASS INDEX DOCD: CPT | Mod: CPTII,S$GLB,, | Performed by: FAMILY MEDICINE

## 2019-12-05 PROCEDURE — 3008F PR BODY MASS INDEX (BMI) DOCUMENTED: ICD-10-PCS | Mod: CPTII,S$GLB,, | Performed by: FAMILY MEDICINE

## 2019-12-05 RX ORDER — FAMOTIDINE 40 MG/1
40 TABLET, FILM COATED ORAL NIGHTLY PRN
Qty: 30 TABLET | Refills: 11 | Status: SHIPPED | OUTPATIENT
Start: 2019-12-05 | End: 2020-05-27

## 2019-12-05 RX ORDER — ESOMEPRAZOLE MAGNESIUM 40 MG/1
40 CAPSULE, DELAYED RELEASE ORAL DAILY PRN
Qty: 30 CAPSULE | Refills: 11 | Status: SHIPPED | OUTPATIENT
Start: 2019-12-05 | End: 2020-10-19 | Stop reason: SDUPTHER

## 2019-12-05 NOTE — PROGRESS NOTES
Chief Complaint   Patient presents with    Medication Problem     Would like replacement for nexium and zantac     HISTORY OF PRESENT ILLNESS: This is a 54 year-old male presents today for f/u on chronic health issues.    Palpitations - taking propranolol 10mg BID; no new issues; angiogram in October was normal  GERD - He is using nexium 40mg and Zantac daily. Needs replacement for zantac  HLD - He has  been taking Tricor and Niaspan 500mg daily  HTN - tolerating Norvasc 10mg daily for HTN. Home /70.  Lumbar DDD, cervical DDD - usin Aelve PRN and Flexeril PRN spasms; getting persistent posterior neck pain. He did recently do some PT. Using Naproxen PRN.  Following with pain management, Dr. Mao.  Gabbi have been stable on present regimen. Using atarax at bedtime  Following with GI for liver mass    Past Medical History:   Diagnosis Date    ALLERGIC RHINITIS     Asthma     as child    Cervicalgia     DDD (degenerative disc disease), lumbar     Fatty liver     Hemorrhoid     Hepatomegaly     High triglycerides     HTN (hypertension)     Liver hemangioma 2014    PTSD (post-traumatic stress disorder)     not currently taking [facility]s [facility]    [facility]     lower [facility]    Urticaria          Past Surgical History:   Procedure Laterality Date    BACK SURGERY      ruptured disc L5-S1    COLONOSCOPY  ~2010  ([facility])    Hemorrhoids    CORONARY ANGIOGRAPHY N/A 10/23/2019    Procedure: ANGIOGRAM, CORONARY ARTERY;  Surgeon: Can Masters MD;  Location[facility] CATH;  Service: Cardiology;  Laterality: N/A;    ELBOW SURGERY      right    EPIDURAL STEROID INJECTION INTO CERVICAL SPINE N/A 10/15/2019    Procedure: Injection-steroid-epidural-cervical C7/T1;  Surgeon: Blaise Mao MD;  Location: [facility] OR;  Service: Pain Management;  Laterality: N/A;    ESOPHAGOGASTRODUODENOSCOPY N/A 1/29/2019    Procedure: EGD (ESOPHAGOGASTRODUODENOSCOPY);  Surgeon: Balwinder Dexter Jr., MD;  Location: [facility]  ENDO;  Service: Endoscopy;  Laterality: N/A;    KNEE SURGERY      right    LEFT HEART CATHETERIZATION N/A 10/23/2019    Procedure: Left heart cath;  Surgeon: Can Masters MD;  Location: [facility] CATH;  Service: Cardiology;  Laterality: N/A;    MAGNETIC RESONANCE IMAGING N/A 6/26/2019    Procedure: MRI (Magnetic Resonance Imagine) needs anesthesia;  Surgeon: Maurice Fonseca MD;  Location: [facility] CATH;  Service: Anesthesiology;  Laterality: N/A;       FAMILY HISTORY: coronary artery disease and diabetes in his father, diabetes in his brother.     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 He does not smoke. He drinks 3 beers per day. He does   not exercise regularly. He is , has children.      REVIEW OF SYSTEMS: Denies any chest pain, or change in   bowel or bladder habits.   Intermittent epigastric soreness which comes and goes.  Getting some low back pain, neck pain    PHYSICAL EXAM:   BP (!) 146/90 (BP Location: Right arm, Patient Position: Sitting)   Pulse 78   Temp 98.5 °F (36.9 °C) (Oral)   Ht 6' (1.829 m)   Wt 93.7 kg (206 lb 9.1 oz)   SpO2 98%   BMI 28.02 kg/m²     GENERAL: This is a healthy-appearing 53-year-old male, sitting upright,   in no apparent distress. Alert and oriented x4.   Posterior oropharynx is clear.   NECK: Supple. There is no lymphadenopathy, thyromegaly or JVD. Some crepitus with ROM.  CHEST: Clear to auscultation bilaterally with good respiratory movement.   ABDOMEN: Soft, no epigastric tenderness . Positive bowel sounds and no hepatosplenomegaly.   EXTREMITIES: Show no cyanosis, clubbing or edema.   SKIN: no rashes    Results for orders placed or performed during the hospital encounter of 10/23/19   CBC auto differential   Result Value Ref Range    WBC 8.02 3.90 - 12.70 K/uL    RBC 4.89 4.60 - 6.20 M/uL    Hemoglobin 13.5 (L) 14.0 - 18.0 g/dL    Hematocrit 41.8 40.0 - 54.0 %    Mean Corpuscular Volume 86 82 - 98 fL     Mean Corpuscular Hemoglobin 27.6 27.0 - 31.0 pg    Mean Corpuscular Hemoglobin Conc 32.3 32.0 - 36.0 g/dL    RDW 14.0 11.5 - 14.5 %    Platelets 400 (H) 150 - 350 K/uL    MPV 9.4 9.2 - 12.9 fL    Immature Granulocytes 0.4 0.0 - 0.5 %    Gran # (ANC) 5.1 1.8 - 7.7 K/uL    Immature Grans (Abs) 0.03 0.00 - 0.04 K/uL    Lymph # 1.8 1.0 - 4.8 K/uL    Mono # 0.8 0.3 - 1.0 K/uL    Eos # 0.2 0.0 - 0.5 K/uL    Baso # 0.08 0.00 - 0.20 K/uL    nRBC 0 0 /100 WBC    Gran% 63.9 38.0 - 73.0 %    Lymph% 22.6 18.0 - 48.0 %    Mono% 9.9 4.0 - 15.0 %    Eosinophil% 2.2 0.0 - 8.0 %    Basophil% 1.0 0.0 - 1.9 %    Differential Method Automated    Basic metabolic panel   Result Value Ref Range    Sodium 137 136 - 145 mmol/L    Potassium 3.8 3.5 - 5.1 mmol/L    Chloride 105 95 - 110 mmol/L    CO2 27 22 - 31 mmol/L    Glucose 102 70 - 110 mg/dL    BUN, Bld 20 9 - 21 mg/dL    Creatinine 0.89 0.50 - 1.40 mg/dL    Calcium 9.5 8.4 - 10.2 mg/dL    Anion Gap 5 (L) 8 - 16 mmol/L    eGFR if African American >60 >60 mL/min/1.73 m^2    eGFR if non African American >60 >60 mL/min/1.73 m^2   Cardiac catheterization   Result Value Ref Range    Cath EF Estimated 60 %               ASSESSMENT/PLAN:   Essential hypertension    Heartburn  -     esomeprazole (NEXIUM) 40 MG capsule; Take 1 capsule (40 mg total) by mouth daily as needed.  Dispense: 30 capsule; Refill: 11  -     famotidine (PEPCID) 40 MG tablet; Take 1 tablet (40 mg total) by mouth nightly as needed for Heartburn.  Dispense: 30 tablet; Refill: 11    [facility]  -     Comprehensive metabolic panel; Future; Expected date: 03/04/2020  -     Lipid panel; Future; Expected date: 03/04/2020  -     PSA, Screening; Future; Expected date: 03/04/2020  -     CBC auto differential; Future; Expected date: 03/04/2020       Overall doing well  Counseled on regular exercise, maintenance of a healthy weight, balanced diet rich in fruits/vegetables and lean protein, and avoidance of unhealthy  habits like smoking and excessive alcohol intake.  continue Tricor,  Niaspan 500mg QHS  Continue a low-fat, low-triglyceride diet.  continue Norvasc daily  Continue Nasonex daily with Claritin  Nexium QAM PRN and Pepcid 40mg QHS  F/u 4 months with labs for physical

## 2020-01-22 ENCOUNTER — TELEPHONE (OUTPATIENT)
Dept: FAMILY MEDICINE | Facility: CLINIC | Age: 55
End: 2020-01-22

## 2020-01-22 ENCOUNTER — DOCUMENTATION ONLY (OUTPATIENT)
Dept: FAMILY MEDICINE | Facility: CLINIC | Age: 55
End: 2020-01-22

## 2020-01-22 NOTE — TELEPHONE ENCOUNTER
----- Message from Kylah Sotelo sent at 1/22/2020  7:45 AM CST -----  Contact: Patient  Type:  RX Refill Request  Who Called:  patient  Refill or New Rx:  Refill  RX Name and Strength: esomeprazole (NEXIUM) 40 MG capsule   How is the patient currently taking it? (ex. 1XDay):  1 x day as needed  Is this a 30 day or 90 day RX:  30  Preferred Pharmacy with phone number:    [id number]  [address]  [address] [address]  Phone: [phone] Fax: [phone]  Local or Mail Order:  Local  Ordering Provider:  Glenna Owens Call Back Number:  [phone]  Additional Information:  Patient states the pharmacy needs pre authorization for insurance company.  Please call to advise. Thanks!

## 2020-01-22 NOTE — PROGRESS NOTES
PA:    Esomeprazole 40 mg capsules    Critical access hospital key:   [id number]  Case ID:    [id number]  ----------------------------    Determination:   APPROVED  Valid 12/23/19-1/21/2021

## 2020-01-23 ENCOUNTER — PATIENT OUTREACH (OUTPATIENT)
Dept: ADMINISTRATIVE | Facility: OTHER | Age: 55
End: 2020-01-23

## 2020-01-26 ENCOUNTER — PATIENT MESSAGE (OUTPATIENT)
Dept: CARDIOLOGY | Facility: CLINIC | Age: 55
End: 2020-01-26

## 2020-01-27 ENCOUNTER — PATIENT MESSAGE (OUTPATIENT)
Dept: CARDIOLOGY | Facility: CLINIC | Age: 55
End: 2020-01-27

## 2020-01-27 ENCOUNTER — TELEPHONE (OUTPATIENT)
Dept: CARDIOLOGY | Facility: CLINIC | Age: 55
End: 2020-01-27

## 2020-01-27 NOTE — TELEPHONE ENCOUNTER
Spoke to pt; 1230 Dr Mcdaniel appt no longer available, offered 1/28 appt with Sallie; pt accepted appt date and time

## 2020-01-28 ENCOUNTER — OFFICE VISIT (OUTPATIENT)
Dept: CARDIOLOGY | Facility: CLINIC | Age: 55
End: 2020-01-28
Payer: COMMERCIAL

## 2020-01-28 VITALS
WEIGHT: 209.19 LBS | HEART RATE: 65 BPM | DIASTOLIC BLOOD PRESSURE: 84 MMHG | HEIGHT: 72 IN | SYSTOLIC BLOOD PRESSURE: 133 MMHG | BODY MASS INDEX: 28.33 KG/M2

## 2020-01-28 DIAGNOSIS — I47.10 SVT (SUPRAVENTRICULAR TACHYCARDIA): ICD-10-CM

## 2020-01-28 DIAGNOSIS — I10 ESSENTIAL HYPERTENSION: ICD-10-CM

## 2020-01-28 DIAGNOSIS — E78.1 HIGH TRIGLYCERIDES: ICD-10-CM

## 2020-01-28 PROCEDURE — 99999 PR PBB SHADOW E&M-EST. PATIENT-LVL IV: ICD-10-PCS | Mod: PBBFAC,,, | Performed by: PHYSICIAN ASSISTANT

## 2020-01-28 PROCEDURE — 99214 OFFICE O/P EST MOD 30 MIN: CPT | Mod: S$GLB,,, | Performed by: PHYSICIAN ASSISTANT

## 2020-01-28 PROCEDURE — 3075F SYST BP GE 130 - 139MM HG: CPT | Mod: CPTII,S$GLB,, | Performed by: PHYSICIAN ASSISTANT

## 2020-01-28 PROCEDURE — 3079F PR MOST RECENT DIASTOLIC BLOOD PRESSURE 80-89 MM HG: ICD-10-PCS | Mod: CPTII,S$GLB,, | Performed by: PHYSICIAN ASSISTANT

## 2020-01-28 PROCEDURE — 3079F DIAST BP 80-89 MM HG: CPT | Mod: CPTII,S$GLB,, | Performed by: PHYSICIAN ASSISTANT

## 2020-01-28 PROCEDURE — 99214 PR OFFICE/OUTPT VISIT, EST, LEVL IV, 30-39 MIN: ICD-10-PCS | Mod: S$GLB,,, | Performed by: PHYSICIAN ASSISTANT

## 2020-01-28 PROCEDURE — 3008F PR BODY MASS INDEX (BMI) DOCUMENTED: ICD-10-PCS | Mod: CPTII,S$GLB,, | Performed by: PHYSICIAN ASSISTANT

## 2020-01-28 PROCEDURE — 3075F PR MOST RECENT SYSTOLIC BLOOD PRESS GE 130-139MM HG: ICD-10-PCS | Mod: CPTII,S$GLB,, | Performed by: PHYSICIAN ASSISTANT

## 2020-01-28 PROCEDURE — 3008F BODY MASS INDEX DOCD: CPT | Mod: CPTII,S$GLB,, | Performed by: PHYSICIAN ASSISTANT

## 2020-01-28 PROCEDURE — 99999 PR PBB SHADOW E&M-EST. PATIENT-LVL IV: CPT | Mod: PBBFAC,,, | Performed by: PHYSICIAN ASSISTANT

## 2020-01-28 NOTE — PROGRESS NOTES
"Subjective:    Patient ID:  Jaren Quiroz is a 54 y.o. male who presents for follow-up of SVT ([facility] 01/25/2020)      HPI  Mr. Quiroz is a very pleasant gentleman who follows with Dr. Masters. He presents today for follow up after a recent ER visit. He states he had taken an OTC decongestant on Thursday and Saturday morning he "got pretty upset" and felt his heart start to race. Normally, he is able to bear down and terminate the palpitations, but on this occasion, he was unable to. After 45 minutes-hour of rapid heart rates, he went to the [place] ED for further evaluation. On presentation, he EKG revealed SVT with a rate of 153. He was given 6mg adenosine IV and converted to NSR. He has done well since his discharge.   Of note, he was previously only taking his propranolol once daily. Since his discharge from the ED, he has been taking it BID without issues.     Review of Systems   Constitution: Negative for chills, diaphoresis, fever, weight gain and weight loss.   HENT: Negative for sore throat.    Eyes: Negative for blurred vision, vision loss in left eye, vision loss in right eye and visual disturbance.   Cardiovascular: Positive for palpitations. Negative for chest pain, claudication, dyspnea on exertion, leg swelling, near-syncope, orthopnea, paroxysmal nocturnal dyspnea and syncope.   Respiratory: Negative for cough, hemoptysis, shortness of breath, sputum production and wheezing.    Endocrine: Negative for cold intolerance and heat intolerance.   Hematologic/Lymphatic: Negative for adenopathy. Does not bruise/bleed easily.   Skin: Negative for rash.   Musculoskeletal: Negative for falls, muscle weakness and myalgias.   Gastrointestinal: Negative for abdominal pain, change in bowel habit, constipation, diarrhea, melena and nausea.   Genitourinary: Negative for bladder incontinence.   Neurological: Negative for dizziness, focal weakness, headaches, light-headedness, numbness and weakness. "   Psychiatric/Behavioral: Negative for altered mental status.         Vitals:    01/28/20 1401   BP: 133/84   BP Location: Left arm   Patient Position: Sitting   BP Method: Medium (Manual)   Pulse: 65   Weight: 94.9 kg (209 lb 3.5 oz)   Height: 6' (1.829 m)   Body mass index is 28.37 kg/m².    Objective:    Physical Exam   Constitutional: He is oriented to person, place, and time. He appears well-developed and well-nourished.   HENT:   Head: Normocephalic and atraumatic.   Eyes: Pupils are equal, round, and reactive to light. EOM are normal.   Neck: Neck supple. No JVD present. No tracheal deviation present. No thyromegaly present.   Cardiovascular: Normal rate, regular rhythm, S1 normal, S2 normal, intact distal pulses and normal pulses. PMI is not displaced. Exam reveals no gallop and no friction rub.   No murmur heard.  Pulmonary/Chest: Effort normal and breath sounds normal. No respiratory distress. He has no wheezes. He has no rales. He exhibits no tenderness.   Abdominal: Soft. Bowel sounds are normal. He exhibits no distension and no mass. There is no tenderness.   Musculoskeletal: Normal range of motion. He exhibits no edema or tenderness.   Neurological: He is alert and oriented to person, place, and time.   Skin: Skin is warm and dry. No rash noted.   Psychiatric: He has a normal mood and affect. His behavior is normal.         Assessment:         SVT (supraventricular tachycardia)  Hx of palpitations on propranolol.   Recent ED visit for SVT -- terminated with 6mg adenosine.   Now taking propranolol 10mg BID without issues.    High triglycerides  On Tricor and Niacin.     HTN (hypertension)  Well controlled on current regimen.     Normal coronary arteries  By angiogram in Oct 2019.     Plan:       Continue propranolol 10 mg BID   He can take an extra 10 mg tablet prn for palpitations.   Discussed avoidance of triggers (stimulants, caffeine, ETOH, smoking, etc).   He he has recurrence or if his  palpitations increase in frequency, can consider refer to Dr. Tolentino.   F/U with Dr. Masters as scheduled.

## 2020-01-28 NOTE — ASSESSMENT & PLAN NOTE
Hx of palpitations on propranolol.   Recent ED visit for SVT -- terminated with 6mg adenosine.   Now taking propranolol 10mg BID without issues.

## 2020-01-30 ENCOUNTER — TELEPHONE (OUTPATIENT)
Dept: ELECTROPHYSIOLOGY | Facility: CLINIC | Age: 55
End: 2020-01-30

## 2020-01-30 ENCOUNTER — HOSPITAL ENCOUNTER (EMERGENCY)
Facility: HOSPITAL | Age: 55
Discharge: HOME | End: 2020-01-30
Attending: EMERGENCY MEDICINE
Payer: COMMERCIAL

## 2020-01-30 VITALS
HEIGHT: 72 IN | BODY MASS INDEX: 27.22 KG/M2 | WEIGHT: 201 LBS | RESPIRATION RATE: 20 BRPM | TEMPERATURE: 99 F | SYSTOLIC BLOOD PRESSURE: 135 MMHG | OXYGEN SATURATION: 100 % | DIASTOLIC BLOOD PRESSURE: 75 MMHG | HEART RATE: 75 BPM

## 2020-01-30 DIAGNOSIS — I47.10 SVT (SUPRAVENTRICULAR TACHYCARDIA): Primary | ICD-10-CM

## 2020-01-30 DIAGNOSIS — R00.2 PALPITATIONS: ICD-10-CM

## 2020-01-30 LAB
ALBUMIN SERPL BCP-MCNC: 4.5 G/DL (ref 3.5–5.2)
ALP SERPL-CCNC: 40 U/L (ref 55–135)
ALT SERPL W/O P-5'-P-CCNC: 20 U/L (ref 10–44)
ANION GAP SERPL CALC-SCNC: 7 MMOL/L (ref 8–16)
AST SERPL-CCNC: 18 U/L (ref 10–40)
BASOPHILS # BLD AUTO: 0.07 K/UL (ref 0–0.2)
BASOPHILS NFR BLD: 0.8 % (ref 0–1.9)
BILIRUB SERPL-MCNC: 0.3 MG/DL (ref 0.1–1)
BNP SERPL-MCNC: 98 PG/ML (ref 0–99)
BUN SERPL-MCNC: 18 MG/DL (ref 6–20)
CALCIUM SERPL-MCNC: 10 MG/DL (ref 8.7–10.5)
CHLORIDE SERPL-SCNC: 106 MMOL/L (ref 95–110)
CO2 SERPL-SCNC: 26 MMOL/L (ref 23–29)
CREAT SERPL-MCNC: 1.2 MG/DL (ref 0.5–1.4)
DIFFERENTIAL METHOD: ABNORMAL
EOSINOPHIL # BLD AUTO: 0.2 K/UL (ref 0–0.5)
EOSINOPHIL NFR BLD: 2.5 % (ref 0–8)
ERYTHROCYTE [DISTWIDTH] IN BLOOD BY AUTOMATED COUNT: 13.9 % (ref 11.5–14.5)
EST. GFR  (AFRICAN AMERICAN): >60 ML/MIN/1.73 M^2
EST. GFR  (NON AFRICAN AMERICAN): >60 ML/MIN/1.73 M^2
GLUCOSE SERPL-MCNC: 105 MG/DL (ref 70–110)
HCT VFR BLD AUTO: 43.7 % (ref 40–54)
HGB BLD-MCNC: 14.2 G/DL (ref 14–18)
LYMPHOCYTES # BLD AUTO: 2.1 K/UL (ref 1–4.8)
LYMPHOCYTES NFR BLD: 23.9 % (ref 18–48)
MAGNESIUM SERPL-MCNC: 2.1 MG/DL (ref 1.6–2.6)
MCH RBC QN AUTO: 27.3 PG (ref 27–31)
MCHC RBC AUTO-ENTMCNC: 32.5 G/DL (ref 32–36)
MCV RBC AUTO: 84 FL (ref 82–98)
MONOCYTES # BLD AUTO: 0.8 K/UL (ref 0.3–1)
MONOCYTES NFR BLD: 9.6 % (ref 4–15)
NEUTROPHILS # BLD AUTO: 5.5 K/UL (ref 1.8–7.7)
NEUTROPHILS NFR BLD: 63.2 % (ref 38–73)
PLATELET # BLD AUTO: 466 K/UL (ref 150–350)
PMV BLD AUTO: 9.6 FL (ref 9.2–12.9)
POTASSIUM SERPL-SCNC: 3.8 MMOL/L (ref 3.5–5.1)
PROT SERPL-MCNC: 7.6 G/DL (ref 6–8.4)
RBC # BLD AUTO: 5.2 M/UL (ref 4.6–6.2)
SODIUM SERPL-SCNC: 139 MMOL/L (ref 136–145)
TROPONIN I SERPL DL<=0.01 NG/ML-MCNC: 0.01 NG/ML (ref 0–0.03)
WBC # BLD AUTO: 8.66 K/UL (ref 3.9–12.7)

## 2020-01-30 PROCEDURE — 99285 EMERGENCY DEPT VISIT HI MDM: CPT | Mod: 25

## 2020-01-30 PROCEDURE — 93005 ELECTROCARDIOGRAM TRACING: CPT

## 2020-01-30 PROCEDURE — 80053 COMPREHEN METABOLIC PANEL: CPT

## 2020-01-30 PROCEDURE — 85025 COMPLETE CBC W/AUTO DIFF WBC: CPT

## 2020-01-30 PROCEDURE — 84484 ASSAY OF TROPONIN QUANT: CPT

## 2020-01-30 PROCEDURE — 83735 ASSAY OF MAGNESIUM: CPT

## 2020-01-30 PROCEDURE — 83880 ASSAY OF NATRIURETIC PEPTIDE: CPT

## 2020-01-30 NOTE — ED NOTES
"Pt standing on side of bed. States "I feel completely fine, im ready to go." pt's VSS. Pt is NAD. MD informed.   "

## 2020-01-30 NOTE — ED NOTES
Pt presents to the ED w/ c/ of palpitations and tachycardia. Pt states that today while getting on a plane he said he became flushed, diaphoretic, and started having palpitations. Pt reports that he tried to do vagal maneuver with no improvement. Pt reports that on Saturday he was seen for SVT and was given adenosine. Pt reports that was the first episodes of SVT. Pt reports that this morning he took 2 doses of his propanolol this morning. Pt's HR on arrival was in the 150's. Pt at this time is connected to cardiac monitor, BP cuff, and pulse ox. Pt reports improvement in diaphoresis and flushing, but states when he moves or stands he gets weak. Pt denies SOB at this time. Pt reports also taking a klonopin with the beta blocker pta.

## 2020-01-30 NOTE — TELEPHONE ENCOUNTER
Spoke with wife who reported that they were on the way to the ED due to another episode of SVT.  I will give them a call in a few days to speak with pt about upcoming appt on 2/12/2020 with Dr. Tolentino.

## 2020-01-30 NOTE — ED PROVIDER NOTES
Encounter Date: 1/30/2020    SCRIBE #1 NOTE: I, Sabra Riddhi, am scribing for, and in the presence of,  Chris Drummond III, MD. I have scribed the entire note.       History     Chief Complaint   Patient presents with    Tachycardia     pt states he felt his heart racing today. HR on arrival is 150. Pt states he was seen at [facility] yesterday and 2 days ago and treated with adenosine for SVT. EMS report accelerated junctional rhythm on EKG.      The patient is a 54-year-old male who has a history of allergic rhinitis, asthma, hypertriglyceridemia, hypertension, liver hemangioma, PTSD, shingles, and paroxysmal atrial fibrillation who presents with palpitations that began as he was preparing to fly to [place] today.  He was seated on the [place] preparing [place] when he began to feel his heart race.  He takes propranolol 20 mg twice daily.  He took his normal morning dose at 07:00.  With the onset of palpitations, he took his second dose of the day.  He had also taken a Klonopin earlier in the day because he knew he would be flying and that such activities give him anxiety. He denies chest pain. He denies shortness of breath. He attempted Valsalva multiple times but had no resolution so he and his wife deplaned and came to the ED for evaluation.  He admits to chronic excessive beer drinking.  He has several beers most nights of each week and will often times binge drink on the weekends.  He reports that he had a normal echocardiogram and angiogram several months ago.  He had a similar episode of palpitations a few days ago and was evaluated in the ED at [facility].  He his propranolol dose was increased at that time.    The history is provided by the patient and the spouse. No  was used.     Review of patient's allergies indicates:   Allergen Reactions    No known drug allergies      Past Medical History:   Diagnosis Date    ALLERGIC RHINITIS     Asthma     as child     Cervicalgia     DDD (degenerative disc disease), lumbar     Fatty liver     Hemorrhoid     Hepatomegaly     High triglycerides     HTN (hypertension)     Liver hemangioma 2014    PTSD (post-traumatic stress disorder)     not currently taking [facility]s [facility]    Shingles     lower back    Urticaria      Past Surgical History:   Procedure Laterality Date    BACK SURGERY      ruptured disc L5-S1    COLONOSCOPY  ~2010  ([facility])    Hemorrhoids    CORONARY ANGIOGRAPHY N/A 10/23/2019    Procedure: ANGIOGRAM, CORONARY ARTERY;  Surgeon: Can Masters MD;  Location: [facility] CATH;  Service: Cardiology;  Laterality: N/A;    ELBOW SURGERY      right    EPIDURAL STEROID INJECTION INTO CERVICAL SPINE N/A 10/15/2019    Procedure: Injection-steroid-epidural-cervical C7/T1;  Surgeon: Blaise Mao MD;  Location: [facility] OR;  Service: Pain Management;  Laterality: N/A;    ESOPHAGOGASTRODUODENOSCOPY N/A 1/29/2019    Procedure: EGD (ESOPHAGOGASTRODUODENOSCOPY);  Surgeon: Balwinder Dexter Jr., MD;  Location: [facility] ENDO;  Service: Endoscopy;  Laterality: N/A;    KNEE SURGERY      right    LEFT HEART CATHETERIZATION N/A 10/23/2019    Procedure: Left heart cath;  Surgeon: Can Masters MD;  Location: [facility] CATH;  Service: Cardiology;  Laterality: N/A;    MAGNETIC RESONANCE IMAGING N/A 6/26/2019    Procedure: MRI (Magnetic Resonance Imagine) needs anesthesia;  Surgeon: Maurice Fonseca MD;  Location: [facility] CATH;  Service: Anesthesiology;  Laterality: N/A;     Family History   Problem Relation Age of Onset    Allergic rhinitis Son     Allergic rhinitis Daughter     Asthma Daughter     Allergic rhinitis Daughter     Diverticulitis Mother     Hypertension Father     COPD Father     Angioedema Neg Hx     Eczema Neg Hx     Immunodeficiency Neg Hx     Urticaria Neg Hx     Lupus Neg Hx     Psoriasis Neg Hx     Melanoma Neg Hx     Acne Neg Hx     Colon cancer Neg Hx     Colon polyps Neg  Hx     Crohn's disease Neg Hx     Ulcerative colitis Neg Hx     Esophageal cancer Neg Hx     Stomach cancer Neg Hx      Social History     Tobacco Use    Smoking status: Never Smoker    Smokeless tobacco: Never Used   Substance Use Topics    Alcohol use: Yes     Alcohol/week: 21.0 standard drinks     Types: 21 Cans of beer per week     Comment: 3 beers daily    Drug use: No     Review of Systems   Constitutional: Positive for diaphoresis (resolved). Negative for appetite change, chills, fatigue and fever.   HENT: Negative for sore throat and trouble swallowing.    Eyes: Negative for visual disturbance.   Respiratory: Negative for cough and shortness of breath.    Cardiovascular: Positive for palpitations. Negative for chest pain.   Gastrointestinal: Negative for abdominal pain, nausea and vomiting.   Endocrine: Negative for polydipsia and polyuria.   Genitourinary: Negative for dysuria.   Musculoskeletal: Negative for arthralgias and myalgias.   Skin: Negative for rash.   Neurological: Positive for light-headedness. Negative for dizziness, syncope, weakness, numbness and headaches.       Physical Exam     Initial Vitals [01/30/20 1249]   BP Pulse Resp Temp SpO2   114/82 (!) 149 20 98.9 °F (37.2 °C) 98 %      MAP       --         Physical Exam    Nursing note and vitals reviewed.  Constitutional: He appears well-developed and well-nourished. He is not diaphoretic. No distress.   HENT:   Head: Normocephalic and atraumatic.   Mouth/Throat: Oropharynx is clear and moist.   Eyes: Conjunctivae and EOM are normal. Pupils are equal, round, and reactive to light. No scleral icterus. Right eye exhibits no nystagmus. Left eye exhibits no nystagmus.   Neck: No JVD present.   Cardiovascular: Normal rate, regular rhythm and normal heart sounds. Exam reveals no gallop and no friction rub.    No murmur heard.  Pulses:       Radial pulses are 2+ on the right side, and 2+ on the left side.        Dorsalis pedis pulses are 2+  on the right side, and 2+ on the left side.   Pulmonary/Chest: Effort normal and breath sounds normal. No stridor. No respiratory distress. He has no decreased breath sounds. He has no wheezes. He has no rhonchi. He has no rales.   Abdominal: Soft. He exhibits no distension and no mass. There is no tenderness.   Musculoskeletal:   No calf swelling or tenderness bilaterally. Negative bilateral Elena's sign.     Neurological: He is alert and oriented to person, place, and time. He has normal strength. No cranial nerve deficit. Coordination and gait normal. GCS score is 15. GCS eye subscore is 4. GCS verbal subscore is 5. GCS motor subscore is 6.   Skin: Skin is warm and dry. No pallor.         ED Course   Procedures  Labs Reviewed   CBC W/ AUTO DIFFERENTIAL - Abnormal; Notable for the following components:       Result Value    Platelets 466 (*)     All other components within normal limits   COMPREHENSIVE METABOLIC PANEL - Abnormal; Notable for the following components:    Alkaline Phosphatase 40 (*)     Anion Gap 7 (*)     All other components within normal limits   MAGNESIUM   TROPONIN I   B-TYPE NATRIURETIC PEPTIDE     EKG Readings: (Independently Interpreted)   01/30/2020 12:58  Supraventricular tachycardia.  Ventricular rate 136 beats per minute. Normal axis.  Normal QRS interval.  Prolonged QT interval.  Inferior lead ST depression.  Normal T-wave morphology.       Imaging Results          X-Ray Chest AP Portable (Final result)  Result time 01/30/20 13:51:01    Final result by Jonathan Leonard MD (01/30/20 13:51:01)                 Impression:      No detrimental change or radiographic acute intrathoracic process seen on this single view.      Electronically signed by: Jonathan Leonard MD  Date:    01/30/2020  Time:    13:51             Narrative:    EXAMINATION:  XR CHEST AP PORTABLE    CLINICAL HISTORY:  Palpitations;    TECHNIQUE:  Single frontal view of the chest was performed.    COMPARISON:  Chest radiograph 1  day prior    FINDINGS:  No detrimental change.  Monitoring leads overlie the chest.The lungs are clear, with normal appearance of pulmonary vasculature and no pleural effusion or pneumothorax.    The cardiac silhouette is normal in size. The hilar and mediastinal contours are unremarkable.    Bones are intact.                                 Medical Decision Making:   History:   Old Medical Records: I decided to obtain old medical records.  Clinical Tests:   Lab Tests: Reviewed and Ordered  Radiological Study: Ordered and Reviewed  Medical Tests: Reviewed and Ordered    Medical decision making:  This patient underwent evaluation following onset of palpitations.  This had resolved prior to my face-to-face encounter with the patient likely due to him taking an extra dose propanolol before arrival to the ED.  SVT was present on the EKG performed upon arrival.  He underwent a period of monitoring had no recurrence of SVT.  His evaluated with labs and had no white count elevation, severe anemia, electrolyte anomalies, or renal insufficiency.  He was discharged with instructions to continue his current medication regimen.  He will follow up with his primary care provider.                Attending Attestation:             Attending ED Notes:   Portions of this chart were completed by the scribe by interpretive transcription of statements made by the patient as a result of my questions at the bedside. Other portions were completed by the scribe from statements made by me for direct transcription into the medical record. Following completion of the charting by the scribe, I made modifications for both correctness and proper phrasing.  - Chris Drummond III, M.D.                        Clinical Impression:       ICD-10-CM ICD-9-CM   1. SVT (supraventricular tachycardia) I47.1 427.89   2. Palpitations R00.2 785.1         Disposition:   Disposition: Discharged  Condition: Stable                     Chris Drummond III  MD  01/30/20 1527

## 2020-02-03 ENCOUNTER — TELEPHONE (OUTPATIENT)
Dept: ELECTROPHYSIOLOGY | Facility: CLINIC | Age: 55
End: 2020-02-03

## 2020-02-03 NOTE — TELEPHONE ENCOUNTER
Spoke with pt. He does not have any outside Cardiology records or an implanted device.  Pt will arrive at 12 for EKG and appt with Dr. Tolentino is at 1.

## 2020-02-05 ENCOUNTER — PATIENT OUTREACH (OUTPATIENT)
Dept: ADMINISTRATIVE | Facility: OTHER | Age: 55
End: 2020-02-05

## 2020-02-10 ENCOUNTER — OFFICE VISIT (OUTPATIENT)
Dept: ORTHOPEDICS | Facility: CLINIC | Age: 55
End: 2020-02-10
Payer: COMMERCIAL

## 2020-02-10 VITALS — WEIGHT: 201 LBS | HEIGHT: 72 IN | BODY MASS INDEX: 27.22 KG/M2

## 2020-02-10 DIAGNOSIS — M17.11 PRIMARY OSTEOARTHRITIS OF RIGHT KNEE: ICD-10-CM

## 2020-02-10 DIAGNOSIS — M25.561 CHRONIC PAIN OF RIGHT KNEE: Primary | ICD-10-CM

## 2020-02-10 DIAGNOSIS — G89.29 CHRONIC PAIN OF RIGHT KNEE: Primary | ICD-10-CM

## 2020-02-10 PROCEDURE — 3008F PR BODY MASS INDEX (BMI) DOCUMENTED: ICD-10-PCS | Mod: CPTII,S$GLB,, | Performed by: ORTHOPAEDIC SURGERY

## 2020-02-10 PROCEDURE — 99999 PR PBB SHADOW E&M-EST. PATIENT-LVL II: ICD-10-PCS | Mod: PBBFAC,,, | Performed by: ORTHOPAEDIC SURGERY

## 2020-02-10 PROCEDURE — 99214 PR OFFICE/OUTPT VISIT, EST, LEVL IV, 30-39 MIN: ICD-10-PCS | Mod: 25,S$GLB,, | Performed by: ORTHOPAEDIC SURGERY

## 2020-02-10 PROCEDURE — 20610 LARGE JOINT ASPIRATION/INJECTION: R KNEE: ICD-10-PCS | Mod: RT,S$GLB,, | Performed by: ORTHOPAEDIC SURGERY

## 2020-02-10 PROCEDURE — 99999 PR PBB SHADOW E&M-EST. PATIENT-LVL II: CPT | Mod: PBBFAC,,, | Performed by: ORTHOPAEDIC SURGERY

## 2020-02-10 PROCEDURE — 99214 OFFICE O/P EST MOD 30 MIN: CPT | Mod: 25,S$GLB,, | Performed by: ORTHOPAEDIC SURGERY

## 2020-02-10 PROCEDURE — 20610 DRAIN/INJ JOINT/BURSA W/O US: CPT | Mod: RT,S$GLB,, | Performed by: ORTHOPAEDIC SURGERY

## 2020-02-10 PROCEDURE — 3008F BODY MASS INDEX DOCD: CPT | Mod: CPTII,S$GLB,, | Performed by: ORTHOPAEDIC SURGERY

## 2020-02-10 RX ORDER — TRIAMCINOLONE ACETONIDE 40 MG/ML
40 INJECTION, SUSPENSION INTRA-ARTICULAR; INTRAMUSCULAR
Status: DISCONTINUED | OUTPATIENT
Start: 2020-02-10 | End: 2020-02-10 | Stop reason: HOSPADM

## 2020-02-10 RX ADMIN — TRIAMCINOLONE ACETONIDE 40 MG: 40 INJECTION, SUSPENSION INTRA-ARTICULAR; INTRAMUSCULAR at 11:02

## 2020-02-10 NOTE — PROCEDURES
Large Joint Aspiration/Injection: R knee  Performed by: Pedro Tao MD  Authorized by: Pedro Tao MD  Date/Time: 2/10/2020 11:15 AM    Consent Done?:  Yes (Verbal)  Indications:   Timeout: Immediately prior to procedure a time out was called to verify the correct patient, procedure, equipment, support staff and site/side marked as required.  Prep:Patient was prepped and draped in the usual sterile fashion.        Details:   Needle size: 21 G   Approach: anterolateral  Location:  Knee  Site:  R knee    Medications: 40 mg triamcinolone acetonide 40 mg/mL  Patient tolerance:  patient tolerated the procedure well with no immediate complications

## 2020-02-10 NOTE — PROGRESS NOTES
54 y.o. year old presents to the clinic today with recurring pain in the right knee.  Patient has had Kenlaog injections in the past and responded well.  Last injection was 5 months ago. Patient is requesting injection today into right knee    Exam shows tenderness at the joint line without signs of infection or instability    X-rays show arthritic changes    Assessment:  right knee arthrosis    Plan:  Kenlaog into the right knee.  Encourage strengthening over time.  Followup as needed.        Further History  Aching pain  Worse with activity  Relieved with rest  No other associated symptoms  No other radiation    Further Exam  Alert and oriented  Pleasant  Contralateral limb has appropriate range of motion for age and condition  Contralateral limb has appropriate strength for age and condition  Contralateral limb has appropriate stability  for age and condition  No adenopathy  Pulses are appropriate for current condition  Skin is intact        Chief Complaint    Chief Complaint   Patient presents with    Right Knee - Pain, Swelling       HPI  Jaren PICHARDO Cousin is a 54 y.o.  male who presents with       Past Medical History  Past Medical History:   Diagnosis Date    ALLERGIC RHINITIS     Asthma     as child    Cervicalgia     DDD (degenerative disc disease), lumbar     Fatty liver     Hemorrhoid     Hepatomegaly     High triglycerides     HTN (hypertension)     Liver hemangioma 2014    PTSD (post-traumatic stress disorder)     not currently taking [facility]s [facility]    Shingles     lower back    Urticaria        Past Surgical History  Past Surgical History:   Procedure Laterality Date    BACK SURGERY      ruptured disc L5-S1    COLONOSCOPY  ~2010  ([facility])    Hemorrhoids    CORONARY ANGIOGRAPHY N/A 10/23/2019    Procedure: ANGIOGRAM, CORONARY ARTERY;  Surgeon: Can Masters MD;  [facility];  Service: Cardiology;  Laterality: N/A;    ELBOW SURGERY      right    EPIDURAL  STEROID INJECTION INTO CERVICAL SPINE N/A 10/15/2019    Procedure: Injection-steroid-epidural-cervical C7/T1;  Surgeon: Blaise Mao MD;  Location: [facility] OR;  Service: Pain Management;  Laterality: N/A;    ESOPHAGOGASTRODUODENOSCOPY N/A 1/29/2019    Procedure: EGD (ESOPHAGOGASTRODUODENOSCOPY);  Surgeon: Balwinder Dexter Jr., MD;  Location: [facility] ENDO;  Service: Endoscopy;  Laterality: N/A;    KNEE SURGERY      right    LEFT HEART CATHETERIZATION N/A 10/23/2019    Procedure: Left heart cath;  Surgeon: Can Masters MD;  Location: [facility] CATH;  Service: Cardiology;  Laterality: N/A;    MAGNETIC RESONANCE IMAGING N/A 6/26/2019    Procedure: MRI (Magnetic Resonance Imagine) needs anesthesia;  Surgeon: Maurice Fonseca MD;  Location: [facility] CATH;  Service: Anesthesiology;  Laterality: N/A;       Medications  Current Outpatient Medications   Medication Sig    ALLERGY RELIEF, LORATADINE, 10 mg tablet TAKE ONE TABLET BY MOUTH ONCE DAILY    amLODIPine (NORVASC) 10 MG tablet Take 1 tablet (10 mg total) by mouth once daily.    benzoyl peroxide (BENZAC AC WASH) 10 % external wash Apply topically 2 (two) times daily. Face and trunk    clindamycin phosphate foam Apply to affected area qd- bid    co-enzyme Q-10 30 mg capsule Take 100 mg by mouth once daily.    cyclobenzaprine (FLEXERIL) 10 MG tablet Take 10 mg by mouth 3 (three) times daily as needed. PRN    esomeprazole (NEXIUM) 40 MG capsule Take 1 capsule (40 mg total) by mouth daily as needed.    famotidine (PEPCID) 40 MG tablet Take 1 tablet (40 mg total) by mouth nightly as needed for Heartburn.    fenofibrate (TRICOR) 145 MG tablet Take 1 tablet (145 mg total) by mouth once daily.    hydrOXYzine (ATARAX) 25 MG tablet Take 1 tablet (25 mg total) by mouth every 6 (six) hours as needed for Itching. Every 6 hours PRN    ketoconazole (NIZORAL) 2 % shampoo Use as directed qohs as needed    meclizine (ANTIVERT) 25 mg tablet Take 1 tablet (25 mg total) by  mouth 3 (three) times daily as needed for Dizziness (may cause drowsiness).    minocycline (MINOCIN,DYNACIN) 100 MG capsule Take 1 capsule (100 mg total) by mouth once daily.    mometasone (NASONEX) 50 mcg/actuation nasal spray USE TWO SPRAY(S) IN EACH NOSTRIL ONCE DAILY    naproxen (NAPROSYN) 500 MG tablet Take 1 tablet (500 mg total) by mouth 2 (two) times daily.    niacin (SLO-NIACIN) 500 mg tablet Take 1 tablet (500 mg total) by mouth nightly.    propranolol (INDERAL) 10 MG tablet Take 1 tablet (10 mg total) by mouth 3 (three) times daily.    valACYclovir (VALTREX) 500 MG tablet TAKE 2 TABLETS BY MOUTH TWICE DAILY (Patient taking differently: TAKE 2 TABLETS BY MOUTH TWICE DAILY as needed)     No current facility-administered medications for this visit.      Facility-Administered Medications Ordered in Other Visits   Medication    lactated ringers infusion    lidocaine (PF) 10 mg/ml (1%) injection 10 mg       Allergies  Review of patient's allergies indicates:   Allergen Reactions    No known drug allergies        Family History  Family History   Problem Relation Age of Onset    Allergic rhinitis Son     Allergic rhinitis Daughter     Asthma Daughter     Allergic rhinitis Daughter     Diverticulitis Mother     Hypertension Father     COPD Father     Angioedema Neg Hx     Eczema Neg Hx     Immunodeficiency Neg Hx     Urticaria Neg Hx     Lupus Neg Hx     Psoriasis Neg Hx     Melanoma Neg Hx     Acne Neg Hx     Colon cancer Neg Hx     Colon polyps Neg Hx     Crohn's disease Neg Hx     Ulcerative colitis Neg Hx     Esophageal cancer Neg Hx     Stomach cancer Neg Hx        Social History  Social History     Socioeconomic History    Marital status:      Spouse name: Not on file    Number of children: Not on file    Years of education: Not on file    Highest education level: Not on file   Occupational History    Occupation: retired   Social Needs    Financial resource strain:  Not on file    Food insecurity:     Worry: Not on file     Inability: Not on file    Transportation needs:     Medical: Not on file     Non-medical: Not on file   Tobacco Use    Smoking status: Never Smoker    Smokeless tobacco: Never Used   Substance and Sexual Activity    Alcohol use: Yes     Alcohol/week: 21.0 standard drinks     Types: 21 Cans of beer per week     Comment: 3 beers daily    Drug use: No    Sexual activity: Yes   Lifestyle    Physical activity:     Days per week: 1 day     Minutes per session: 40 min    Stress: Very much   Relationships    Social connections:     Talks on phone: Twice a week     Gets together: Once a week     Attends [employer] service: Not on file     Active member of club or organization: No     Attends meetings of clubs or organizations: Never     Relationship status:    Other Topics Concern    Not on file   Social History Narrative    Not on file               Review of Systems     Constitutional: Negative    HENT: Negative  Eyes: Negative  Respiratory: Negative  Cardiovascular: Negative  Musculoskeletal: HPI  Skin: Negative  Neurological: Negative  Hematological: Negative  Endocrine: Negative                 Physical Exam    There were no vitals filed for this visit.  Body mass index is 27.26 kg/m².  Physical Examination:     General appearance -  well appearing, and in no distress  Mental status - awake  Neck - supple  Chest -  symmetric air entry  Heart - normal rate   Abdomen - soft      Assessment     1. Chronic pain of right knee    2. Primary osteoarthritis of right knee          Plan

## 2020-02-11 ENCOUNTER — TELEPHONE (OUTPATIENT)
Dept: ELECTROPHYSIOLOGY | Facility: CLINIC | Age: 55
End: 2020-02-11

## 2020-02-11 ENCOUNTER — PATIENT OUTREACH (OUTPATIENT)
Dept: ADMINISTRATIVE | Facility: OTHER | Age: 55
End: 2020-02-11

## 2020-02-11 PROBLEM — K76.0 FATTY LIVER: Status: ACTIVE | Noted: 2020-02-11

## 2020-02-11 NOTE — TELEPHONE ENCOUNTER
Called pt to see if he would like to come in this afternoon, as Dr. Tolentino has some availability.  He opted to keep his appt tomorrow since he live in [place] and his wife would not be available to come today.

## 2020-02-11 NOTE — PROGRESS NOTES
Subjective:    Patient ID:  Jaren PICHARDO Cousin is a 54 y.o. male who presents for evaluation of SVT      HPI 53 yo male with SVT, Htn, alcohol abuse, fatty liver.    Primary cardiologist is Dr. Masters.  Has presented with recurrent sustained palpitations. ECG's have revealed SVT, short RP (1/25/20 1/29/20), terminated with Adenosine.  Over the past 15 years, has had symptoms, but could often terminate it with vagal maneuvers.  With the 2 above episodes, he could not terminate it.  Has been on Propranolol since 12/20.  Increased 1/25/20.  Has had one episode since then, on an airplane.  Got off the airplane prior to take-off.  Exercise echo 10/19 normal biventricular structure and function, + for ischemia.  LHC 10/23/20.  ECG reveals nsr without evidence of pre-excitation.    Review of Systems   Constitution: Negative. Negative for malaise/fatigue.   Cardiovascular: Positive for palpitations. Negative for chest pain, dyspnea on exertion, irregular heartbeat, leg swelling, near-syncope, orthopnea, paroxysmal nocturnal dyspnea and syncope.   Respiratory: Positive for shortness of breath. Negative for cough.    Neurological: Negative for dizziness, light-headedness and weakness.        Objective:    Physical Exam   Constitutional: He is oriented to person, place, and time. He appears well-developed and well-nourished.   Eyes: Conjunctivae are normal. No scleral icterus.   Neck: No JVD present. No tracheal deviation present.   Cardiovascular: Normal rate, regular rhythm and normal heart sounds. PMI is not displaced.   Pulmonary/Chest: Effort normal and breath sounds normal. No respiratory distress.   Abdominal: Soft. There is no hepatosplenomegaly. There is no tenderness.   Musculoskeletal: He exhibits no edema (lower extremity) or tenderness.   Neurological: He is alert and oriented to person, place, and time.   Skin: Skin is warm and dry. No rash noted.   Psychiatric: He has a normal mood and affect. His behavior is  normal.         Assessment:       1. SVT (supraventricular tachycardia)    2. Palpitations    3. Essential hypertension    4. Fatty liver         Plan:               Recurrent SVT, despite beta blocker.  Risks and benefits discussed, he would like to proceed.  Hold Propanolol 3 days prior.

## 2020-02-12 ENCOUNTER — HOSPITAL ENCOUNTER (OUTPATIENT)
Dept: CARDIOLOGY | Facility: CLINIC | Age: 55
Discharge: HOME | End: 2020-02-12
Payer: COMMERCIAL

## 2020-02-12 ENCOUNTER — OFFICE VISIT (OUTPATIENT)
Dept: ELECTROPHYSIOLOGY | Facility: CLINIC | Age: 55
End: 2020-02-12
Payer: COMMERCIAL

## 2020-02-12 VITALS
SYSTOLIC BLOOD PRESSURE: 120 MMHG | HEIGHT: 72 IN | DIASTOLIC BLOOD PRESSURE: 72 MMHG | BODY MASS INDEX: 27.56 KG/M2 | HEART RATE: 56 BPM | WEIGHT: 203.5 LBS

## 2020-02-12 DIAGNOSIS — I47.10 SVT (SUPRAVENTRICULAR TACHYCARDIA): ICD-10-CM

## 2020-02-12 DIAGNOSIS — R00.2 PALPITATIONS: ICD-10-CM

## 2020-02-12 DIAGNOSIS — I47.10 SVT (SUPRAVENTRICULAR TACHYCARDIA): Primary | ICD-10-CM

## 2020-02-12 DIAGNOSIS — K76.0 FATTY LIVER: ICD-10-CM

## 2020-02-12 DIAGNOSIS — I10 ESSENTIAL HYPERTENSION: ICD-10-CM

## 2020-02-12 PROCEDURE — 99999 PR PBB SHADOW E&M-EST. PATIENT-LVL III: CPT | Mod: PBBFAC,,, | Performed by: INTERNAL MEDICINE

## 2020-02-12 PROCEDURE — 99999 PR PBB SHADOW E&M-EST. PATIENT-LVL III: ICD-10-PCS | Mod: PBBFAC,,, | Performed by: INTERNAL MEDICINE

## 2020-02-12 PROCEDURE — 93010 RHYTHM STRIP: ICD-10-PCS | Mod: S$GLB,,, | Performed by: INTERNAL MEDICINE

## 2020-02-12 PROCEDURE — 93005 ELECTROCARDIOGRAM TRACING: CPT | Mod: S$GLB,,, | Performed by: INTERNAL MEDICINE

## 2020-02-12 PROCEDURE — 99203 PR OFFICE/OUTPT VISIT, NEW, LEVL III, 30-44 MIN: ICD-10-PCS | Mod: S$GLB,,, | Performed by: INTERNAL MEDICINE

## 2020-02-12 PROCEDURE — 93005 RHYTHM STRIP: ICD-10-PCS | Mod: S$GLB,,, | Performed by: INTERNAL MEDICINE

## 2020-02-12 PROCEDURE — 99203 OFFICE O/P NEW LOW 30 MIN: CPT | Mod: S$GLB,,, | Performed by: INTERNAL MEDICINE

## 2020-02-12 PROCEDURE — 93010 ELECTROCARDIOGRAM REPORT: CPT | Mod: S$GLB,,, | Performed by: INTERNAL MEDICINE

## 2020-02-19 ENCOUNTER — PATIENT MESSAGE (OUTPATIENT)
Dept: ELECTROPHYSIOLOGY | Facility: CLINIC | Age: 55
End: 2020-02-19

## 2020-03-11 ENCOUNTER — TELEPHONE (OUTPATIENT)
Dept: ELECTROPHYSIOLOGY | Facility: CLINIC | Age: 55
End: 2020-03-11

## 2020-03-12 ENCOUNTER — TELEPHONE (OUTPATIENT)
Dept: ELECTROPHYSIOLOGY | Facility: CLINIC | Age: 55
End: 2020-03-12

## 2020-03-12 NOTE — TELEPHONE ENCOUNTER
Returned call to pt. Pt stated he would like to cancel procedure for now. He stated that from what he is reading that it could possibly be 6 months before we get through this virus crisis, but once it levels out he will call to reschedule.        ----- Message from Alicia Fry sent at 3/12/2020  8:12 AM CDT -----  Contact: pt  Pt wants to cancel his pre op labs this morning as well as his procedure next week until the Corona Virus situation settles down.  He would like to speak to you and can be reached at [phone].    Thank you

## 2020-04-30 ENCOUNTER — PATIENT MESSAGE (OUTPATIENT)
Dept: ELECTROPHYSIOLOGY | Facility: CLINIC | Age: 55
End: 2020-04-30

## 2020-04-30 ENCOUNTER — TELEPHONE (OUTPATIENT)
Dept: ELECTROPHYSIOLOGY | Facility: CLINIC | Age: 55
End: 2020-04-30

## 2020-04-30 DIAGNOSIS — Z01.818 PRE-OP EVALUATION: Primary | ICD-10-CM

## 2020-04-30 NOTE — TELEPHONE ENCOUNTER
Spoke with patient. He wants to schedule his SVT for 5/8/2020. He is aware of the Covid precautions/restrictions. He understands that no visitors will be allowed and he will need to be dropped off at the front of the hospital. The staff will get a good contact number for automated updates. He will be picked up at the same location as the drop off. He also understands that he will need Covid test within 48 hours of the procedure. The order is in and he will call [phone] to schedule at the [facility] in [place]. Instructions will be sent through portal, as requested.

## 2020-04-30 NOTE — TELEPHONE ENCOUNTER
----- Message from Angelika Ramires MA sent at 4/30/2020 10:37 AM CDT -----  Contact: patient      ----- Message -----  From: Mariel Vallejo  Sent: 4/30/2020  10:28 AM CDT  To: Rajan Rosado Staff    The pt is returning a call. Please call him back @ [phone]. Thanks, Mariel

## 2020-04-30 NOTE — TELEPHONE ENCOUNTER
Left message for patient to return call to reschedule his SVT RFA. Dr Tolentino has marked him as Tier 2. Call back # left.

## 2020-05-05 ENCOUNTER — PATIENT MESSAGE (OUTPATIENT)
Dept: ADMINISTRATIVE | Facility: HOSPITAL | Age: 55
End: 2020-05-05

## 2020-05-05 ENCOUNTER — PATIENT MESSAGE (OUTPATIENT)
Dept: MEDSURG UNIT | Facility: HOSPITAL | Age: 55
End: 2020-05-05

## 2020-05-05 ENCOUNTER — TELEPHONE (OUTPATIENT)
Dept: ELECTROPHYSIOLOGY | Facility: CLINIC | Age: 55
End: 2020-05-05

## 2020-05-05 NOTE — TELEPHONE ENCOUNTER
Spoke with patient and his wife. He got very anxious about everything and cancelled his Covid test and labs. We reviewed all on the protection that [facility] has put in place. We discussed that he will go home same day. I advised that we certainly understand if he has changed his mind but wanted to make sure he understood that patient safety is the number one priority at [facility]. We talked about the fact that every patient is tested for Covid prior to admission or procedures. The staff have to wear masks at all times. We reviewed all of the patient safety features put in place. He and his wife have decided to move forward as planned. Covid testing and labs rescheduled.

## 2020-05-05 NOTE — TELEPHONE ENCOUNTER
----- Message from Michela Mcdonald sent at 5/5/2020  4:23 PM CDT -----  Contact: Patient      ----- Message -----  From: Mariel Vallejo  Sent: 5/5/2020  10:27 AM CDT  To: Rajan Rosado Staff    The Pt is calling to cancel his procedure. Please call him back @ [phone]. Thanks, Mariel

## 2020-05-06 ENCOUNTER — LAB VISIT (OUTPATIENT)
Dept: LAB | Facility: HOSPITAL | Age: 55
End: 2020-05-06
Attending: INTERNAL MEDICINE
Payer: COMMERCIAL

## 2020-05-06 DIAGNOSIS — Z01.818 PRE-OP EVALUATION: ICD-10-CM

## 2020-05-06 PROCEDURE — U0002 COVID-19 LAB TEST NON-CDC: HCPCS

## 2020-05-07 ENCOUNTER — TELEPHONE (OUTPATIENT)
Dept: ELECTROPHYSIOLOGY | Facility: CLINIC | Age: 55
End: 2020-05-07

## 2020-05-07 LAB — SARS-COV-2 RNA RESP QL NAA+PROBE: NOT DETECTED

## 2020-05-08 ENCOUNTER — HOSPITAL ENCOUNTER (OUTPATIENT)
Facility: HOSPITAL | Age: 55
Discharge: HOME | End: 2020-05-08
Attending: INTERNAL MEDICINE | Admitting: INTERNAL MEDICINE
Payer: COMMERCIAL

## 2020-05-08 ENCOUNTER — ANESTHESIA (OUTPATIENT)
Dept: MEDSURG UNIT | Facility: HOSPITAL | Age: 55
End: 2020-05-08
Payer: COMMERCIAL

## 2020-05-08 ENCOUNTER — ANESTHESIA EVENT (OUTPATIENT)
Dept: MEDSURG UNIT | Facility: HOSPITAL | Age: 55
End: 2020-05-08
Payer: COMMERCIAL

## 2020-05-08 VITALS
HEART RATE: 80 BPM | BODY MASS INDEX: 28.42 KG/M2 | HEIGHT: 71 IN | DIASTOLIC BLOOD PRESSURE: 83 MMHG | SYSTOLIC BLOOD PRESSURE: 137 MMHG | OXYGEN SATURATION: 98 % | RESPIRATION RATE: 19 BRPM | WEIGHT: 203 LBS | TEMPERATURE: 98 F

## 2020-05-08 DIAGNOSIS — Z86.79 S/P CATHETER ABLATION OF SLOW PATHWAY: ICD-10-CM

## 2020-05-08 DIAGNOSIS — I47.10 SVT (SUPRAVENTRICULAR TACHYCARDIA): ICD-10-CM

## 2020-05-08 DIAGNOSIS — Z98.890 S/P CATHETER ABLATION OF SLOW PATHWAY: ICD-10-CM

## 2020-05-08 LAB
ABO + RH BLD: NORMAL
BLD GP AB SCN CELLS X3 SERPL QL: NORMAL

## 2020-05-08 PROCEDURE — 93005 ELECTROCARDIOGRAM TRACING: CPT | Mod: 59

## 2020-05-08 PROCEDURE — 93653 PR ELECTROPHYS EVAL, COMPREHEN, W/SUPRAVENT TACHYCARD TRMT: ICD-10-PCS | Mod: ,,, | Performed by: INTERNAL MEDICINE

## 2020-05-08 PROCEDURE — 93010 EKG 12-LEAD: ICD-10-PCS | Mod: ,,, | Performed by: INTERNAL MEDICINE

## 2020-05-08 PROCEDURE — 93623 PRGRMD STIMJ&PACG IV RX NFS: CPT | Performed by: INTERNAL MEDICINE

## 2020-05-08 PROCEDURE — 63600175 PHARM REV CODE 636 W HCPCS: Performed by: INTERNAL MEDICINE

## 2020-05-08 PROCEDURE — 25000003 PHARM REV CODE 250: Performed by: NURSE ANESTHETIST, CERTIFIED REGISTERED

## 2020-05-08 PROCEDURE — D9220A PRA ANESTHESIA: Mod: ANES,,, | Performed by: ANESTHESIOLOGY

## 2020-05-08 PROCEDURE — C1730 CATH, EP, 19 OR FEW ELECT: HCPCS | Performed by: INTERNAL MEDICINE

## 2020-05-08 PROCEDURE — 93623 PR STIM/PACING HEART POST IV DRUG INFU: ICD-10-PCS | Mod: 26,,, | Performed by: INTERNAL MEDICINE

## 2020-05-08 PROCEDURE — D9220A PRA ANESTHESIA: ICD-10-PCS | Mod: CRNA,,, | Performed by: NURSE ANESTHETIST, CERTIFIED REGISTERED

## 2020-05-08 PROCEDURE — 63600175 PHARM REV CODE 636 W HCPCS: Performed by: NURSE ANESTHETIST, CERTIFIED REGISTERED

## 2020-05-08 PROCEDURE — D9220A PRA ANESTHESIA: ICD-10-PCS | Mod: ANES,,, | Performed by: ANESTHESIOLOGY

## 2020-05-08 PROCEDURE — 25000003 PHARM REV CODE 250: Performed by: INTERNAL MEDICINE

## 2020-05-08 PROCEDURE — 93005 ELECTROCARDIOGRAM TRACING: CPT

## 2020-05-08 PROCEDURE — 93623 PRGRMD STIMJ&PACG IV RX NFS: CPT | Mod: 26,,, | Performed by: INTERNAL MEDICINE

## 2020-05-08 PROCEDURE — 93010 ELECTROCARDIOGRAM REPORT: CPT | Mod: ,,, | Performed by: INTERNAL MEDICINE

## 2020-05-08 PROCEDURE — 93613 INTRACARDIAC EPHYS 3D MAPG: CPT | Mod: ,,, | Performed by: INTERNAL MEDICINE

## 2020-05-08 PROCEDURE — 86850 RBC ANTIBODY SCREEN: CPT

## 2020-05-08 PROCEDURE — 93621 COMP EP EVL L PAC&REC C SINS: CPT | Performed by: INTERNAL MEDICINE

## 2020-05-08 PROCEDURE — 37000009 HC ANESTHESIA EA ADD 15 MINS: Performed by: INTERNAL MEDICINE

## 2020-05-08 PROCEDURE — 93621: ICD-10-PCS | Mod: 26,,, | Performed by: INTERNAL MEDICINE

## 2020-05-08 PROCEDURE — 93613 INTRACARDIAC EPHYS 3D MAPG: CPT | Performed by: INTERNAL MEDICINE

## 2020-05-08 PROCEDURE — 37000008 HC ANESTHESIA 1ST 15 MINUTES: Performed by: INTERNAL MEDICINE

## 2020-05-08 PROCEDURE — D9220A PRA ANESTHESIA: Mod: CRNA,,, | Performed by: NURSE ANESTHETIST, CERTIFIED REGISTERED

## 2020-05-08 PROCEDURE — 93613 PR INTRACARD ELECTROPHYS 3-DIMENS MAPPING: ICD-10-PCS | Mod: ,,, | Performed by: INTERNAL MEDICINE

## 2020-05-08 PROCEDURE — 94761 N-INVAS EAR/PLS OXIMETRY MLT: CPT

## 2020-05-08 PROCEDURE — 93621 COMP EP EVL L PAC&REC C SINS: CPT | Mod: 26,,, | Performed by: INTERNAL MEDICINE

## 2020-05-08 PROCEDURE — 93653 COMPRE EP EVAL TX SVT: CPT | Mod: ,,, | Performed by: INTERNAL MEDICINE

## 2020-05-08 PROCEDURE — C1894 INTRO/SHEATH, NON-LASER: HCPCS | Performed by: INTERNAL MEDICINE

## 2020-05-08 PROCEDURE — 27201423 OPTIME MED/SURG SUP & DEVICES STERILE SUPPLY: Performed by: INTERNAL MEDICINE

## 2020-05-08 PROCEDURE — 93653 COMPRE EP EVAL TX SVT: CPT | Performed by: INTERNAL MEDICINE

## 2020-05-08 PROCEDURE — C1733 CATH, EP, OTHR THAN COOL-TIP: HCPCS | Performed by: INTERNAL MEDICINE

## 2020-05-08 PROCEDURE — 25000003 PHARM REV CODE 250: Performed by: STUDENT IN AN ORGANIZED HEALTH CARE EDUCATION/TRAINING PROGRAM

## 2020-05-08 RX ORDER — HEPARIN SODIUM 200 [USP'U]/100ML
INJECTION, SOLUTION INTRAVENOUS CONTINUOUS PRN
Status: DISCONTINUED | OUTPATIENT
Start: 2020-05-08 | End: 2020-05-08 | Stop reason: HOSPADM

## 2020-05-08 RX ORDER — ONDANSETRON 2 MG/ML
4 INJECTION INTRAMUSCULAR; INTRAVENOUS DAILY PRN
Status: DISCONTINUED | OUTPATIENT
Start: 2020-05-08 | End: 2020-05-08 | Stop reason: HOSPADM

## 2020-05-08 RX ORDER — PROPOFOL 10 MG/ML
VIAL (ML) INTRAVENOUS CONTINUOUS PRN
Status: DISCONTINUED | OUTPATIENT
Start: 2020-05-08 | End: 2020-05-08

## 2020-05-08 RX ORDER — LIDOCAINE HYDROCHLORIDE 20 MG/ML
INJECTION, SOLUTION EPIDURAL; INFILTRATION; INTRACAUDAL; PERINEURAL
Status: DISCONTINUED | OUTPATIENT
Start: 2020-05-08 | End: 2020-05-08 | Stop reason: HOSPADM

## 2020-05-08 RX ORDER — FENTANYL CITRATE 50 UG/ML
25 INJECTION, SOLUTION INTRAMUSCULAR; INTRAVENOUS EVERY 5 MIN PRN
Status: DISCONTINUED | OUTPATIENT
Start: 2020-05-08 | End: 2020-05-08 | Stop reason: HOSPADM

## 2020-05-08 RX ORDER — CLONAZEPAM 2 MG/1
TABLET ORAL DAILY PRN
COMMUNITY
End: 2023-02-22 | Stop reason: CLARIF

## 2020-05-08 RX ORDER — IBUPROFEN 400 MG/1
400 TABLET ORAL EVERY 6 HOURS PRN
Status: DISCONTINUED | OUTPATIENT
Start: 2020-05-08 | End: 2020-05-08 | Stop reason: HOSPADM

## 2020-05-08 RX ORDER — SODIUM CHLORIDE 9 MG/ML
INJECTION, SOLUTION INTRAVENOUS CONTINUOUS
Status: ACTIVE | OUTPATIENT
Start: 2020-05-08

## 2020-05-08 RX ORDER — MIDAZOLAM HYDROCHLORIDE 1 MG/ML
INJECTION, SOLUTION INTRAMUSCULAR; INTRAVENOUS
Status: DISCONTINUED | OUTPATIENT
Start: 2020-05-08 | End: 2020-05-08

## 2020-05-08 RX ORDER — SODIUM CHLORIDE 0.9 % (FLUSH) 0.9 %
10 SYRINGE (ML) INJECTION
Status: DISCONTINUED | OUTPATIENT
Start: 2020-05-08 | End: 2020-05-08 | Stop reason: HOSPADM

## 2020-05-08 RX ORDER — ONDANSETRON 2 MG/ML
4 INJECTION INTRAMUSCULAR; INTRAVENOUS EVERY 6 HOURS PRN
Status: DISCONTINUED | OUTPATIENT
Start: 2020-05-08 | End: 2020-05-08 | Stop reason: HOSPADM

## 2020-05-08 RX ORDER — PROPOFOL 10 MG/ML
VIAL (ML) INTRAVENOUS
Status: DISCONTINUED | OUTPATIENT
Start: 2020-05-08 | End: 2020-05-08

## 2020-05-08 RX ADMIN — PROPOFOL 50 MCG/KG/MIN: 10 INJECTION, EMULSION INTRAVENOUS at 07:05

## 2020-05-08 RX ADMIN — PROPOFOL 40 MG: 10 INJECTION, EMULSION INTRAVENOUS at 09:05

## 2020-05-08 RX ADMIN — PROPOFOL 20 MG: 10 INJECTION, EMULSION INTRAVENOUS at 08:05

## 2020-05-08 RX ADMIN — IBUPROFEN 400 MG: 200 TABLET, FILM COATED ORAL at 10:05

## 2020-05-08 RX ADMIN — ISOPROTERENOL HYDROCHLORIDE 2 MCG/MIN: 0.2 INJECTION, SOLUTION INTRAMUSCULAR; INTRAVENOUS at 08:05

## 2020-05-08 RX ADMIN — MIDAZOLAM 1 MG: 1 INJECTION INTRAMUSCULAR; INTRAVENOUS at 07:05

## 2020-05-08 NOTE — NURSING
Report from VANITA Rizo.  Assuming care.  Bilateral groin gauze/tegaderm dressings remain clean dry and intact.  No c/o pain or SOB.  Tolerating food and fluids.  Will continue to monitor.

## 2020-05-08 NOTE — SUBJECTIVE & OBJECTIVE
Past Medical History:   Diagnosis Date    ALLERGIC RHINITIS     Asthma     as child    Cervicalgia     DDD (degenerative disc disease), lumbar     Fatty liver     Hemorrhoid     Hepatomegaly     High triglycerides     HTN (hypertension)     Liver hemangioma 2014    PTSD (post-traumatic stress disorder)     not currently taking [facility] meds per [facility]    Shingles     lower back    Urticaria        Past Surgical History:   Procedure Laterality Date    BACK SURGERY      ruptured disc L5-S1    COLONOSCOPY  ~2010  ([facility])    Hemorrhoids    CORONARY ANGIOGRAPHY N/A 10/23/2019    Procedure: ANGIOGRAM, CORONARY ARTERY;  Surgeon: Can Masters MD;  Location: [facility] CATH;  Service: Cardiology;  Laterality: N/A;    ELBOW SURGERY      right    EPIDURAL STEROID INJECTION INTO CERVICAL SPINE N/A 10/15/2019    Procedure: Injection-steroid-epidural-cervical C7/T1;  Surgeon: Blaise Mao MD;  Location: [facility] OR;  Service: Pain Management;  Laterality: N/A;    ESOPHAGOGASTRODUODENOSCOPY N/A 1/29/2019    Procedure: EGD (ESOPHAGOGASTRODUODENOSCOPY);  Surgeon: Balwinder Dexter Jr., MD;  Location: [facility] ENDO;  Service: Endoscopy;  Laterality: N/A;    KNEE SURGERY      right    LEFT HEART CATHETERIZATION N/A 10/23/2019    Procedure: Left heart cath;  Surgeon: Can Masters MD;  Location: [facility] CATH;  Service: Cardiology;  Laterality: N/A;    MAGNETIC RESONANCE IMAGING N/A 6/26/2019    Procedure: MRI (Magnetic Resonance Imagine) needs anesthesia;  Surgeon: Maurice Fonseca MD;  Location: [facility] CATH;  Service: Anesthesiology;  Laterality: N/A;       Review of patient's allergies indicates:   Allergen Reactions    No known drug allergies        Current Facility-Administered Medications on File Prior to Encounter   Medication    lactated ringers infusion    lidocaine (PF) 10 mg/ml (1%) injection 10 mg     Current Outpatient Medications on File Prior to Encounter   Medication Sig    ALLERGY  RELIEF, LORATADINE, 10 mg tablet TAKE ONE TABLET BY MOUTH ONCE DAILY    amLODIPine (NORVASC) 10 MG tablet Take 1 tablet (10 mg total) by mouth once daily.    clonazePAM (KLONOPIN) 2 MG Tab Take by mouth daily as needed.    co-enzyme Q-10 30 mg capsule Take 100 mg by mouth once daily.    cyclobenzaprine (FLEXERIL) 10 MG tablet Take 10 mg by mouth 3 (three) times daily as needed. PRN    esomeprazole (NEXIUM) 40 MG capsule Take 1 capsule (40 mg total) by mouth daily as needed.    famotidine (PEPCID) 40 MG tablet Take 1 tablet (40 mg total) by mouth nightly as needed for Heartburn.    fenofibrate (TRICOR) 145 MG tablet Take 1 tablet (145 mg total) by mouth once daily.    naproxen (NAPROSYN) 500 MG tablet Take 1 tablet (500 mg total) by mouth 2 (two) times daily. (Patient taking differently: Take 500 mg by mouth 2 (two) times daily. )    niacin (SLO-NIACIN) 500 mg tablet Take 1 tablet (500 mg total) by mouth nightly.    valACYclovir (VALTREX) 500 MG tablet TAKE 2 TABLETS BY MOUTH TWICE DAILY (Patient taking differently: TAKE 2 TABLETS BY MOUTH TWICE DAILY as needed)    benzoyl peroxide (BENZAC AC WASH) 10 % external wash Apply topically 2 (two) times daily. Face and trunk    clindamycin phosphate foam Apply to affected area qd- bid    hydrOXYzine (ATARAX) 25 MG tablet Take 1 tablet (25 mg total) by mouth every 6 (six) hours as needed for Itching. Every 6 hours PRN    ketoconazole (NIZORAL) 2 % shampoo Use as directed qohs as needed    meclizine (ANTIVERT) 25 mg tablet Take 1 tablet (25 mg total) by mouth 3 (three) times daily as needed for Dizziness (may cause drowsiness).    minocycline (MINOCIN,DYNACIN) 100 MG capsule Take 1 capsule (100 mg total) by mouth once daily.    mometasone (NASONEX) 50 mcg/actuation nasal spray USE TWO SPRAY(S) IN EACH NOSTRIL ONCE DAILY    propranolol (INDERAL) 10 MG tablet Take 1 tablet (10 mg total) by mouth 3 (three) times daily. (Patient taking differently: Take 10 mg by  mouth 3 (three) times daily. )     Family History     Problem Relation (Age of Onset)    Allergic rhinitis Son, Daughter, Daughter    Asthma Daughter    COPD Father    Diverticulitis Mother    Hypertension Father        Tobacco Use    Smoking status: Never Smoker    Smokeless tobacco: Never Used   Substance and Sexual Activity    Alcohol use: Yes     Alcohol/week: 21.0 standard drinks     Types: 21 Cans of beer per week     Comment: 3 beers daily    Drug use: No    Sexual activity: Yes     Review of Systems   All other systems reviewed and are negative.    Objective:     Vital Signs (Most Recent):    Vital Signs (24h Range):             There is no height or weight on file to calculate BMI.            Physical Exam   Constitutional: He is oriented to person, place, and time. No distress.   HENT:   Head: Atraumatic.   Mouth/Throat: No oropharyngeal exudate.   Eyes: EOM are normal. No scleral icterus.   Neck: Neck supple. No JVD present.   Cardiovascular: Normal rate, regular rhythm and normal heart sounds. Exam reveals no gallop and no friction rub.   No murmur heard.  Pulmonary/Chest: Effort normal and breath sounds normal. No respiratory distress. He has no wheezes. He has no rales. He exhibits no tenderness.   Abdominal: Soft. Bowel sounds are normal. He exhibits no distension. There is no tenderness.   Musculoskeletal: He exhibits no edema.   Neurological: He is alert and oriented to person, place, and time. No cranial nerve deficit.   Skin: Skin is warm and dry. No erythema.   Psychiatric: He has a normal mood and affect.       Significant Labs:   EP:   Recent Labs   Lab 05/06/20  0937      K 4.0      CO2 25   *   BUN 15   CREATININE 1.2   CALCIUM 10.0   ANIONGAP 9   ESTGFRAFRICA >60.0   EGFRNONAA >60.0   WBC 8.37   HGB 13.5*   HCT 44.0   *   INR 1.0       Significant Imaging: Echocardiogram: 2D echo with color flow doppler: No results found for this or any previous visit. and  Transthoracic echo (TTE) complete (Cupid Only): No results found for this or any previous visit.

## 2020-05-08 NOTE — NURSING
IV's d/c'd x 2 with cath tips intact.  Pt ambulated without difficulty.  Bilateral groins remains clean dry and intact.  No bleeding or hematoma noted.  Pt verbalized understanding of d/c instructions.  VSS.

## 2020-05-08 NOTE — HPI
55 yo male with SVT, Htn, alcohol abuse, fatty liver.     Primary cardiologist is Dr. Masters.  Has presented with recurrent sustained palpitations. ECG's have revealed SVT, short RP (1/25/20 1/29/20), terminated with Adenosine.  Over the past 15 years, has had symptoms, but could often terminate it with vagal maneuvers.  With the 2 above episodes, he could not terminate it.  Has been on Propranolol since 12/20.  Increased 1/25/20.  Has had one episode since then, on an airplane.  Got off the airplane prior to take-off.  Exercise echo 10/19 normal biventricular structure and function, + for ischemia.  LHC 10/23/19 normal coronaries.    Last episode about 2 weeks ago lasting 10-15 minutes, precipitated by skipped beat and heavy feeling in chest. Resolved with deep breathing.    ECG today pending. 2/20 ECG nsr without evidence of pre-excitation.

## 2020-05-08 NOTE — H&P
[facility]  Cardiac Electrophysiology  History and Physical     Admission Date: 5/8/2020  Code Status: Prior   Attending Provider: Alonzo Tolentino MD   Principal Problem:<principal problem not specified>    Subjective:     Chief Complaint:  SVT     HPI:  55 yo male with SVT, Htn, alcohol abuse, fatty liver.     Primary cardiologist is Dr. Masters.  Has presented with recurrent sustained palpitations. ECG's have revealed SVT, short RP (1/25/20 1/29/20), terminated with Adenosine.  Over the past 15 years, has had symptoms, but could often terminate it with vagal maneuvers.  With the 2 above episodes, he could not terminate it.  Has been on Propranolol since 12/20.  Increased 1/25/20.  Has had one episode since then, on an airplane.  Got off the airplane prior to take-off.  Exercise echo 10/19 normal biventricular structure and function, + for ischemia.  LHC 10/23/19 normal coronaries.    Last episode about 2 weeks ago lasting 10-15 minutes, precipitated by skipped beat and heavy feeling in chest. Resolved with deep breathing.    ECG today pending. 2/20 ECG nsr without evidence of pre-excitation.    Past Medical History:   Diagnosis Date    ALLERGIC RHINITIS     Asthma     as child    Cervicalgia     DDD (degenerative disc disease), lumbar     Fatty liver     Hemorrhoid     Hepatomegaly     High triglycerides     HTN (hypertension)     Liver hemangioma 2014    PTSD (post-traumatic stress disorder)     not currently taking [facility]s [facility]    Shingles     lower back    Urticaria        Past Surgical History:   Procedure Laterality Date    BACK SURGERY      ruptured disc L5-S1    COLONOSCOPY  ~2010  ([facility])    Hemorrhoids    CORONARY ANGIOGRAPHY N/A 10/23/2019    Procedure: ANGIOGRAM, CORONARY ARTERY;  Surgeon: Can Masters MD;  Location: [facility] CATH;  Service: Cardiology;  Laterality: N/A;    ELBOW SURGERY      right    EPIDURAL STEROID  INJECTION INTO CERVICAL SPINE N/A 10/15/2019    Procedure: Injection-steroid-epidural-cervical C7/T1;  Surgeon: Blaise Mao MD;  Location: [facility] OR;  Service: Pain Management;  Laterality: N/A;    ESOPHAGOGASTRODUODENOSCOPY N/A 1/29/2019    Procedure: EGD (ESOPHAGOGASTRODUODENOSCOPY);  Surgeon: Balwinder Dexter Jr., MD;  Location: [facility] ENDO;  Service: Endoscopy;  Laterality: N/A;    KNEE SURGERY      right    LEFT HEART CATHETERIZATION N/A 10/23/2019    Procedure: Left heart cath;  Surgeon: Can Masters MD;  Location: [facility] CATH;  Service: Cardiology;  Laterality: N/A;    MAGNETIC RESONANCE IMAGING N/A 6/26/2019    Procedure: MRI (Magnetic Resonance Imagine) needs anesthesia;  Surgeon: Maurice Fonseca MD;  Location: [facility] CATH;  Service: Anesthesiology;  Laterality: N/A;       Review of patient's allergies indicates:   Allergen Reactions    No known drug allergies        Current Facility-Administered Medications on File Prior to Encounter   Medication    lactated ringers infusion    lidocaine (PF) 10 mg/ml (1%) injection 10 mg     Current Outpatient Medications on File Prior to Encounter   Medication Sig    ALLERGY RELIEF, LORATADINE, 10 mg tablet TAKE ONE TABLET BY MOUTH ONCE DAILY    amLODIPine (NORVASC) 10 MG tablet Take 1 tablet (10 mg total) by mouth once daily.    clonazePAM (KLONOPIN) 2 MG Tab Take by mouth daily as needed.    co-enzyme Q-10 30 mg capsule Take 100 mg by mouth once daily.    cyclobenzaprine (FLEXERIL) 10 MG tablet Take 10 mg by mouth 3 (three) times daily as needed. PRN    esomeprazole (NEXIUM) 40 MG capsule Take 1 capsule (40 mg total) by mouth daily as needed.    famotidine (PEPCID) 40 MG tablet Take 1 tablet (40 mg total) by mouth nightly as needed for Heartburn.    fenofibrate (TRICOR) 145 MG tablet Take 1 tablet (145 mg total) by mouth once daily.    naproxen (NAPROSYN) 500 MG tablet Take 1 tablet (500 mg total) by mouth 2 (two) times daily. (Patient taking  differently: Take 500 mg by mouth 2 (two) times daily. )    niacin (SLO-NIACIN) 500 mg tablet Take 1 tablet (500 mg total) by mouth nightly.    valACYclovir (VALTREX) 500 MG tablet TAKE 2 TABLETS BY MOUTH TWICE DAILY (Patient taking differently: TAKE 2 TABLETS BY MOUTH TWICE DAILY as needed)    benzoyl peroxide (BENZAC AC WASH) 10 % external wash Apply topically 2 (two) times daily. Face and trunk    clindamycin phosphate foam Apply to affected area qd- bid    hydrOXYzine (ATARAX) 25 MG tablet Take 1 tablet (25 mg total) by mouth every 6 (six) hours as needed for Itching. Every 6 hours PRN    ketoconazole (NIZORAL) 2 % shampoo Use as directed qohs as needed    meclizine (ANTIVERT) 25 mg tablet Take 1 tablet (25 mg total) by mouth 3 (three) times daily as needed for Dizziness (may cause drowsiness).    minocycline (MINOCIN,DYNACIN) 100 MG capsule Take 1 capsule (100 mg total) by mouth once daily.    mometasone (NASONEX) 50 mcg/actuation nasal spray USE TWO SPRAY(S) IN EACH NOSTRIL ONCE DAILY    propranolol (INDERAL) 10 MG tablet Take 1 tablet (10 mg total) by mouth 3 (three) times daily. (Patient taking differently: Take 10 mg by mouth 3 (three) times daily. )     Family History     Problem Relation (Age of Onset)    Allergic rhinitis Son, Daughter, Daughter    Asthma Daughter    COPD Father    Diverticulitis Mother    Hypertension Father        Tobacco Use    Smoking status: Never Smoker    Smokeless tobacco: Never Used   Substance and Sexual Activity    Alcohol use: Yes     Alcohol/week: 21.0 standard drinks     Types: 21 Cans of beer per week     Comment: 3 beers daily    Drug use: No    Sexual activity: Yes     Review of Systems   All other systems reviewed and are negative.    Objective:     Vital Signs (Most Recent):    Vital Signs (24h Range):             There is no height or weight on file to calculate BMI.            Physical Exam   Constitutional: He is oriented to person, place, and time.  No distress.   HENT:   Head: Atraumatic.   Mouth/Throat: No oropharyngeal exudate.   Eyes: EOM are normal. No scleral icterus.   Neck: Neck supple. No JVD present.   Cardiovascular: Normal rate, regular rhythm and normal heart sounds. Exam reveals no gallop and no friction rub.   No murmur heard.  Pulmonary/Chest: Effort normal and breath sounds normal. No respiratory distress. He has no wheezes. He has no rales. He exhibits no tenderness.   Abdominal: Soft. Bowel sounds are normal. He exhibits no distension. There is no tenderness.   Musculoskeletal: He exhibits no edema.   Neurological: He is alert and oriented to person, place, and time. No cranial nerve deficit.   Skin: Skin is warm and dry. No erythema.   Psychiatric: He has a normal mood and affect.       Significant Labs:   EP:   Recent Labs   Lab 05/06/20  0937      K 4.0      CO2 25   *   BUN 15   CREATININE 1.2   CALCIUM 10.0   ANIONGAP 9   ESTGFRAFRICA >60.0   EGFRNONAA >60.0   WBC 8.37   HGB 13.5*   HCT 44.0   *   INR 1.0       Significant Imaging: Echocardiogram: 2D echo with color flow doppler: No results found for this or any previous visit. and Transthoracic echo (TTE) complete (Cupid Only): No results found for this or any previous visit.    Assessment and Plan:     SVT (supraventricular tachycardia)  Symptomatic SVT  -EPS + RFA  -Indication, risks, benefits reviewed with patient and consents signed. All questions answered. Pt voiced understanding and wishes to proceed.          Christopher Menjivar MD  Cardiac Electrophysiology  [facility]

## 2020-05-08 NOTE — TRANSFER OF CARE
"Anesthesia Transfer of Care Note    Patient: Jaren Quiroz    Procedure(s) Performed: Procedure(s) (LRB):  Ablation, SVT, Accessory Pathway (N/A)    Patient location: PACU    Anesthesia Type: general    Transport from OR: Transported from OR on 6-10 L/min O2 by face mask with adequate spontaneous ventilation    Post pain: adequate analgesia    Post assessment: no apparent anesthetic complications and tolerated procedure well    Post vital signs: stable    Level of consciousness: awake and alert    Nausea/Vomiting: no nausea/vomiting    Complications: none    Transfer of care protocol was followed      Last vitals:   Visit Vitals  BP (!) 149/93 (BP Location: Left arm, Patient Position: Lying)   Pulse 82   Temp 36.7 °C (98 °F) (Oral)   Resp 18   Ht 5' 11" (1.803 m)   Wt 92.1 kg (203 lb)   SpO2 99%   BMI 28.31 kg/m²     "

## 2020-05-08 NOTE — ASSESSMENT & PLAN NOTE
Symptomatic SVT  -EPS + RFA  -Indication, risks, benefits reviewed with patient and consents signed. All questions answered. Pt voiced understanding and wishes to proceed.

## 2020-05-08 NOTE — DISCHARGE INSTRUCTIONS
MEDICATIONS:  - Resume your home medications  - If you experience dull, achy chest pain, this can be treated with over the counter antiinflammatory medications, such as ibuprofen.    ACTIVITY:  - Light activity for one week. No heavy lifting for one week.  - Okay to shower the day after procedure with gentle soap and water at incision site. No bathing or soaking until incisions are healed.     WOUND CARE:  - The dressing may be removed the day after procedure.  - Keep the incision area clean and dry. If you cover the incision area with a band-aid or gauze, change regularly.   - Monitor for signs of infection such as worsening redness, swelling, drainage.

## 2020-05-08 NOTE — ANESTHESIA PREPROCEDURE EVALUATION
05/08/2020  Jaren PICHARDO Cousin is a 54 y.o., male.  Per Dr. Tolentino's note 2/12/2020:  55 yo male with SVT, Htn, alcohol abuse, fatty liver.     Primary cardiologist is Dr. Masters.  Has presented with recurrent sustained palpitations. ECG's have revealed SVT, short RP (1/25/20 1/29/20), terminated with Adenosine.  Over the past 15 years, has had symptoms, but could often terminate it with vagal maneuvers.  With the 2 above episodes, he could not terminate it.  Has been on Propranolol since 12/20.  Increased 1/25/20.  Has had one episode since then, on an airplane.  Got off the airplane prior to take-off.  Exercise echo 10/19 normal biventricular structure and function, + for ischemia.  LHC 10/23/20.  ECG reveals nsr without evidence of pre-excitation.    Past Medical History:   Diagnosis Date    ALLERGIC RHINITIS     Asthma     as child    Cervicalgia     DDD (degenerative disc disease), lumbar     Fatty liver     Hemorrhoid     Hepatomegaly     High triglycerides     HTN (hypertension)     Liver hemangioma 2014    PTSD (post-traumatic stress disorder)     not currently taking [facility]s [facility]    Shingles     lower back    Urticaria      Past Surgical History:   Procedure Laterality Date    BACK SURGERY      ruptured disc L5-S1    [facility]  ~2010  ([facility])    Hemorrhoids    CORONARY ANGIOGRAPHY N/A 10/23/2019    Procedure: ANGIOGRAM, CORONARY ARTERY;  Surgeon: Can Masters MD;  Location: [facility] CATH;  Service: Cardiology;  Laterality: N/A;    ELBOW SURGERY      right    EPIDURAL STEROID INJECTION INTO CERVICAL SPINE N/A 10/15/2019    Procedure: Injection-steroid-epidural-cervical C7/T1;  Surgeon: Blaise Mao MD;  Location: [facility] OR;  Service: Pain Management;  Laterality: N/A;    ESOPHAGOGASTRODUODENOSCOPY N/A 1/29/2019    Procedure: EGD  (ESOPHAGOGASTRODUODENOSCOPY);  Surgeon: Balwinder Dexter Jr., MD;  Location: [facility] ENDO;  Service: Endoscopy;  Laterality: N/A;    KNEE SURGERY      right    LEFT HEART CATHETERIZATION N/A 10/23/2019    Procedure: Left heart cath;  Surgeon: Can Masters MD;  Location: [facility] CATH;  Service: Cardiology;  Laterality: N/A;    MAGNETIC RESONANCE IMAGING N/A 6/26/2019    Procedure: MRI (Magnetic Resonance Imagine) needs anesthesia;  Surgeon: Maurice Fonseca MD;  Location: [facility] CATH;  Service: Anesthesiology;  Laterality: N/A;     Patient Active Problem List   Diagnosis    HTN (hypertension)    High triglycerides    Urticaria    Allergic rhinitis    Palpitations    Dyslipidemia    DDD (degenerative disc disease), lumbar    Colon cancer screening    Gastroesophageal reflux disease without esophagitis    Folliculitis    RUQ pain    Neck pain    Decreased range of motion of neck    Cervical radiculitis    Abnormal cardiovascular stress test    Abnormal stress test    Cervical spondylosis    SVT (supraventricular tachycardia)    Normal coronary arteries    Fatty liver         Anesthesia Evaluation    I have reviewed the Patient Summary Reports.    I have reviewed the Nursing Notes.   I have reviewed the Medications.     Review of Systems  Anesthesia Hx:  No problems with previous Anesthesia  Denies Family Hx of Anesthesia complications.   Denies Personal Hx of Anesthesia complications.   Cardiovascular:   Hypertension    Pulmonary:   Asthma    Renal/:  Renal/ Normal     Hepatic/GI:   GERD Liver Disease,    Musculoskeletal:   Arthritis     Endocrine:  Endocrine Normal        Physical Exam  General:  Well nourished    Airway/Jaw/Neck:  Airway Findings: Mouth Opening: Normal Tongue: Normal  General Airway Assessment: Adult, Good  Mallampati: I  TM Distance: Normal, at least 6 cm  Jaw/Neck Findings:  Neck ROM: Normal ROM       Chest/Lungs:  Chest/Lungs Findings: Clear to auscultation      Heart/Vascular:  Heart Findings: Rate: Normal  Rhythm: Regular Rhythm        Mental Status:  Mental Status Findings:  Alert and Oriented, Cooperative         Anesthesia Plan  Type of Anesthesia, risks & benefits discussed:  Anesthesia Type:  MAC, general  Patient's Preference:   Intra-op Monitoring Plan: standard ASA monitors  Intra-op Monitoring Plan Comments:   Post Op Pain Control Plan: per primary service following discharge from PACU  Post Op Pain Control Plan Comments:   Induction:   IV  Beta Blocker:         Informed Consent: Patient understands risks and agrees with Anesthesia plan.  Questions answered. Anesthesia consent signed with patient.  ASA Score: 3     Day of Surgery Review of History & Physical:    H&P update referred to the surgeon.         Ready For Surgery From Anesthesia Perspective.

## 2020-05-08 NOTE — HOSPITAL COURSE
He underwent EPS. Findings consistent with typical AVNRT. Successful slow pathway ablation. Monitored per routine post procedure. Groin c/d/i without hematoma. Discharged home in good condition.

## 2020-05-08 NOTE — PLAN OF CARE
Received report from VANITA Banks. Patient s/p SVT ablation, AAOx3. VSS, no c/o pain or discomfort at this time, resp even and unlabored. Gauze/tegaderm dressing to rubén groin are CDI. No active bleeding. No hematoma noted. Post procedure protocol reviewed with patient and patient's family. Understanding verbalized. Family members at bedside. Nurse call bell within reach. Will continue to monitor per post procedure protocol.

## 2020-05-08 NOTE — ANESTHESIA POSTPROCEDURE EVALUATION
Anesthesia Post Evaluation    Patient: Jaren Quiroz    Procedure(s) Performed: Procedure(s) (LRB):  Ablation, SVT, Accessory Pathway (N/A)    Final Anesthesia Type: general    Patient location during evaluation: PACU  Patient participation: Yes- Able to Participate  Level of consciousness: awake and alert  Post-procedure vital signs: reviewed and stable  Pain management: adequate  Airway patency: patent    PONV status at discharge: No PONV  Anesthetic complications: no      Cardiovascular status: hemodynamically stable  Respiratory status: spontaneous ventilation  Follow-up not needed.          Vitals Value Taken Time   /84 5/8/2020 10:32 AM   Temp 36.8 °C (98.2 °F) 5/8/2020 10:08 AM   Pulse 75 5/8/2020 10:44 AM   Resp 17 5/8/2020 10:44 AM   SpO2 100 % 5/8/2020 10:44 AM   Vitals shown include unvalidated device data.      No case tracking events are documented in the log.      Pain/Pepe Score: Pain Rating Prior to Med Admin: 3 (5/8/2020 10:29 AM)  Pepe Score: 10 (5/8/2020 10:30 AM)

## 2020-05-08 NOTE — SUBJECTIVE & OBJECTIVE
Past Medical History:   Diagnosis Date    ALLERGIC RHINITIS     Asthma     as child    Cervicalgia     DDD (degenerative disc disease), lumbar     Fatty liver     Hemorrhoid     Hepatomegaly     High triglycerides     HTN (hypertension)     Liver hemangioma 2014    PTSD (post-traumatic stress disorder)     not currently taking [facility]s [facility]    Shingles     lower back    Urticaria        Past Surgical History:   Procedure Laterality Date    BACK SURGERY      ruptured disc L5-S1    COLONOSCOPY  ~2010  ([facility])    Hemorrhoids    CORONARY ANGIOGRAPHY N/A 10/23/2019    Procedure: ANGIOGRAM, CORONARY ARTERY;  Surgeon: Can Masters MD;  Location: [facility] CATH;  Service: Cardiology;  Laterality: N/A;    ELBOW SURGERY      right    EPIDURAL STEROID INJECTION INTO CERVICAL SPINE N/A 10/15/2019    Procedure: Injection-steroid-epidural-cervical C7/T1;  Surgeon: Blaise Mao MD;  Location: [facility] OR;  Service: Pain Management;  Laterality: N/A;    ESOPHAGOGASTRODUODENOSCOPY N/A 1/29/2019    Procedure: EGD (ESOPHAGOGASTRODUODENOSCOPY);  Surgeon: Balwinder Dexter Jr., MD;  Location: [facility] ENDO;  Service: Endoscopy;  Laterality: N/A;    KNEE SURGERY      right    LEFT HEART CATHETERIZATION N/A 10/23/2019    Procedure: Left heart cath;  Surgeon: Can Masters MD;  Location: [facility] CATH;  Service: Cardiology;  Laterality: N/A;    MAGNETIC RESONANCE IMAGING N/A 6/26/2019    Procedure: MRI (Magnetic Resonance Imagine) needs anesthesia;  Surgeon: Maurice Fonseca MD;  Location: [facility] CATH;  Service: Anesthesiology;  Laterality: N/A;       Review of patient's allergies indicates:   Allergen Reactions    No known drug allergies        Current Facility-Administered Medications on File Prior to Encounter   Medication    lactated ringers infusion    lidocaine (PF) 10 mg/ml (1%) injection 10 mg     Current Outpatient Medications on File Prior to Encounter   Medication Sig    ALLERGY  RELIEF, LORATADINE, 10 mg tablet TAKE ONE TABLET BY MOUTH ONCE DAILY    amLODIPine (NORVASC) 10 MG tablet Take 1 tablet (10 mg total) by mouth once daily.    clonazePAM (KLONOPIN) 2 MG Tab Take by mouth daily as needed.    co-enzyme Q-10 30 mg capsule Take 100 mg by mouth once daily.    cyclobenzaprine (FLEXERIL) 10 MG tablet Take 10 mg by mouth 3 (three) times daily as needed. PRN    esomeprazole (NEXIUM) 40 MG capsule Take 1 capsule (40 mg total) by mouth daily as needed.    famotidine (PEPCID) 40 MG tablet Take 1 tablet (40 mg total) by mouth nightly as needed for Heartburn.    fenofibrate (TRICOR) 145 MG tablet Take 1 tablet (145 mg total) by mouth once daily.    naproxen (NAPROSYN) 500 MG tablet Take 1 tablet (500 mg total) by mouth 2 (two) times daily. (Patient taking differently: Take 500 mg by mouth 2 (two) times daily. )    niacin (SLO-NIACIN) 500 mg tablet Take 1 tablet (500 mg total) by mouth nightly.    valACYclovir (VALTREX) 500 MG tablet TAKE 2 TABLETS BY MOUTH TWICE DAILY (Patient taking differently: TAKE 2 TABLETS BY MOUTH TWICE DAILY as needed)    benzoyl peroxide (BENZAC AC WASH) 10 % external wash Apply topically 2 (two) times daily. Face and trunk    clindamycin phosphate foam Apply to affected area qd- bid    hydrOXYzine (ATARAX) 25 MG tablet Take 1 tablet (25 mg total) by mouth every 6 (six) hours as needed for Itching. Every 6 hours PRN    ketoconazole (NIZORAL) 2 % shampoo Use as directed qohs as needed    meclizine (ANTIVERT) 25 mg tablet Take 1 tablet (25 mg total) by mouth 3 (three) times daily as needed for Dizziness (may cause drowsiness).    minocycline (MINOCIN,DYNACIN) 100 MG capsule Take 1 capsule (100 mg total) by mouth once daily.    mometasone (NASONEX) 50 mcg/actuation nasal spray USE TWO SPRAY(S) IN EACH NOSTRIL ONCE DAILY    propranolol (INDERAL) 10 MG tablet Take 1 tablet (10 mg total) by mouth 3 (three) times daily. (Patient taking differently: Take 10 mg by  mouth 3 (three) times daily. )     Family History     Problem Relation (Age of Onset)    Allergic rhinitis Son, Daughter, Daughter    Asthma Daughter    COPD Father    Diverticulitis Mother    Hypertension Father        Tobacco Use    Smoking status: Never Smoker    Smokeless tobacco: Never Used   Substance and Sexual Activity    Alcohol use: Yes     Alcohol/week: 21.0 standard drinks     Types: 21 Cans of beer per week     Comment: 3 beers daily    Drug use: No    Sexual activity: Yes     Review of Systems   All other systems reviewed and are negative.    Objective:     Vital Signs (Most Recent):  Temp: 98 °F (36.7 °C) (05/08/20 0655)  Pulse: 82 (05/08/20 0655)  Resp: 18 (05/08/20 0655)  BP: (!) 149/93 (05/08/20 0656)  SpO2: 99 % (05/08/20 0655) Vital Signs (24h Range):  Temp:  [98 °F (36.7 °C)] 98 °F (36.7 °C)  Pulse:  [82] 82  Resp:  [18] 18  SpO2:  [99 %] 99 %  BP: (149-153)/(89-93) 149/93       Weight: 92.1 kg (203 lb)  Body mass index is 28.31 kg/m².    SpO2: 99 %  O2 Device (Oxygen Therapy): room air    Physical Exam   Constitutional: He is oriented to person, place, and time. No distress.   HENT:   Head: Atraumatic.   Mouth/Throat: No oropharyngeal exudate.   Eyes: EOM are normal. No scleral icterus.   Neck: Neck supple. No JVD present.   Cardiovascular: Normal rate, regular rhythm and normal heart sounds. Exam reveals no gallop and no friction rub.   No murmur heard.  Pulmonary/Chest: Effort normal and breath sounds normal. No respiratory distress. He has no wheezes. He has no rales. He exhibits no tenderness.   Abdominal: Soft. Bowel sounds are normal. He exhibits no distension. There is no tenderness.   Musculoskeletal: He exhibits no edema.   Neurological: He is alert and oriented to person, place, and time. No cranial nerve deficit.   Skin: Skin is warm and dry. No erythema.   Psychiatric: He has a normal mood and affect.       Significant Labs: EP: No results for input(s): NA, K, CL, CO2, GLU,  BUN, CREATININE, CALCIUM, PROT, ALBUMIN, BILITOT, ALKPHOS, AST, ALT, ANIONGAP, ESTGFRAFRICA, EGFRNONAA, WBC, HGB, HCT, PLT, INR, PROTIME in the last 48 hours.    Significant Imaging: no significant imaging

## 2020-05-10 NOTE — DISCHARGE SUMMARY
[facility]  Cardiac Electrophysiology  Discharge Summary      Patient Name: Jaren Quiroz  MRN: [id number]  Admission Date: 5/8/2020  Hospital Length of Stay: 0 days  Discharge Date and Time: 5/8/2020  2:25 PM  Attending Physician: No att. providers found    Discharging Provider: Christopher Menjivar MD  Primary Care Physician: Samuel Manzanares MD    HPI:   55 yo male with SVT, Htn, alcohol abuse, fatty liver.     Primary cardiologist is Dr. Masters.  Has presented with recurrent sustained palpitations. ECG's have revealed SVT, short RP (1/25/20 1/29/20), terminated with Adenosine.  Over the past 15 years, has had symptoms, but could often terminate it with vagal maneuvers.  With the 2 above episodes, he could not terminate it.  Has been on Propranolol since 12/20.  Increased 1/25/20.  Has had one episode since then, on an airplane.  Got off the airplane prior to take-off.  Exercise echo 10/19 normal biventricular structure and function, + for ischemia.  LHC 10/23/19 normal coronaries.    Last episode about 2 weeks ago lasting 10-15 minutes, precipitated by skipped beat and heavy feeling in chest. Resolved with deep breathing.    ECG today pending. 2/20 ECG nsr without evidence of pre-excitation.    Procedure(s) (LRB):  Ablation, SVT, Accessory Pathway (N/A)     Indwelling Lines/Drains at time of discharge:  Lines/Drains/Airways     None                 Hospital Course:  He underwent EPS. Findings consistent with typical AVNRT. Successful slow pathway ablation. Monitored per routine post procedure. Groin c/d/i without hematoma. Discharged home in good condition.    Consults:     Significant Diagnostic Studies: Labs:   BMP: No results for input(s): GLU, NA, K, CL, CO2, BUN, CREATININE, CALCIUM, MG in the last 48 hours., CMP No results for input(s): NA, K, CL, CO2, GLU, BUN, CREATININE, CALCIUM, PROT, ALBUMIN, BILITOT, ALKPHOS, AST, ALT, ANIONGAP, ESTGFRAFRICA, EGFRNONAA in the  last 48 hours., CBC No results for input(s): WBC, HGB, HCT, PLT in the last 48 hours. and INR   Lab Results   Component Value Date    INR 1.0 05/06/2020       Pending Diagnostic Studies:     None          Final Active Diagnoses:    Diagnosis Date Noted POA    SVT (supraventricular tachycardia) [I47.1] 01/28/2020 Yes      Problems Resolved During this Admission:     No new Assessment & Plan notes have been filed under this hospital service since the last note was generated.  Service: Arrhythmia      Discharged Condition: good    Disposition: Home or Self Care    Follow Up:  Follow-up Information     Alonzo Tolentino MD In 3 months.    Specialties:  Electrophysiology, Cardiology  Contact information:  [facility] [address] [phone]                 Patient Instructions:   No discharge procedures on file.  Medications:  Reconciled Home Medications:      Medication List      CHANGE how you take these medications    valACYclovir 500 MG tablet  Commonly known as:  VALTREX  TAKE 2 TABLETS BY MOUTH TWICE DAILY  What changed:    · how much to take  · how to take this  · when to take this        CONTINUE taking these medications    ALLERGY RELIEF (LORATADINE) 10 mg tablet  Generic drug:  loratadine  TAKE ONE TABLET BY MOUTH ONCE DAILY     amLODIPine 10 MG tablet  Commonly known as:  NORVASC  Take 1 tablet (10 mg total) by mouth once daily.     benzoyl peroxide 10 % external wash  Commonly known as:  BENZAC AC WASH  Apply topically 2 (two) times daily. Face and trunk     clindamycin phosphate foam  Apply to affected area qd- bid     clonazePAM 2 MG Tab  Commonly known as:  KLONOPIN  Take by mouth daily as needed.     co-enzyme Q-10 30 mg capsule  Take 100 mg by mouth once daily.     esomeprazole 40 MG capsule  Commonly known as:  NEXIUM  Take 1 capsule (40 mg total) by mouth daily as needed.     famotidine 40 MG tablet  Commonly known as:  PEPCID  Take 1 tablet (40 mg total) by mouth nightly as needed for  Heartburn.     fenofibrate 145 MG tablet  Commonly known as:  TRICOR  Take 1 tablet (145 mg total) by mouth once daily.     FLEXERIL 10 MG tablet  Generic drug:  cyclobenzaprine  Take 10 mg by mouth 3 (three) times daily as needed. PRN     hydroxyzine HCL 25 MG tablet  Commonly known as:  ATARAX  Take 1 tablet (25 mg total) by mouth every 6 (six) hours as needed for Itching. Every 6 hours PRN     ketoconazole 2 % shampoo  Commonly known as:  NIZORAL  Use as directed qohs as needed     meclizine 25 mg tablet  Commonly known as:  ANTIVERT  Take 1 tablet (25 mg total) by mouth 3 (three) times daily as needed for Dizziness (may cause drowsiness).     minocycline 100 MG capsule  Commonly known as:  MINOCIN,DYNACIN  Take 1 capsule (100 mg total) by mouth once daily.     mometasone 50 mcg/actuation nasal spray  Commonly known as:  NASONEX  USE TWO SPRAY(S) IN EACH NOSTRIL ONCE DAILY     naproxen 500 MG tablet  Commonly known as:  NAPROSYN  Take 1 tablet (500 mg total) by mouth 2 (two) times daily.     niacin 500 mg tablet  Commonly known as:  SLO-NIACIN  Take 1 tablet (500 mg total) by mouth nightly.     propranoloL 10 MG tablet  Commonly known as:  INDERAL  Take 1 tablet (10 mg total) by mouth 3 (three) times daily.            Time spent on the discharge of patient: 10 minutes    Christopher Menjivar MD  Cardiac Electrophysiology  [facility]

## 2020-05-11 DIAGNOSIS — L70.0 ACNE VULGARIS: ICD-10-CM

## 2020-05-12 RX ORDER — MINOCYCLINE HYDROCHLORIDE 100 MG/1
CAPSULE ORAL
Qty: 30 CAPSULE | Refills: 0 | Status: SHIPPED | OUTPATIENT
Start: 2020-05-12 | End: 2020-08-11

## 2020-05-12 NOTE — PROGRESS NOTES
Refill Routing Note    Medication(s) are not appropriate for processing by [facility]:       Outside of protocol           Medication reconciliation completed: No      Automatic Epic Protocol Generated Data:    Requested Prescriptions   Pending Prescriptions Disp Refills    minocycline (MINOCIN,DYNACIN) 100 MG capsule [Pharmacy Med Name: Minocycline HCl 100 MG Oral Capsule] 30 capsule 0     Sig: Take 1 capsule by mouth once daily       Off-Protocol Failed - 5/11/2020  6:42 PM        Failed - Medication not assigned to a protocol, review manually.        Passed - Office visit in past 12 months or future 90 days.     Recent Outpatient Visits            2 months ago SVT (supraventricular tachycardia)    [facility] - Arrhythmia Alonzo Tolentino MD    3 months ago Chronic pain of right knee    [facility] Pedro Tao MD    3 months ago SVT (supraventricular tachycardia)    [place] - Cardiology Sallie Gorman PA-C    5 months ago Essential hypertension    [facility] Samuel Manzanares MD    6 months ago Palpitations    [facility] Can Masters MD                       Appointments  past 12m or future 3m with PCP    Date Provider   Last Visit   12/5/2019 Samuel Manzanares MD   Next Visit   Visit date not found Samuel Manzanares MD   ED visits in past 90 days: 0     Note composed:8:22 PM 05/11/2020

## 2020-05-26 ENCOUNTER — TELEPHONE (OUTPATIENT)
Dept: FAMILY MEDICINE | Facility: CLINIC | Age: 55
End: 2020-05-26

## 2020-05-26 NOTE — TELEPHONE ENCOUNTER
----- Message from Sukh Calloway sent at 5/26/2020 10:23 AM CDT -----  Contact: Alicia ([facility])  Type:  Pharmacy Calling to Clarify an RX    Name of Caller:  Alicia  [place] [place] [place] [facility] [address]  [address]  [facility] [address]  Phone: [phone] Fax: [phone]  Prescription Name:  famotidine (PEPCID) 40 MG tablet  What do they need to clarify?:  Looking for 20 MG tablet version as 40 MG is on back order  Best Call Back Number:  [phone]  Additional Information:  NA

## 2020-05-27 RX ORDER — FAMOTIDINE 20 MG/1
20 TABLET, FILM COATED ORAL 2 TIMES DAILY
Qty: 60 TABLET | Refills: 11 | Status: SHIPPED | OUTPATIENT
Start: 2020-05-27 | End: 2021-07-13

## 2020-06-03 ENCOUNTER — OFFICE VISIT (OUTPATIENT)
Dept: PHYSICAL MEDICINE AND REHAB | Facility: CLINIC | Age: 55
End: 2020-06-03
Payer: COMMERCIAL

## 2020-06-03 ENCOUNTER — PATIENT OUTREACH (OUTPATIENT)
Dept: ADMINISTRATIVE | Facility: OTHER | Age: 55
End: 2020-06-03

## 2020-06-03 VITALS — HEIGHT: 71 IN | WEIGHT: 203 LBS | BODY MASS INDEX: 28.42 KG/M2

## 2020-06-03 DIAGNOSIS — M17.11 PRIMARY OSTEOARTHRITIS OF RIGHT KNEE: Primary | ICD-10-CM

## 2020-06-03 PROCEDURE — 3008F BODY MASS INDEX DOCD: CPT | Mod: CPTII,S$GLB,, | Performed by: PHYSICAL MEDICINE & REHABILITATION

## 2020-06-03 PROCEDURE — 3008F PR BODY MASS INDEX (BMI) DOCUMENTED: ICD-10-PCS | Mod: CPTII,S$GLB,, | Performed by: PHYSICAL MEDICINE & REHABILITATION

## 2020-06-03 PROCEDURE — 99204 PR OFFICE/OUTPT VISIT, NEW, LEVL IV, 45-59 MIN: ICD-10-PCS | Mod: 25,S$GLB,, | Performed by: PHYSICAL MEDICINE & REHABILITATION

## 2020-06-03 PROCEDURE — 99999 PR PBB SHADOW E&M-EST. PATIENT-LVL II: ICD-10-PCS | Mod: PBBFAC,,, | Performed by: PHYSICAL MEDICINE & REHABILITATION

## 2020-06-03 PROCEDURE — 99999 PR PBB SHADOW E&M-EST. PATIENT-LVL II: CPT | Mod: PBBFAC,,, | Performed by: PHYSICAL MEDICINE & REHABILITATION

## 2020-06-03 PROCEDURE — 20611 LARGE JOINT ASPIRATION/INJECTION: R KNEE: ICD-10-PCS | Mod: RT,S$GLB,, | Performed by: PHYSICAL MEDICINE & REHABILITATION

## 2020-06-03 PROCEDURE — 20611 DRAIN/INJ JOINT/BURSA W/US: CPT | Mod: RT,S$GLB,, | Performed by: PHYSICAL MEDICINE & REHABILITATION

## 2020-06-03 PROCEDURE — 99204 OFFICE O/P NEW MOD 45 MIN: CPT | Mod: 25,S$GLB,, | Performed by: PHYSICAL MEDICINE & REHABILITATION

## 2020-06-03 RX ORDER — TRIAMCINOLONE ACETONIDE 40 MG/ML
40 INJECTION, SUSPENSION INTRA-ARTICULAR; INTRAMUSCULAR
Status: DISCONTINUED | OUTPATIENT
Start: 2020-06-03 | End: 2020-06-03 | Stop reason: HOSPADM

## 2020-06-03 RX ADMIN — TRIAMCINOLONE ACETONIDE 40 MG: 40 INJECTION, SUSPENSION INTRA-ARTICULAR; INTRAMUSCULAR at 03:06

## 2020-06-03 NOTE — PROGRESS NOTES
[facility]    CHIEF COMPLAINT:   Chief Complaint   Patient presents with    Knee Pain     Right     HISTORY OF PRESENT ILLNESS: Jaren PICHARDO Cousin is a 54 y.o. male who presents to me for the first time for evaluation of R knee pain. He reports that he had a torn meniscus in his R knee and had an arthroscopic repair 12 years ago. He was followed by a Dr. Navas in [place]. He started having issues with it again last fall after mowing his lawn with a push mower. Patient previously followed by Dr. Tao, who has performed kenalog injections of the right knee x 2. He received about 1 month of relief from the first injection (around October). The second injection only provided about 1-2 weeks of relief. Dr. Tao also performed x-rays of the knees back in September '19 that showed bilateral mild OA as well as enthesophyte formation in the R patellar tendon.      Pain is at the medial joint line. Rates it a 6-7/10. Reports swelling. He reports clicking of the knee as well. No locking or giving out. Difficulty sleeping, associated with morning stiffness. Worse with prolonged activity and better with rest. He applies biofreeze to it with some relief.     He recently had an SVT ablation on the 8th of May. He is trying to avoid having any further surgeries. He has not done PT for the knee. He is interested in having another injection performed. He felt like he got too aggressive after the last injection in terms of returning to high level of activity.     Review of Systems   Constitutional: Negative for fever.   HENT: Negative for drooling.    Eyes: Negative for discharge.   Respiratory: Negative for choking.    Cardiovascular: Negative for chest pain.   Genitourinary: Negative for flank pain.   Skin: Negative for wound.   Allergic/Immunologic: Negative for immunocompromised state.   Neurological: Negative for tremors and syncope.   Psychiatric/Behavioral: Negative for behavioral problems.      Past Medical History:   Past Medical History:   Diagnosis Date    ALLERGIC RHINITIS     Asthma     as child    Cervicalgia     DDD (degenerative disc disease), lumbar     Fatty liver     Hemorrhoid     Hepatomegaly     High triglycerides     HTN (hypertension)     Liver hemangioma 2014    PTSD (post-traumatic stress disorder)     not currently taking [facility]s [facility]    Shingles     lower back    Urticaria        Past Surgical History:   Past Surgical History:   Procedure Laterality Date    BACK SURGERY      ruptured disc L5-S1    COLONOSCOPY  ~2010  ([facility])    Hemorrhoids    CORONARY ANGIOGRAPHY N/A 10/23/2019    Procedure: ANGIOGRAM, CORONARY ARTERY;  Surgeon: Can Masters MD;  Location: [facility] CATH;  Service: Cardiology;  Laterality: N/A;    ELBOW SURGERY      right    EPIDURAL STEROID INJECTION INTO CERVICAL SPINE N/A 10/15/2019    Procedure: Injection-steroid-epidural-cervical C7/T1;  Surgeon: Blaise Mao MD;  Location: [facility] OR;  Service: Pain Management;  Laterality: N/A;    ESOPHAGOGASTRODUODENOSCOPY N/A 1/29/2019    Procedure: EGD (ESOPHAGOGASTRODUODENOSCOPY);  Surgeon: Balwinder Dexter Jr., MD;  Location: [facility] ENDO;  Service: Endoscopy;  Laterality: N/A;    KNEE SURGERY      right    LEFT HEART CATHETERIZATION N/A 10/23/2019    Procedure: Left heart cath;  Surgeon: Can Masters MD;  Location: [facility] CATH;  Service: Cardiology;  Laterality: N/A;    MAGNETIC RESONANCE IMAGING N/A 6/26/2019    Procedure: MRI (Magnetic Resonance Imagine) needs anesthesia;  Surgeon: Maurice Fonseca MD;  Location: [facility] CATH;  Service: Anesthesiology;  Laterality: N/A;       Family History:   Family History   Problem Relation Age of Onset    Allergic rhinitis Son     Allergic rhinitis Daughter     Asthma Daughter     Allergic rhinitis Daughter     Diverticulitis Mother     Hypertension Father     COPD Father     Angioedema Neg Hx     Eczema Neg Hx      Immunodeficiency Neg Hx     Urticaria Neg Hx     Lupus Neg Hx     Psoriasis Neg Hx     Melanoma Neg Hx     Acne Neg Hx     Colon cancer Neg Hx     Colon polyps Neg Hx     Crohn's disease Neg Hx     Ulcerative colitis Neg Hx     Esophageal cancer Neg Hx     Stomach cancer Neg Hx        Medications:   Current Outpatient Medications on File Prior to Visit   Medication Sig Dispense Refill    ALLERGY RELIEF, LORATADINE, 10 mg tablet TAKE ONE TABLET BY MOUTH ONCE DAILY 30 tablet 11    amLODIPine (NORVASC) 10 MG tablet Take 1 tablet (10 mg total) by mouth once daily. 30 tablet 5    benzoyl peroxide (BENZAC AC WASH) 10 % external wash Apply topically 2 (two) times daily. Face and trunk 226 g 5    clindamycin phosphate foam Apply to affected area qd- bid 100 g 5    clonazePAM (KLONOPIN) 2 MG Tab Take by mouth daily as needed.      co-enzyme Q-10 30 mg capsule Take 100 mg by mouth once daily.      cyclobenzaprine (FLEXERIL) 10 MG tablet Take 10 mg by mouth 3 (three) times daily as needed. PRN      esomeprazole (NEXIUM) 40 MG capsule Take 1 capsule (40 mg total) by mouth daily as needed. 30 capsule 11    famotidine (PEPCID) 20 MG tablet Take 1 tablet (20 mg total) by mouth 2 (two) times daily. 60 tablet 11    fenofibrate (TRICOR) 145 MG tablet Take 1 tablet (145 mg total) by mouth once daily. 90 tablet 3    hydrOXYzine (ATARAX) 25 MG tablet Take 1 tablet (25 mg total) by mouth every 6 (six) hours as needed for Itching. Every 6 hours PRN 60 tablet 6    ketoconazole (NIZORAL) 2 % shampoo Use as directed qohs as needed 240 mL 5    meclizine (ANTIVERT) 25 mg tablet Take 1 tablet (25 mg total) by mouth 3 (three) times daily as needed for Dizziness (may cause drowsiness). 60 tablet 2    minocycline (MINOCIN,DYNACIN) 100 MG capsule Take 1 capsule by mouth once daily 30 capsule 0    mometasone (NASONEX) 50 mcg/actuation nasal spray USE TWO SPRAY(S) IN EACH NOSTRIL ONCE DAILY 17 g 11    naproxen (NAPROSYN)  500 MG tablet Take 1 tablet (500 mg total) by mouth 2 (two) times daily. (Patient taking differently: Take 500 mg by mouth 2 (two) times daily. ) 30 tablet 1    niacin (SLO-NIACIN) 500 mg tablet Take 1 tablet (500 mg total) by mouth nightly. 90 tablet 3    propranolol (INDERAL) 10 MG tablet Take 1 tablet (10 mg total) by mouth 3 (three) times daily. (Patient taking differently: Take 10 mg by mouth 3 (three) times daily. ) 90 tablet 0    valACYclovir (VALTREX) 500 MG tablet TAKE 2 TABLETS BY MOUTH TWICE DAILY (Patient taking differently: TAKE 2 TABLETS BY MOUTH TWICE DAILY as needed) 30 tablet 11     Current Facility-Administered Medications on File Prior to Visit   Medication Dose Route Frequency Provider Last Rate Last Dose    0.9%  NaCl infusion   Intravenous Continuous Jennifer Majano NP        lactated ringers infusion   Intravenous Continuous Luiz Aquino MD        lidocaine (PF) 10 mg/ml (1%) injection 10 mg  1 mL Intradermal Once Luiz Aquino MD           Allergies:   Review of patient's allergies indicates:   Allergen Reactions    No known drug allergies        Social History:   Social History     Socioeconomic History    Marital status:      Spouse name: Not on file    Number of children: Not on file    Years of education: Not on file    Highest education level: Not on file   Occupational History    Occupation: retired   Social Needs    Financial resource strain: Not on file    Food insecurity:     Worry: Not on file     Inability: Not on file    Transportation needs:     Medical: Not on file     Non-medical: Not on file   Tobacco Use    Smoking status: Never Smoker    Smokeless tobacco: Never Used   Substance and Sexual Activity    Alcohol use: Yes     Alcohol/week: 21.0 standard drinks     Types: 21 Cans of beer per week     Comment: 3 beers daily    Drug use: No    Sexual activity: Yes   Lifestyle    Physical activity:     Days per week: 1 day     Minutes per  "session: 40 min    Stress: Very much   Relationships    Social connections:     Talks on phone: Twice a week     Gets together: Once a week     Attends [employer]ism service: Not on file     Active member of club or organization: No     Attends meetings of clubs or organizations: Never     Relationship status:    Other Topics Concern    Not on file   Social History Narrative    Not on file    He served in the [employer].    PHYSICAL EXAMINATION:   General    Vitals:    06/03/20 1534   Weight: 92.1 kg (203 lb)   Height: 5' 11" (1.803 m)     Constitutional: Oriented to person, place, and time. No apparent distress. Pleasant.  HENT:   Head: Normocephalic and atraumatic.   Eyes: Right eye exhibits no discharge. Left eye exhibits no discharge. No scleral icterus.   Pulmonary/Chest: Effort normal. No respiratory distress.   Abdominal: There is no guarding.   Neurological: Alert and oriented to person, place, and time.   Psychiatric: Behavior is normal.   Right Knee Exam     Tenderness   The patient is experiencing tenderness in the medial joint line (Medial retropatellar facet).    Range of Motion   Extension:  0 normal   Flexion: 130     Tests   Ta:  Medial - positive Lateral - negative  Varus: negative Valgus: negative  Lachman:  Anterior - negative    Posterior - negative    Other   Erythema: absent  Scars: absent  Sensation: normal  Pulse: present  Swelling: moderate  Effusion: effusion present    Comments:  Negative bounce home  Pain with end-range flexion        Left Knee Exam   Left knee exam is normal.    Range of Motion   Extension: normal     Tests   Varus: negative Valgus: negative  Lachman:  Anterior - negative    Posterior - negative        INSPECTION: There is + swelling, no ecchymoses, erythema or gross deformity of the knee.  GAIT/DYNAMIC: Gait preserved.     Imaging  R knee x-ray 9/3/2019    FINDINGS:  There is mild right knee medial tibiofemoral joint space narrowing.  No " fracture or osseous destructive process.  No chondrocalcinosis.  No left or right knee joint effusion.  There is enthesophyte formation at the attachment of the right patellar tendon on the anterior tibial tubercle.    Supportive Actions: Independent visualization of images or test specimens    ASSESSMENT:   1. Primary osteoarthritis of right knee      PLAN:     1. Time was spent reviewing the above diagnosis in depth with Jaren today, including acute management and rehabilitation. We also explained patient's x-ray findings with him.     2. We discussed the possible treatment options including PRP, HA injection, corticosteroid injection, genicular nerve block/RFA and the cost, risk and benefits associated with each. After discussing his options, he elected to have CSI of the right knee under ultrasound guidance. Please see separate procedure note. 25 CC of serous fluid was aspirated from the knee prior to injection.     3. RTC PRN. Can consider HA injection of the R knee if inadequate response to corticosteroid.     The above note was completed, in part, with the aid of Dragon dictation software/hardware. Translation errors may be present.

## 2020-06-03 NOTE — PROCEDURES
Large Joint Aspiration/Injection: R knee  Date/Time: 6/3/2020 3:30 PM  Performed by: Gregory Gonzalez MD  Authorized by: Gregory Gonzalez MD     Consent Done?:  Yes (Verbal)  Indications:  Pain  Site marked: the procedure site was marked    Timeout: prior to procedure the correct patient, procedure, and site was verified    Prep: patient was prepped and draped in usual sterile fashion      Local anesthesia used?: Yes    Local anesthetic:  Lidocaine 1% without epinephrine  Anesthetic total (ml):  3      Details:  Needle Size:  18 G  Ultrasonic Guidance for needle placement?: Yes    Images are saved and documented.  Approach: Needle in plane, lateral to medial.  Location:  Knee  Site:  R knee  Medications:  40 mg triamcinolone acetonide 40 mg/mL  Aspirate amount (mL):  25  Aspirate:  Serous  Patient tolerance:  Patient tolerated the procedure well with no immediate complications     Ultrasound guidance was used for correct needle placement, the images were saved will be uploaded to EMR.

## 2020-07-13 RX ORDER — PROPRANOLOL HYDROCHLORIDE 10 MG/1
10 TABLET ORAL 3 TIMES DAILY
Qty: 90 TABLET | Refills: 11 | Status: SHIPPED | OUTPATIENT
Start: 2020-07-13 | End: 2021-08-10 | Stop reason: SDUPTHER

## 2020-08-10 DIAGNOSIS — L70.0 ACNE VULGARIS: ICD-10-CM

## 2020-08-11 RX ORDER — MINOCYCLINE HYDROCHLORIDE 100 MG/1
CAPSULE ORAL
Qty: 30 CAPSULE | Refills: 11 | Status: SHIPPED | OUTPATIENT
Start: 2020-08-11 | End: 2021-08-10 | Stop reason: SDUPTHER

## 2020-08-11 NOTE — PROGRESS NOTES
Refill Routing Note   Medication(s) are not appropriate for processing by [facility]:       - Outside of protocol           Medication reconciliation completed: No      Automatic Epic Generated Protocol Data:        Requested Prescriptions   Pending Prescriptions Disp Refills    minocycline (MINOCIN,DYNACIN) 100 MG capsule [Pharmacy Med Name: Minocycline HCl 100 MG Oral Capsule] 30 capsule 0     Sig: Take 1 capsule by mouth once daily       Off-Protocol Failed - 8/10/2020  2:38 PM        Failed - Medication not assigned to a protocol, review manually.        Passed - Office visit in past 12 months or future 90 days.     Recent Outpatient Visits            2 months ago Primary osteoarthritis of right knee    [place] [facility] Gregory Gonzalez MD    6 months ago SVT (supraventricular tachycardia)    [id number] Alonzo Tolentino MD    6 months ago Chronic pain of right knee    [facility] [place] Pedro Tao MD    6 months ago SVT (supraventricular tachycardia)    [facility] Sallie Gorman PA-C    8 months ago Essential hypertension    [facility] Samuel Manzanares MD          Future Appointments              In 2 weeks EKG, APPT [facility], [facility]    In 2 weeks Evie Riddle, NP [id number]    In 2 weeks LAB, [place] [facility]    In 3 weeks Samuel Manzanares MD [facility]                      Appointments  past 12m or future 3m with PCP    Date Provider   Last Visit   12/5/2019 Samuel Manzanares MD   Next Visit   9/2/2020 Samuel Manzanares MD   ED visits in past 90 days: 0     Note composed:8:13 PM 08/10/2020

## 2020-08-21 ENCOUNTER — PATIENT OUTREACH (OUTPATIENT)
Dept: ADMINISTRATIVE | Facility: OTHER | Age: 55
End: 2020-08-21

## 2020-08-21 NOTE — PROGRESS NOTES
Requested updates within Care Everywhere.  Patient's chart was reviewed for overdue BERTHA topics.  Immunizations reconciled.    Orders placed:n/a  Tasked appts:n/a  Labs Linked:n/a

## 2020-08-24 NOTE — PROGRESS NOTES
Mr. Quiroz is a patient of Dr. Tolentino and was last seen in clinic 2/12/2020.      Subjective:   Patient ID:  Jaren Quiroz is a 54 y.o. male who presents for follow-up of Palpitations  .     HPI:    Mr. Quiroz is a 54 y.o. male with SVT, Htn, alcohol abuse, fatty liver here for follow up after ablation.     Background:    Primary cardiologist is Dr. Masters.  Has presented with recurrent sustained palpitations. ECG's have revealed SVT, short RP (1/25/20 1/29/20), terminated with Adenosine.  Over the past 15 years, has had symptoms, but could often terminate it with vagal maneuvers.  With the 2 above episodes, he could not terminate it.  Has been on Propranolol since 12/20.  Increased 1/25/20.  Has had one episode since then, on an airplane.  Got off the airplane prior to take-off.  Exercise echo 10/19 normal biventricular structure and function, + for ischemia.  C 10/23/20.  ECG reveals nsr without evidence of pre-excitation.    Recurrent SVT, despite beta blocker.  Risks and benefits discussed, he would like to proceed.  Hold Propanolol 3 days prior.    Update (08/25/2020):    5/8/2020: Successful slow pathway modification.    Today he says he has had 2-3 episodes where he thought his heart would start racing but it didn't. No sustained palpitations. He notices that he will experience very brief palps lasting seconds when he is stressed or angry. These are relieved quickly with his propranolol. Denies CP, ELIZABETH, LH, syncope.    I have personally reviewed the patient's EKG today, which shows sinus rhythm at 75bpm. [place] interval is 138. QRS is 92. QTc is 455. No preexcitation noted.    Recent Cardiac Tests:    [facility] (10/23/2019):  · Normal coronary arteries.  · The ejection fraction is greater than 55% by visual estimate.  · LV end diastolic pressure is normal.     Current Outpatient Medications   Medication Sig    ALLERGY RELIEF, LORATADINE, 10 mg tablet TAKE ONE TABLET BY MOUTH ONCE DAILY    amLODIPine (NORVASC)  10 MG tablet Take 1 tablet (10 mg total) by mouth once daily.    benzoyl peroxide (BENZAC AC WASH) 10 % external wash Apply topically 2 (two) times daily. Face and trunk    clindamycin phosphate foam Apply to affected area qd- bid    clonazePAM (KLONOPIN) 2 MG Tab Take by mouth daily as needed.    co-enzyme Q-10 30 mg capsule Take 100 mg by mouth once daily.    cyclobenzaprine (FLEXERIL) 10 MG tablet Take 10 mg by mouth 3 (three) times daily as needed. PRN    esomeprazole (NEXIUM) 40 MG capsule Take 1 capsule (40 mg total) by mouth daily as needed.    famotidine (PEPCID) 20 MG tablet Take 1 tablet (20 mg total) by mouth 2 (two) times daily.    fenofibrate (TRICOR) 145 MG tablet Take 1 tablet (145 mg total) by mouth once daily.    fenofibrate 150 mg Cap Pt only taken 145mg    hydrOXYzine (ATARAX) 25 MG tablet Take 1 tablet (25 mg total) by mouth every 6 (six) hours as needed for Itching. Every 6 hours PRN    ketoconazole (NIZORAL) 2 % shampoo Use as directed qohs as needed    meclizine (ANTIVERT) 25 mg tablet Take 1 tablet (25 mg total) by mouth 3 (three) times daily as needed for Dizziness (may cause drowsiness).    minocycline (MINOCIN,DYNACIN) 100 MG capsule Take 1 capsule by mouth once daily    mometasone (NASONEX) 50 mcg/actuation nasal spray USE TWO SPRAY(S) IN EACH NOSTRIL ONCE DAILY    naproxen (NAPROSYN) 500 MG tablet Take 1 tablet (500 mg total) by mouth 2 (two) times daily. (Patient taking differently: Take 500 mg by mouth 2 (two) times daily. )    niacin (SLO-NIACIN) 500 mg tablet Take 1 tablet (500 mg total) by mouth nightly.    propranoloL (INDERAL) 10 MG tablet Take 1 tablet (10 mg total) by mouth 3 (three) times daily.    valACYclovir (VALTREX) 500 MG tablet TAKE 2 TABLETS BY MOUTH TWICE DAILY (Patient taking differently: TAKE 2 TABLETS BY MOUTH TWICE DAILY as needed)     No current facility-administered medications for this visit.      Facility-Administered Medications Ordered in  Other Visits   Medication    0.9%  NaCl infusion    lactated ringers infusion    lidocaine (PF) 10 mg/ml (1%) injection 10 mg       Review of Systems   Constitution: Negative for malaise/fatigue.   Cardiovascular: Negative for chest pain, dyspnea on exertion, irregular heartbeat, leg swelling and palpitations.   Respiratory: Negative for shortness of breath.    Hematologic/Lymphatic: Negative for bleeding problem.   Skin: Negative for rash.   Musculoskeletal: Negative for myalgias.   Gastrointestinal: Negative for hematemesis, hematochezia and nausea.   Genitourinary: Negative for hematuria.   Neurological: Negative for light-headedness.   Psychiatric/Behavioral: Negative for altered mental status.   Allergic/Immunologic: Negative for persistent infections.     Objective:        /89   Pulse 74   Ht 6' (1.829 m)   Wt 96.7 kg (213 lb 3 oz)   BMI 28.91 kg/m²     Physical Exam   Constitutional: He is oriented to person, place, and time. He appears well-developed and well-nourished.   HENT:   Head: Normocephalic.   Nose: Nose normal.   Eyes: Pupils are equal, round, and reactive to light.   Cardiovascular: Normal rate, regular rhythm, S1 normal and S2 normal.   No murmur heard.  Pulses:       Radial pulses are 2+ on the right side and 2+ on the left side.   Pulmonary/Chest: Breath sounds normal. No respiratory distress.   Abdominal: Normal appearance.   Musculoskeletal: Normal range of motion.         General: No edema.   Neurological: He is alert and oriented to person, place, and time.   Skin: Skin is warm and dry. No erythema.   Psychiatric: He has a normal mood and affect. His speech is normal and behavior is normal.   Nursing note and vitals reviewed.    Lab Results   Component Value Date     05/06/2020    K 4.0 05/06/2020    MG 2.1 01/30/2020    BUN 15 05/06/2020    CREATININE 1.2 05/06/2020    ALT 20 01/30/2020    AST 18 01/30/2020    HGB 13.5 (L) 05/06/2020    HCT 44.0 05/06/2020    TSH 2.140  01/25/2020    LDLCALC 143.8 04/17/2019       Recent Labs   Lab 05/06/20  0937   INR 1.0       Assessment:     1. SVT (supraventricular tachycardia)    2. Essential hypertension    3. History of radiofrequency ablation (RFA) procedure for cardiac arrhythmia      Plan:     In summary, Mr. Quiroz is a 54 y.o. male with SVT, Htn, alcohol abuse, fatty liver here for follow up after ablation.   He is now 3.5 months s/p slow pathway modification. He is doing well from a rhythm standpoint, with no documented or symptomatic recurrence of sustained arrhythmia since procedure.  He would like to continue his propranolol for his palps lasting seconds. He says these occur with stress and he feels much better with the propranolol.  Not like sustained episodes he had previously. Likely ectopy. Echo 10/2019 showed normal EF. I advised him to contact the clinic for any new or concerning symptoms.     Continue current medications.  RTC as needed.    *A copy of this note has been sent to Dr. Tolentino*    Follow up if symptoms worsen or fail to improve.    ------------------------------------------------------------------    VERONICA Gongora, NP-C  Cardiac Electrophysiology

## 2020-08-25 ENCOUNTER — HOSPITAL ENCOUNTER (OUTPATIENT)
Dept: CARDIOLOGY | Facility: CLINIC | Age: 55
Discharge: HOME | End: 2020-08-25
Payer: COMMERCIAL

## 2020-08-25 ENCOUNTER — OFFICE VISIT (OUTPATIENT)
Dept: ELECTROPHYSIOLOGY | Facility: CLINIC | Age: 55
End: 2020-08-25
Payer: COMMERCIAL

## 2020-08-25 VITALS
SYSTOLIC BLOOD PRESSURE: 135 MMHG | BODY MASS INDEX: 28.88 KG/M2 | HEIGHT: 72 IN | HEART RATE: 74 BPM | WEIGHT: 213.19 LBS | DIASTOLIC BLOOD PRESSURE: 89 MMHG

## 2020-08-25 DIAGNOSIS — I47.10 SVT (SUPRAVENTRICULAR TACHYCARDIA): Primary | ICD-10-CM

## 2020-08-25 DIAGNOSIS — I47.10 SVT (SUPRAVENTRICULAR TACHYCARDIA): ICD-10-CM

## 2020-08-25 DIAGNOSIS — Z98.890 HISTORY OF RADIOFREQUENCY ABLATION (RFA) PROCEDURE FOR CARDIAC ARRHYTHMIA: ICD-10-CM

## 2020-08-25 DIAGNOSIS — I10 ESSENTIAL HYPERTENSION: ICD-10-CM

## 2020-08-25 PROCEDURE — 99999 PR PBB SHADOW E&M-EST. PATIENT-LVL IV: ICD-10-PCS | Mod: PBBFAC,,, | Performed by: NURSE PRACTITIONER

## 2020-08-25 PROCEDURE — 3075F PR MOST RECENT SYSTOLIC BLOOD PRESS GE 130-139MM HG: ICD-10-PCS | Mod: CPTII,S$GLB,, | Performed by: NURSE PRACTITIONER

## 2020-08-25 PROCEDURE — 99999 PR PBB SHADOW E&M-EST. PATIENT-LVL IV: CPT | Mod: PBBFAC,,, | Performed by: NURSE PRACTITIONER

## 2020-08-25 PROCEDURE — 93005 ELECTROCARDIOGRAM TRACING: CPT | Mod: S$GLB,,, | Performed by: INTERNAL MEDICINE

## 2020-08-25 PROCEDURE — 3079F DIAST BP 80-89 MM HG: CPT | Mod: CPTII,S$GLB,, | Performed by: NURSE PRACTITIONER

## 2020-08-25 PROCEDURE — 99214 OFFICE O/P EST MOD 30 MIN: CPT | Mod: S$GLB,,, | Performed by: NURSE PRACTITIONER

## 2020-08-25 PROCEDURE — 93010 ELECTROCARDIOGRAM REPORT: CPT | Mod: S$GLB,,, | Performed by: INTERNAL MEDICINE

## 2020-08-25 PROCEDURE — 3079F PR MOST RECENT DIASTOLIC BLOOD PRESSURE 80-89 MM HG: ICD-10-PCS | Mod: CPTII,S$GLB,, | Performed by: NURSE PRACTITIONER

## 2020-08-25 PROCEDURE — 99214 PR OFFICE/OUTPT VISIT, EST, LEVL IV, 30-39 MIN: ICD-10-PCS | Mod: S$GLB,,, | Performed by: NURSE PRACTITIONER

## 2020-08-25 PROCEDURE — 3075F SYST BP GE 130 - 139MM HG: CPT | Mod: CPTII,S$GLB,, | Performed by: NURSE PRACTITIONER

## 2020-08-25 PROCEDURE — 93005 RHYTHM STRIP: ICD-10-PCS | Mod: S$GLB,,, | Performed by: INTERNAL MEDICINE

## 2020-08-25 PROCEDURE — 93010 RHYTHM STRIP: ICD-10-PCS | Mod: S$GLB,,, | Performed by: INTERNAL MEDICINE

## 2020-08-25 PROCEDURE — 3008F BODY MASS INDEX DOCD: CPT | Mod: CPTII,S$GLB,, | Performed by: NURSE PRACTITIONER

## 2020-08-25 PROCEDURE — 3008F PR BODY MASS INDEX (BMI) DOCUMENTED: ICD-10-PCS | Mod: CPTII,S$GLB,, | Performed by: NURSE PRACTITIONER

## 2020-08-25 RX ORDER — FENOFIBRATE 150 MG/1
CAPSULE ORAL
COMMUNITY
Start: 2019-09-30 | End: 2021-03-15

## 2020-08-26 ENCOUNTER — LAB VISIT (OUTPATIENT)
Dept: LAB | Facility: HOSPITAL | Age: 55
End: 2020-08-26
Attending: FAMILY MEDICINE
Payer: COMMERCIAL

## 2020-08-26 DIAGNOSIS — Z00.00 PREVENTATIVE HEALTH CARE: ICD-10-CM

## 2020-08-26 LAB
ALBUMIN SERPL BCP-MCNC: 4.5 G/DL (ref 3.5–5.2)
ALP SERPL-CCNC: 42 U/L (ref 55–135)
ALT SERPL W/O P-5'-P-CCNC: 28 U/L (ref 10–44)
ANION GAP SERPL CALC-SCNC: 7 MMOL/L (ref 8–16)
AST SERPL-CCNC: 21 U/L (ref 10–40)
BASOPHILS # BLD AUTO: 0.07 K/UL (ref 0–0.2)
BASOPHILS NFR BLD: 1 % (ref 0–1.9)
BILIRUB SERPL-MCNC: 0.3 MG/DL (ref 0.1–1)
BUN SERPL-MCNC: 19 MG/DL (ref 6–20)
CALCIUM SERPL-MCNC: 9.6 MG/DL (ref 8.7–10.5)
CHLORIDE SERPL-SCNC: 108 MMOL/L (ref 95–110)
CHOLEST SERPL-MCNC: 209 MG/DL (ref 120–199)
CHOLEST/HDLC SERPL: 4.9 {RATIO} (ref 2–5)
CO2 SERPL-SCNC: 27 MMOL/L (ref 23–29)
COMPLEXED PSA SERPL-MCNC: 0.92 NG/ML (ref 0–4)
CREAT SERPL-MCNC: 1.1 MG/DL (ref 0.5–1.4)
DIFFERENTIAL METHOD: ABNORMAL
EOSINOPHIL # BLD AUTO: 0.3 K/UL (ref 0–0.5)
EOSINOPHIL NFR BLD: 3.8 % (ref 0–8)
ERYTHROCYTE [DISTWIDTH] IN BLOOD BY AUTOMATED COUNT: 14.9 % (ref 11.5–14.5)
EST. GFR  (AFRICAN AMERICAN): >60 ML/MIN/1.73 M^2
EST. GFR  (NON AFRICAN AMERICAN): >60 ML/MIN/1.73 M^2
GLUCOSE SERPL-MCNC: 106 MG/DL (ref 70–110)
HCT VFR BLD AUTO: 45.2 % (ref 40–54)
HDLC SERPL-MCNC: 43 MG/DL (ref 40–75)
HDLC SERPL: 20.6 % (ref 20–50)
HGB BLD-MCNC: 14.1 G/DL (ref 14–18)
IMM GRANULOCYTES # BLD AUTO: 0.02 K/UL (ref 0–0.04)
IMM GRANULOCYTES NFR BLD AUTO: 0.3 % (ref 0–0.5)
LDLC SERPL CALC-MCNC: 135.2 MG/DL (ref 63–159)
LYMPHOCYTES # BLD AUTO: 2 K/UL (ref 1–4.8)
LYMPHOCYTES NFR BLD: 28.1 % (ref 18–48)
MCH RBC QN AUTO: 27.4 PG (ref 27–31)
MCHC RBC AUTO-ENTMCNC: 31.2 G/DL (ref 32–36)
MCV RBC AUTO: 88 FL (ref 82–98)
MONOCYTES # BLD AUTO: 0.6 K/UL (ref 0.3–1)
MONOCYTES NFR BLD: 8.4 % (ref 4–15)
NEUTROPHILS # BLD AUTO: 4.1 K/UL (ref 1.8–7.7)
NEUTROPHILS NFR BLD: 58.4 % (ref 38–73)
NONHDLC SERPL-MCNC: 166 MG/DL
NRBC BLD-RTO: 0 /100 WBC
PLATELET # BLD AUTO: 444 K/UL (ref 150–350)
PMV BLD AUTO: 9.6 FL (ref 9.2–12.9)
POTASSIUM SERPL-SCNC: 4.5 MMOL/L (ref 3.5–5.1)
PROT SERPL-MCNC: 7.2 G/DL (ref 6–8.4)
RBC # BLD AUTO: 5.14 M/UL (ref 4.6–6.2)
SODIUM SERPL-SCNC: 142 MMOL/L (ref 136–145)
TRIGL SERPL-MCNC: 154 MG/DL (ref 30–150)
WBC # BLD AUTO: 7.02 K/UL (ref 3.9–12.7)

## 2020-08-26 PROCEDURE — 80053 COMPREHEN METABOLIC PANEL: CPT

## 2020-08-26 PROCEDURE — 85025 COMPLETE CBC W/AUTO DIFF WBC: CPT

## 2020-08-26 PROCEDURE — 80061 LIPID PANEL: CPT

## 2020-08-26 PROCEDURE — 36415 COLL VENOUS BLD VENIPUNCTURE: CPT | Mod: PO

## 2020-08-26 PROCEDURE — 84153 ASSAY OF PSA TOTAL: CPT

## 2020-09-02 ENCOUNTER — OFFICE VISIT (OUTPATIENT)
Dept: FAMILY MEDICINE | Facility: CLINIC | Age: 55
End: 2020-09-02
Payer: COMMERCIAL

## 2020-09-02 ENCOUNTER — IMMUNIZATION (OUTPATIENT)
Dept: PHARMACY | Facility: CLINIC | Age: 55
End: 2020-09-02
Payer: COMMERCIAL

## 2020-09-02 VITALS
WEIGHT: 212.31 LBS | TEMPERATURE: 98 F | OXYGEN SATURATION: 98 % | DIASTOLIC BLOOD PRESSURE: 80 MMHG | HEART RATE: 83 BPM | SYSTOLIC BLOOD PRESSURE: 134 MMHG | BODY MASS INDEX: 28.76 KG/M2 | HEIGHT: 72 IN

## 2020-09-02 DIAGNOSIS — Z00.00 PREVENTATIVE HEALTH CARE: Primary | ICD-10-CM

## 2020-09-02 DIAGNOSIS — E78.1 HIGH TRIGLYCERIDES: ICD-10-CM

## 2020-09-02 PROCEDURE — 99999 PR PBB SHADOW E&M-EST. PATIENT-LVL IV: ICD-10-PCS | Mod: PBBFAC,,, | Performed by: FAMILY MEDICINE

## 2020-09-02 PROCEDURE — 3008F BODY MASS INDEX DOCD: CPT | Mod: CPTII,S$GLB,, | Performed by: FAMILY MEDICINE

## 2020-09-02 PROCEDURE — 3075F SYST BP GE 130 - 139MM HG: CPT | Mod: CPTII,S$GLB,, | Performed by: FAMILY MEDICINE

## 2020-09-02 PROCEDURE — 99396 PREV VISIT EST AGE 40-64: CPT | Mod: S$GLB,,, | Performed by: FAMILY MEDICINE

## 2020-09-02 PROCEDURE — 3008F PR BODY MASS INDEX (BMI) DOCUMENTED: ICD-10-PCS | Mod: CPTII,S$GLB,, | Performed by: FAMILY MEDICINE

## 2020-09-02 PROCEDURE — 3079F DIAST BP 80-89 MM HG: CPT | Mod: CPTII,S$GLB,, | Performed by: FAMILY MEDICINE

## 2020-09-02 PROCEDURE — 99999 PR PBB SHADOW E&M-EST. PATIENT-LVL IV: CPT | Mod: PBBFAC,,, | Performed by: FAMILY MEDICINE

## 2020-09-02 PROCEDURE — 3075F PR MOST RECENT SYSTOLIC BLOOD PRESS GE 130-139MM HG: ICD-10-PCS | Mod: CPTII,S$GLB,, | Performed by: FAMILY MEDICINE

## 2020-09-02 PROCEDURE — 99396 PR PREVENTIVE VISIT,EST,40-64: ICD-10-PCS | Mod: S$GLB,,, | Performed by: FAMILY MEDICINE

## 2020-09-02 PROCEDURE — 3079F PR MOST RECENT DIASTOLIC BLOOD PRESSURE 80-89 MM HG: ICD-10-PCS | Mod: CPTII,S$GLB,, | Performed by: FAMILY MEDICINE

## 2020-09-02 RX ORDER — NIACIN 500 MG/1
500 TABLET, EXTENDED RELEASE ORAL NIGHTLY
Qty: 90 TABLET | Refills: 3 | Status: SHIPPED | OUTPATIENT
Start: 2020-09-02 | End: 2020-09-08 | Stop reason: ALTCHOICE

## 2020-09-02 RX ORDER — FENOFIBRATE 145 MG/1
145 TABLET, FILM COATED ORAL DAILY
Qty: 90 TABLET | Refills: 3 | Status: SHIPPED | OUTPATIENT
Start: 2020-09-02 | End: 2021-08-10 | Stop reason: SDUPTHER

## 2020-09-02 NOTE — PROGRESS NOTES
Chief Complaint   Patient presents with    4 month f/u     HISTORY OF PRESENT ILLNESS: This is a 54 year-old male presents today for f/u on chronic health issues.    H/o SVT s/p ablation - taking propranolol 10mg BID  GERD - He is using nexium 40mg and Pepcid  HLD - He has  been taking Tricor and Niaspan 500mg daily  HTN - tolerating Norvasc 10mg daily for HTN. Home /70.  Lumbar DDD, cervical DDD - usin Aelve PRN and Flexeril PRN spasms; getting persistent posterior neck pain. He did recently do some PT. Using Naproxen PRN.  Following with pain management, Dr. Mao.  aGbbi have been stable on present regimen. Using atarax at bedtime    Past Medical History:   Diagnosis Date    ALLERGIC RHINITIS     Asthma     as child    Cervicalgia     DDD (degenerative disc disease), lumbar     Fatty liver     Hemorrhoid     Hepatomegaly     High triglycerides     HTN (hypertension)     Liver hemangioma 2014    PTSD (post-traumatic stress disorder)     not currently taking [facility]s [facility]    Shingles     lower back    Urticaria          Past Surgical History:   Procedure Laterality Date    BACK SURGERY      ruptured disc L5-S1    COLONOSCOPY  ~2010  ([facility])    Hemorrhoids    CORONARY ANGIOGRAPHY N/A 10/23/2019    Procedure: ANGIOGRAM, CORONARY ARTERY;  Surgeon: Can Masters MD;  Location: [facility] CATH;  Service: Cardiology;  Laterality: N/A;    ELBOW SURGERY      right    EPIDURAL STEROID INJECTION INTO CERVICAL SPINE N/A 10/15/2019    Procedure: Injection-steroid-epidural-cervical C7/T1;  Surgeon: Blaise Mao MD;  Location: [facility] OR;  Service: Pain Management;  Laterality: N/A;    ESOPHAGOGASTRODUODENOSCOPY N/A 1/29/2019    Procedure: EGD (ESOPHAGOGASTRODUODENOSCOPY);  Surgeon: Balwinder Dexter Jr., MD;  Location: [facility] ENDO;  Service: Endoscopy;  Laterality: N/A;    KNEE SURGERY      right    LEFT HEART CATHETERIZATION N/A 10/23/2019    Procedure: Left heart cath;  Surgeon:  Can Masters MD;  Location: [facility] CATH;  Service: Cardiology;  Laterality: N/A;    MAGNETIC RESONANCE IMAGING N/A 6/26/2019    Procedure: MRI (Magnetic Resonance Imagine) needs anesthesia;  Surgeon: Maurice Fonseca MD;  Location: [facility] CATH;  Service: Anesthesiology;  Laterality: N/A;       FAMILY HISTORY: coronary artery disease and diabetes in his father, diabetes in his brother.     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 He does not smoke. He drinks 3 beers per day. He does   not exercise regularly. He is , has children.      REVIEW OF SYSTEMS: Denies any chest pain, or change in   bowel or bladder habits.   Getting some low back pain, neck pain  Some knee pain from torn meniscus    PHYSICAL EXAM:   /80 (BP Location: Left arm, Patient Position: Sitting)   Pulse 83   Temp 98.1 °F (36.7 °C) (Oral)   Ht 6' (1.829 m)   Wt 96.3 kg (212 lb 4.9 oz)   SpO2 98%   BMI 28.79 kg/m²     GENERAL: This is a healthy-appearing 54-year-old male, sitting upright,   in no apparent distress. Alert and oriented x4.   Posterior oropharynx is clear.   NECK: Supple. There is no lymphadenopathy, thyromegaly or JVD. Some crepitus with ROM.  CHEST: Clear to auscultation bilaterally with good respiratory movement.   ABDOMEN: Soft, no epigastric tenderness . Positive bowel sounds and no hepatosplenomegaly.   EXTREMITIES: Show no cyanosis, clubbing or edema.   SKIN: no rashes    Results for orders placed or performed in visit on 08/26/20   Comprehensive metabolic panel   Result Value Ref Range    Sodium 142 136 - 145 mmol/L    Potassium 4.5 3.5 - 5.1 mmol/L    Chloride 108 95 - 110 mmol/L    CO2 27 23 - 29 mmol/L    Glucose 106 70 - 110 mg/dL    BUN, Bld 19 6 - 20 mg/dL    Creatinine 1.1 0.5 - 1.4 mg/dL    Calcium 9.6 8.7 - 10.5 mg/dL    Total Protein 7.2 6.0 - 8.4 g/dL    Albumin 4.5 3.5 - 5.2 g/dL    Total Bilirubin 0.3 0.1 - 1.0 mg/dL    Alkaline Phosphatase  42 (L) 55 - 135 U/L    AST 21 10 - 40 U/L    ALT 28 10 - 44 U/L    Anion Gap 7 (L) 8 - 16 mmol/L    eGFR if African American >60.0 >60 mL/min/1.73 m^2    eGFR if non African American >60.0 >60 mL/min/1.73 m^2   Lipid panel   Result Value Ref Range    Cholesterol 209 (H) 120 - 199 mg/dL    Triglycerides 154 (H) 30 - 150 mg/dL    HDL 43 40 - 75 mg/dL    LDL Cholesterol 135.2 63.0 - 159.0 mg/dL    Hdl/Cholesterol Ratio 20.6 20.0 - 50.0 %    Total Cholesterol/HDL Ratio 4.9 2.0 - 5.0    Non-HDL Cholesterol 166 mg/dL   PSA, Screening   Result Value Ref Range    PSA, SCREEN 0.92 0.00 - 4.00 ng/mL   CBC auto differential   Result Value Ref Range    WBC 7.02 3.90 - 12.70 K/uL    RBC 5.14 4.60 - 6.20 M/uL    Hemoglobin 14.1 14.0 - 18.0 g/dL    Hematocrit 45.2 40.0 - 54.0 %    Mean Corpuscular Volume 88 82 - 98 fL    Mean Corpuscular Hemoglobin 27.4 27.0 - 31.0 pg    Mean Corpuscular Hemoglobin Conc 31.2 (L) 32.0 - 36.0 g/dL    RDW 14.9 (H) 11.5 - 14.5 %    Platelets 444 (H) 150 - 350 K/uL    MPV 9.6 9.2 - 12.9 fL    Immature Granulocytes 0.3 0.0 - 0.5 %    Gran # (ANC) 4.1 1.8 - 7.7 K/uL    Immature Grans (Abs) 0.02 0.00 - 0.04 K/uL    Lymph # 2.0 1.0 - 4.8 K/uL    Mono # 0.6 0.3 - 1.0 K/uL    Eos # 0.3 0.0 - 0.5 K/uL    Baso # 0.07 0.00 - 0.20 K/uL    nRBC 0 0 /100 WBC    Gran% 58.4 38.0 - 73.0 %    Lymph% 28.1 18.0 - 48.0 %    Mono% 8.4 4.0 - 15.0 %    Eosinophil% 3.8 0.0 - 8.0 %    Basophil% 1.0 0.0 - 1.9 %    Differential Method Automated                ASSESSMENT/PLAN:   Preventative health care    High triglycerides  -     fenofibrate (TRICOR) 145 MG tablet; Take 1 tablet (145 mg total) by mouth once daily.  Dispense: 90 tablet; Refill: 3  -     niacin (SLO-NIACIN) 500 mg tablet; Take 1 tablet (500 mg total) by mouth nightly.  Dispense: 90 tablet; Refill: 3       Overall doing well  Counseled on regular exercise, maintenance of a healthy weight, balanced diet rich in fruits/vegetables and lean protein, and avoidance of  unhealthy habits like smoking and excessive alcohol intake.  continue Tricor,  Niaspan 500mg QHS  Continue a low-fat, low-triglyceride diet.  continue Norvasc daily  Continue Nasonex daily with Claritin  Nexium QAM PRN and Pepcid 40mg QHS  F/u 12 months with labs for physical

## 2020-09-07 ENCOUNTER — PATIENT MESSAGE (OUTPATIENT)
Dept: FAMILY MEDICINE | Facility: CLINIC | Age: 55
End: 2020-09-07

## 2020-09-08 RX ORDER — NIACIN 500 MG/1
500 TABLET, EXTENDED RELEASE ORAL NIGHTLY
Qty: 90 TABLET | Refills: 3 | Status: SHIPPED | OUTPATIENT
Start: 2020-09-08 | End: 2021-08-10 | Stop reason: SDUPTHER

## 2020-09-08 NOTE — TELEPHONE ENCOUNTER
No new care gaps identified.  Powered by [employer]. Reference number: [id number]. 9/08/2020 8:11:03 AM VIDHIT

## 2020-09-08 NOTE — TELEPHONE ENCOUNTER
Refill Routing Note   Medication(s) are not appropriate for processing by [facility] for the following reason(s):        - Medication is a new start (<3 months)  Medication-related problems identified: Drug-drug interaction      Medication Therapy Plan: CDMR. of note encounter notes states pt is supposed to be taking niaspan 500mg but OMR shows (slo-niacin); adherence data indicates spharmacy has been filling Niaspan 500mg; last commented to continue Niaspan;  will remove Slo-niacin from OMR and pend Niaspan, defer to PCP    Medication reconciliation completed: No   Automatic Epic Generated Protocol Data:        Requested Prescriptions   Pending Prescriptions Disp Refills    niacin (NIASPAN EXTENDED-RELEASE) 500 mg tablet 90 tablet 3     Sig: Take 1 tablet (500 mg total) by mouth nightly.       Cardiovascular:  Antilipid - niacin Passed - 9/8/2020  8:10 AM        Passed - Patient is at least 18 years old        Passed - Office visit in past 12 months or future 90 days.     Recent Outpatient Visits            6 days ago Preventative health care    [facility] Samuel Manzanares MD    2 weeks ago SVT (supraventricular tachycardia)    [facility] R[facility]    3 months ago Primary osteoarthritis of right knee    [facility] Physical Med/Rehab Gregory Gonzalez MD    6 months ago SVT (supraventricular tachycardia)    [facility] Alonzo Tolentino MD    7 months ago Chronic pain of right knee    [facility]                    Passed - Lipid Panel completed in last 360 days     Lab Results   Component Value Date    CHOL 209 (H) 08/26/2020    HDL 43 08/26/2020    LDLCALC 135.2 08/26/2020    TRIG 154 (H) 08/26/2020             Passed - AST is 54 or below and within 360 days     AST   Date Value Ref Range Status   08/26/2020 21 10 - 40 U/L Final   01/30/2020 18 10 - 40 U/L Final   04/17/2019 17 10 -  40 U/L Final     POC AST   Date Value Ref Range Status   01/29/2020 27 11 - 38 U/L Final   01/25/2020 31 11 - 38 U/L Final              Passed - ALT is 94 or below and within 360 days     ALT   Date Value Ref Range Status   08/26/2020 28 10 - 44 U/L Final   01/30/2020 20 10 - 44 U/L Final   04/17/2019 20 10 - 44 U/L Final     POC ALT   Date Value Ref Range Status   01/29/2020 25 10 - 47 U/L Final   01/25/2020 25 10 - 47 U/L Final                    Appointments     Date Provider   Last Visit   9/2/2020 Samuel Manzanares MD   Next Visit   Visit date not found Samuel Manzanares MD   ED visits in past 90 days: [unfilled]    Note composed:8:44 AM 09/08/2020

## 2020-10-19 DIAGNOSIS — R12 HEARTBURN: ICD-10-CM

## 2020-10-19 NOTE — TELEPHONE ENCOUNTER
No new care gaps identified.  Powered by [employer]. Reference number: [id number]. 10/19/2020 1:51:32 PM CDT

## 2020-10-20 RX ORDER — ESOMEPRAZOLE MAGNESIUM 40 MG/1
40 CAPSULE, DELAYED RELEASE ORAL DAILY PRN
Qty: 90 CAPSULE | Refills: 3 | Status: SHIPPED | OUTPATIENT
Start: 2020-10-20 | End: 2022-02-21 | Stop reason: SDUPTHER

## 2020-10-20 NOTE — PROGRESS NOTES
Refill Routing Note   Medication(s) are not appropriate for processing by [facility] for the following reason(s):     - Drug-Disease Interaction (FATTY LIVER)    [facility] actions taken in this encounter: Defer    Medication-related problems identified: Drug-disease interaction     Medication reconciliation completed: No   Automatic Epic Generated Protocol Data:        Requested Prescriptions   Pending Prescriptions Disp Refills    esomeprazole (NEXIUM) 40 MG capsule 90 capsule 3     Sig: Take 1 capsule (40 mg total) by mouth daily as needed.       Gastroenterology: Proton Pump Inhibitors Passed - 10/19/2020  1:50 PM        Passed - Patient is at least 18 years old        Passed - Osteoporosis is not on problem list        Passed - Plavix is not on active medication list        Passed - Office Visit within last 12 months or future 90 days.     Recent Outpatient Visits            1 month ago Preventative health care    [facility] Samuel Manzanares MD    1 month ago SVT (supraventricular tachycardia)    [facility] [place] Evie Riddle NP    4 months ago Primary osteoarthritis of right knee    [place] - Physical Med/Rehab Gregory Gonzalez MD    8 months ago SVT (supraventricular tachycardia)    [facility] [place] Alonzo Tolentino MD    8 months ago Chronic pain of right knee    [facility]                    Passed - GERD is on problem list              Appointments  past 12m or future 3m with PCP    Date Provider   Last Visit   9/2/2020 Samuel Manzanares MD   Next Visit   Visit date not found Samuel Manzanares MD   ED visits in past 90 days: 0        Note composed:7:55 AM 10/20/2020

## 2020-12-02 RX ORDER — MOMETASONE FUROATE 50 UG/1
SPRAY, METERED NASAL
Qty: 51 G | Refills: 2 | Status: SHIPPED | OUTPATIENT
Start: 2020-12-02 | End: 2021-10-22 | Stop reason: SDUPTHER

## 2020-12-02 RX ORDER — VALACYCLOVIR HYDROCHLORIDE 500 MG/1
TABLET, FILM COATED ORAL
Qty: 90 TABLET | Refills: 2 | Status: SHIPPED | OUTPATIENT
Start: 2020-12-02 | End: 2021-12-03 | Stop reason: SDUPTHER

## 2020-12-02 NOTE — TELEPHONE ENCOUNTER
No new care gaps identified.  Powered by [employer]. Reference number: [id number]. 12/02/2020 2:26:25 PM   CST

## 2020-12-03 NOTE — PROGRESS NOTES
Refill Authorization Note   Jaren Hillsin  is requesting a refill authorization.  Brief Assessment and Rationale for Refill:  Approve     Medication Therapy Plan:  [facility]    Medication Reconciliation Completed: No   Comments:   Orders Placed This Encounter    valACYclovir (VALTREX) 500 MG tablet    mometasone (NASONEX) 50 mcg/actuation nasal spray      Requested Prescriptions   Signed Prescriptions Disp Refills    valACYclovir (VALTREX) 500 MG tablet 90 tablet 2     Sig: Take 2 tablets by mouth twice daily       Antimicrobials:  Oral Antiviral Agents - Anti-Herpetic Passed - 12/2/2020  2:31 PM        Passed - Patient is at least 18 years old        Passed - Office visit in past 12 months or future 90 days     Recent Outpatient Visits            3 months ago Preventative health care    [facility] Samuel Manzanares MD    3 months ago SVT (supraventricular tachycardia)    [facility] [id number] Evie Riddle NP    6 months ago Primary osteoarthritis of right knee    [place] - Physical Med/Rehab Gregory Gonzalez MD    9 months ago SVT (supraventricular tachycardia)    [facility] [id number] Alonzo Tolentino MD    9 months ago Chronic pain of right knee    [facility] [place] Pedro Tao MD                    Passed - Cr is 1.4 or below and within 360 days     Creatinine   Date Value Ref Range Status   08/26/2020 1.1 0.5 - 1.4 mg/dL Final   05/06/2020 1.2 0.5 - 1.4 mg/dL Final   01/30/2020 1.2 0.5 - 1.4 mg/dL Final     POC Creatinine   Date Value Ref Range Status   01/29/2020 1.5 (H) 0.6 - 1.2 mg/dL Final   01/25/2020 1.3 (H) 0.6 - 1.2 mg/dL Final              Passed - WBC in normal range and within 360 days     POC WBC   Date Value Ref Range Status   01/29/2020 11.1 3.9 - 12.7 K/uL Final   01/25/2020 10.2 3.9 - 12.7 K/uL Final     WBC   Date Value Ref Range Status   08/26/2020 7.02 3.90 - 12.70 K/uL Final   05/06/2020 8.37 3.90 - 12.70 K/uL Final    01/30/2020 8.66 3.90 - 12.70 K/uL Final              Passed - eGFR within 360 days     POC eGFR   Date Value Ref Range Status   01/29/2020 59 (L) 61 - 2,000 mL/min Final   01/25/2020 >60 61 - 2,000 mL/min Final     eGFR if non    Date Value Ref Range Status   08/26/2020 >60.0 >60 mL/min/1.73 m^2 Final     Comment:     Calculation used to obtain the estimated glomerular filtration  rate (eGFR) is the CKD-EPI equation.      05/06/2020 >60.0 >60 mL/min/1.73 m^2 Final     Comment:     Calculation used to obtain the estimated glomerular filtration  rate (eGFR) is the CKD-EPI equation.      01/30/2020 >60 >60 mL/min/1.73 m^2 Final     Comment:     Calculation used to obtain the estimated glomerular filtration  rate (eGFR) is the CKD-EPI equation.        eGFR if    Date Value Ref Range Status   08/26/2020 >60.0 >60 mL/min/1.73 m^2 Final   05/06/2020 >60.0 >60 mL/min/1.73 m^2 Final   01/30/2020 >60 >60 mL/min/1.73 m^2 Final                mometasone (NASONEX) 50 mcg/actuation nasal spray 51 g 2     Sig: Use 2 spray(s) in each nostril once daily       Ear, Nose, and Throat: Nasal Preparations - Corticosteroids Passed - 12/2/2020  2:31 PM        Passed - Patient is at least 18 years old        Passed - Office visit in past 12 months or future 90 days     Recent Outpatient Visits            3 months ago Preventative health care    [facility] Samuel Manzanares MD    3 months ago SVT (supraventricular tachycardia)    [facility] Evie Riddle NP    6 months ago Primary osteoarthritis of right knee    [place] - Physical Med/Rehab Gregory Gonzalez MD    9 months ago SVT (supraventricular tachycardia)    [id number] Alonzo Tolentino MD    9 months ago Chronic pain of right knee    [facility] [place] Pedro Tao MD                        Appointments  past 12m or future 3m with PCP    Date Provider   Last  Visit   9/2/2020 Samuel Manzanares MD   Next Visit   Visit date not found Samuel Manzanares MD   ED visits in past 90 days: 0     Note composed:10:00 PM 12/02/2020

## 2021-02-03 ENCOUNTER — OFFICE VISIT (OUTPATIENT)
Dept: FAMILY MEDICINE | Facility: CLINIC | Age: 56
End: 2021-02-03
Payer: COMMERCIAL

## 2021-02-03 VITALS
WEIGHT: 215.19 LBS | SYSTOLIC BLOOD PRESSURE: 124 MMHG | HEIGHT: 72 IN | BODY MASS INDEX: 29.15 KG/M2 | DIASTOLIC BLOOD PRESSURE: 70 MMHG | TEMPERATURE: 98 F | RESPIRATION RATE: 18 BRPM | HEART RATE: 72 BPM

## 2021-02-03 DIAGNOSIS — M76.62 ACHILLES TENDONITIS, BILATERAL: ICD-10-CM

## 2021-02-03 DIAGNOSIS — M76.61 ACHILLES TENDONITIS, BILATERAL: ICD-10-CM

## 2021-02-03 DIAGNOSIS — L50.9 HIVES: ICD-10-CM

## 2021-02-03 DIAGNOSIS — I10 ESSENTIAL HYPERTENSION: Primary | ICD-10-CM

## 2021-02-03 DIAGNOSIS — T78.3XXA ANGIOEDEMA: ICD-10-CM

## 2021-02-03 DIAGNOSIS — Z00.00 PREVENTATIVE HEALTH CARE: ICD-10-CM

## 2021-02-03 PROCEDURE — 99999 PR PBB SHADOW E&M-EST. PATIENT-LVL III: ICD-10-PCS | Mod: PBBFAC,,, | Performed by: FAMILY MEDICINE

## 2021-02-03 PROCEDURE — 1125F AMNT PAIN NOTED PAIN PRSNT: CPT | Mod: S$GLB,,, | Performed by: FAMILY MEDICINE

## 2021-02-03 PROCEDURE — 1125F PR PAIN SEVERITY QUANTIFIED, PAIN PRESENT: ICD-10-PCS | Mod: S$GLB,,, | Performed by: FAMILY MEDICINE

## 2021-02-03 PROCEDURE — 3008F PR BODY MASS INDEX (BMI) DOCUMENTED: ICD-10-PCS | Mod: CPTII,S$GLB,, | Performed by: FAMILY MEDICINE

## 2021-02-03 PROCEDURE — 3074F PR MOST RECENT SYSTOLIC BLOOD PRESSURE < 130 MM HG: ICD-10-PCS | Mod: CPTII,S$GLB,, | Performed by: FAMILY MEDICINE

## 2021-02-03 PROCEDURE — 99213 PR OFFICE/OUTPT VISIT, EST, LEVL III, 20-29 MIN: ICD-10-PCS | Mod: S$GLB,,, | Performed by: FAMILY MEDICINE

## 2021-02-03 PROCEDURE — 3008F BODY MASS INDEX DOCD: CPT | Mod: CPTII,S$GLB,, | Performed by: FAMILY MEDICINE

## 2021-02-03 PROCEDURE — 99999 PR PBB SHADOW E&M-EST. PATIENT-LVL III: CPT | Mod: PBBFAC,,, | Performed by: FAMILY MEDICINE

## 2021-02-03 PROCEDURE — 3074F SYST BP LT 130 MM HG: CPT | Mod: CPTII,S$GLB,, | Performed by: FAMILY MEDICINE

## 2021-02-03 PROCEDURE — 99213 OFFICE O/P EST LOW 20 MIN: CPT | Mod: S$GLB,,, | Performed by: FAMILY MEDICINE

## 2021-02-03 PROCEDURE — 3078F PR MOST RECENT DIASTOLIC BLOOD PRESSURE < 80 MM HG: ICD-10-PCS | Mod: CPTII,S$GLB,, | Performed by: FAMILY MEDICINE

## 2021-02-03 PROCEDURE — 3078F DIAST BP <80 MM HG: CPT | Mod: CPTII,S$GLB,, | Performed by: FAMILY MEDICINE

## 2021-02-03 RX ORDER — HYDROXYZINE HYDROCHLORIDE 25 MG/1
25 TABLET, FILM COATED ORAL EVERY 6 HOURS PRN
Qty: 180 TABLET | Refills: 3 | Status: SHIPPED | OUTPATIENT
Start: 2021-02-03 | End: 2023-02-15

## 2021-03-10 DIAGNOSIS — M25.571 RIGHT ANKLE PAIN, UNSPECIFIED CHRONICITY: Primary | ICD-10-CM

## 2021-03-15 ENCOUNTER — OFFICE VISIT (OUTPATIENT)
Dept: ORTHOPEDICS | Facility: CLINIC | Age: 56
End: 2021-03-15
Payer: COMMERCIAL

## 2021-03-15 ENCOUNTER — HOSPITAL ENCOUNTER (OUTPATIENT)
Dept: RADIOLOGY | Facility: HOSPITAL | Age: 56
Discharge: HOME | End: 2021-03-15
Attending: ORTHOPAEDIC SURGERY
Payer: COMMERCIAL

## 2021-03-15 VITALS
HEART RATE: 77 BPM | SYSTOLIC BLOOD PRESSURE: 136 MMHG | BODY MASS INDEX: 29.15 KG/M2 | WEIGHT: 215.19 LBS | DIASTOLIC BLOOD PRESSURE: 82 MMHG | HEIGHT: 72 IN

## 2021-03-15 DIAGNOSIS — M25.571 RIGHT ANKLE PAIN, UNSPECIFIED CHRONICITY: ICD-10-CM

## 2021-03-15 DIAGNOSIS — M25.571 RIGHT ANKLE PAIN, UNSPECIFIED CHRONICITY: Primary | ICD-10-CM

## 2021-03-15 PROCEDURE — 73610 XR ANKLE COMPLETE 3 VIEW BILATERAL: ICD-10-PCS | Mod: 26,,, | Performed by: RADIOLOGY

## 2021-03-15 PROCEDURE — 97760 PR ORTHOTIC MGMT&TRAINJ INITIAL ENC EA 15 MINS: ICD-10-PCS | Mod: GP,S$GLB,, | Performed by: ORTHOPAEDIC SURGERY

## 2021-03-15 PROCEDURE — 99204 PR OFFICE/OUTPT VISIT, NEW, LEVL IV, 45-59 MIN: ICD-10-PCS | Mod: 25,S$GLB,, | Performed by: ORTHOPAEDIC SURGERY

## 2021-03-15 PROCEDURE — 99204 OFFICE O/P NEW MOD 45 MIN: CPT | Mod: 25,S$GLB,, | Performed by: ORTHOPAEDIC SURGERY

## 2021-03-15 PROCEDURE — 1125F AMNT PAIN NOTED PAIN PRSNT: CPT | Mod: S$GLB,,, | Performed by: ORTHOPAEDIC SURGERY

## 2021-03-15 PROCEDURE — 1125F PR PAIN SEVERITY QUANTIFIED, PAIN PRESENT: ICD-10-PCS | Mod: S$GLB,,, | Performed by: ORTHOPAEDIC SURGERY

## 2021-03-15 PROCEDURE — 97760 ORTHOTIC MGMT&TRAING 1ST ENC: CPT | Mod: GP,S$GLB,, | Performed by: ORTHOPAEDIC SURGERY

## 2021-03-15 PROCEDURE — 73610 X-RAY EXAM OF ANKLE: CPT | Mod: 26,,, | Performed by: RADIOLOGY

## 2021-03-15 PROCEDURE — 73610 X-RAY EXAM OF ANKLE: CPT | Mod: TC,50,PO

## 2021-03-15 PROCEDURE — 99999 PR PBB SHADOW E&M-EST. PATIENT-LVL IV: ICD-10-PCS | Mod: PBBFAC,,, | Performed by: ORTHOPAEDIC SURGERY

## 2021-03-15 PROCEDURE — 99999 PR PBB SHADOW E&M-EST. PATIENT-LVL IV: CPT | Mod: PBBFAC,,, | Performed by: ORTHOPAEDIC SURGERY

## 2021-03-15 PROCEDURE — 3008F PR BODY MASS INDEX (BMI) DOCUMENTED: ICD-10-PCS | Mod: CPTII,S$GLB,, | Performed by: ORTHOPAEDIC SURGERY

## 2021-03-15 PROCEDURE — 3008F BODY MASS INDEX DOCD: CPT | Mod: CPTII,S$GLB,, | Performed by: ORTHOPAEDIC SURGERY

## 2021-03-15 RX ORDER — COLCHICINE 0.6 MG/1
0.6 TABLET ORAL 2 TIMES DAILY
Qty: 6 TABLET | Refills: 6 | Status: SHIPPED | OUTPATIENT
Start: 2021-03-15 | End: 2021-03-31 | Stop reason: SDUPTHER

## 2021-03-15 RX ORDER — NABUMETONE 500 MG/1
500 TABLET, FILM COATED ORAL 2 TIMES DAILY
Qty: 60 TABLET | Refills: 3 | Status: SHIPPED | OUTPATIENT
Start: 2021-03-15 | End: 2021-06-22

## 2021-03-16 ENCOUNTER — TELEPHONE (OUTPATIENT)
Dept: ORTHOPEDICS | Facility: CLINIC | Age: 56
End: 2021-03-16

## 2021-03-29 ENCOUNTER — TELEPHONE (OUTPATIENT)
Dept: ORTHOPEDICS | Facility: CLINIC | Age: 56
End: 2021-03-29

## 2021-03-30 ENCOUNTER — OFFICE VISIT (OUTPATIENT)
Dept: ORTHOPEDICS | Facility: CLINIC | Age: 56
End: 2021-03-30
Payer: COMMERCIAL

## 2021-03-30 VITALS
DIASTOLIC BLOOD PRESSURE: 81 MMHG | HEART RATE: 68 BPM | WEIGHT: 215.19 LBS | SYSTOLIC BLOOD PRESSURE: 130 MMHG | HEIGHT: 72 IN | BODY MASS INDEX: 29.15 KG/M2

## 2021-03-30 DIAGNOSIS — M25.571 RIGHT ANKLE PAIN, UNSPECIFIED CHRONICITY: Primary | ICD-10-CM

## 2021-03-30 DIAGNOSIS — M76.61 TENDONITIS, ACHILLES, RIGHT: ICD-10-CM

## 2021-03-30 PROCEDURE — 3008F PR BODY MASS INDEX (BMI) DOCUMENTED: ICD-10-PCS | Mod: CPTII,S$GLB,, | Performed by: ORTHOPAEDIC SURGERY

## 2021-03-30 PROCEDURE — 1126F AMNT PAIN NOTED NONE PRSNT: CPT | Mod: S$GLB,,, | Performed by: ORTHOPAEDIC SURGERY

## 2021-03-30 PROCEDURE — 99999 PR PBB SHADOW E&M-EST. PATIENT-LVL IV: CPT | Mod: PBBFAC,,, | Performed by: ORTHOPAEDIC SURGERY

## 2021-03-30 PROCEDURE — 99214 PR OFFICE/OUTPT VISIT, EST, LEVL IV, 30-39 MIN: ICD-10-PCS | Mod: S$GLB,,, | Performed by: ORTHOPAEDIC SURGERY

## 2021-03-30 PROCEDURE — 99999 PR PBB SHADOW E&M-EST. PATIENT-LVL IV: ICD-10-PCS | Mod: PBBFAC,,, | Performed by: ORTHOPAEDIC SURGERY

## 2021-03-30 PROCEDURE — 3079F DIAST BP 80-89 MM HG: CPT | Mod: CPTII,S$GLB,, | Performed by: ORTHOPAEDIC SURGERY

## 2021-03-30 PROCEDURE — 99214 OFFICE O/P EST MOD 30 MIN: CPT | Mod: S$GLB,,, | Performed by: ORTHOPAEDIC SURGERY

## 2021-03-30 PROCEDURE — 3079F PR MOST RECENT DIASTOLIC BLOOD PRESSURE 80-89 MM HG: ICD-10-PCS | Mod: CPTII,S$GLB,, | Performed by: ORTHOPAEDIC SURGERY

## 2021-03-30 PROCEDURE — 3008F BODY MASS INDEX DOCD: CPT | Mod: CPTII,S$GLB,, | Performed by: ORTHOPAEDIC SURGERY

## 2021-03-30 PROCEDURE — 3075F SYST BP GE 130 - 139MM HG: CPT | Mod: CPTII,S$GLB,, | Performed by: ORTHOPAEDIC SURGERY

## 2021-03-30 PROCEDURE — 1126F PR PAIN SEVERITY QUANTIFIED, NO PAIN PRESENT: ICD-10-PCS | Mod: S$GLB,,, | Performed by: ORTHOPAEDIC SURGERY

## 2021-03-30 PROCEDURE — 3075F PR MOST RECENT SYSTOLIC BLOOD PRESS GE 130-139MM HG: ICD-10-PCS | Mod: CPTII,S$GLB,, | Performed by: ORTHOPAEDIC SURGERY

## 2021-03-31 ENCOUNTER — TELEPHONE (OUTPATIENT)
Dept: FAMILY MEDICINE | Facility: CLINIC | Age: 56
End: 2021-03-31

## 2021-03-31 RX ORDER — COLCHICINE 0.6 MG/1
0.6 TABLET ORAL 2 TIMES DAILY
Qty: 30 TABLET | Refills: 6 | Status: SHIPPED | OUTPATIENT
Start: 2021-03-31 | End: 2022-11-14 | Stop reason: SDUPTHER

## 2021-06-22 ENCOUNTER — OFFICE VISIT (OUTPATIENT)
Dept: FAMILY MEDICINE | Facility: CLINIC | Age: 56
End: 2021-06-22
Payer: COMMERCIAL

## 2021-06-22 VITALS
BODY MASS INDEX: 28.05 KG/M2 | SYSTOLIC BLOOD PRESSURE: 118 MMHG | OXYGEN SATURATION: 98 % | WEIGHT: 206.81 LBS | HEART RATE: 74 BPM | DIASTOLIC BLOOD PRESSURE: 76 MMHG

## 2021-06-22 DIAGNOSIS — F10.10 ETOH ABUSE: ICD-10-CM

## 2021-06-22 DIAGNOSIS — I47.10 PAROXYSMAL SVT (SUPRAVENTRICULAR TACHYCARDIA): ICD-10-CM

## 2021-06-22 DIAGNOSIS — K21.9 GASTROESOPHAGEAL REFLUX DISEASE, UNSPECIFIED WHETHER ESOPHAGITIS PRESENT: ICD-10-CM

## 2021-06-22 DIAGNOSIS — E78.5 HYPERLIPIDEMIA, UNSPECIFIED HYPERLIPIDEMIA TYPE: ICD-10-CM

## 2021-06-22 DIAGNOSIS — I10 ESSENTIAL HYPERTENSION: ICD-10-CM

## 2021-06-22 DIAGNOSIS — G89.29 CHRONIC PAIN OF RIGHT KNEE: Primary | ICD-10-CM

## 2021-06-22 DIAGNOSIS — M25.561 CHRONIC PAIN OF RIGHT KNEE: Primary | ICD-10-CM

## 2021-06-22 PROCEDURE — 99999 PR PBB SHADOW E&M-EST. PATIENT-LVL IV: ICD-10-PCS | Mod: PBBFAC,,, | Performed by: FAMILY MEDICINE

## 2021-06-22 PROCEDURE — 99999 PR PBB SHADOW E&M-EST. PATIENT-LVL IV: CPT | Mod: PBBFAC,,, | Performed by: FAMILY MEDICINE

## 2021-06-22 PROCEDURE — 3008F PR BODY MASS INDEX (BMI) DOCUMENTED: ICD-10-PCS | Mod: CPTII,S$GLB,, | Performed by: FAMILY MEDICINE

## 2021-06-22 PROCEDURE — 99214 PR OFFICE/OUTPT VISIT, EST, LEVL IV, 30-39 MIN: ICD-10-PCS | Mod: S$GLB,,, | Performed by: FAMILY MEDICINE

## 2021-06-22 PROCEDURE — 3008F BODY MASS INDEX DOCD: CPT | Mod: CPTII,S$GLB,, | Performed by: FAMILY MEDICINE

## 2021-06-22 PROCEDURE — 99214 OFFICE O/P EST MOD 30 MIN: CPT | Mod: S$GLB,,, | Performed by: FAMILY MEDICINE

## 2021-06-22 RX ORDER — GABAPENTIN 300 MG/1
300 CAPSULE ORAL NIGHTLY
Qty: 30 CAPSULE | Refills: 11 | Status: SHIPPED | OUTPATIENT
Start: 2021-06-22 | End: 2021-12-16

## 2021-06-22 RX ORDER — SILDENAFIL 100 MG/1
TABLET, FILM COATED ORAL
COMMUNITY
Start: 2020-09-17

## 2021-06-22 RX ORDER — CETIRIZINE HYDROCHLORIDE 10 MG/1
TABLET ORAL
COMMUNITY
Start: 2020-09-17

## 2021-06-25 ENCOUNTER — TELEPHONE (OUTPATIENT)
Dept: FAMILY MEDICINE | Facility: CLINIC | Age: 56
End: 2021-06-25

## 2021-07-07 ENCOUNTER — TELEPHONE (OUTPATIENT)
Dept: FAMILY MEDICINE | Facility: CLINIC | Age: 56
End: 2021-07-07

## 2021-07-07 DIAGNOSIS — M25.561 CHRONIC PAIN OF RIGHT KNEE: Primary | ICD-10-CM

## 2021-07-07 DIAGNOSIS — G89.29 CHRONIC PAIN OF RIGHT KNEE: Primary | ICD-10-CM

## 2021-07-14 RX ORDER — FAMOTIDINE 20 MG/1
TABLET, FILM COATED ORAL
Qty: 180 TABLET | Refills: 3 | Status: SHIPPED | OUTPATIENT
Start: 2021-07-14 | End: 2022-09-05 | Stop reason: SDUPTHER

## 2021-07-16 ENCOUNTER — TELEPHONE (OUTPATIENT)
Dept: FAMILY MEDICINE | Facility: CLINIC | Age: 56
End: 2021-07-16

## 2021-07-21 RX ORDER — ALPRAZOLAM 0.5 MG/1
TABLET ORAL
Qty: 5 TABLET | Refills: 0 | Status: SHIPPED | OUTPATIENT
Start: 2021-07-21 | End: 2021-10-22

## 2021-07-22 ENCOUNTER — TELEPHONE (OUTPATIENT)
Dept: FAMILY MEDICINE | Facility: CLINIC | Age: 56
End: 2021-07-22

## 2021-07-23 ENCOUNTER — PATIENT MESSAGE (OUTPATIENT)
Dept: FAMILY MEDICINE | Facility: CLINIC | Age: 56
End: 2021-07-23

## 2021-07-28 ENCOUNTER — LAB VISIT (OUTPATIENT)
Dept: LAB | Facility: HOSPITAL | Age: 56
End: 2021-07-28
Payer: COMMERCIAL

## 2021-07-28 DIAGNOSIS — Z00.00 PREVENTATIVE HEALTH CARE: ICD-10-CM

## 2021-07-28 LAB
BASOPHILS # BLD AUTO: 0.1 K/UL (ref 0–0.2)
BASOPHILS NFR BLD: 1.5 % (ref 0–1.9)
DIFFERENTIAL METHOD: ABNORMAL
EOSINOPHIL # BLD AUTO: 0.3 K/UL (ref 0–0.5)
EOSINOPHIL NFR BLD: 4.3 % (ref 0–8)
ERYTHROCYTE [DISTWIDTH] IN BLOOD BY AUTOMATED COUNT: 14.7 % (ref 11.5–14.5)
HCT VFR BLD AUTO: 43.7 % (ref 40–54)
HGB BLD-MCNC: 14 G/DL (ref 14–18)
IMM GRANULOCYTES # BLD AUTO: 0.02 K/UL (ref 0–0.04)
IMM GRANULOCYTES NFR BLD AUTO: 0.3 % (ref 0–0.5)
LYMPHOCYTES # BLD AUTO: 1.9 K/UL (ref 1–4.8)
LYMPHOCYTES NFR BLD: 27.8 % (ref 18–48)
MCH RBC QN AUTO: 27.5 PG (ref 27–31)
MCHC RBC AUTO-ENTMCNC: 32 G/DL (ref 32–36)
MCV RBC AUTO: 86 FL (ref 82–98)
MONOCYTES # BLD AUTO: 0.6 K/UL (ref 0.3–1)
MONOCYTES NFR BLD: 8.7 % (ref 4–15)
NEUTROPHILS # BLD AUTO: 3.9 K/UL (ref 1.8–7.7)
NEUTROPHILS NFR BLD: 57.4 % (ref 38–73)
NRBC BLD-RTO: 0 /100 WBC
PLATELET # BLD AUTO: 441 K/UL (ref 150–450)
PMV BLD AUTO: 9.8 FL (ref 9.2–12.9)
RBC # BLD AUTO: 5.09 M/UL (ref 4.6–6.2)
WBC # BLD AUTO: 6.8 K/UL (ref 3.9–12.7)

## 2021-07-28 PROCEDURE — 85025 COMPLETE CBC W/AUTO DIFF WBC: CPT | Performed by: FAMILY MEDICINE

## 2021-07-28 PROCEDURE — 80061 LIPID PANEL: CPT | Performed by: FAMILY MEDICINE

## 2021-07-28 PROCEDURE — 36415 COLL VENOUS BLD VENIPUNCTURE: CPT | Mod: PO | Performed by: FAMILY MEDICINE

## 2021-07-28 PROCEDURE — 84153 ASSAY OF PSA TOTAL: CPT | Performed by: FAMILY MEDICINE

## 2021-07-28 PROCEDURE — 80053 COMPREHEN METABOLIC PANEL: CPT | Performed by: FAMILY MEDICINE

## 2021-07-29 LAB
ALBUMIN SERPL BCP-MCNC: 4.6 G/DL (ref 3.5–5.2)
ALP SERPL-CCNC: 41 U/L (ref 55–135)
ALT SERPL W/O P-5'-P-CCNC: 26 U/L (ref 10–44)
ANION GAP SERPL CALC-SCNC: 7 MMOL/L (ref 8–16)
AST SERPL-CCNC: 20 U/L (ref 10–40)
BILIRUB SERPL-MCNC: 0.7 MG/DL (ref 0.1–1)
BUN SERPL-MCNC: 16 MG/DL (ref 6–20)
CALCIUM SERPL-MCNC: 9.9 MG/DL (ref 8.7–10.5)
CHLORIDE SERPL-SCNC: 106 MMOL/L (ref 95–110)
CHOLEST SERPL-MCNC: 213 MG/DL (ref 120–199)
CHOLEST/HDLC SERPL: 4.6 {RATIO} (ref 2–5)
CO2 SERPL-SCNC: 27 MMOL/L (ref 23–29)
COMPLEXED PSA SERPL-MCNC: 0.89 NG/ML (ref 0–4)
CREAT SERPL-MCNC: 1 MG/DL (ref 0.5–1.4)
EST. GFR  (AFRICAN AMERICAN): >60 ML/MIN/1.73 M^2
EST. GFR  (NON AFRICAN AMERICAN): >60 ML/MIN/1.73 M^2
GLUCOSE SERPL-MCNC: 109 MG/DL (ref 70–110)
HDLC SERPL-MCNC: 46 MG/DL (ref 40–75)
HDLC SERPL: 21.6 % (ref 20–50)
LDLC SERPL CALC-MCNC: 139.8 MG/DL (ref 63–159)
NONHDLC SERPL-MCNC: 167 MG/DL
POTASSIUM SERPL-SCNC: 4.5 MMOL/L (ref 3.5–5.1)
PROT SERPL-MCNC: 7.4 G/DL (ref 6–8.4)
SODIUM SERPL-SCNC: 140 MMOL/L (ref 136–145)
TRIGL SERPL-MCNC: 136 MG/DL (ref 30–150)

## 2021-08-10 ENCOUNTER — OFFICE VISIT (OUTPATIENT)
Dept: FAMILY MEDICINE | Facility: CLINIC | Age: 56
End: 2021-08-10
Payer: COMMERCIAL

## 2021-08-10 VITALS
HEIGHT: 72 IN | WEIGHT: 209 LBS | HEART RATE: 72 BPM | BODY MASS INDEX: 28.31 KG/M2 | SYSTOLIC BLOOD PRESSURE: 122 MMHG | TEMPERATURE: 99 F | DIASTOLIC BLOOD PRESSURE: 84 MMHG | OXYGEN SATURATION: 99 %

## 2021-08-10 DIAGNOSIS — E78.1 HIGH TRIGLYCERIDES: ICD-10-CM

## 2021-08-10 DIAGNOSIS — K21.9 GASTROESOPHAGEAL REFLUX DISEASE, UNSPECIFIED WHETHER ESOPHAGITIS PRESENT: ICD-10-CM

## 2021-08-10 DIAGNOSIS — S83.241A ACUTE MEDIAL MENISCUS TEAR OF RIGHT KNEE, INITIAL ENCOUNTER: Primary | ICD-10-CM

## 2021-08-10 DIAGNOSIS — I10 ESSENTIAL HYPERTENSION: ICD-10-CM

## 2021-08-10 DIAGNOSIS — L70.0 ACNE VULGARIS: ICD-10-CM

## 2021-08-10 DIAGNOSIS — F41.9 ANXIETY: ICD-10-CM

## 2021-08-10 PROCEDURE — 1160F PR REVIEW ALL MEDS BY PRESCRIBER/CLIN PHARMACIST DOCUMENTED: ICD-10-PCS | Mod: CPTII,S$GLB,, | Performed by: FAMILY MEDICINE

## 2021-08-10 PROCEDURE — 3008F BODY MASS INDEX DOCD: CPT | Mod: CPTII,S$GLB,, | Performed by: FAMILY MEDICINE

## 2021-08-10 PROCEDURE — 1159F PR MEDICATION LIST DOCUMENTED IN MEDICAL RECORD: ICD-10-PCS | Mod: CPTII,S$GLB,, | Performed by: FAMILY MEDICINE

## 2021-08-10 PROCEDURE — 3079F DIAST BP 80-89 MM HG: CPT | Mod: CPTII,S$GLB,, | Performed by: FAMILY MEDICINE

## 2021-08-10 PROCEDURE — 3074F SYST BP LT 130 MM HG: CPT | Mod: CPTII,S$GLB,, | Performed by: FAMILY MEDICINE

## 2021-08-10 PROCEDURE — 3079F PR MOST RECENT DIASTOLIC BLOOD PRESSURE 80-89 MM HG: ICD-10-PCS | Mod: CPTII,S$GLB,, | Performed by: FAMILY MEDICINE

## 2021-08-10 PROCEDURE — 99214 PR OFFICE/OUTPT VISIT, EST, LEVL IV, 30-39 MIN: ICD-10-PCS | Mod: S$GLB,,, | Performed by: FAMILY MEDICINE

## 2021-08-10 PROCEDURE — 3074F PR MOST RECENT SYSTOLIC BLOOD PRESSURE < 130 MM HG: ICD-10-PCS | Mod: CPTII,S$GLB,, | Performed by: FAMILY MEDICINE

## 2021-08-10 PROCEDURE — 3008F PR BODY MASS INDEX (BMI) DOCUMENTED: ICD-10-PCS | Mod: CPTII,S$GLB,, | Performed by: FAMILY MEDICINE

## 2021-08-10 PROCEDURE — 99214 OFFICE O/P EST MOD 30 MIN: CPT | Mod: S$GLB,,, | Performed by: FAMILY MEDICINE

## 2021-08-10 PROCEDURE — 1160F RVW MEDS BY RX/DR IN RCRD: CPT | Mod: CPTII,S$GLB,, | Performed by: FAMILY MEDICINE

## 2021-08-10 PROCEDURE — 99999 PR PBB SHADOW E&M-EST. PATIENT-LVL V: ICD-10-PCS | Mod: PBBFAC,,, | Performed by: FAMILY MEDICINE

## 2021-08-10 PROCEDURE — 1125F PR PAIN SEVERITY QUANTIFIED, PAIN PRESENT: ICD-10-PCS | Mod: CPTII,S$GLB,, | Performed by: FAMILY MEDICINE

## 2021-08-10 PROCEDURE — 1125F AMNT PAIN NOTED PAIN PRSNT: CPT | Mod: CPTII,S$GLB,, | Performed by: FAMILY MEDICINE

## 2021-08-10 PROCEDURE — 99999 PR PBB SHADOW E&M-EST. PATIENT-LVL V: CPT | Mod: PBBFAC,,, | Performed by: FAMILY MEDICINE

## 2021-08-10 PROCEDURE — 1159F MED LIST DOCD IN RCRD: CPT | Mod: CPTII,S$GLB,, | Performed by: FAMILY MEDICINE

## 2021-08-10 RX ORDER — FENOFIBRATE 145 MG/1
145 TABLET, FILM COATED ORAL DAILY
Qty: 90 TABLET | Refills: 3 | Status: SHIPPED | OUTPATIENT
Start: 2021-08-10 | End: 2022-10-01 | Stop reason: SDUPTHER

## 2021-08-10 RX ORDER — MINOCYCLINE HYDROCHLORIDE 100 MG/1
100 CAPSULE ORAL DAILY
Qty: 90 CAPSULE | Refills: 3 | Status: SHIPPED | OUTPATIENT
Start: 2021-08-10 | End: 2021-10-22

## 2021-08-10 RX ORDER — PROPRANOLOL HYDROCHLORIDE 10 MG/1
10 TABLET ORAL 3 TIMES DAILY
Qty: 270 TABLET | Refills: 3 | Status: SHIPPED | OUTPATIENT
Start: 2021-08-10 | End: 2022-10-01 | Stop reason: SDUPTHER

## 2021-08-10 RX ORDER — NIACIN 500 MG/1
500 TABLET, EXTENDED RELEASE ORAL NIGHTLY
Qty: 90 TABLET | Refills: 3 | Status: SHIPPED | OUTPATIENT
Start: 2021-08-10 | End: 2021-11-03

## 2021-08-19 ENCOUNTER — TELEPHONE (OUTPATIENT)
Dept: SPORTS MEDICINE | Facility: CLINIC | Age: 56
End: 2021-08-19

## 2021-08-20 ENCOUNTER — PATIENT OUTREACH (OUTPATIENT)
Dept: ADMINISTRATIVE | Facility: OTHER | Age: 56
End: 2021-08-20

## 2021-08-23 ENCOUNTER — HOSPITAL ENCOUNTER (OUTPATIENT)
Dept: RADIOLOGY | Facility: HOSPITAL | Age: 56
Discharge: HOME | End: 2021-08-23
Attending: ORTHOPAEDIC SURGERY
Payer: COMMERCIAL

## 2021-08-23 ENCOUNTER — OFFICE VISIT (OUTPATIENT)
Dept: SPORTS MEDICINE | Facility: CLINIC | Age: 56
End: 2021-08-23
Payer: COMMERCIAL

## 2021-08-23 VITALS
DIASTOLIC BLOOD PRESSURE: 91 MMHG | SYSTOLIC BLOOD PRESSURE: 151 MMHG | HEART RATE: 64 BPM | HEIGHT: 72 IN | BODY MASS INDEX: 28.17 KG/M2 | WEIGHT: 208 LBS

## 2021-08-23 DIAGNOSIS — S83.241A ACUTE MEDIAL MENISCUS TEAR OF RIGHT KNEE, INITIAL ENCOUNTER: ICD-10-CM

## 2021-08-23 DIAGNOSIS — M21.161 GENU VARUM OF BOTH LOWER EXTREMITIES: Primary | ICD-10-CM

## 2021-08-23 DIAGNOSIS — M17.31 POST-TRAUMATIC OSTEOARTHRITIS OF RIGHT KNEE: ICD-10-CM

## 2021-08-23 DIAGNOSIS — M21.162 GENU VARUM OF BOTH LOWER EXTREMITIES: Primary | ICD-10-CM

## 2021-08-23 PROCEDURE — 77073 BONE LENGTH STUDIES: CPT | Mod: 26,,, | Performed by: RADIOLOGY

## 2021-08-23 PROCEDURE — 3008F PR BODY MASS INDEX (BMI) DOCUMENTED: ICD-10-PCS | Mod: CPTII,S$GLB,, | Performed by: ORTHOPAEDIC SURGERY

## 2021-08-23 PROCEDURE — 99214 PR OFFICE/OUTPT VISIT, EST, LEVL IV, 30-39 MIN: ICD-10-PCS | Mod: S$GLB,,, | Performed by: ORTHOPAEDIC SURGERY

## 2021-08-23 PROCEDURE — 73560 X-RAY EXAM OF KNEE 1 OR 2: CPT | Mod: 26,59,RT, | Performed by: RADIOLOGY

## 2021-08-23 PROCEDURE — 3077F PR MOST RECENT SYSTOLIC BLOOD PRESSURE >= 140 MM HG: ICD-10-PCS | Mod: CPTII,S$GLB,, | Performed by: ORTHOPAEDIC SURGERY

## 2021-08-23 PROCEDURE — 1160F RVW MEDS BY RX/DR IN RCRD: CPT | Mod: CPTII,S$GLB,, | Performed by: ORTHOPAEDIC SURGERY

## 2021-08-23 PROCEDURE — 1125F AMNT PAIN NOTED PAIN PRSNT: CPT | Mod: CPTII,S$GLB,, | Performed by: ORTHOPAEDIC SURGERY

## 2021-08-23 PROCEDURE — 3080F PR MOST RECENT DIASTOLIC BLOOD PRESSURE >= 90 MM HG: ICD-10-PCS | Mod: CPTII,S$GLB,, | Performed by: ORTHOPAEDIC SURGERY

## 2021-08-23 PROCEDURE — 3077F SYST BP >= 140 MM HG: CPT | Mod: CPTII,S$GLB,, | Performed by: ORTHOPAEDIC SURGERY

## 2021-08-23 PROCEDURE — 1125F PR PAIN SEVERITY QUANTIFIED, PAIN PRESENT: ICD-10-PCS | Mod: CPTII,S$GLB,, | Performed by: ORTHOPAEDIC SURGERY

## 2021-08-23 PROCEDURE — 73560 XR KNEE 1 OR 2 VIEW RIGHT: ICD-10-PCS | Mod: 26,59,RT, | Performed by: RADIOLOGY

## 2021-08-23 PROCEDURE — 73560 X-RAY EXAM OF KNEE 1 OR 2: CPT | Mod: TC,RT

## 2021-08-23 PROCEDURE — 99999 PR PBB SHADOW E&M-EST. PATIENT-LVL V: ICD-10-PCS | Mod: PBBFAC,,, | Performed by: ORTHOPAEDIC SURGERY

## 2021-08-23 PROCEDURE — 77073 XR HIP TO ANKLE: ICD-10-PCS | Mod: 26,,, | Performed by: RADIOLOGY

## 2021-08-23 PROCEDURE — 73564 X-RAY EXAM KNEE 4 OR MORE: CPT | Mod: 26,59,, | Performed by: RADIOLOGY

## 2021-08-23 PROCEDURE — 1159F PR MEDICATION LIST DOCUMENTED IN MEDICAL RECORD: ICD-10-PCS | Mod: CPTII,S$GLB,, | Performed by: ORTHOPAEDIC SURGERY

## 2021-08-23 PROCEDURE — 73564 XR KNEE ORTHO BILAT WITH FLEXION: ICD-10-PCS | Mod: 26,59,, | Performed by: RADIOLOGY

## 2021-08-23 PROCEDURE — 99999 PR PBB SHADOW E&M-EST. PATIENT-LVL V: CPT | Mod: PBBFAC,,, | Performed by: ORTHOPAEDIC SURGERY

## 2021-08-23 PROCEDURE — 3080F DIAST BP >= 90 MM HG: CPT | Mod: CPTII,S$GLB,, | Performed by: ORTHOPAEDIC SURGERY

## 2021-08-23 PROCEDURE — 1159F MED LIST DOCD IN RCRD: CPT | Mod: CPTII,S$GLB,, | Performed by: ORTHOPAEDIC SURGERY

## 2021-08-23 PROCEDURE — 77073 BONE LENGTH STUDIES: CPT | Mod: TC

## 2021-08-23 PROCEDURE — 3008F BODY MASS INDEX DOCD: CPT | Mod: CPTII,S$GLB,, | Performed by: ORTHOPAEDIC SURGERY

## 2021-08-23 PROCEDURE — 73564 X-RAY EXAM KNEE 4 OR MORE: CPT | Mod: TC,50,59

## 2021-08-23 PROCEDURE — 1160F PR REVIEW ALL MEDS BY PRESCRIBER/CLIN PHARMACIST DOCUMENTED: ICD-10-PCS | Mod: CPTII,S$GLB,, | Performed by: ORTHOPAEDIC SURGERY

## 2021-08-23 PROCEDURE — 99214 OFFICE O/P EST MOD 30 MIN: CPT | Mod: S$GLB,,, | Performed by: ORTHOPAEDIC SURGERY

## 2021-09-06 ENCOUNTER — PATIENT MESSAGE (OUTPATIENT)
Dept: SPORTS MEDICINE | Facility: CLINIC | Age: 56
End: 2021-09-06

## 2021-09-21 ENCOUNTER — PATIENT MESSAGE (OUTPATIENT)
Dept: SPORTS MEDICINE | Facility: CLINIC | Age: 56
End: 2021-09-21

## 2021-10-03 ENCOUNTER — PATIENT OUTREACH (OUTPATIENT)
Dept: ADMINISTRATIVE | Facility: OTHER | Age: 56
End: 2021-10-03

## 2021-10-04 ENCOUNTER — OFFICE VISIT (OUTPATIENT)
Dept: SPORTS MEDICINE | Facility: CLINIC | Age: 56
End: 2021-10-04
Payer: COMMERCIAL

## 2021-10-04 ENCOUNTER — PATIENT MESSAGE (OUTPATIENT)
Dept: ELECTROPHYSIOLOGY | Facility: CLINIC | Age: 56
End: 2021-10-04

## 2021-10-04 VITALS
BODY MASS INDEX: 24.48 KG/M2 | DIASTOLIC BLOOD PRESSURE: 86 MMHG | WEIGHT: 201 LBS | SYSTOLIC BLOOD PRESSURE: 132 MMHG | HEIGHT: 76 IN | HEART RATE: 67 BPM

## 2021-10-04 DIAGNOSIS — M25.561 RIGHT KNEE PAIN: ICD-10-CM

## 2021-10-04 DIAGNOSIS — M17.31 POST-TRAUMATIC OSTEOARTHRITIS OF RIGHT KNEE: Primary | ICD-10-CM

## 2021-10-04 PROCEDURE — 1160F RVW MEDS BY RX/DR IN RCRD: CPT | Mod: CPTII,S$GLB,, | Performed by: ORTHOPAEDIC SURGERY

## 2021-10-04 PROCEDURE — 1160F PR REVIEW ALL MEDS BY PRESCRIBER/CLIN PHARMACIST DOCUMENTED: ICD-10-PCS | Mod: CPTII,S$GLB,, | Performed by: ORTHOPAEDIC SURGERY

## 2021-10-04 PROCEDURE — 3008F PR BODY MASS INDEX (BMI) DOCUMENTED: ICD-10-PCS | Mod: CPTII,S$GLB,, | Performed by: ORTHOPAEDIC SURGERY

## 2021-10-04 PROCEDURE — 1159F MED LIST DOCD IN RCRD: CPT | Mod: CPTII,S$GLB,, | Performed by: ORTHOPAEDIC SURGERY

## 2021-10-04 PROCEDURE — 1159F PR MEDICATION LIST DOCUMENTED IN MEDICAL RECORD: ICD-10-PCS | Mod: CPTII,S$GLB,, | Performed by: ORTHOPAEDIC SURGERY

## 2021-10-04 PROCEDURE — 3075F SYST BP GE 130 - 139MM HG: CPT | Mod: CPTII,S$GLB,, | Performed by: ORTHOPAEDIC SURGERY

## 2021-10-04 PROCEDURE — 3008F BODY MASS INDEX DOCD: CPT | Mod: CPTII,S$GLB,, | Performed by: ORTHOPAEDIC SURGERY

## 2021-10-04 PROCEDURE — 3079F PR MOST RECENT DIASTOLIC BLOOD PRESSURE 80-89 MM HG: ICD-10-PCS | Mod: CPTII,S$GLB,, | Performed by: ORTHOPAEDIC SURGERY

## 2021-10-04 PROCEDURE — 99499 UNLISTED E&M SERVICE: CPT | Mod: S$GLB,,, | Performed by: ORTHOPAEDIC SURGERY

## 2021-10-04 PROCEDURE — 20611 LARGE JOINT ASPIRATION/INJECTION: R KNEE: ICD-10-PCS | Mod: RT,S$GLB,, | Performed by: ORTHOPAEDIC SURGERY

## 2021-10-04 PROCEDURE — 99999 PR PBB SHADOW E&M-EST. PATIENT-LVL IV: CPT | Mod: PBBFAC,,, | Performed by: ORTHOPAEDIC SURGERY

## 2021-10-04 PROCEDURE — 99999 PR PBB SHADOW E&M-EST. PATIENT-LVL IV: ICD-10-PCS | Mod: PBBFAC,,, | Performed by: ORTHOPAEDIC SURGERY

## 2021-10-04 PROCEDURE — 20611 DRAIN/INJ JOINT/BURSA W/US: CPT | Mod: RT,S$GLB,, | Performed by: ORTHOPAEDIC SURGERY

## 2021-10-04 PROCEDURE — 3079F DIAST BP 80-89 MM HG: CPT | Mod: CPTII,S$GLB,, | Performed by: ORTHOPAEDIC SURGERY

## 2021-10-04 PROCEDURE — 3075F PR MOST RECENT SYSTOLIC BLOOD PRESS GE 130-139MM HG: ICD-10-PCS | Mod: CPTII,S$GLB,, | Performed by: ORTHOPAEDIC SURGERY

## 2021-10-04 PROCEDURE — 99499 NO LOS: ICD-10-PCS | Mod: S$GLB,,, | Performed by: ORTHOPAEDIC SURGERY

## 2021-10-04 RX ORDER — CELECOXIB 200 MG/1
200 CAPSULE ORAL 2 TIMES DAILY
Qty: 60 CAPSULE | Refills: 4 | Status: SHIPPED | OUTPATIENT
Start: 2021-10-04 | End: 2021-11-03

## 2021-10-05 ENCOUNTER — IMMUNIZATION (OUTPATIENT)
Dept: FAMILY MEDICINE | Facility: CLINIC | Age: 56
End: 2021-10-05
Payer: COMMERCIAL

## 2021-10-05 DIAGNOSIS — Z23 NEED FOR VACCINATION: Primary | ICD-10-CM

## 2021-10-05 PROCEDURE — 0003A COVID-19, MRNA, LNP-S, PF, 30 MCG/0.3 ML DOSE VACCINE: CPT | Mod: PBBFAC | Performed by: FAMILY MEDICINE

## 2021-10-05 PROCEDURE — 91300 COVID-19, MRNA, LNP-S, PF, 30 MCG/0.3 ML DOSE VACCINE: CPT | Mod: PBBFAC | Performed by: FAMILY MEDICINE

## 2021-10-22 ENCOUNTER — OFFICE VISIT (OUTPATIENT)
Dept: FAMILY MEDICINE | Facility: CLINIC | Age: 56
End: 2021-10-22
Payer: COMMERCIAL

## 2021-10-22 VITALS
TEMPERATURE: 98 F | HEIGHT: 76 IN | HEART RATE: 75 BPM | DIASTOLIC BLOOD PRESSURE: 80 MMHG | WEIGHT: 205.5 LBS | BODY MASS INDEX: 25.02 KG/M2 | OXYGEN SATURATION: 99 % | SYSTOLIC BLOOD PRESSURE: 132 MMHG

## 2021-10-22 DIAGNOSIS — J32.9 SINUSITIS, UNSPECIFIED CHRONICITY, UNSPECIFIED LOCATION: Primary | ICD-10-CM

## 2021-10-22 PROCEDURE — 1160F RVW MEDS BY RX/DR IN RCRD: CPT | Mod: CPTII,S$GLB,, | Performed by: NURSE PRACTITIONER

## 2021-10-22 PROCEDURE — 99999 PR PBB SHADOW E&M-EST. PATIENT-LVL IV: ICD-10-PCS | Mod: PBBFAC,,, | Performed by: NURSE PRACTITIONER

## 2021-10-22 PROCEDURE — 99999 PR PBB SHADOW E&M-EST. PATIENT-LVL IV: CPT | Mod: PBBFAC,,, | Performed by: NURSE PRACTITIONER

## 2021-10-22 PROCEDURE — 3075F PR MOST RECENT SYSTOLIC BLOOD PRESS GE 130-139MM HG: ICD-10-PCS | Mod: CPTII,S$GLB,, | Performed by: NURSE PRACTITIONER

## 2021-10-22 PROCEDURE — 3008F BODY MASS INDEX DOCD: CPT | Mod: CPTII,S$GLB,, | Performed by: NURSE PRACTITIONER

## 2021-10-22 PROCEDURE — 99214 OFFICE O/P EST MOD 30 MIN: CPT | Mod: S$GLB,,, | Performed by: NURSE PRACTITIONER

## 2021-10-22 PROCEDURE — 3079F DIAST BP 80-89 MM HG: CPT | Mod: CPTII,S$GLB,, | Performed by: NURSE PRACTITIONER

## 2021-10-22 PROCEDURE — 1159F MED LIST DOCD IN RCRD: CPT | Mod: CPTII,S$GLB,, | Performed by: NURSE PRACTITIONER

## 2021-10-22 PROCEDURE — 3008F PR BODY MASS INDEX (BMI) DOCUMENTED: ICD-10-PCS | Mod: CPTII,S$GLB,, | Performed by: NURSE PRACTITIONER

## 2021-10-22 PROCEDURE — 1159F PR MEDICATION LIST DOCUMENTED IN MEDICAL RECORD: ICD-10-PCS | Mod: CPTII,S$GLB,, | Performed by: NURSE PRACTITIONER

## 2021-10-22 PROCEDURE — 3075F SYST BP GE 130 - 139MM HG: CPT | Mod: CPTII,S$GLB,, | Performed by: NURSE PRACTITIONER

## 2021-10-22 PROCEDURE — 99214 PR OFFICE/OUTPT VISIT, EST, LEVL IV, 30-39 MIN: ICD-10-PCS | Mod: S$GLB,,, | Performed by: NURSE PRACTITIONER

## 2021-10-22 PROCEDURE — 1160F PR REVIEW ALL MEDS BY PRESCRIBER/CLIN PHARMACIST DOCUMENTED: ICD-10-PCS | Mod: CPTII,S$GLB,, | Performed by: NURSE PRACTITIONER

## 2021-10-22 PROCEDURE — 3079F PR MOST RECENT DIASTOLIC BLOOD PRESSURE 80-89 MM HG: ICD-10-PCS | Mod: CPTII,S$GLB,, | Performed by: NURSE PRACTITIONER

## 2021-10-22 RX ORDER — METHYLPREDNISOLONE 4 MG/1
TABLET ORAL
Qty: 21 TABLET | Refills: 0 | Status: SHIPPED | OUTPATIENT
Start: 2021-10-23 | End: 2021-12-16

## 2021-10-22 RX ORDER — MOMETASONE FUROATE 50 UG/1
SPRAY, METERED NASAL
Qty: 51 G | Refills: 2 | Status: SHIPPED | OUTPATIENT
Start: 2021-10-22

## 2021-11-03 RX ORDER — NIACIN 500 MG/1
TABLET, EXTENDED RELEASE ORAL
Qty: 90 TABLET | Refills: 3 | Status: SHIPPED | OUTPATIENT
Start: 2021-11-03 | End: 2023-01-16

## 2021-12-03 ENCOUNTER — PATIENT MESSAGE (OUTPATIENT)
Dept: FAMILY MEDICINE | Facility: CLINIC | Age: 56
End: 2021-12-03
Payer: COMMERCIAL

## 2021-12-03 RX ORDER — VALACYCLOVIR HYDROCHLORIDE 500 MG/1
1000 TABLET, FILM COATED ORAL 2 TIMES DAILY
Qty: 90 TABLET | Refills: 2 | Status: SHIPPED | OUTPATIENT
Start: 2021-12-03 | End: 2022-05-25

## 2021-12-16 ENCOUNTER — OFFICE VISIT (OUTPATIENT)
Dept: FAMILY MEDICINE | Facility: CLINIC | Age: 56
End: 2021-12-16
Payer: COMMERCIAL

## 2021-12-16 VITALS
OXYGEN SATURATION: 99 % | HEIGHT: 72 IN | DIASTOLIC BLOOD PRESSURE: 74 MMHG | WEIGHT: 210.75 LBS | HEART RATE: 77 BPM | BODY MASS INDEX: 28.54 KG/M2 | SYSTOLIC BLOOD PRESSURE: 136 MMHG

## 2021-12-16 DIAGNOSIS — I10 ESSENTIAL HYPERTENSION: ICD-10-CM

## 2021-12-16 DIAGNOSIS — E78.5 HYPERLIPIDEMIA, UNSPECIFIED HYPERLIPIDEMIA TYPE: ICD-10-CM

## 2021-12-16 DIAGNOSIS — K21.9 GASTROESOPHAGEAL REFLUX DISEASE, UNSPECIFIED WHETHER ESOPHAGITIS PRESENT: ICD-10-CM

## 2021-12-16 DIAGNOSIS — I47.10 PAROXYSMAL SVT (SUPRAVENTRICULAR TACHYCARDIA): ICD-10-CM

## 2021-12-16 DIAGNOSIS — M54.12 CERVICAL RADICULOPATHY: Primary | ICD-10-CM

## 2021-12-16 PROCEDURE — 99999 PR PBB SHADOW E&M-EST. PATIENT-LVL IV: CPT | Mod: PBBFAC,,, | Performed by: FAMILY MEDICINE

## 2021-12-16 PROCEDURE — 99214 OFFICE O/P EST MOD 30 MIN: CPT | Mod: S$GLB,,, | Performed by: FAMILY MEDICINE

## 2021-12-16 PROCEDURE — 99999 PR PBB SHADOW E&M-EST. PATIENT-LVL IV: ICD-10-PCS | Mod: PBBFAC,,, | Performed by: FAMILY MEDICINE

## 2021-12-16 PROCEDURE — 99214 PR OFFICE/OUTPT VISIT, EST, LEVL IV, 30-39 MIN: ICD-10-PCS | Mod: S$GLB,,, | Performed by: FAMILY MEDICINE

## 2021-12-16 RX ORDER — PREDNISONE 10 MG/1
TABLET ORAL
Qty: 20 TABLET | Refills: 0 | Status: SHIPPED | OUTPATIENT
Start: 2021-12-16 | End: 2022-03-22

## 2022-01-07 ENCOUNTER — OFFICE VISIT (OUTPATIENT)
Dept: PAIN MEDICINE | Facility: CLINIC | Age: 57
End: 2022-01-07
Payer: COMMERCIAL

## 2022-01-07 VITALS
TEMPERATURE: 98 F | WEIGHT: 207.56 LBS | DIASTOLIC BLOOD PRESSURE: 64 MMHG | HEART RATE: 78 BPM | BODY MASS INDEX: 28.15 KG/M2 | RESPIRATION RATE: 20 BRPM | OXYGEN SATURATION: 97 % | SYSTOLIC BLOOD PRESSURE: 133 MMHG

## 2022-01-07 DIAGNOSIS — M54.12 CERVICAL RADICULITIS: Primary | ICD-10-CM

## 2022-01-07 DIAGNOSIS — M54.2 NECK PAIN: ICD-10-CM

## 2022-01-07 PROCEDURE — 99214 PR OFFICE/OUTPT VISIT, EST, LEVL IV, 30-39 MIN: ICD-10-PCS | Mod: S$GLB,,, | Performed by: ANESTHESIOLOGY

## 2022-01-07 PROCEDURE — 1160F RVW MEDS BY RX/DR IN RCRD: CPT | Mod: CPTII,S$GLB,, | Performed by: ANESTHESIOLOGY

## 2022-01-07 PROCEDURE — 99999 PR PBB SHADOW E&M-EST. PATIENT-LVL V: ICD-10-PCS | Mod: PBBFAC,,, | Performed by: ANESTHESIOLOGY

## 2022-01-07 PROCEDURE — 3078F DIAST BP <80 MM HG: CPT | Mod: CPTII,S$GLB,, | Performed by: ANESTHESIOLOGY

## 2022-01-07 PROCEDURE — 3075F PR MOST RECENT SYSTOLIC BLOOD PRESS GE 130-139MM HG: ICD-10-PCS | Mod: CPTII,S$GLB,, | Performed by: ANESTHESIOLOGY

## 2022-01-07 PROCEDURE — 1159F PR MEDICATION LIST DOCUMENTED IN MEDICAL RECORD: ICD-10-PCS | Mod: CPTII,S$GLB,, | Performed by: ANESTHESIOLOGY

## 2022-01-07 PROCEDURE — 99214 OFFICE O/P EST MOD 30 MIN: CPT | Mod: S$GLB,,, | Performed by: ANESTHESIOLOGY

## 2022-01-07 PROCEDURE — 3008F PR BODY MASS INDEX (BMI) DOCUMENTED: ICD-10-PCS | Mod: CPTII,S$GLB,, | Performed by: ANESTHESIOLOGY

## 2022-01-07 PROCEDURE — 1160F PR REVIEW ALL MEDS BY PRESCRIBER/CLIN PHARMACIST DOCUMENTED: ICD-10-PCS | Mod: CPTII,S$GLB,, | Performed by: ANESTHESIOLOGY

## 2022-01-07 PROCEDURE — 3008F BODY MASS INDEX DOCD: CPT | Mod: CPTII,S$GLB,, | Performed by: ANESTHESIOLOGY

## 2022-01-07 PROCEDURE — 3075F SYST BP GE 130 - 139MM HG: CPT | Mod: CPTII,S$GLB,, | Performed by: ANESTHESIOLOGY

## 2022-01-07 PROCEDURE — 3078F PR MOST RECENT DIASTOLIC BLOOD PRESSURE < 80 MM HG: ICD-10-PCS | Mod: CPTII,S$GLB,, | Performed by: ANESTHESIOLOGY

## 2022-01-07 PROCEDURE — 99999 PR PBB SHADOW E&M-EST. PATIENT-LVL V: CPT | Mod: PBBFAC,,, | Performed by: ANESTHESIOLOGY

## 2022-01-07 PROCEDURE — 1159F MED LIST DOCD IN RCRD: CPT | Mod: CPTII,S$GLB,, | Performed by: ANESTHESIOLOGY

## 2022-01-07 NOTE — PROGRESS NOTES
[facility] Follow Up Evaluation    Referred by: Cynthia Perdomo PA-C  Reason for referral: neck pain    CC:   Chief Complaint   Patient presents with    Neck Pain      Last 3 PDI Scores 1/7/2022   Pain Disability Index (PDI) 12         Interval HPI 1/7/22: Mr. Quiroz returns to the office for follow up.  Over the past couple month he has had some worsening neck pain radiating into the right shoulder blade and scapula.  No numbness or weakness.  He's taken a medrol dose pack, flexeril and started formal PT, pain has improved, pain is 2/10 today.      Pain intervention history:  - s/p cervical SKYLA on 10/15/19 initially with 50-60% relief    HPI:   Jaren Hillsin is a 56 y.o. male who complains of neck pain    Onset: 1 year  Inciting Event: none  Progression: since onset, pain is stable  Typical Range: 4-7/10  Timing: constant  Quality: aching, sharp, throbbing  Radiation: no  Associated numbness or weakness: yes numbness over the right neck, no weakness  Exacerbated by: laying, night, morning  Allievated by: medications, PT  Is Pain Level Acceptable?: No    Previous Therapies:  PT/OT: yes  HEP: yes  Interventions:   Surgery:  Medications:   - NSAIDS: ibuprofen  - MSK Relaxants: flexeril  - TCAs:   - SNRIs:   - Topicals: voltaren gel  - Anticonvulsants:  - Opioids: tramadol    History:    Current Outpatient Medications:     ALLERGY RELIEF, LORATADINE, 10 mg tablet, TAKE ONE TABLET BY MOUTH ONCE DAILY, Disp: 30 tablet, Rfl: 11    amLODIPine (NORVASC) 10 MG tablet, Take 1 tablet (10 mg total) by mouth once daily., Disp: 30 tablet, Rfl: 5    cetirizine (ZYRTEC) 10 MG tablet, TAKE ONE TABLET BY MOUTH ONCE DAILY AS NEEDED FOR ALLERGIES, Disp: , Rfl:     clonazePAM (KLONOPIN) 2 MG Tab, Take by mouth daily as needed., Disp: , Rfl:     co-enzyme Q-10 30 mg capsule, Take 100 mg by mouth once daily., Disp: , Rfl:     colchicine (COLCRYS) 0.6 mg tablet, Take 1 tablet (0.6 mg total) by mouth 2 (two) times  daily., Disp: 30 tablet, Rfl: 6    cyclobenzaprine (FLEXERIL) 10 MG tablet, Take 10 mg by mouth 3 (three) times daily as needed. PRN, Disp: , Rfl:     esomeprazole (NEXIUM) 40 MG capsule, Take 1 capsule (40 mg total) by mouth daily as needed., Disp: 90 capsule, Rfl: 3    famotidine (PEPCID) 20 MG tablet, Take 1 tablet by mouth twice daily, Disp: 180 tablet, Rfl: 3    fenofibrate (TRICOR) 145 MG tablet, Take 1 tablet (145 mg total) by mouth once daily., Disp: 90 tablet, Rfl: 3    hydrOXYzine HCL (ATARAX) 25 MG tablet, Take 1 tablet (25 mg total) by mouth every 6 (six) hours as needed for Itching. Every 6 hours PRN, Disp: 180 tablet, Rfl: 3    meclizine (ANTIVERT) 25 mg tablet, Take 1 tablet (25 mg total) by mouth 3 (three) times daily as needed for Dizziness (may cause drowsiness)., Disp: 60 tablet, Rfl: 2    mometasone (NASONEX) 50 mcg/actuation nasal spray, Use 2 spray(s) in each nostril once daily, Disp: 51 g, Rfl: 2    niacin (SLO-NIACIN) 500 mg tablet, TAKE 1 TABLET BY MOUTH ONCE DAILY AT NIGHT, Disp: 90 tablet, Rfl: 3    predniSONE (DELTASONE) 10 MG tablet, Take 4 tabs daily for 2 days then Take 3 tabs daily for 2 days thenTake 2 tabs daily for 2 days then Take 1 tab daily for 2 days then stop, Disp: 20 tablet, Rfl: 0    propranoloL (INDERAL) 10 MG tablet, Take 1 tablet (10 mg total) by mouth 3 (three) times daily., Disp: 270 tablet, Rfl: 3    sildenafiL (VIAGRA) 100 MG tablet, TAKE ONE TABLET BY MOUTH EVERY WEEK AS NEEDED 30 TO 60 MINUTES BEFORE INTERCOURSE FOR BEST RESULTS TAKE ON EMPTY STOMACH, Disp: , Rfl:     valACYclovir (VALTREX) 500 MG tablet, Take 2 tablets (1,000 mg total) by mouth 2 (two) times daily., Disp: 90 tablet, Rfl: 2  No current facility-administered medications for this visit.    Facility-Administered Medications Ordered in Other Visits:     0.9%  NaCl infusion, , Intravenous, Continuous, Jennifer Majano, NP    lactated ringers infusion, , Intravenous, Continuous, Luiz  STONEY Aquino MD    lidocaine (PF) 10 mg/ml (1%) injection 10 mg, 1 mL, Intradermal, Once, Luiz Aquino MD    Past Medical History:   Diagnosis Date    ALLERGIC RHINITIS     Asthma     as child    Cervicalgia     DDD (degenerative disc disease), lumbar     Fatty liver     Hemorrhoid     Hepatomegaly     High triglycerides     HTN (hypertension)     Liver hemangioma 2014    PTSD (post-traumatic stress disorder)     not currently taking [facility]    Shingles     lower back    Urticaria        Past Surgical History:   Procedure Laterality Date    BACK SURGERY      ruptured disc L5-S1    COLONOSCOPY  ~2010  ([facility])    Hemorrhoids    CORONARY ANGIOGRAPHY N/A 10/23/2019    Procedure: ANGIOGRAM, CORONARY ARTERY;  Surgeon: Can Masters MD;  Location: [facility] CATH;  Service: Cardiology;  Laterality: N/A;    ELBOW SURGERY      right    EPIDURAL STEROID INJECTION INTO CERVICAL SPINE N/A 10/15/2019    Procedure: Injection-steroid-epidural-cervical C7/T1;  Surgeon: Blaise Mao MD;  Location: [facility] OR;  Service: Pain Management;  Laterality: N/A;    ESOPHAGOGASTRODUODENOSCOPY N/A 1/29/2019    Procedure: EGD (ESOPHAGOGASTRODUODENOSCOPY);  Surgeon: Balwinder Dexter Jr., MD;  Location: [facility] ENDO;  Service: Endoscopy;  Laterality: N/A;    KNEE SURGERY      right    LEFT HEART CATHETERIZATION N/A 10/23/2019    Procedure: Left heart cath;  Surgeon: Can Masters MD;  Location: [facility] CATH;  Service: Cardiology;  Laterality: N/A;    MAGNETIC RESONANCE IMAGING N/A 6/26/2019    Procedure: MRI (Magnetic Resonance Imagine) needs anesthesia;  Surgeon: Maurice Fonseca MD;  Location: [facility] CATH;  Service: Anesthesiology;  Laterality: N/A;       Family History   Problem Relation Age of Onset    Allergic rhinitis Son     Allergic rhinitis Daughter     Asthma Daughter     Allergic rhinitis Daughter     Diverticulitis Mother     Hypertension Father     COPD Father      Angioedema Neg Hx     Eczema Neg Hx     Immunodeficiency Neg Hx     Urticaria Neg Hx     Lupus Neg Hx     Psoriasis Neg Hx     Melanoma Neg Hx     Acne Neg Hx     Colon cancer Neg Hx     Colon polyps Neg Hx     Crohn's disease Neg Hx     Ulcerative colitis Neg Hx     Esophageal cancer Neg Hx     Stomach cancer Neg Hx        Social History     Socioeconomic History    Marital status:    Occupational History    Occupation: retired   Tobacco Use    Smoking status: Never Smoker    Smokeless tobacco: Never Used   Substance and Sexual Activity    Alcohol use: Yes     Alcohol/week: 21.0 standard drinks     Types: 21 Cans of beer per week     Comment: 3 beers daily    Drug use: No    Sexual activity: Yes     Social Determinants of Health     Financial Resource Strain: Low Risk     Difficulty of Paying Living Expenses: Not hard at all   Food Insecurity: No Food Insecurity    Worried About Running Out of Food in the Last Year: Never true    Ran Out of Food in the Last Year: Never true   Transportation Needs: No Transportation Needs    Lack of Transportation (Medical): No    Lack of Transportation (Non-Medical): No   Physical Activity: Insufficiently Active    Days of Exercise per Week: 3 days    Minutes of Exercise per Session: 20 min   Stress: Stress Concern Present    Feeling of Stress : Very much   Social Connections: Unknown    Frequency of Communication with Friends and Family: More than three times a week    Frequency of Social Gatherings with Friends and Family: Once a week    Active Member of Clubs or Organizations: No    Attends Club or Organization Meetings: Never    Marital Status:        Review of patient's allergies indicates:   Allergen Reactions    Ciprofloxacin Hives       Review of Systems:  General ROS: negative for - fever  Psychological ROS: negative for - hostility  Hematological and Lymphatic ROS: negative for - bleeding problems  Endocrine ROS: negative  for - unexpected weight changes  Respiratory ROS: no cough, shortness of breath, or wheezing  Cardiovascular ROS: no chest pain or dyspnea on exertion  Gastrointestinal ROS: no abdominal pain, change in bowel habits, or black or bloody stools  Musculoskeletal ROS: negative for - muscular weakness  Neurological ROS: negative for - bowel and bladder control changes or impaired coordination/balance  Dermatological ROS: negative for rash    Physical Exam:  Vitals:    01/07/22 1428   BP: 133/64   Pulse: 78   Resp: 20   Temp: 97.9 °F (36.6 °C)   TempSrc: Oral   SpO2: 97%   Weight: 94.2 kg (207 lb 9 oz)   PainSc:   2   PainLoc: Neck     Body mass index is 28.15 kg/m².     Gen: NAD  Psych: mood appropriate for given condition  CV: RRR  HEENT: anicteric   Respiratory: non labored  Abd: soft nt, nd  Skin: intact  Sensation: intact to lt touch bilaterally in c4-t1  Reflexes: 2+ b/l Bicep, tricep, BR and patella, sotomayor's negative  ROM: Cervical ROM full, shoulder, elbow and wrist ROM full, no scapular dysmotility   Tone:  Normal at elbow, wrist and shoulder   Inspection: no atrophy of bicep, FDI or APB noted  Palpation: tender cervical paraspinals, levator scapula and trapezius    Motor:    Right Left   C4 Shoulder Abduction  5  5   C5 Elbow Flexion    5  5   C6 Wrist Extension  5  5   C7 Elbow Extension   5  5   C8/T1 Hand Intrinsics   5  5                   Imaging:  MRI cervical spine 6/26/19  FINDINGS:  CORD: Normal size and signal.  No syrinx.  Cervicomedullary junction is normal.    ALIGNMENT: Slight retrolisthesis C2 on C3 lateral masses of C1 and C2 are congruent.    BONES: Vertebral body heights are maintained.  Multilevel type 2 endplate changes.  No aggressive bone marrow signal.    PARASPINAL AREA: Normal.    CERVICAL DISC LEVELS:    C2-C3: Mild disc osteophyte complex.  Preserved ventral and dorsal CSF.  No significant foraminal stenosis.  C3-C4: Mild disc osteophyte complex.  Mild-moderate left facet  hypertrophy.  Slight ventral cord flattening within sliver preserved ventral and preserved dorsal CSF.  Mild left foraminal stenosis.  C4-C5: Mild disc osteophyte complex.  Slight ligamentum flavum thickening.  Slight ventral cord flattening within sliver preserved ventral and preserved dorsal CSF.  No significant foraminal stenosis.  C5-C6: Mild disc osteophyte complex with left greater than right uncovertebral spurring.  Slight ventral cord flattening with preserved ventral and dorsal CSF.  Moderate left and mild right foraminal stenosis.  C6-C7: Mild-moderate disc osteophyte complex.  Slight ventral cord flattening with preserved ventral and dorsal CSF.  Mild right and minimal left foraminal stenosis.  C7-T1: Mild disc osteophyte complex.  Mild right facet hypertrophy.  Mild right and minimal left foraminal stenosis.    Xray cervical spine 5/30/19   FINDINGS:  Redemonstrated is marked disc space narrowing at the C5-6 and C6-7 levels with moderate disc space narrowing at the C7-T1 level.  There is endplate sclerosis and osteophyte formation at these levels.  There is no fracture.  There is stable trace retrolisthesis of C2 on C3 which is unchanged on extension but reduces to neutral position on flexion.  Otherwise, alignment is normal.  The facet joints maintain normal articulation.  The prevertebral soft tissues are normal.  The airway is patent.  Foraminal stenosis is suboptimally evaluated due to degree of obliquity.    Labs:  BMP  Lab Results   Component Value Date     07/28/2021    K 4.5 07/28/2021     07/28/2021    CO2 27 07/28/2021    BUN 16 07/28/2021    CREATININE 1.0 07/28/2021    CALCIUM 9.9 07/28/2021    ANIONGAP 7 (L) 07/28/2021    ESTGFRAFRICA >60.0 07/28/2021    EGFRNONAA >60.0 07/28/2021     Lab Results   Component Value Date    ALT 26 07/28/2021    AST 20 07/28/2021    ALKPHOS 41 (L) 07/28/2021    BILITOT 0.7 07/28/2021       Assessment:  Problem List Items Addressed This Visit         Neuro    Cervical radiculitis - Primary       Orthopedic    Neck pain          Treatment Plan:  56 y.o. year old male with PMH HTN, GERD presents to the office with neck pain    1/7/21 - Mr. Quiroz returns to the office for follow up.  Over the past couple month he has had some worsening neck pain radiating into the right shoulder blade and scapula.  No numbness or weakness.  He's taken a medrol dose pack, flexeril and started formal PT, pain has improved, pain is 2/10 today.      - on exam he is neurologically intact, sotomayor's negative  - I independently reviewed his cervical MRI and it is c/w multilevel disc-osteophyte complex with ventral cord flattening  - at this time he appears to be doing well with conservative treatments  - we'll let him continue with PT.  Follow up in 2 months.  If he feels like his pain hasn't improved enough then we'll get an updated cervical MRI and discuss repeat SKYLA     : Not applicable    Blaise Mao M.D.  Interventional Pain Medicine / Anesthesiology

## 2022-01-26 ENCOUNTER — PATIENT OUTREACH (OUTPATIENT)
Dept: ADMINISTRATIVE | Facility: OTHER | Age: 57
End: 2022-01-26
Payer: COMMERCIAL

## 2022-01-27 ENCOUNTER — OFFICE VISIT (OUTPATIENT)
Dept: SPORTS MEDICINE | Facility: CLINIC | Age: 57
End: 2022-01-27
Payer: COMMERCIAL

## 2022-01-27 DIAGNOSIS — M17.31 POST-TRAUMATIC OSTEOARTHRITIS OF RIGHT KNEE: Primary | ICD-10-CM

## 2022-01-27 DIAGNOSIS — S83.241A ACUTE MEDIAL MENISCUS TEAR OF RIGHT KNEE, INITIAL ENCOUNTER: ICD-10-CM

## 2022-01-27 PROCEDURE — 1159F MED LIST DOCD IN RCRD: CPT | Mod: CPTII,95,, | Performed by: PHYSICIAN ASSISTANT

## 2022-01-27 PROCEDURE — 1159F PR MEDICATION LIST DOCUMENTED IN MEDICAL RECORD: ICD-10-PCS | Mod: CPTII,95,, | Performed by: PHYSICIAN ASSISTANT

## 2022-01-27 PROCEDURE — 99213 PR OFFICE/OUTPT VISIT, EST, LEVL III, 20-29 MIN: ICD-10-PCS | Mod: 95,,, | Performed by: PHYSICIAN ASSISTANT

## 2022-01-27 PROCEDURE — 99213 OFFICE O/P EST LOW 20 MIN: CPT | Mod: 95,,, | Performed by: PHYSICIAN ASSISTANT

## 2022-01-27 PROCEDURE — 1160F RVW MEDS BY RX/DR IN RCRD: CPT | Mod: CPTII,95,, | Performed by: PHYSICIAN ASSISTANT

## 2022-01-27 PROCEDURE — 1160F PR REVIEW ALL MEDS BY PRESCRIBER/CLIN PHARMACIST DOCUMENTED: ICD-10-PCS | Mod: CPTII,95,, | Performed by: PHYSICIAN ASSISTANT

## 2022-01-27 NOTE — PROGRESS NOTES
The patient location is:  home  The chief complaint leading to consultation is:  3 month follow-up right knee Synvisc-One injections.    Visit type: audiovisual    Face to Face time with patient:   15 minutes of total time spent on the encounter, which includes face to face time and non-face to face time preparing to see the patient (eg, review of tests), Obtaining and/or reviewing separately obtained history, Documenting clinical information in the electronic or other health record, Independently interpreting results (not separately reported) and communicating results to the patient/family/caregiver, or Care coordination (not separately reported).         Each patient to whom he or she provides medical services by telemedicine is:  (1) informed of the relationship between the physician and patient and the respective role of any other health care provider with respect to management of the patient; and (2) notified that he or she may decline to receive medical services by telemedicine and may withdraw from such care at any time.    Notes:     HPI:  The patient reports significant relief with right knee Synvisc-One injections.  He would like to continue with current treatment.      Radiographic Findings:      Kellgren-Fernando Classification: 2-3    Xrays of the knees were reviewed by me today. These findings were discussed and reviewed with the patient.      ICD-10-CM ICD-9-CM   1. Post-traumatic osteoarthritis of right knee  M17.31 715.26   2. Acute medial meniscus tear of right knee, initial encounter  S83.241A 836.0        Plan:      1. IKDC, SF-12 and KOOS was not filled out today in clinic.     RTC in 3 months with Henry Marlow PA-C for right knee Synvisc-One injection. Patient will not fill out IKDC, SF-12 and KOOS on return.    2.  Pre authorization placed for right knee Synvisc-One injection.    3.  Continue HEP    All of the patient's questions were answered and the patient will contact us if they have  any questions or concerns in the interim.

## 2022-02-07 ENCOUNTER — TELEPHONE (OUTPATIENT)
Dept: FAMILY MEDICINE | Facility: CLINIC | Age: 57
End: 2022-02-07
Payer: COMMERCIAL

## 2022-02-07 NOTE — TELEPHONE ENCOUNTER
----- Message from Rama Foster, Patient Care Assistant sent at 2/7/2022  9:13 AM CST -----  Regarding: orders  Contact: pt  Type: Needs Medical Advice  Who Called:  pt   Best Call Back Number: [phone] (home)     Additional Information: pt states he would like a callback regarding lab orders. Thanks!

## 2022-02-10 ENCOUNTER — OFFICE VISIT (OUTPATIENT)
Dept: FAMILY MEDICINE | Facility: CLINIC | Age: 57
End: 2022-02-10
Payer: COMMERCIAL

## 2022-02-10 VITALS
DIASTOLIC BLOOD PRESSURE: 82 MMHG | HEART RATE: 78 BPM | SYSTOLIC BLOOD PRESSURE: 138 MMHG | OXYGEN SATURATION: 97 % | BODY MASS INDEX: 28.29 KG/M2 | WEIGHT: 208.56 LBS

## 2022-02-10 DIAGNOSIS — I47.10 PAROXYSMAL SVT (SUPRAVENTRICULAR TACHYCARDIA): ICD-10-CM

## 2022-02-10 DIAGNOSIS — K21.9 GASTROESOPHAGEAL REFLUX DISEASE, UNSPECIFIED WHETHER ESOPHAGITIS PRESENT: ICD-10-CM

## 2022-02-10 DIAGNOSIS — E78.5 HYPERLIPIDEMIA, UNSPECIFIED HYPERLIPIDEMIA TYPE: ICD-10-CM

## 2022-02-10 DIAGNOSIS — Z12.5 PROSTATE CANCER SCREENING: ICD-10-CM

## 2022-02-10 DIAGNOSIS — M54.12 CERVICAL RADICULOPATHY: ICD-10-CM

## 2022-02-10 DIAGNOSIS — I10 ESSENTIAL HYPERTENSION: Primary | ICD-10-CM

## 2022-02-10 PROCEDURE — 99214 PR OFFICE/OUTPT VISIT, EST, LEVL IV, 30-39 MIN: ICD-10-PCS | Mod: S$GLB,,, | Performed by: FAMILY MEDICINE

## 2022-02-10 PROCEDURE — 3079F PR MOST RECENT DIASTOLIC BLOOD PRESSURE 80-89 MM HG: ICD-10-PCS | Mod: CPTII,S$GLB,, | Performed by: FAMILY MEDICINE

## 2022-02-10 PROCEDURE — 99999 PR PBB SHADOW E&M-EST. PATIENT-LVL IV: CPT | Mod: PBBFAC,,, | Performed by: FAMILY MEDICINE

## 2022-02-10 PROCEDURE — 99214 OFFICE O/P EST MOD 30 MIN: CPT | Mod: S$GLB,,, | Performed by: FAMILY MEDICINE

## 2022-02-10 PROCEDURE — 1159F PR MEDICATION LIST DOCUMENTED IN MEDICAL RECORD: ICD-10-PCS | Mod: CPTII,S$GLB,, | Performed by: FAMILY MEDICINE

## 2022-02-10 PROCEDURE — 3008F PR BODY MASS INDEX (BMI) DOCUMENTED: ICD-10-PCS | Mod: CPTII,S$GLB,, | Performed by: FAMILY MEDICINE

## 2022-02-10 PROCEDURE — 3079F DIAST BP 80-89 MM HG: CPT | Mod: CPTII,S$GLB,, | Performed by: FAMILY MEDICINE

## 2022-02-10 PROCEDURE — 1159F MED LIST DOCD IN RCRD: CPT | Mod: CPTII,S$GLB,, | Performed by: FAMILY MEDICINE

## 2022-02-10 PROCEDURE — 3075F PR MOST RECENT SYSTOLIC BLOOD PRESS GE 130-139MM HG: ICD-10-PCS | Mod: CPTII,S$GLB,, | Performed by: FAMILY MEDICINE

## 2022-02-10 PROCEDURE — 3075F SYST BP GE 130 - 139MM HG: CPT | Mod: CPTII,S$GLB,, | Performed by: FAMILY MEDICINE

## 2022-02-10 PROCEDURE — 99999 PR PBB SHADOW E&M-EST. PATIENT-LVL IV: ICD-10-PCS | Mod: PBBFAC,,, | Performed by: FAMILY MEDICINE

## 2022-02-10 PROCEDURE — 3008F BODY MASS INDEX DOCD: CPT | Mod: CPTII,S$GLB,, | Performed by: FAMILY MEDICINE

## 2022-02-10 NOTE — PROGRESS NOTES
Subjective:       Patient ID: Jaren PICHARDO Cousin is a 56 y.o. male.    Chief Complaint: cervical radiculopathy    HPI     Here for a f/u    Reports that physio has helped with right cervical radicular pain.       [facility] Reports 1st synvisc injection helped.      htn stable.      Hx of svt s/p ablation. Stable. On propranolol. Takes bid but sometimes tid.      Takes fenofibrate and niacin for hld management.      gerd stable while on nexium and pepcid.    Review of Systems      Review of Systems   Constitutional: Negative for fever and chills.   HENT: Negative for hearing loss and neck stiffness.    Eyes: Negative for redness and itching.   Respiratory: Negative for cough and choking.    Cardiovascular: Negative for chest pain and leg swelling.  Abdomen: Negative for abdominal pain and blood in stool.   Genitourinary: Negative for dysuria and flank pain.   Musculoskeletal: Negative for back pain   Neurological: Negative for light-headedness and headaches.   Hematological: Negative for adenopathy.   Psychiatric/Behavioral: Negative for behavioral problems.     Objective:      Physical Exam  HENT:      Head: Atraumatic.   Eyes:      Conjunctiva/sclera: Conjunctivae normal.      Pupils: Pupils are equal, round, and reactive to light.   Cardiovascular:      Rate and Rhythm: Normal rate and regular rhythm.      Heart sounds: No murmur heard.      Pulmonary:      Effort: Pulmonary effort is normal.      Breath sounds: Normal breath sounds. No wheezing.   Musculoskeletal:      Cervical back: Normal range of motion.   Lymphadenopathy:      Cervical: No cervical adenopathy.         Assessment:       1. Essential hypertension    2. Prostate cancer screening    3. Paroxysmal SVT (supraventricular tachycardia)    4. Hyperlipidemia, unspecified hyperlipidemia type    5. Gastroesophageal reflux disease, unspecified whether esophagitis present    6. Cervical radiculopathy        Plan:        Essential hypertension  -     Comprehensive Metabolic Panel; Future  -     Lipid Panel; Future    Prostate cancer screening  -     PSA, Screening; Future    Paroxysmal SVT (supraventricular tachycardia)    Hyperlipidemia, unspecified hyperlipidemia type    Gastroesophageal reflux disease, unspecified whether esophagitis present    Cervical radiculopathy          Plan:  Labs in 6 months  Cont current meds  F/u with me in 6 months    Medication List with Changes/Refills   Current Medications    ALLERGY RELIEF, LORATADINE, 10 MG TABLET    TAKE ONE TABLET BY MOUTH ONCE DAILY    AMLODIPINE (NORVASC) 10 MG TABLET    Take 1 tablet (10 mg total) by mouth once daily.    CETIRIZINE (ZYRTEC) 10 MG TABLET    TAKE ONE TABLET BY MOUTH ONCE DAILY AS NEEDED FOR ALLERGIES    CLONAZEPAM (KLONOPIN) 2 MG TAB    Take by mouth daily as needed.    CO-ENZYME Q-10 30 MG CAPSULE    Take 100 mg by mouth once daily.    COLCHICINE (COLCRYS) 0.6 MG TABLET    Take 1 tablet (0.6 mg total) by mouth 2 (two) times daily.    CYCLOBENZAPRINE (FLEXERIL) 10 MG TABLET    Take 10 mg by mouth 3 (three) times daily as needed. PRN    ESOMEPRAZOLE (NEXIUM) 40 MG CAPSULE    Take 1 capsule (40 mg total) by mouth daily as needed.    FAMOTIDINE (PEPCID) 20 MG TABLET    Take 1 tablet by mouth twice daily    FENOFIBRATE (TRICOR) 145 MG TABLET    Take 1 tablet (145 mg total) by mouth once daily.    HYDROXYZINE HCL (ATARAX) 25 MG TABLET    Take 1 tablet (25 mg total) by mouth every 6 (six) hours as needed for Itching. Every 6 hours PRN    MECLIZINE (ANTIVERT) 25 MG TABLET    Take 1 tablet (25 mg total) by mouth 3 (three) times daily as needed for Dizziness (may cause drowsiness).    MOMETASONE (NASONEX) 50 MCG/ACTUATION NASAL SPRAY    Use 2 spray(s) in each nostril once daily    NIACIN (SLO-NIACIN) 500 MG TABLET    TAKE 1 TABLET BY MOUTH ONCE DAILY AT NIGHT    PREDNISONE (DELTASONE) 10 MG TABLET    Take 4 tabs daily for 2 days then Take 3 tabs daily for 2 days  thenTake 2 tabs daily for 2 days then Take 1 tab daily for 2 days then stop    PROPRANOLOL (INDERAL) 10 MG TABLET    Take 1 tablet (10 mg total) by mouth 3 (three) times daily.    SILDENAFIL (VIAGRA) 100 MG TABLET    TAKE ONE TABLET BY MOUTH EVERY WEEK AS NEEDED 30 TO 60 MINUTES BEFORE INTERCOURSE FOR BEST RESULTS TAKE ON EMPTY STOMACH    VALACYCLOVIR (VALTREX) 500 MG TABLET    Take 2 tablets (1,000 mg total) by mouth 2 (two) times daily.

## 2022-02-21 DIAGNOSIS — R12 HEARTBURN: ICD-10-CM

## 2022-02-21 NOTE — TELEPHONE ENCOUNTER
Care Due:                  Date            Visit Type   Department     Provider  --------------------------------------------------------------------------------                                EP -                              [facility] FAMILY  Last Visit: 02-      CARE ([facility])   CARLOS ALBERTO HARDY                               -                              [facility]  Next Visit: 08-      CARE ([facility])   CARLOS ALBERTO HARDY                                                            Last  Test          Frequency    Reason                     Performed    Due Date  --------------------------------------------------------------------------------    Uric Acid...  12 months..  colchicine...............  03- 03-    Powered by [employer]. Reference number: [id number].   2/21/2022 2:55:16 PM CST

## 2022-02-23 RX ORDER — ESOMEPRAZOLE MAGNESIUM 40 MG/1
40 CAPSULE, DELAYED RELEASE ORAL DAILY PRN
Qty: 90 CAPSULE | Refills: 3 | Status: SHIPPED | OUTPATIENT
Start: 2022-02-23 | End: 2023-02-15 | Stop reason: SDUPTHER

## 2022-03-22 ENCOUNTER — OFFICE VISIT (OUTPATIENT)
Dept: FAMILY MEDICINE | Facility: CLINIC | Age: 57
End: 2022-03-22
Payer: COMMERCIAL

## 2022-03-22 DIAGNOSIS — J32.9 SINUSITIS, UNSPECIFIED CHRONICITY, UNSPECIFIED LOCATION: Primary | ICD-10-CM

## 2022-03-22 PROCEDURE — 99213 OFFICE O/P EST LOW 20 MIN: CPT | Mod: 95,,, | Performed by: FAMILY MEDICINE

## 2022-03-22 PROCEDURE — 99213 PR OFFICE/OUTPT VISIT, EST, LEVL III, 20-29 MIN: ICD-10-PCS | Mod: 95,,, | Performed by: FAMILY MEDICINE

## 2022-03-22 RX ORDER — AMOXICILLIN 500 MG/1
500 CAPSULE ORAL EVERY 8 HOURS
Qty: 30 CAPSULE | Refills: 0 | Status: SHIPPED | OUTPATIENT
Start: 2022-03-22 | End: 2022-06-08

## 2022-03-22 RX ORDER — PREDNISONE 10 MG/1
TABLET ORAL
Qty: 20 TABLET | Refills: 0 | Status: SHIPPED | OUTPATIENT
Start: 2022-03-22 | End: 2022-06-08

## 2022-03-22 NOTE — PROGRESS NOTES
Subjective:       Patient ID: Jaren PICHARDO Cousin is a 56 y.o. male.    Chief Complaint: No chief complaint on file.    HPI     The patient location is: [place]  The chief complaint leading to consultation is: sinus congestion    Visit type: audiovisual    Face to Face time with patient:   20 minutes of total time spent on the encounter, which includes face to face time and non-face to face time preparing to see the patient (eg, review of tests), Obtaining and/or reviewing separately obtained history, Documenting clinical information in the electronic or other health record, Independently interpreting results (not separately reported) and communicating results to the patient/family/caregiver, or Care coordination (not separately reported).         Each patient to whom he or she provides medical services by telemedicine is:  (1) informed of the relationship between the physician and patient and the respective role of any other health care provider with respect to management of the patient; and (2) notified that he or she may decline to receive medical services by telemedicine and may withdraw from such care at any time.    Notes:     C/o frontal sinus headache x 3 days. Taking otc clarinex, aleve d and nasal saline which is providing mild relief.  Negative home covid test.       Review of Systems    Objective:      Physical Exam    Assessment:       1. Sinusitis, unspecified chronicity, unspecified location        Plan:       Sinusitis, unspecified chronicity, unspecified location    Other orders  -     amoxicillin (AMOXIL) 500 MG capsule; Take 1 capsule (500 mg total) by mouth every 8 (eight) hours.  Dispense: 30 capsule; Refill: 0  -     predniSONE (DELTASONE) 10 MG tablet; Take 4 tabs daily for 2 days then Take 3 tabs daily for 2 days thenTake 2 tabs daily for 2 days then Take 1 tab daily for 2 days then stop  Dispense: 20 tablet; Refill: 0                Plan:  Start amoxil and prednisone taper  Cont all other  meds      Medication List with Changes/Refills   New Medications    AMOXICILLIN (AMOXIL) 500 MG CAPSULE    Take 1 capsule (500 mg total) by mouth every 8 (eight) hours.    PREDNISONE (DELTASONE) 10 MG TABLET    Take 4 tabs daily for 2 days then Take 3 tabs daily for 2 days thenTake 2 tabs daily for 2 days then Take 1 tab daily for 2 days then stop   Current Medications    ALLERGY RELIEF, LORATADINE, 10 MG TABLET    TAKE ONE TABLET BY MOUTH ONCE DAILY    AMLODIPINE (NORVASC) 10 MG TABLET    Take 1 tablet (10 mg total) by mouth once daily.    CETIRIZINE (ZYRTEC) 10 MG TABLET    TAKE ONE TABLET BY MOUTH ONCE DAILY AS NEEDED FOR ALLERGIES    CLONAZEPAM (KLONOPIN) 2 MG TAB    Take by mouth daily as needed.    CO-ENZYME Q-10 30 MG CAPSULE    Take 100 mg by mouth once daily.    COLCHICINE (COLCRYS) 0.6 MG TABLET    Take 1 tablet (0.6 mg total) by mouth 2 (two) times daily.    CYCLOBENZAPRINE (FLEXERIL) 10 MG TABLET    Take 10 mg by mouth 3 (three) times daily as needed. PRN    ESOMEPRAZOLE (NEXIUM) 40 MG CAPSULE    Take 1 capsule (40 mg total) by mouth daily as needed.    FAMOTIDINE (PEPCID) 20 MG TABLET    Take 1 tablet by mouth twice daily    FENOFIBRATE (TRICOR) 145 MG TABLET    Take 1 tablet (145 mg total) by mouth once daily.    HYDROXYZINE HCL (ATARAX) 25 MG TABLET    Take 1 tablet (25 mg total) by mouth every 6 (six) hours as needed for Itching. Every 6 hours PRN    MECLIZINE (ANTIVERT) 25 MG TABLET    Take 1 tablet (25 mg total) by mouth 3 (three) times daily as needed for Dizziness (may cause drowsiness).    MOMETASONE (NASONEX) 50 MCG/ACTUATION NASAL SPRAY    Use 2 spray(s) in each nostril once daily    NIACIN (SLO-NIACIN) 500 MG TABLET    TAKE 1 TABLET BY MOUTH ONCE DAILY AT NIGHT    PROPRANOLOL (INDERAL) 10 MG TABLET    Take 1 tablet (10 mg total) by mouth 3 (three) times daily.    SILDENAFIL (VIAGRA) 100 MG TABLET    TAKE ONE TABLET BY MOUTH EVERY WEEK AS NEEDED 30 TO 60 MINUTES BEFORE INTERCOURSE FOR BEST  RESULTS TAKE ON EMPTY STOMACH    VALACYCLOVIR (VALTREX) 500 MG TABLET    Take 2 tablets (1,000 mg total) by mouth 2 (two) times daily.   Discontinued Medications    PREDNISONE (DELTASONE) 10 MG TABLET    Take 4 tabs daily for 2 days then Take 3 tabs daily for 2 days thenTake 2 tabs daily for 2 days then Take 1 tab daily for 2 days then stop

## 2022-04-04 ENCOUNTER — PATIENT OUTREACH (OUTPATIENT)
Dept: ADMINISTRATIVE | Facility: OTHER | Age: 57
End: 2022-04-04
Payer: COMMERCIAL

## 2022-04-05 ENCOUNTER — OFFICE VISIT (OUTPATIENT)
Dept: SPORTS MEDICINE | Facility: CLINIC | Age: 57
End: 2022-04-05
Payer: COMMERCIAL

## 2022-04-05 VITALS
HEART RATE: 76 BPM | SYSTOLIC BLOOD PRESSURE: 129 MMHG | WEIGHT: 207 LBS | DIASTOLIC BLOOD PRESSURE: 87 MMHG | HEIGHT: 72 IN | BODY MASS INDEX: 28.04 KG/M2

## 2022-04-05 DIAGNOSIS — M17.31 POST-TRAUMATIC OSTEOARTHRITIS OF RIGHT KNEE: Primary | ICD-10-CM

## 2022-04-05 PROCEDURE — 3074F SYST BP LT 130 MM HG: CPT | Mod: CPTII,S$GLB,, | Performed by: PHYSICIAN ASSISTANT

## 2022-04-05 PROCEDURE — 3008F BODY MASS INDEX DOCD: CPT | Mod: CPTII,S$GLB,, | Performed by: PHYSICIAN ASSISTANT

## 2022-04-05 PROCEDURE — 1159F PR MEDICATION LIST DOCUMENTED IN MEDICAL RECORD: ICD-10-PCS | Mod: CPTII,S$GLB,, | Performed by: PHYSICIAN ASSISTANT

## 2022-04-05 PROCEDURE — 20610 PR DRAIN/INJECT LARGE JOINT/BURSA: ICD-10-PCS | Mod: RT,S$GLB,, | Performed by: PHYSICIAN ASSISTANT

## 2022-04-05 PROCEDURE — 20610 DRAIN/INJ JOINT/BURSA W/O US: CPT | Mod: RT,S$GLB,, | Performed by: PHYSICIAN ASSISTANT

## 2022-04-05 PROCEDURE — 1159F MED LIST DOCD IN RCRD: CPT | Mod: CPTII,S$GLB,, | Performed by: PHYSICIAN ASSISTANT

## 2022-04-05 PROCEDURE — 99999 PR PBB SHADOW E&M-EST. PATIENT-LVL IV: CPT | Mod: PBBFAC,,, | Performed by: PHYSICIAN ASSISTANT

## 2022-04-05 PROCEDURE — 3008F PR BODY MASS INDEX (BMI) DOCUMENTED: ICD-10-PCS | Mod: CPTII,S$GLB,, | Performed by: PHYSICIAN ASSISTANT

## 2022-04-05 PROCEDURE — 3074F PR MOST RECENT SYSTOLIC BLOOD PRESSURE < 130 MM HG: ICD-10-PCS | Mod: CPTII,S$GLB,, | Performed by: PHYSICIAN ASSISTANT

## 2022-04-05 PROCEDURE — 3079F PR MOST RECENT DIASTOLIC BLOOD PRESSURE 80-89 MM HG: ICD-10-PCS | Mod: CPTII,S$GLB,, | Performed by: PHYSICIAN ASSISTANT

## 2022-04-05 PROCEDURE — 3079F DIAST BP 80-89 MM HG: CPT | Mod: CPTII,S$GLB,, | Performed by: PHYSICIAN ASSISTANT

## 2022-04-05 PROCEDURE — 99999 PR PBB SHADOW E&M-EST. PATIENT-LVL IV: ICD-10-PCS | Mod: PBBFAC,,, | Performed by: PHYSICIAN ASSISTANT

## 2022-04-05 PROCEDURE — 99499 UNLISTED E&M SERVICE: CPT | Mod: S$GLB,,, | Performed by: PHYSICIAN ASSISTANT

## 2022-04-05 PROCEDURE — 99499 NO LOS: ICD-10-PCS | Mod: S$GLB,,, | Performed by: PHYSICIAN ASSISTANT

## 2022-04-05 NOTE — PROGRESS NOTES
Jaren Hillsin is here for follow up of right knee arthritis. Pt is requesting Synvisc-One injection.  [facility] reviewed per encounter record. Has failed other conservative modalities including NSAIDS, activity modification, weight loss.    The prior shot was tolerated well.    PHYSICAL EXAMINATION:     General: The patient is alert and oriented x 3. Mood is pleasant.   Observation of ears, eyes and nose reveals no gross abnormalities. No   labored breathing observed.     No signs of infection or adverse reaction to knee.    Injection Procedure  A time out was performed, including verification of patient ID, procedure, site and side, availability of information and equipment, review of safety issues, and agreement with consent, the procedure site was marked.    After time out was performed, the patient was prepped aseptically with chloraprep. A diagnostic and therapeutic injection of 6cc's Synvisc-One was given under sterile technique using a 22g x 1.5 needle from the Superolateral aspect of the right Knee Joint in the supine position.      Jaren Quiroz had no adverse reactions to the medication. Pain decreased. He was instructed to apply ice to the joint for 20 minutes and avoid strenuous activities for 24-36 hours following the injection. He was warned of possible blood sugar and/or blood pressure changes during that time. Following that time, he can resume regular activities.    He was reminded to call the clinic immediately for any adverse side effects as explained in clinic today.    RTC to see Henry Marlow PA-C in 3 months for follow-up.    All of the patient's questions were answered and the patient will contact us if they have any questions or concerns in the interim.

## 2022-05-09 ENCOUNTER — PATIENT MESSAGE (OUTPATIENT)
Dept: SMOKING CESSATION | Facility: CLINIC | Age: 57
End: 2022-05-09
Payer: COMMERCIAL

## 2022-05-25 ENCOUNTER — PATIENT MESSAGE (OUTPATIENT)
Dept: FAMILY MEDICINE | Facility: CLINIC | Age: 57
End: 2022-05-25
Payer: COMMERCIAL

## 2022-05-25 RX ORDER — VALACYCLOVIR HYDROCHLORIDE 500 MG/1
TABLET, FILM COATED ORAL
Qty: 90 TABLET | Refills: 0 | Status: SHIPPED | OUTPATIENT
Start: 2022-05-25 | End: 2022-09-05 | Stop reason: SDUPTHER

## 2022-05-25 RX ORDER — VALACYCLOVIR HYDROCHLORIDE 500 MG/1
1000 TABLET, FILM COATED ORAL 2 TIMES DAILY
Qty: 90 TABLET | Refills: 2 | Status: CANCELLED | OUTPATIENT
Start: 2022-05-25

## 2022-05-25 NOTE — TELEPHONE ENCOUNTER
No new care gaps identified.  [facility] Embedded Care Gaps. Reference number: [id number]. 5/25/2022   12:56:34 PM CDT

## 2022-05-25 NOTE — TELEPHONE ENCOUNTER
Care Due:                  Date            Visit Type   Department     Provider  --------------------------------------------------------------------------------                                ESTABLISHED                              PATIENT -    [facility]  Last Visit: 03-      VIRTUAL      CARLOS ALBERTO HARDY                               -                              PRIMARY      [facility]  Next Visit: 08-      CARE ([facility])   MEDICINE       Vince HARDY                                                            Last  Test          Frequency    Reason                     Performed    Due Date  --------------------------------------------------------------------------------    CBC.........  12 months..  fenofibrate, valACYclovir  07- 07-    CMP.........  12 months..  fenofibrate, valACYclovir  07- 07-    Lipid Panel.  12 months..  fenofibrate..............  07- 07-    [facility] Embedded Care Gaps. Reference number: [id number]. 5/25/2022   12:51:58 PM CDT

## 2022-05-26 ENCOUNTER — PATIENT MESSAGE (OUTPATIENT)
Dept: FAMILY MEDICINE | Facility: CLINIC | Age: 57
End: 2022-05-26
Payer: COMMERCIAL

## 2022-05-26 RX ORDER — VALACYCLOVIR HYDROCHLORIDE 500 MG/1
1000 TABLET, FILM COATED ORAL 2 TIMES DAILY
Qty: 90 TABLET | Refills: 0 | OUTPATIENT
Start: 2022-05-26

## 2022-06-08 ENCOUNTER — OFFICE VISIT (OUTPATIENT)
Dept: FAMILY MEDICINE | Facility: CLINIC | Age: 57
End: 2022-06-08
Payer: COMMERCIAL

## 2022-06-08 ENCOUNTER — PATIENT MESSAGE (OUTPATIENT)
Dept: FAMILY MEDICINE | Facility: CLINIC | Age: 57
End: 2022-06-08

## 2022-06-08 VITALS
DIASTOLIC BLOOD PRESSURE: 82 MMHG | HEIGHT: 72 IN | HEART RATE: 71 BPM | OXYGEN SATURATION: 98 % | SYSTOLIC BLOOD PRESSURE: 130 MMHG | BODY MASS INDEX: 28.04 KG/M2 | WEIGHT: 207 LBS

## 2022-06-08 DIAGNOSIS — K57.92 ACUTE DIVERTICULITIS: Primary | ICD-10-CM

## 2022-06-08 DIAGNOSIS — R10.32 LLQ ABDOMINAL PAIN: Primary | ICD-10-CM

## 2022-06-08 LAB
BILIRUB UR QL STRIP: NEGATIVE
CLARITY UR: CLEAR
COLOR UR: YELLOW
GLUCOSE UR QL STRIP: NEGATIVE
HGB UR QL STRIP: NEGATIVE
KETONES UR QL STRIP: NEGATIVE
LEUKOCYTE ESTERASE UR QL STRIP: NEGATIVE
NITRITE UR QL STRIP: NEGATIVE
PH UR STRIP: 6 [PH] (ref 5–8)
PROT UR QL STRIP: NEGATIVE
SP GR UR STRIP: >=1.03 (ref 1–1.03)
URN SPEC COLLECT METH UR: ABNORMAL

## 2022-06-08 PROCEDURE — 99214 PR OFFICE/OUTPT VISIT, EST, LEVL IV, 30-39 MIN: ICD-10-PCS | Mod: S$GLB,,, | Performed by: PHYSICIAN ASSISTANT

## 2022-06-08 PROCEDURE — 1160F RVW MEDS BY RX/DR IN RCRD: CPT | Mod: CPTII,S$GLB,, | Performed by: PHYSICIAN ASSISTANT

## 2022-06-08 PROCEDURE — 99999 PR PBB SHADOW E&M-EST. PATIENT-LVL IV: CPT | Mod: PBBFAC,,, | Performed by: PHYSICIAN ASSISTANT

## 2022-06-08 PROCEDURE — 3075F SYST BP GE 130 - 139MM HG: CPT | Mod: CPTII,S$GLB,, | Performed by: PHYSICIAN ASSISTANT

## 2022-06-08 PROCEDURE — 3075F PR MOST RECENT SYSTOLIC BLOOD PRESS GE 130-139MM HG: ICD-10-PCS | Mod: CPTII,S$GLB,, | Performed by: PHYSICIAN ASSISTANT

## 2022-06-08 PROCEDURE — 3079F PR MOST RECENT DIASTOLIC BLOOD PRESSURE 80-89 MM HG: ICD-10-PCS | Mod: CPTII,S$GLB,, | Performed by: PHYSICIAN ASSISTANT

## 2022-06-08 PROCEDURE — 3008F BODY MASS INDEX DOCD: CPT | Mod: CPTII,S$GLB,, | Performed by: PHYSICIAN ASSISTANT

## 2022-06-08 PROCEDURE — 1159F PR MEDICATION LIST DOCUMENTED IN MEDICAL RECORD: ICD-10-PCS | Mod: CPTII,S$GLB,, | Performed by: PHYSICIAN ASSISTANT

## 2022-06-08 PROCEDURE — 3008F PR BODY MASS INDEX (BMI) DOCUMENTED: ICD-10-PCS | Mod: CPTII,S$GLB,, | Performed by: PHYSICIAN ASSISTANT

## 2022-06-08 PROCEDURE — 1160F PR REVIEW ALL MEDS BY PRESCRIBER/CLIN PHARMACIST DOCUMENTED: ICD-10-PCS | Mod: CPTII,S$GLB,, | Performed by: PHYSICIAN ASSISTANT

## 2022-06-08 PROCEDURE — 99214 OFFICE O/P EST MOD 30 MIN: CPT | Mod: S$GLB,,, | Performed by: PHYSICIAN ASSISTANT

## 2022-06-08 PROCEDURE — 99999 PR PBB SHADOW E&M-EST. PATIENT-LVL IV: ICD-10-PCS | Mod: PBBFAC,,, | Performed by: PHYSICIAN ASSISTANT

## 2022-06-08 PROCEDURE — 3079F DIAST BP 80-89 MM HG: CPT | Mod: CPTII,S$GLB,, | Performed by: PHYSICIAN ASSISTANT

## 2022-06-08 PROCEDURE — 1159F MED LIST DOCD IN RCRD: CPT | Mod: CPTII,S$GLB,, | Performed by: PHYSICIAN ASSISTANT

## 2022-06-08 PROCEDURE — 81003 URINALYSIS AUTO W/O SCOPE: CPT | Mod: PO | Performed by: PHYSICIAN ASSISTANT

## 2022-06-08 RX ORDER — METRONIDAZOLE 500 MG/1
500 TABLET ORAL EVERY 8 HOURS
Qty: 30 TABLET | Refills: 0 | Status: SHIPPED | OUTPATIENT
Start: 2022-06-08 | End: 2022-06-18

## 2022-06-08 RX ORDER — SULFAMETHOXAZOLE AND TRIMETHOPRIM 800; 160 MG/1; MG/1
1 TABLET ORAL 2 TIMES DAILY
Qty: 20 TABLET | Refills: 0 | Status: SHIPPED | OUTPATIENT
Start: 2022-06-08 | End: 2022-06-18

## 2022-06-08 NOTE — PATIENT INSTRUCTIONS
A few reminders from today:    Schedule colonoscopy once this resolves  Urinalysis today  Mild, low fiber, soft diet until you feel better      Follow up with me if needed.   Please go to ER/urgent care if after hours or symptoms persist/worsen.     Do not hesitate to get in touch with me should you have any further questions.     Thank you for trusting me with your care!  I wish you health and happiness.    -Sasha Avendaño PA-C     182.1

## 2022-06-08 NOTE — PROGRESS NOTES
Subjective:       Patient ID: Jaren PICHARDO Cousin is a 56 y.o. male.    Chief Complaint: Abdominal Pain (He complains of intermitted left lower abdominal pain for about a month )    HPI     Pt is new to me, PCP Dr. Sandhu.     Pt is a 56 year old male with lumbar and cervical DDD, HTN, HLD, GERD. He presents today complaining of lower abdominal pain. It is an intermittent pain that has been going on for about a month in a half. Pain is more localized to left lower abdomen when it happens. When present, it is persistent and last a few days. Pain is aching. Tender to touch. He does note that it seems to worsen when he urinates. No dysuria, hematuria. Does admit to some urinary frequency. No prostate pain or pressure. No fever or chills. No change in bowel movements. No blood or mucus in stool. Drank alcohol over the weekend at a wedding and it seemed to make things worse.     Review of Systems   Constitutional: Negative for activity change, appetite change, chills, fatigue, fever and unexpected weight change.   HENT: Negative for nasal congestion, ear pain, hearing loss and sore throat.    Eyes: Negative for visual disturbance.   Respiratory: Negative for cough, chest tightness, shortness of breath and wheezing.    Cardiovascular: Negative for chest pain and palpitations.   Gastrointestinal: Positive for abdominal pain. Negative for change in bowel habit, constipation, diarrhea, nausea, vomiting, reflux and change in bowel habit.   Genitourinary: Positive for frequency. Negative for bladder incontinence, decreased urine volume, difficulty urinating, dysuria, erectile dysfunction, flank pain, hematuria and urgency.   Musculoskeletal: Negative for arthralgias and myalgias.   Integumentary:  Negative for rash.   Neurological: Negative for dizziness, vertigo, weakness, light-headedness and headaches.   Hematological: Does not bruise/bleed easily.   Psychiatric/Behavioral: Negative for dysphoric mood and sleep disturbance.  The patient is not nervous/anxious.          Objective:      Physical Exam  Vitals and nursing note reviewed.   Constitutional:       General: He is not in acute distress.     Appearance: Normal appearance. He is not ill-appearing, toxic-appearing or diaphoretic.   HENT:      Head: Normocephalic and atraumatic.      Right Ear: External ear normal.      Left Ear: External ear normal.   Eyes:      General: No scleral icterus.        Right eye: No discharge.         Left eye: No discharge.      Extraocular Movements: Extraocular movements intact.      Conjunctiva/sclera: Conjunctivae normal.      Pupils: Pupils are equal, round, and reactive to light.   Cardiovascular:      Rate and Rhythm: Normal rate and regular rhythm.      Pulses: Normal pulses.      Heart sounds: Normal heart sounds. No murmur heard.    No friction rub. No gallop.   Pulmonary:      Effort: Pulmonary effort is normal. No respiratory distress.      Breath sounds: Normal breath sounds. No stridor. No wheezing, rhonchi or rales.   Abdominal:      General: Abdomen is flat. Bowel sounds are normal. There is no distension.      Palpations: Abdomen is soft. There is no mass.      Tenderness: There is abdominal tenderness (mild-moderate LLQ tenderness). There is no right CVA tenderness, left CVA tenderness, guarding or rebound.   Musculoskeletal:         General: No deformity or signs of injury. Normal range of motion.      Cervical back: Neck supple. No muscular tenderness.      Right lower leg: No edema.      Left lower leg: No edema.   Lymphadenopathy:      Cervical: No cervical adenopathy.   Skin:     General: Skin is warm.      Capillary Refill: Capillary refill takes less than 2 seconds.      Coloration: Skin is not jaundiced or pale.      Findings: No rash.   Neurological:      General: No focal deficit present.      Mental Status: He is alert and oriented to person, place, and time. Mental status is at baseline.   Psychiatric:         Mood and  Affect: Mood normal.         Behavior: Behavior normal.         Thought Content: Thought content normal.         Judgment: Judgment normal.           Assessment:       Problem List Items Addressed This Visit    None     Visit Diagnoses     LLQ abdominal pain    -  Primary    Relevant Orders    Urinalysis, Reflex to Urine Culture Urine, Clean Catch          Plan:       1. LLQ abdominal pain  -will wait for results prior to prescribing rx  - Urinalysis, Reflex to Urine Culture Urine, Clean Catch; Future  -mild/soft/low fiber diet    RTC/ER precautions given. FU if symptoms persist or worsen

## 2022-06-15 ENCOUNTER — OFFICE VISIT (OUTPATIENT)
Dept: FAMILY MEDICINE | Facility: CLINIC | Age: 57
End: 2022-06-15
Payer: COMMERCIAL

## 2022-06-15 VITALS
DIASTOLIC BLOOD PRESSURE: 82 MMHG | BODY MASS INDEX: 28.1 KG/M2 | HEART RATE: 70 BPM | HEIGHT: 72 IN | WEIGHT: 207.44 LBS | OXYGEN SATURATION: 98 % | SYSTOLIC BLOOD PRESSURE: 134 MMHG

## 2022-06-15 DIAGNOSIS — L98.9 SKIN LESION: Primary | ICD-10-CM

## 2022-06-15 PROCEDURE — 1160F RVW MEDS BY RX/DR IN RCRD: CPT | Mod: CPTII,S$GLB,, | Performed by: FAMILY MEDICINE

## 2022-06-15 PROCEDURE — 99999 PR PBB SHADOW E&M-EST. PATIENT-LVL V: ICD-10-PCS | Mod: PBBFAC,,, | Performed by: FAMILY MEDICINE

## 2022-06-15 PROCEDURE — 3075F PR MOST RECENT SYSTOLIC BLOOD PRESS GE 130-139MM HG: ICD-10-PCS | Mod: CPTII,S$GLB,, | Performed by: FAMILY MEDICINE

## 2022-06-15 PROCEDURE — 3008F BODY MASS INDEX DOCD: CPT | Mod: CPTII,S$GLB,, | Performed by: FAMILY MEDICINE

## 2022-06-15 PROCEDURE — 99999 PR PBB SHADOW E&M-EST. PATIENT-LVL V: CPT | Mod: PBBFAC,,, | Performed by: FAMILY MEDICINE

## 2022-06-15 PROCEDURE — 1159F MED LIST DOCD IN RCRD: CPT | Mod: CPTII,S$GLB,, | Performed by: FAMILY MEDICINE

## 2022-06-15 PROCEDURE — 99214 OFFICE O/P EST MOD 30 MIN: CPT | Mod: S$GLB,,, | Performed by: FAMILY MEDICINE

## 2022-06-15 PROCEDURE — 99214 PR OFFICE/OUTPT VISIT, EST, LEVL IV, 30-39 MIN: ICD-10-PCS | Mod: S$GLB,,, | Performed by: FAMILY MEDICINE

## 2022-06-15 PROCEDURE — 3008F PR BODY MASS INDEX (BMI) DOCUMENTED: ICD-10-PCS | Mod: CPTII,S$GLB,, | Performed by: FAMILY MEDICINE

## 2022-06-15 PROCEDURE — 3079F PR MOST RECENT DIASTOLIC BLOOD PRESSURE 80-89 MM HG: ICD-10-PCS | Mod: CPTII,S$GLB,, | Performed by: FAMILY MEDICINE

## 2022-06-15 PROCEDURE — 1159F PR MEDICATION LIST DOCUMENTED IN MEDICAL RECORD: ICD-10-PCS | Mod: CPTII,S$GLB,, | Performed by: FAMILY MEDICINE

## 2022-06-15 PROCEDURE — 3079F DIAST BP 80-89 MM HG: CPT | Mod: CPTII,S$GLB,, | Performed by: FAMILY MEDICINE

## 2022-06-15 PROCEDURE — 3075F SYST BP GE 130 - 139MM HG: CPT | Mod: CPTII,S$GLB,, | Performed by: FAMILY MEDICINE

## 2022-06-15 PROCEDURE — 1160F PR REVIEW ALL MEDS BY PRESCRIBER/CLIN PHARMACIST DOCUMENTED: ICD-10-PCS | Mod: CPTII,S$GLB,, | Performed by: FAMILY MEDICINE

## 2022-06-15 NOTE — PROGRESS NOTES
Subjective:       Patient ID: Jaren PICHARDO Cousin is a 56 y.o. male.    Chief Complaint: Mole (On left shoulder /Started bleeding on Monday 6/13/22 /Starting to detach )    Here today for a changing skin lesion on his left posterior shoulder. It appears to be a wart that has been injured. He has numerous other skin lesions he would like to see a Dermatologist.     Mole      Review of Systems   Constitutional: Negative.    Cardiovascular: Negative.    Gastrointestinal: Negative.    Skin:        Numerous SK's and a few verrucae. He has a few achrochordans as well.        Objective:      Vitals:    06/15/22 1125   BP: 134/82   Pulse: 70   SpO2: 98%   Weight: 94.1 kg (207 lb 7.3 oz)   Height: 6' (1.829 m)      Physical Exam    Results for orders placed or performed in visit on 06/08/22   Urinalysis, Reflex to Urine Culture Urine, Clean Catch    Specimen: Urine   Result Value Ref Range    Specimen UA Urine, Clean Catch     Color, UA Yellow Yellow, Straw, Donna    Appearance, UA Clear Clear    pH, UA 6.0 5.0 - 8.0    Specific Gravity, UA >=1.030 (A) 1.005 - 1.030    Protein, UA Negative Negative    Glucose, UA Negative Negative    Ketones, UA Negative Negative    Bilirubin (UA) Negative Negative    Occult Blood UA Negative Negative    Nitrite, UA Negative Negative    Leukocytes, UA Negative Negative      Assessment:       1. Skin lesion        Plan:       Skin lesion  -     Ambulatory referral/consult to Dermatology; Future; Expected date: 06/22/2022          Medication List with Changes/Refills   Current Medications    ALLERGY RELIEF, LORATADINE, 10 MG TABLET    TAKE ONE TABLET BY MOUTH ONCE DAILY    AMLODIPINE (NORVASC) 10 MG TABLET    Take 1 tablet (10 mg total) by mouth once daily.    CETIRIZINE (ZYRTEC) 10 MG TABLET    TAKE ONE TABLET BY MOUTH ONCE DAILY AS NEEDED FOR ALLERGIES    CLONAZEPAM (KLONOPIN) 2 MG TAB    Take by mouth daily as needed.    CO-ENZYME Q-10 30 MG CAPSULE    Take 100 mg by mouth once daily.     COLCHICINE (COLCRYS) 0.6 MG TABLET    Take 1 tablet (0.6 mg total) by mouth 2 (two) times daily.    CYCLOBENZAPRINE (FLEXERIL) 10 MG TABLET    Take 10 mg by mouth 3 (three) times daily as needed. PRN    ESOMEPRAZOLE (NEXIUM) 40 MG CAPSULE    Take 1 capsule (40 mg total) by mouth daily as needed.    FAMOTIDINE (PEPCID) 20 MG TABLET    Take 1 tablet by mouth twice daily    FENOFIBRATE (TRICOR) 145 MG TABLET    Take 1 tablet (145 mg total) by mouth once daily.    HYDROXYZINE HCL (ATARAX) 25 MG TABLET    Take 1 tablet (25 mg total) by mouth every 6 (six) hours as needed for Itching. Every 6 hours PRN    MECLIZINE (ANTIVERT) 25 MG TABLET    Take 1 tablet (25 mg total) by mouth 3 (three) times daily as needed for Dizziness (may cause drowsiness).    METRONIDAZOLE (FLAGYL) 500 MG TABLET    Take 1 tablet (500 mg total) by mouth every 8 (eight) hours. for 10 days    MOMETASONE (NASONEX) 50 MCG/ACTUATION NASAL SPRAY    Use 2 spray(s) in each nostril once daily    NIACIN (SLO-NIACIN) 500 MG TABLET    TAKE 1 TABLET BY MOUTH ONCE DAILY AT NIGHT    PROPRANOLOL (INDERAL) 10 MG TABLET    Take 1 tablet (10 mg total) by mouth 3 (three) times daily.    SILDENAFIL (VIAGRA) 100 MG TABLET    TAKE ONE TABLET BY MOUTH EVERY WEEK AS NEEDED 30 TO 60 MINUTES BEFORE INTERCOURSE FOR BEST RESULTS TAKE ON EMPTY STOMACH    SULFAMETHOXAZOLE-TRIMETHOPRIM 800-160MG (BACTRIM DS) 800-160 MG TAB    Take 1 tablet by mouth 2 (two) times daily. Take with food for 10 days    VALACYCLOVIR (VALTREX) 500 MG TABLET    TAKE TWO TABLETS BY MOUTH TWICE DAILY

## 2022-06-21 ENCOUNTER — PATIENT MESSAGE (OUTPATIENT)
Dept: FAMILY MEDICINE | Facility: CLINIC | Age: 57
End: 2022-06-21
Payer: COMMERCIAL

## 2022-06-21 DIAGNOSIS — Z12.11 COLON CANCER SCREENING: Primary | ICD-10-CM

## 2022-06-22 ENCOUNTER — PATIENT MESSAGE (OUTPATIENT)
Dept: FAMILY MEDICINE | Facility: CLINIC | Age: 57
End: 2022-06-22
Payer: COMMERCIAL

## 2022-06-23 ENCOUNTER — TELEPHONE (OUTPATIENT)
Dept: GASTROENTEROLOGY | Facility: CLINIC | Age: 57
End: 2022-06-23
Payer: COMMERCIAL

## 2022-07-06 ENCOUNTER — OFFICE VISIT (OUTPATIENT)
Dept: DERMATOLOGY | Facility: CLINIC | Age: 57
End: 2022-07-06
Payer: COMMERCIAL

## 2022-07-06 DIAGNOSIS — L98.9 SKIN LESION: ICD-10-CM

## 2022-07-06 DIAGNOSIS — L71.9 ROSACEA: Primary | ICD-10-CM

## 2022-07-06 DIAGNOSIS — L82.1 SEBORRHEIC KERATOSIS: ICD-10-CM

## 2022-07-06 PROCEDURE — 99999 PR PBB SHADOW E&M-EST. PATIENT-LVL III: ICD-10-PCS | Mod: PBBFAC,,, | Performed by: DERMATOLOGY

## 2022-07-06 PROCEDURE — 99204 PR OFFICE/OUTPT VISIT, NEW, LEVL IV, 45-59 MIN: ICD-10-PCS | Mod: S$GLB,,, | Performed by: DERMATOLOGY

## 2022-07-06 PROCEDURE — 99204 OFFICE O/P NEW MOD 45 MIN: CPT | Mod: S$GLB,,, | Performed by: DERMATOLOGY

## 2022-07-06 PROCEDURE — 1159F PR MEDICATION LIST DOCUMENTED IN MEDICAL RECORD: ICD-10-PCS | Mod: CPTII,S$GLB,, | Performed by: DERMATOLOGY

## 2022-07-06 PROCEDURE — 1159F MED LIST DOCD IN RCRD: CPT | Mod: CPTII,S$GLB,, | Performed by: DERMATOLOGY

## 2022-07-06 PROCEDURE — 99999 PR PBB SHADOW E&M-EST. PATIENT-LVL III: CPT | Mod: PBBFAC,,, | Performed by: DERMATOLOGY

## 2022-07-06 PROCEDURE — 1160F PR REVIEW ALL MEDS BY PRESCRIBER/CLIN PHARMACIST DOCUMENTED: ICD-10-PCS | Mod: CPTII,S$GLB,, | Performed by: DERMATOLOGY

## 2022-07-06 PROCEDURE — 1160F RVW MEDS BY RX/DR IN RCRD: CPT | Mod: CPTII,S$GLB,, | Performed by: DERMATOLOGY

## 2022-07-06 RX ORDER — IVERMECTIN 10 MG/G
CREAM TOPICAL
Qty: 45 G | Refills: 3 | Status: SHIPPED | OUTPATIENT
Start: 2022-07-06 | End: 2022-08-26

## 2022-07-06 RX ORDER — DOXYCYCLINE 50 MG/1
CAPSULE ORAL
Qty: 90 CAPSULE | Refills: 1 | Status: SHIPPED | OUTPATIENT
Start: 2022-07-06 | End: 2023-04-20

## 2022-07-06 NOTE — PROGRESS NOTES
Subjective:       Patient ID:  Jaren PICHARDO Cousin is a 56 y.o. male who presents for   Chief Complaint   Patient presents with    Acne     On the face for years.     Mole     On the left posterior shoulder for years.      Last seen by Dr. Triana on 1/17/19.     History of Present Illness: The patient presents with chief complaint of lesion.  Location: left shoulder  Duration: several months  Signs/Symptoms: irritation, fell off     Prior treatments: none    The patient denies personal or family history of skin cancer.    History of Present Illness: The patient presents with chief complaint of acne.  Location: face  Duration: several years  Signs/Symptoms: irritation    Prior treatments: clindamycin soln, minoccycline x 5-10 days, ketoconazole shampoo, clindamycin foam, differin gel, doxycycline (upset stomach)            Review of Systems   Constitutional: Negative for fever and chills.   Skin: Positive for activity-related sunscreen use. Negative for daily sunscreen use and recent sunburn.   Hematologic/Lymphatic: Does not bruise/bleed easily.        Objective:    Physical Exam   Constitutional: He appears well-developed and well-nourished. No distress.   Neurological: He is alert and oriented to person, place, and time. He is not disoriented.   Psychiatric: He has a normal mood and affect.   Skin:   Areas Examined (abnormalities noted in diagram):   Head / Face Inspection Performed  Neck Inspection Performed  RUE Inspected  LUE Inspection Performed  Nails and Digits Inspection Performed                   Diagram Legend     Open and closed comedones      Inflammatory papules and pustules     Assessment / Plan:        Rosacea  -     ivermectin (SOOLANTRA) 1 % Crea; AAA face qhs  Dispense: 45 g; Refill: 3  -     doxycycline (MONODOX) 50 MG Cap; Take once daily with food  Dispense: 90 capsule; Refill: 1  -     Recommend daily sunscreen.  Side effect profile of doxy reviewed including increased sun sensitivity and  upset stomach.      Skin lesion  -     Ambulatory referral/consult to Dermatology    Seborrheic keratosis  Reassurance given. Discussed diagnosis and that lesions are benign.  AAD handout given.            Follow up if symptoms worsen or fail to improve.

## 2022-07-06 NOTE — PATIENT INSTRUCTIONS
SHEER SUNSCREENS    Black Girl Sunscreen SPF 30  Angela Brightening Moisturizer SPF 30  Umbra Sheer Physical Daily Defense SPF 30  Shiseido Ultimate Sun Protection Lotion WetForce SPF 50+  Supergoop! Unseen Sunscreen Broad Spectrum SPF 40  Neutrogena HydroBoost Water Gel Lotion SPF 50  Neutrogena Ultrasheer Face & Body Stick SPF 70      SEBORRHEIC KERATOSES        What causes seborrheic keratoses?    Seborrheic keratoses are harmless, common skin growths that first appear during adult life.  As time goes by, more growths appear.  Some persons have a very large number of them.  Seborrheic keratoses appear on both covered and uncovered parts of the body; they are not caused by sunlight.  The tendency to develop seborrheic keratoses is inherited.    Seborrheic keratoses are harmless and never become malignant.  They begin as slightly raised, light brown spots.  Gradually they thicken and take on a rough wartlike surface.  They slowly darken and may turn black.  These color changes are harmless.  Seborrheic keratoses are superficial and look as if they were stuck on the skin.  Persons who have had several seborrheic keratoses can usually recognize this type of benign growth.  However, if you are concerned or unsure about any growth, consult me.    Treatment    Seborrheic keratoses can easily be removed in the office.  The only reason for removing a seborrheic keratosis is your wish to get rid of it.

## 2022-07-12 ENCOUNTER — HOSPITAL ENCOUNTER (OUTPATIENT)
Facility: HOSPITAL | Age: 57
Discharge: HOME | End: 2022-07-12
Attending: SURGERY | Admitting: SURGERY
Payer: COMMERCIAL

## 2022-07-12 ENCOUNTER — ANESTHESIA (OUTPATIENT)
Dept: ENDOSCOPY | Facility: HOSPITAL | Age: 57
End: 2022-07-12
Payer: COMMERCIAL

## 2022-07-12 ENCOUNTER — ANESTHESIA EVENT (OUTPATIENT)
Dept: ENDOSCOPY | Facility: HOSPITAL | Age: 57
End: 2022-07-12
Payer: COMMERCIAL

## 2022-07-12 VITALS
DIASTOLIC BLOOD PRESSURE: 69 MMHG | RESPIRATION RATE: 17 BRPM | TEMPERATURE: 97 F | BODY MASS INDEX: 27.36 KG/M2 | HEART RATE: 72 BPM | SYSTOLIC BLOOD PRESSURE: 126 MMHG | WEIGHT: 202 LBS | HEIGHT: 72 IN | OXYGEN SATURATION: 96 %

## 2022-07-12 DIAGNOSIS — Z12.11 ENCOUNTER FOR SCREENING COLONOSCOPY: ICD-10-CM

## 2022-07-12 PROCEDURE — G0121 COLON CA SCRN NOT HI RSK IND: HCPCS | Mod: PO | Performed by: SURGERY

## 2022-07-12 PROCEDURE — 63600175 PHARM REV CODE 636 W HCPCS: Mod: PO | Performed by: ANESTHESIOLOGY

## 2022-07-12 PROCEDURE — G0121 COLON CA SCRN NOT HI RSK IND: ICD-10-PCS | Mod: ,,, | Performed by: SURGERY

## 2022-07-12 PROCEDURE — 63600175 PHARM REV CODE 636 W HCPCS: Mod: PO | Performed by: NURSE ANESTHETIST, CERTIFIED REGISTERED

## 2022-07-12 PROCEDURE — 25000003 PHARM REV CODE 250: Mod: PO | Performed by: NURSE ANESTHETIST, CERTIFIED REGISTERED

## 2022-07-12 PROCEDURE — G0121 COLON CA SCRN NOT HI RSK IND: HCPCS | Mod: ,,, | Performed by: SURGERY

## 2022-07-12 PROCEDURE — D9220A PRA ANESTHESIA: Mod: ANES,,, | Performed by: ANESTHESIOLOGY

## 2022-07-12 PROCEDURE — D9220A PRA ANESTHESIA: Mod: CRNA,,, | Performed by: NURSE ANESTHETIST, CERTIFIED REGISTERED

## 2022-07-12 PROCEDURE — D9220A PRA ANESTHESIA: ICD-10-PCS | Mod: ANES,,, | Performed by: ANESTHESIOLOGY

## 2022-07-12 PROCEDURE — 37000009 HC ANESTHESIA EA ADD 15 MINS: Mod: PO | Performed by: SURGERY

## 2022-07-12 PROCEDURE — D9220A PRA ANESTHESIA: ICD-10-PCS | Mod: CRNA,,, | Performed by: NURSE ANESTHETIST, CERTIFIED REGISTERED

## 2022-07-12 PROCEDURE — 37000008 HC ANESTHESIA 1ST 15 MINUTES: Mod: PO | Performed by: SURGERY

## 2022-07-12 RX ORDER — PROPOFOL 10 MG/ML
VIAL (ML) INTRAVENOUS
Status: DISCONTINUED | OUTPATIENT
Start: 2022-07-12 | End: 2022-07-12

## 2022-07-12 RX ORDER — LIDOCAINE HCL/PF 100 MG/5ML
SYRINGE (ML) INTRAVENOUS
Status: DISCONTINUED | OUTPATIENT
Start: 2022-07-12 | End: 2022-07-12

## 2022-07-12 RX ADMIN — LIDOCAINE HYDROCHLORIDE 100 MG: 20 INJECTION, SOLUTION INTRAVENOUS at 08:07

## 2022-07-12 RX ADMIN — PROPOFOL 30 MG: 10 INJECTION, EMULSION INTRAVENOUS at 08:07

## 2022-07-12 RX ADMIN — SODIUM CHLORIDE, SODIUM LACTATE, POTASSIUM CHLORIDE, AND CALCIUM CHLORIDE: .6; .31; .03; .02 INJECTION, SOLUTION INTRAVENOUS at 08:07

## 2022-07-12 NOTE — TRANSFER OF CARE
Anesthesia Transfer of Care Note    Patient: Jaren Quiroz    Procedure(s) Performed: Procedure(s) (LRB):  COLONOSCOPY (N/A)    Patient location: PACU    Anesthesia Type: general    Transport from OR: Transported from OR on room air with adequate spontaneous ventilation    Post pain: adequate analgesia    Post assessment: no apparent anesthetic complications and tolerated procedure well    Post vital signs: stable    Level of consciousness: awake and alert    Nausea/Vomiting: no nausea/vomiting    Complications: none    Transfer of care protocol was followed      Last vitals:   Visit Vitals  BP (!) 145/88 (BP Location: Right arm, Patient Position: Lying)   Pulse 81   Temp 36.2 °C (97.1 °F) (Skin)   Resp 18   Ht 6' (1.829 m)   Wt 91.6 kg (202 lb)   BMI 27.40 kg/m²

## 2022-07-12 NOTE — PLAN OF CARE
Pt meets criteria for discharge. Discharge instructions given to pt/family; V/U. Pt belongings returned to pt/family. Pt/family instructed to call office with questions or concerns following procedure, V/U. VSS, no issues or distress noted.

## 2022-07-12 NOTE — ANESTHESIA PREPROCEDURE EVALUATION
07/12/2022  Jaren PICHARDO Cousin is a 56 y.o., male.  Per Dr. Tolentino's note 2/12/2020:  53 yo male with SVT, Htn, alcohol abuse, fatty liver.     Primary cardiologist is Dr. Masters.  Has presented with recurrent sustained palpitations. ECG's have revealed SVT, short RP (1/25/20 1/29/20), terminated with Adenosine.  Over the past 15 years, has had symptoms, but could often terminate it with vagal maneuvers.  With the 2 above episodes, he could not terminate it.  Has been on Propranolol since 12/20.  Increased 1/25/20.  Has had one episode since then, on an airplane.  Got off the airplane prior to take-off.  Exercise echo 10/19 normal biventricular structure and function, + for ischemia.  LHC 10/23/20.  ECG reveals nsr without evidence of pre-excitation.    Past Medical History:   Diagnosis Date    ALLERGIC RHINITIS     Asthma     as child    Cervicalgia     DDD (degenerative disc disease), lumbar     Fatty liver     Hemorrhoid     Hepatomegaly     High triglycerides     HTN (hypertension)     Liver hemangioma 2014    PTSD (post-traumatic stress disorder)     not [facility]    Shingles     lower back    Urticaria      Past Surgical History:   Procedure Laterality Date    BACK SURGERY      ruptured disc L5-S1    COLON[facility]COPY  ~2010  ([facility])    Hemorrhoids    CORONARY ANGIOGRAPHY N/A 10/23/2019    Procedure: ANGIOGRAM, CORONARY ARTERY;  Surgeon: Can Masters MD;  Location: [facility] CATH;  Service: Cardiology;  Laterality: N/A;    ELBOW SURGERY      right    EPIDURAL STEROID INJECTION INTO CERVICAL SPINE N/A 10/15/2019    Procedure: Injection-steroid-epidural-cervical C7/T1;  Surgeon: Blaise Mao MD;  Location: [facility] OR;  Service: Pain Management;  Laterality: N/A;    ESOPHAGOGASTRODUODENOSCOPY N/A 1/29/2019    Procedure: EGD  (ESOPHAGOGASTRODUODENOSCOPY);  Surgeon: Balwinder Dexter Jr., MD;  Location: [facility] ENDO;  Service: Endoscopy;  Laterality: N/A;    KNEE SURGERY      right    LEFT HEART CATHETERIZATION N/A 10/23/2019    Procedure: Left heart cath;  Surgeon: Can Masters MD;  Location: [facility] CATH;  Service: Cardiology;  Laterality: N/A;    MAGNETIC RESONANCE IMAGING N/A 6/26/2019    Procedure: MRI (Magnetic Resonance Imagine) needs anesthesia;  Surgeon: Maurice Fonseca MD;  Location: [facility] CATH;  Service: Anesthesiology;  Laterality: N/A;     Patient Active Problem List   Diagnosis    HTN (hypertension)    High triglycerides    Urticaria    Allergic rhinitis    Palpitations    Dyslipidemia    DDD (degenerative disc disease), lumbar    Colon cancer screening    Gastroesophageal reflux disease without esophagitis    Folliculitis    RUQ pain    Neck pain    Decreased range of motion of neck    Cervical radiculitis    Abnormal cardiovascular stress test    Abnormal stress test    Cervical spondylosis    SVT (supraventricular tachycardia)    Normal coronary arteries    Fatty liver    History of radiofrequency ablation (RFA) procedure for cardiac arrhythmia    Right ankle pain    Tendonitis, Achilles, right    Acute medial meniscus tear of right knee    Genu varum of both lower extremities    Post-traumatic osteoarthritis of right knee         Pre-op Assessment    I have reviewed the Patient Summary Reports.    I have reviewed the Nursing Notes. I have reviewed the NPO Status.   I have reviewed the Medications.     Review of Systems  Anesthesia Hx:  No problems with previous Anesthesia Denies Hx of Anesthetic complications  Denies Family Hx of Anesthesia complications.   Denies Personal Hx of Anesthesia complications.   Social:  Non-Smoker    Cardiovascular:   Hypertension Denies MI.  Denies CAD.    Denies CABG/stent.   Denies Angina.    Pulmonary:   Denies COPD.  Denies Asthma.  Denies Recent URI.     Renal/:   Denies Chronic Renal Disease.     Hepatic/GI:   Denies GERD. Denies Liver Disease.    Musculoskeletal:   Arthritis     Neurological:   Denies TIA. Denies CVA. Neuromuscular Disease,  Denies Seizures.    Endocrine:   Denies Diabetes. Denies Hypothyroidism.    Psych:   Denies Psychiatric History.          Physical Exam  General:  Well nourished      Airway/Jaw/Neck:  Airway Findings: Mouth Opening: Normal   Tongue: Normal   General Airway Assessment: Adult, Good Mallampati: I  TM Distance: Normal, at least 6 cm   Jaw/Neck Findings:  Neck ROM: Normal ROM        Chest/Lungs:  Chest/Lungs Findings: Clear to auscultation      Heart/Vascular:  Heart Findings: Rate: Normal  Rhythm: Regular Rhythm        Mental Status:  Mental Status Findings:  Alert and Oriented, Cooperative         Anesthesia Plan  Type of Anesthesia, risks & benefits discussed:  Anesthesia Type:  general    Patient's Preference:   Plan Factors:          Intra-op Monitoring Plan: standard ASA monitors  Intra-op Monitoring Plan Comments:   Post Op Pain Control Plan: per primary service following discharge from PACU  Post Op Pain Control Plan Comments:     Induction:   IV  Beta Blocker:         Informed Consent: Informed consent signed with the Patient and all parties understand the risks and agree with anesthesia plan.  All questions answered.  Anesthesia consent signed with patient.  ASA Score: 3     Day of Surgery Review of History & Physical: I have interviewed and examined the patient. I have reviewed the patient's H&P dated:              Ready For Surgery From Anesthesia Perspective.           Physical Exam  General: Well nourished    Airway:  Mallampati: I   Mouth Opening: Normal  TM Distance: Normal, at least 6 cm  Tongue: Normal  Neck ROM: Normal ROM    Chest/Lungs:  Clear to auscultation    Heart:  Rate: Normal  Rhythm: Regular Rhythm          Anesthesia Plan  Type of Anesthesia, risks & benefits discussed:    Anesthesia Type:  general  Intra-op Monitoring Plan: standard ASA monitors  Post Op Pain Control Plan: per primary service following discharge from PACU  Induction:  IV  Informed Consent: Informed consent signed with the Patient and all parties understand the risks and agree with anesthesia plan.  All questions answered.   ASA Score: 3  Day of Surgery Review of History & Physical: I have interviewed and examined the patient. I have reviewed the patient's H&P dated:     Ready For Surgery From Anesthesia Perspective.       .

## 2022-07-12 NOTE — ANESTHESIA POSTPROCEDURE EVALUATION
Anesthesia Post Evaluation    Patient: Jaren Quiroz    Procedure(s) Performed: Procedure(s) (LRB):  COLONOSCOPY (N/A)    Final Anesthesia Type: general      Patient location during evaluation: PACU  Patient participation: Yes- Able to Participate  Level of consciousness: awake and alert and oriented  Post-procedure vital signs: reviewed and stable  Pain management: adequate  Airway patency: patent    PONV status at discharge: No PONV  Anesthetic complications: no      Cardiovascular status: blood pressure returned to baseline  Respiratory status: unassisted, spontaneous ventilation and room air  Hydration status: euvolemic  Follow-up not needed.          Vitals Value Taken Time   /69 07/12/22 0911   Temp  07/12/22 0930   Pulse 72 07/12/22 0911   Resp 17 07/12/22 0911   SpO2 96 % 07/12/22 0911         Event Time   Out of Recovery 09:07:00         Pain/Pepe Score: Pepe Score: 10 (7/12/2022  9:11 AM)

## 2022-07-12 NOTE — H&P
[facility] - Endoscopy  History & Physical     Subjective:     Chief Complaint/Reason for Admission: screening colonoscopy    History of Present Illness:   Patient 56 y.o. male presents for screening colonoscopy.  Pt had his last cscope 8 years ago.  He has had occasional lower abdominal pain from presumed diverticulitis.  No changes in bowel habits.  No blood per rectum.  NO significant PMhx or PShx.       Patient Active Problem List    Diagnosis Date Noted    Acute medial meniscus tear of right knee 08/23/2021    Genu varum of both lower extremities 08/23/2021    Post-traumatic osteoarthritis of right knee 08/23/2021    Tendonitis, Achilles, right 03/30/2021    Right ankle pain 03/15/2021    History of radiofrequency ablation (RFA) procedure for cardiac arrhythmia 08/25/2020    Fatty liver 02/11/2020    SVT (supraventricular tachycardia) 01/28/2020    Normal coronary arteries 01/28/2020    Cervical spondylosis 10/30/2019    Abnormal stress test 10/23/2019    Abnormal cardiovascular stress test 10/10/2019    Cervical radiculitis 10/02/2019    Decreased range of motion of neck 07/23/2019    Neck pain 06/26/2019    RUQ pain 01/29/2019    Folliculitis 02/18/2018    Gastroesophageal reflux disease without esophagitis 02/15/2018    Colon cancer screening 06/24/2015    DDD (degenerative disc disease), lumbar     Palpitations 02/26/2014    Dyslipidemia 02/26/2014    HTN (hypertension)     High triglycerides     Urticaria     Allergic rhinitis         Medications Prior to Admission   Medication Sig Dispense Refill Last Dose    ALLERGY RELIEF, LORATADINE, 10 mg tablet TAKE ONE TABLET BY MOUTH ONCE DAILY 30 tablet 11 7/11/2022 at Unknown time    amLODIPine (NORVASC) 10 MG tablet Take 1 tablet (10 mg total) by mouth once daily. 30 tablet 5 7/12/2022 at Unknown time    cetirizine (ZYRTEC) 10 MG tablet TAKE ONE TABLET BY MOUTH ONCE DAILY AS NEEDED FOR ALLERGIES   7/11/2022 at Unknown time     clonazePAM (KLONOPIN) 2 MG Tab Take by mouth daily as needed.   Past Month at Unknown time    co-enzyme Q-10 30 mg capsule Take 100 mg by mouth once daily.   Past Week at Unknown time    colchicine (COLCRYS) 0.6 mg tablet Take 1 tablet (0.6 mg total) by mouth 2 (two) times daily. 30 tablet 6 Past Week at Unknown time    cyclobenzaprine (FLEXERIL) 10 MG tablet Take 10 mg by mouth 3 (three) times daily as needed. PRN   Past Week at Unknown time    doxycycline (MONODOX) 50 MG Cap Take once daily with food 90 capsule 1 Past Week at Unknown time    esomeprazole (NEXIUM) 40 MG capsule Take 1 capsule (40 mg total) by mouth daily as needed. 90 capsule 3 Past Week at Unknown time    famotidine (PEPCID) 20 MG tablet Take 1 tablet by mouth twice daily 180 tablet 3 7/11/2022 at Unknown time    fenofibrate (TRICOR) 145 MG tablet Take 1 tablet (145 mg total) by mouth once daily. 90 tablet 3 7/11/2022 at Unknown time    mometasone (NASONEX) 50 mcg/actuation nasal spray Use 2 spray(s) in each nostril once daily 51 g 2 Past Week at Unknown time    niacin (SLO-NIACIN) 500 mg tablet TAKE 1 TABLET BY MOUTH ONCE DAILY AT NIGHT 90 tablet 3 7/11/2022 at Unknown time    propranoloL (INDERAL) 10 MG tablet Take 1 tablet (10 mg total) by mouth 3 (three) times daily. 270 tablet 3 7/12/2022 at Unknown time    valACYclovir (VALTREX) 500 MG tablet TAKE TWO TABLETS BY MOUTH TWICE DAILY 90 tablet 0 Past Week at Unknown time    hydrOXYzine HCL (ATARAX) 25 MG tablet Take 1 tablet (25 mg total) by mouth every 6 (six) hours as needed for Itching. Every 6 hours  tablet 3 Unknown at Unknown time    ivermectin (SOOLANTRA) 1 % Crea AAA face qhs 45 g 3 Unknown at Unknown time    meclizine (ANTIVERT) 25 mg tablet Take 1 tablet (25 mg total) by mouth 3 (three) times daily as needed for Dizziness (may cause drowsiness). 60 tablet 2 Unknown at Unknown time    sildenafiL (VIAGRA) 100 MG tablet TAKE ONE TABLET BY MOUTH EVERY WEEK AS NEEDED  30 TO 60 MINUTES BEFORE INTERCOURSE FOR BEST RESULTS TAKE ON EMPTY STOMACH   Unknown at Unknown time     Review of patient's allergies indicates:   Allergen Reactions    Ciprofloxacin Hives        Past Medical History:   Diagnosis Date    ALLERGIC RHINITIS     Asthma     as child    Cervicalgia     DDD (degenerative disc disease), lumbar     Fatty liver     Hemorrhoid     Hepatomegaly     High triglycerides     HTN (hypertension)     Liver hemangioma 2014    PTSD (post-traumatic stress disorder)     not [facility]    Shingles     lower back    Urticaria       Past Surgical History:   Procedure Laterality Date    BACK SURGERY      ruptured disc L5-S1    COLONOSCOPY  ~2010  ([facility])    Hemorrhoids    CORONARY ANGIOGRAPHY N/A 10/23/2019    Procedure: ANGIOGRAM, CORONARY ARTERY;  Surgeon: Can Masters MD;  Location: [facility] CATH;  Service: Cardiology;  Laterality: N/A;    ELBOW SURGERY      right    EPIDURAL STEROID INJECTION INTO CERVICAL SPINE N/A 10/15/2019    Procedure: Injection-steroid-epidural-cervical C7/T1;  Surgeon: Blaise Mao MD;  Location: [facility] OR;  Service: Pain Management;  Laterality: N/A;    ESOPHAGOGASTRODUODENOSCOPY N/A 1/29/2019    Procedure: EGD (ESOPHAGOGASTRODUODENOSCOPY);  Surgeon: Balwinder Dexter Jr., MD;  Location: [facility] ENDO;  Service: Endoscopy;  Laterality: N/A;    KNEE SURGERY      right    LEFT HEART CATHETERIZATION N/A 10/23/2019    Procedure: Left heart cath;  Surgeon: Can Masters MD;  Location: [facility] CATH;  Service: Cardiology;  Laterality: N/A;    MAGNETIC RESONANCE IMAGING N/A 6/26/2019    Procedure: MRI (Magnetic Resonance Imagine) needs anesthesia;  Surgeon: Maurice Fonseca MD;  Location: [facility] CATH;  Service: Anesthesiology;  Laterality: N/A;      Family History   Problem Relation Age of Onset    Allergic rhinitis Son     Allergic rhinitis Daughter     Asthma Daughter     Allergic rhinitis  Daughter     Diverticulitis Mother     Hypertension Father     COPD Father     Angioedema Neg Hx     Eczema Neg Hx     Immunodeficiency Neg Hx     Urticaria Neg Hx     Lupus Neg Hx     Psoriasis Neg Hx     Melanoma Neg Hx     Acne Neg Hx     Colon cancer Neg Hx     Colon polyps Neg Hx     Crohn's disease Neg Hx     Ulcerative colitis Neg Hx     Esophageal cancer Neg Hx     Stomach cancer Neg Hx       Social History     Tobacco Use    Smoking status: Never Smoker    Smokeless tobacco: Never Used   Substance Use Topics    Alcohol use: Yes     Alcohol/week: 21.0 standard drinks     Types: 21 Cans of beer per week     Comment: 3 beers daily        Review of Systems:  A comprehensive review of systems was negative.    OBJECTIVE:     Patient Vitals for the past 8 hrs:   BP Temp Temp src Pulse Resp Height Weight   07/12/22 0806 (!) 145/88 97.1 °F (36.2 °C) Skin 81 18 6' (1.829 m) 91.6 kg (202 lb)     AAOx3  CTA B  Soft/nt/nd      Data Review:      ASSESSMENT/PLAN:     There are no hospital problems to display for this patient.    Screening cscope  Plan:  To have screening cscope today

## 2022-07-12 NOTE — PROVATION PATIENT INSTRUCTIONS
Discharge Summary/Instructions after an Endoscopic Procedure  Patient Name: Jaren Cousin  Patient MRN: [id number]  Patient YOB: 1965 Tuesday, July 12, 2022  Andrew Caba MD  Dear patient,  As a result of recent federal legislation (The Federal Cures Act), you may   receive lab or pathology results from your procedure in your [facility]   account before your physician is able to contact you. Your physician or   their representative will relay the results to you with their   recommendations at their soonest availability.  Thank you,  RESTRICTIONS:  During your procedure today, you received medications for sedation.  These   medications may affect your judgment, balance and coordination.  Therefore,   for 24 hours, you have the following restrictions:   - DO NOT drive a car, operate machinery, make legal/financial decisions,   sign important papers or drink alcohol.    ACTIVITY:  Today: no heavy lifting, straining or running due to procedural   sedation/anesthesia.  The following day: return to full activity including work.  DIET:  Eat and drink normally unless instructed otherwise.     TREATMENT FOR COMMON SIDE EFFECTS:  - Mild abdominal pain, nausea, belching, bloating or excessive gas:  rest,   eat lightly and use a heating pad.  - Sore Throat: treat with throat lozenges and/or gargle with warm salt   water.  - Because air was used during the procedure, expelling large amounts of air   from your rectum or belching is normal.  - If a bowel prep was taken, you may not have a bowel movement for 1-3 days.    This is normal.  SYMPTOMS TO WATCH FOR AND REPORT TO YOUR PHYSICIAN:  1. Abdominal pain or bloating, other than gas cramps.  2. Chest pain.  3. Back pain.  4. Signs of infection such as: chills or fever occurring within 24 hours   after the procedure.  5. Rectal bleeding, which would show as bright red, maroon, or black stools.   (A tablespoon of blood from the rectum is not serious, especially if    hemorrhoids are present.)  6. Vomiting.  7. Weakness or dizziness.  GO DIRECTLY TO THE NEAREST EMERGENCY ROOM IF YOU HAVE ANY OF THE FOLLOWING:      Difficulty breathing              Chills and/or fever over 101 F   Persistent vomiting and/or vomiting blood   Severe abdominal pain   Severe chest pain   Black, tarry stools   Bleeding- more than one tablespoon   Any other symptom or condition that you feel may need urgent attention  Your doctor recommends these additional instructions:  If any biopsies were taken, your doctors clinic will contact you in 1 to 2   weeks with any results.  You are being discharged to home.   Resume your regular diet.   Continue your present medications.   Your physician has recommended a repeat colonoscopy in 10 years for   screening purposes.   Return to my office as needed.  For questions, problems or results please call your physician - Andrew Caba MD at Work:  [phone].  EMERGENCY PHONE NUMBER: [phone], LAB RESULTS: [phone]  IF A COMPLICATION OR EMERGENCY SITUATION ARISES AND YOU ARE UNABLE TO REACH   YOUR PHYSICIAN - GO DIRECTLY TO THE EMERGENCY ROOM.  ___________________________________________  Nurse Signature  ___________________________________________  Patient/Designated Responsible Party Signature  Andrew Caba MD  7/12/2022 8:47:47 AM  This report has been verified and signed electronically.  Dear patient,  As a result of recent federal legislation (The Federal Cures Act), you may   receive lab or pathology results from your procedure in your [facility]   account before your physician is able to contact you. Your physician or   their representative will relay the results to you with their   recommendations at their soonest availability.  Thank you.  PROVATION

## 2022-07-13 ENCOUNTER — TELEPHONE (OUTPATIENT)
Dept: FAMILY MEDICINE | Facility: CLINIC | Age: 57
End: 2022-07-13
Payer: COMMERCIAL

## 2022-07-20 ENCOUNTER — LAB VISIT (OUTPATIENT)
Dept: LAB | Facility: HOSPITAL | Age: 57
End: 2022-07-20
Attending: FAMILY MEDICINE
Payer: COMMERCIAL

## 2022-07-20 DIAGNOSIS — Z12.5 PROSTATE CANCER SCREENING: ICD-10-CM

## 2022-07-20 DIAGNOSIS — I10 ESSENTIAL HYPERTENSION: ICD-10-CM

## 2022-07-20 LAB
ALBUMIN SERPL BCP-MCNC: 4.2 G/DL (ref 3.5–5.2)
ALP SERPL-CCNC: 38 U/L (ref 55–135)
ALT SERPL W/O P-5'-P-CCNC: 17 U/L (ref 10–44)
ANION GAP SERPL CALC-SCNC: 9 MMOL/L (ref 8–16)
AST SERPL-CCNC: 17 U/L (ref 10–40)
BILIRUB SERPL-MCNC: 0.5 MG/DL (ref 0.1–1)
BUN SERPL-MCNC: 16 MG/DL (ref 6–20)
CALCIUM SERPL-MCNC: 10 MG/DL (ref 8.7–10.5)
CHLORIDE SERPL-SCNC: 108 MMOL/L (ref 95–110)
CHOLEST SERPL-MCNC: 193 MG/DL (ref 120–199)
CHOLEST/HDLC SERPL: 4.5 {RATIO} (ref 2–5)
CO2 SERPL-SCNC: 23 MMOL/L (ref 23–29)
COMPLEXED PSA SERPL-MCNC: 1.4 NG/ML (ref 0–4)
CREAT SERPL-MCNC: 1.3 MG/DL (ref 0.5–1.4)
EST. GFR  (AFRICAN AMERICAN): >60 ML/MIN/1.73 M^2
EST. GFR  (NON AFRICAN AMERICAN): >60 ML/MIN/1.73 M^2
GLUCOSE SERPL-MCNC: 106 MG/DL (ref 70–110)
HDLC SERPL-MCNC: 43 MG/DL (ref 40–75)
HDLC SERPL: 22.3 % (ref 20–50)
LDLC SERPL CALC-MCNC: 131.4 MG/DL (ref 63–159)
NONHDLC SERPL-MCNC: 150 MG/DL
POTASSIUM SERPL-SCNC: 5.1 MMOL/L (ref 3.5–5.1)
PROT SERPL-MCNC: 7.1 G/DL (ref 6–8.4)
SODIUM SERPL-SCNC: 140 MMOL/L (ref 136–145)
TRIGL SERPL-MCNC: 93 MG/DL (ref 30–150)

## 2022-07-20 PROCEDURE — 80053 COMPREHEN METABOLIC PANEL: CPT | Performed by: FAMILY MEDICINE

## 2022-07-20 PROCEDURE — 84153 ASSAY OF PSA TOTAL: CPT | Performed by: FAMILY MEDICINE

## 2022-07-20 PROCEDURE — 36415 COLL VENOUS BLD VENIPUNCTURE: CPT | Mod: PO | Performed by: FAMILY MEDICINE

## 2022-07-20 PROCEDURE — 80061 LIPID PANEL: CPT | Performed by: FAMILY MEDICINE

## 2022-08-03 ENCOUNTER — IMMUNIZATION (OUTPATIENT)
Dept: PHARMACY | Facility: CLINIC | Age: 57
End: 2022-08-03
Payer: COMMERCIAL

## 2022-08-03 DIAGNOSIS — Z23 NEED FOR VACCINATION: Primary | ICD-10-CM

## 2022-08-26 ENCOUNTER — OFFICE VISIT (OUTPATIENT)
Dept: FAMILY MEDICINE | Facility: CLINIC | Age: 57
End: 2022-08-26
Payer: COMMERCIAL

## 2022-08-26 VITALS
OXYGEN SATURATION: 96 % | TEMPERATURE: 99 F | SYSTOLIC BLOOD PRESSURE: 130 MMHG | HEART RATE: 72 BPM | WEIGHT: 206.81 LBS | HEIGHT: 72 IN | DIASTOLIC BLOOD PRESSURE: 80 MMHG | BODY MASS INDEX: 28.01 KG/M2

## 2022-08-26 DIAGNOSIS — I10 ESSENTIAL HYPERTENSION: Primary | ICD-10-CM

## 2022-08-26 DIAGNOSIS — K21.9 GASTROESOPHAGEAL REFLUX DISEASE, UNSPECIFIED WHETHER ESOPHAGITIS PRESENT: ICD-10-CM

## 2022-08-26 DIAGNOSIS — F41.9 ANXIETY: ICD-10-CM

## 2022-08-26 DIAGNOSIS — H60.91 OTITIS EXTERNA OF RIGHT EAR, UNSPECIFIED CHRONICITY, UNSPECIFIED TYPE: ICD-10-CM

## 2022-08-26 DIAGNOSIS — M54.12 CERVICAL RADICULOPATHY: ICD-10-CM

## 2022-08-26 DIAGNOSIS — E78.5 HYPERLIPIDEMIA, UNSPECIFIED HYPERLIPIDEMIA TYPE: ICD-10-CM

## 2022-08-26 PROCEDURE — 3075F SYST BP GE 130 - 139MM HG: CPT | Mod: CPTII,S$GLB,, | Performed by: FAMILY MEDICINE

## 2022-08-26 PROCEDURE — 3008F BODY MASS INDEX DOCD: CPT | Mod: CPTII,S$GLB,, | Performed by: FAMILY MEDICINE

## 2022-08-26 PROCEDURE — 99214 OFFICE O/P EST MOD 30 MIN: CPT | Mod: S$GLB,,, | Performed by: FAMILY MEDICINE

## 2022-08-26 PROCEDURE — 1159F PR MEDICATION LIST DOCUMENTED IN MEDICAL RECORD: ICD-10-PCS | Mod: CPTII,S$GLB,, | Performed by: FAMILY MEDICINE

## 2022-08-26 PROCEDURE — 3075F PR MOST RECENT SYSTOLIC BLOOD PRESS GE 130-139MM HG: ICD-10-PCS | Mod: CPTII,S$GLB,, | Performed by: FAMILY MEDICINE

## 2022-08-26 PROCEDURE — 1159F MED LIST DOCD IN RCRD: CPT | Mod: CPTII,S$GLB,, | Performed by: FAMILY MEDICINE

## 2022-08-26 PROCEDURE — 99214 PR OFFICE/OUTPT VISIT, EST, LEVL IV, 30-39 MIN: ICD-10-PCS | Mod: S$GLB,,, | Performed by: FAMILY MEDICINE

## 2022-08-26 PROCEDURE — 99999 PR PBB SHADOW E&M-EST. PATIENT-LVL IV: CPT | Mod: PBBFAC,,, | Performed by: FAMILY MEDICINE

## 2022-08-26 PROCEDURE — 1160F RVW MEDS BY RX/DR IN RCRD: CPT | Mod: CPTII,S$GLB,, | Performed by: FAMILY MEDICINE

## 2022-08-26 PROCEDURE — 3008F PR BODY MASS INDEX (BMI) DOCUMENTED: ICD-10-PCS | Mod: CPTII,S$GLB,, | Performed by: FAMILY MEDICINE

## 2022-08-26 PROCEDURE — 1160F PR REVIEW ALL MEDS BY PRESCRIBER/CLIN PHARMACIST DOCUMENTED: ICD-10-PCS | Mod: CPTII,S$GLB,, | Performed by: FAMILY MEDICINE

## 2022-08-26 PROCEDURE — 99999 PR PBB SHADOW E&M-EST. PATIENT-LVL IV: ICD-10-PCS | Mod: PBBFAC,,, | Performed by: FAMILY MEDICINE

## 2022-08-26 PROCEDURE — 3079F DIAST BP 80-89 MM HG: CPT | Mod: CPTII,S$GLB,, | Performed by: FAMILY MEDICINE

## 2022-08-26 PROCEDURE — 3079F PR MOST RECENT DIASTOLIC BLOOD PRESSURE 80-89 MM HG: ICD-10-PCS | Mod: CPTII,S$GLB,, | Performed by: FAMILY MEDICINE

## 2022-08-26 RX ORDER — BUSPIRONE HYDROCHLORIDE 5 MG/1
5 TABLET ORAL 2 TIMES DAILY
Qty: 60 TABLET | Refills: 11 | Status: SHIPPED | OUTPATIENT
Start: 2022-08-26 | End: 2023-05-24

## 2022-08-26 RX ORDER — NEOMYCIN SULFATE, POLYMYXIN B SULFATE AND HYDROCORTISONE 10; 3.5; 1 MG/ML; MG/ML; [USP'U]/ML
3 SUSPENSION/ DROPS AURICULAR (OTIC) 3 TIMES DAILY
Qty: 10 ML | Refills: 1 | Status: SHIPPED | OUTPATIENT
Start: 2022-08-26 | End: 2023-08-28

## 2022-08-26 NOTE — PROGRESS NOTES
Subjective:       Patient ID: Jaren PICHARDO Cousin is a 56 y.o. male.    Chief Complaint: Follow-up (6 months)    HPI     Here for a f/u    Reviewed recent labs. All wnl.      [facility].      htn stable.      Hx of svt s/p ablation. Stable. On propranolol. Takes bid but sometimes tid.      Takes fenofibrate and niacin for hld management.      gerd stable while on nexium and pepcid.    Has chronic right cervical radiculopathy. Planning to see the a specialist at the [facility].     Takes klonopin prn for anxiety. Receives k[facility]pin from [facility].  Has generalized anxiety.      Reports digging in right ear canal about 10 days ago. Reports pain.     Review of Systems      Review of Systems   Constitutional: Negative for fever and chills.   HENT: Negative for hearing loss and neck stiffness.    Eyes: Negative for redness and itching.   Respiratory: Negative for cough and choking.    Cardiovascular: Negative for chest pain and leg swelling.  Abdomen: Negative for abdominal pain and blood in stool.   Genitourinary: Negative for dysuria and flank pain.   Neurological: Negative for light-headedness and headaches.   Hematological: Negative for adenopathy.   Psychiatric/Behavioral: Negative for behavioral problems.     Objective:      Physical Exam  HENT:      Head: Atraumatic.      Left Ear: External ear normal.      Ears:      Comments: Right ear canal: abrasion noted with swelling at proximal end  Eyes:      Conjunctiva/sclera: Conjunctivae normal.      Pupils: Pupils are equal, round, and reactive to light.   Cardiovascular:      Rate and Rhythm: Normal rate and regular rhythm.      Heart sounds: No murmur heard.  Pulmonary:      Effort: Pulmonary effort is normal.      Breath sounds: Normal breath sounds. No wheezing.   Musculoskeletal:      Cervical back: Normal range of motion.   Lymphadenopathy:      Cervical: No cervical adenopathy.         Assessment:       1. Essential  hypertension    2. Hyperlipidemia, unspecified hyperlipidemia type    3. Gastroesophageal reflux disease, unspecified whether esophagitis present    4. Cervical radiculopathy    5. Anxiety    6. Otitis externa of right ear, unspecified chronicity, unspecified type        Plan:       Essential hypertension    Hyperlipidemia, unspecified hyperlipidemia type    Gastroesophageal reflux disease, unspecified whether esophagitis present    Cervical radiculopathy    Anxiety    Otitis externa of right ear, unspecified chronicity, unspecified type    Other orders  -     busPIRone (BUSPAR) 5 MG Tab; Take 1 tablet (5 mg total) by mouth 2 (two) times daily.  Dispense: 60 tablet; Refill: 11  -     neomycin-polymyxin-hydrocortisone (CORTISPORIN) 3.5-10,000-1 mg/mL-unit/mL-% otic suspension; Place 3 drops into the right ear 3 (three) times daily.  Dispense: 10 mL; Refill: 1          Plan:  Start cortisporin for right ear canal  Start buspar. F/u with Jazmin in 6 weeks  Cont all other meds          Medication List with Changes/Refills   New Medications    BUSPIRONE (BUSPAR) 5 MG TAB    Take 1 tablet (5 mg total) by mouth 2 (two) times daily.    NEOMYCIN-POLYMYXIN-HYDROCORTISONE (CORTISPORIN) 3.5-10,000-1 MG/ML-UNIT/ML-% OTIC SUSPENSION    Place 3 drops into the right ear 3 (three) times daily.   Current Medications    ALLERGY RELIEF, LORATADINE, 10 MG TABLET    TAKE ONE TABLET BY MOUTH ONCE DAILY    AMLODIPINE (NORVASC) 10 MG TABLET    Take 1 tablet (10 mg total) by mouth once daily.    CETIRIZINE (ZYRTEC) 10 MG TABLET    TAKE ONE TABLET BY MOUTH ONCE DAILY AS NEEDED FOR ALLERGIES    CLONAZEPAM (KLONOPIN) 2 MG TAB    Take by mouth daily as needed.    CO-ENZYME Q-10 30 MG CAPSULE    Take 100 mg by mouth once daily.    COLCHICINE (COLCRYS) 0.6 MG TABLET    Take 1 tablet (0.6 mg total) by mouth 2 (two) times daily.    CYCLOBENZAPRINE (FLEXERIL) 10 MG TABLET    Take 10 mg by mouth 3 (three) times daily as needed. PRN    DOXYCYCLINE  (MONODOX) 50 MG CAP    Take once daily with food    ESOMEPRAZOLE (NEXIUM) 40 MG CAPSULE    Take 1 capsule (40 mg total) by mouth daily as needed.    FAMOTIDINE (PEPCID) 20 MG TABLET    Take 1 tablet by mouth twice daily    FENOFIBRATE (TRICOR) 145 MG TABLET    Take 1 tablet (145 mg total) by mouth once daily.    HYDROXYZINE HCL (ATARAX) 25 MG TABLET    Take 1 tablet (25 mg total) by mouth every 6 (six) hours as needed for Itching. Every 6 hours PRN    MECLIZINE (ANTIVERT) 25 MG TABLET    Take 1 tablet (25 mg total) by mouth 3 (three) times daily as needed for Dizziness (may cause drowsiness).    MOMETASONE (NASONEX) 50 MCG/ACTUATION NASAL SPRAY    Use 2 spray(s) in each nostril once daily    NIACIN (SLO-NIACIN) 500 MG TABLET    TAKE 1 TABLET BY MOUTH ONCE DAILY AT NIGHT    PROPRANOLOL (INDERAL) 10 MG TABLET    Take 1 tablet (10 mg total) by mouth 3 (three) times daily.    SILDENAFIL (VIAGRA) 100 MG TABLET    TAKE ONE TABLET BY MOUTH EVERY WEEK AS NEEDED 30 TO 60 MINUTES BEFORE INTERCOURSE FOR BEST RESULTS TAKE ON EMPTY STOMACH    VALACYCLOVIR (VALTREX) 500 MG TABLET    TAKE TWO TABLETS BY MOUTH TWICE DAILY   Discontinued Medications    IVERMECTIN (SOOLANTRA) 1 % CREA    AAA face qhs

## 2022-09-13 ENCOUNTER — PATIENT MESSAGE (OUTPATIENT)
Dept: SPORTS MEDICINE | Facility: CLINIC | Age: 57
End: 2022-09-13
Payer: COMMERCIAL

## 2022-09-13 DIAGNOSIS — M17.31 POST-TRAUMATIC OSTEOARTHRITIS OF RIGHT KNEE: Primary | ICD-10-CM

## 2022-09-14 NOTE — TELEPHONE ENCOUNTER
Patient contacted clinic requesting repeat viscosupplementation injections.  He has had significant relief from injections in the past up until now.  Patient has failed other treatments such as physical therapy cortisone injections, and conservative management.  This is the only treatment that provides significant relief.  Patient denies new trauma and/or since symptoms are similar to previous HPI.    Review of bilateral knee radiographs show Kellgren-Fernando Classification: 2-3      We have discussed a variety of treatment options including medications, injections, physical therapy and other alternative treatments. I also explained the indications, risks and benefits of surgery. Pt would like to proceed with visco-supplementation at this time.    I made the decision to obtain old records of the patient including previous notes and imaging. I independently reviewed and interpreted lab results today as well as prior imaging.      1. Pre-authorization placed for RIGHT KNEE SYNVISC ONE injections.  2. RTC to see Henry Marlow PA-C for right knee Synvisc One injection.    All of the patient's questions were answered and the patient will contact us if they have any questions or concerns in the interim.

## 2022-10-06 ENCOUNTER — PATIENT MESSAGE (OUTPATIENT)
Dept: SPORTS MEDICINE | Facility: CLINIC | Age: 57
End: 2022-10-06
Payer: COMMERCIAL

## 2022-10-14 NOTE — TELEPHONE ENCOUNTER
This has been approved. See documentation note dated 1/22/2020 for details.   due to liver failure  s/p transfusion  no overt s/s of bleeding   monitor CBC

## 2022-10-19 ENCOUNTER — PATIENT OUTREACH (OUTPATIENT)
Dept: ADMINISTRATIVE | Facility: HOSPITAL | Age: 57
End: 2022-10-19
Payer: COMMERCIAL

## 2022-10-19 ENCOUNTER — OFFICE VISIT (OUTPATIENT)
Dept: PHYSICAL MEDICINE AND REHAB | Facility: CLINIC | Age: 57
End: 2022-10-19
Payer: COMMERCIAL

## 2022-10-19 ENCOUNTER — IMMUNIZATION (OUTPATIENT)
Dept: PHARMACY | Facility: CLINIC | Age: 57
End: 2022-10-19
Payer: COMMERCIAL

## 2022-10-19 VITALS — BODY MASS INDEX: 27.9 KG/M2 | WEIGHT: 206 LBS | HEIGHT: 72 IN

## 2022-10-19 DIAGNOSIS — M47.812 CERVICAL SPONDYLOSIS: Primary | ICD-10-CM

## 2022-10-19 DIAGNOSIS — M79.18 MYOFASCIAL PAIN ON RIGHT SIDE: ICD-10-CM

## 2022-10-19 PROCEDURE — 1160F RVW MEDS BY RX/DR IN RCRD: CPT | Mod: CPTII,S$GLB,, | Performed by: PHYSICAL MEDICINE & REHABILITATION

## 2022-10-19 PROCEDURE — 20553 TRIGGER POINT INJECTION: ICD-10-PCS | Mod: S$GLB,,, | Performed by: PHYSICAL MEDICINE & REHABILITATION

## 2022-10-19 PROCEDURE — 1159F PR MEDICATION LIST DOCUMENTED IN MEDICAL RECORD: ICD-10-PCS | Mod: CPTII,S$GLB,, | Performed by: PHYSICAL MEDICINE & REHABILITATION

## 2022-10-19 PROCEDURE — 99999 PR PBB SHADOW E&M-EST. PATIENT-LVL IV: ICD-10-PCS | Mod: PBBFAC,,, | Performed by: PHYSICAL MEDICINE & REHABILITATION

## 2022-10-19 PROCEDURE — 99999 PR PBB SHADOW E&M-EST. PATIENT-LVL IV: CPT | Mod: PBBFAC,,, | Performed by: PHYSICAL MEDICINE & REHABILITATION

## 2022-10-19 PROCEDURE — 20553 NJX 1/MLT TRIGGER POINTS 3/>: CPT | Mod: S$GLB,,, | Performed by: PHYSICAL MEDICINE & REHABILITATION

## 2022-10-19 PROCEDURE — 99214 OFFICE O/P EST MOD 30 MIN: CPT | Mod: 25,S$GLB,, | Performed by: PHYSICAL MEDICINE & REHABILITATION

## 2022-10-19 PROCEDURE — 99214 PR OFFICE/OUTPT VISIT, EST, LEVL IV, 30-39 MIN: ICD-10-PCS | Mod: 25,S$GLB,, | Performed by: PHYSICAL MEDICINE & REHABILITATION

## 2022-10-19 PROCEDURE — 1160F PR REVIEW ALL MEDS BY PRESCRIBER/CLIN PHARMACIST DOCUMENTED: ICD-10-PCS | Mod: CPTII,S$GLB,, | Performed by: PHYSICAL MEDICINE & REHABILITATION

## 2022-10-19 PROCEDURE — 1159F MED LIST DOCD IN RCRD: CPT | Mod: CPTII,S$GLB,, | Performed by: PHYSICAL MEDICINE & REHABILITATION

## 2022-10-19 NOTE — PROGRESS NOTES
[facility]    CHIEF COMPLAINT:   Chief Complaint   Patient presents with    Neck Pain     HISTORY OF PRESENT ILLNESS: Jaren PICHARDO Cousin is a 56 y.o. male who presents to me for evaluation and treatment of right-sided neck pain.  The symptoms have been present chronically for several months.  He denies any specific trauma or injury.  He has had prior workup in the form of an MRI and he has been followed by interventional pain.  He received a cervical epidural in 2019 with 50-60% relief.  He has been experiencing worsening symptoms over recent months without new trauma.  He locates the pain most prominently to the right aspect of the neck and superior shoulder.  There is some occasional radiation into the anterior neck/shoulder area as well.  He denies any radiation into the right upper extremity.  He denies numbness and tingling of the upper extremity.  He takes tramadol and Flexeril as needed with some temporary relief.  He has completed physical therapy in the past.  He frequently wakes up with sleep with these symptoms.    Review of Systems   Constitutional: Negative for fever.   HENT: Negative for drooling.    Eyes: Negative for discharge.   Respiratory: Negative for choking.    Cardiovascular: Negative for chest pain.   Genitourinary: Negative for flank pain.   Skin: Negative for wound.   Allergic/Immunologic: Negative for immunocompromised state.   Neurological: Negative for tremors and syncope.   Psychiatric/Behavioral: Negative for behavioral problems.     Past Medical History:   Past Medical History:   Diagnosis Date    ALLERGIC RHINITIS     Asthma     as child    Cervicalgia     DDD (degenerative disc disease), lumbar     Fatty liver     Hemorrhoid     Hepatomegaly     High triglycerides     HTN (hypertension)     Liver hemangioma 2014    PTSD (post-traumatic stress disorder)     not [facility]    Shingles     lower back    Urticaria        Past Surgical  History:   Past Surgical History:   Procedure Laterality Date    BACK SURGERY      ruptured disc L5-S1    COLONOSCOPY  ~2010  ([facility])    Hemorrhoids    COLONOSCOPY N/A 7/12/2022    Procedure: COLONOSCOPY;  Surgeon: Andrew Caba MD;  Location: [facility] ENDO;  Service: Endoscopy;  Laterality: N/A;    CORONARY ANGIOGRAPHY N/A 10/23/2019    Procedure: ANGIOGRAM, CORONARY ARTERY;  Surgeon: Can Masters MD;  Location: [facility] CATH;  Service: Cardiology;  Laterality: N/A;    ELBOW SURGERY      right    EPIDURAL STEROID INJECTION INTO CERVICAL SPINE N/A 10/15/2019    Procedure: Injection-steroid-epidural-cervical C7/T1;  Surgeon: Blaise Mao MD;  Location: [facility] OR;  Service: Pain Management;  Laterality: N/A;    ESOPHAGOGASTRODUODENOSCOPY N/A 1/29/2019    Procedure: EGD (ESOPHAGOGASTRODUODENOSCOPY);  Surgeon: Balwinder Dexter Jr., MD;  Location: [facility] ENDO;  Service: Endoscopy;  Laterality: N/A;    KNEE SURGERY      right    LEFT HEART CATHETERIZATION N/A 10/23/2019    Procedure: Left heart cath;  Surgeon: Can Masters MD;  Location: [facility] CATH;  Service: Cardiology;  Laterality: N/A;    MAGNETIC RESONANCE IMAGING N/A 6/26/2019    Procedure: MRI (Magnetic Resonance Imagine) needs anesthesia;  Surgeon: Maurice Fonseca MD;  Location: [facility] CATH;  Service: Anesthesiology;  Laterality: N/A;       Family History:   Family History   Problem Relation Age of Onset    Allergic rhinitis Son     Allergic rhinitis Daughter     Asthma Daughter     Allergic rhinitis Daughter     Diverticulitis Mother     Hypertension Father     COPD Father     Angioedema Neg Hx     Eczema Neg Hx     Immunodeficiency Neg Hx     Urticaria Neg Hx     Lupus Neg Hx     Psoriasis Neg Hx     Melanoma Neg Hx     Acne Neg Hx     Colon cancer Neg Hx     Colon polyps Neg Hx     Crohn's disease Neg Hx     Ulcerative colitis Neg Hx     Esophageal cancer Neg Hx     Stomach cancer Neg Hx        Medications:   Current Outpatient Medications  on File Prior to Visit   Medication Sig Dispense Refill    ALLERGY RELIEF, LORATADINE, 10 mg tablet TAKE ONE TABLET BY MOUTH ONCE DAILY 30 tablet 11    amLODIPine (NORVASC) 10 MG tablet Take 1 tablet (10 mg total) by mouth once daily. 30 tablet 5    busPIRone (BUSPAR) 5 MG Tab Take 1 tablet (5 mg total) by mouth 2 (two) times daily. 60 tablet 11    cetirizine (ZYRTEC) 10 MG tablet TAKE ONE TABLET BY MOUTH ONCE DAILY AS NEEDED FOR ALLERGIES      clonazePAM (KLONOPIN) 2 MG Tab Take by mouth daily as needed.      co-enzyme Q-10 30 mg capsule Take 100 mg by mouth once daily.      cyclobenzaprine (FLEXERIL) 10 MG tablet Take 10 mg by mouth 3 (three) times daily as needed. PRN      doxycycline (MONODOX) 50 MG Cap Take once daily with food 90 capsule 1    esomeprazole (NEXIUM) 40 MG capsule Take 1 capsule (40 mg total) by mouth daily as needed. 90 capsule 3    famotidine (PEPCID) 20 MG tablet Take 1 tablet (20 mg total) by mouth 2 (two) times daily. 180 tablet 3    fenofibrate (TRICOR) 145 MG tablet Take 1 tablet (145 mg total) by mouth once daily. 90 tablet 3    hydrOXYzine HCL (ATARAX) 25 MG tablet Take 1 tablet (25 mg total) by mouth every 6 (six) hours as needed for Itching. Every 6 hours  tablet 3    meclizine (ANTIVERT) 25 mg tablet Take 1 tablet (25 mg total) by mouth 3 (three) times daily as needed for Dizziness (may cause drowsiness). 60 tablet 2    mometasone (NASONEX) 50 mcg/actuation nasal spray Use 2 spray(s) in each nostril once daily 51 g 2    neomycin-polymyxin-hydrocortisone (CORTISPORIN) 3.5-10,000-1 mg/mL-unit/mL-% otic suspension Place 3 drops into the right ear 3 (three) times daily. 10 mL 1    niacin (SLO-NIACIN) 500 mg tablet TAKE 1 TABLET BY MOUTH ONCE DAILY AT NIGHT 90 tablet 3    propranoloL (INDERAL) 10 MG tablet Take 1 tablet (10 mg total) by mouth 3 (three) times daily. 270 tablet 3    sildenafiL (VIAGRA) 100 MG tablet TAKE ONE TABLET BY MOUTH EVERY WEEK AS NEEDED 30 TO 60 MINUTES BEFORE  INTERCOURSE FOR BEST RESULTS TAKE ON EMPTY STOMACH      valACYclovir (VALTREX) 500 MG tablet Take 2 tablets (1,000 mg total) by mouth 2 (two) times daily. 90 tablet 0    colchicine (COLCRYS) 0.6 mg tablet Take 1 tablet (0.6 mg total) by mouth 2 (two) times daily. (Patient not taking: Reported on 8/26/2022) 30 tablet 6     Current Facility-Administered Medications on File Prior to Visit   Medication Dose Route Frequency Provider Last Rate Last Admin    0.9%  NaCl infusion   Intravenous Continuous Jennifer Majano NP        lactated ringers infusion   Intravenous Continuous Luiz Aquino MD   New Bag at 07/12/22 0833    lidocaine (PF) 10 mg/ml (1%) injection 10 mg  1 mL Intradermal Once Luiz Aquino MD           Allergies:   Review of patient's allergies indicates:   Allergen Reactions    Ciprofloxacin Hives       Social History:   Social History     Socioeconomic History    Marital status:    Occupational History    Occupation: retired   Tobacco Use    Smoking status: Never    Smokeless tobacco: Never   Substance and Sexual Activity    Alcohol use: Yes     Alcohol/week: 21.0 standard drinks     Types: 21 Cans of beer per week     Comment: 3 beers daily    Drug use: No    Sexual activity: Yes     Social Determinants of Health     Financial Resource Strain: Low Risk     Difficulty of Paying Living Expenses: Not hard at all   Food Insecurity: No Food Insecurity    Worried About Running Out of Food in the Last Year: Never true    Ran Out of Food in the Last Year: Never true   Transportation Needs: No Transportation Needs    Lack of Transportation (Medical): No    Lack of Transportation (Non-Medical): No   Physical Activity: Insufficiently Active    Days of Exercise per Week: 2 days    Minutes of Exercise per Session: 10 min   Stress: Stress Concern Present    Feeling of Stress : Very much   Social Connections: Unknown    Frequency of Communication with Friends and Family: Three times a week     Frequency of Social Gatherings with Friends and Family: Once a week    Active Member of Clubs or Organizations: No    Attends Club or Organization Meetings: Never    Marital Status:    Housing Stability: Low Risk     Unable to Pay for Housing in the Last Year: No    Number of Places Lived in the Last Year: 1    Unstable Housing in the Last Year: No     Jaren is retired [employer].    PHYSICAL EXAMINATION:   General    Vitals:    10/19/22 0819   Weight: 93.4 kg (206 lb)   Height: 6' (1.829 m)     Constitutional: Oriented to person, place, and time. No apparent distress. Pleasant.  HENT:   Head: Normocephalic and atraumatic.   Eyes: Right eye exhibits no discharge. Left eye exhibits no discharge. No scleral icterus.   Pulmonary/Chest: Effort normal. No respiratory distress.   Abdominal: There is no guarding.   Neurological: Alert and oriented to person, place, and time.   Psychiatric: Behavior is normal.   Ortho Exam  INSPECTION: There is no swelling, ecchymoses, erythema or gross deformity of the cervical spine or right shoulder.  Palpation: There is some mild tenderness to palpation about the cervical spine in the midline. There is also tenderness to palpation about the right cervical paraspinals and trapezius musculature.  Range of motion: There is approximately 45° of cervical extension with some discomfort, there is 45° of cervical flexion. There is approximately 45° of lateral bending bilaterally. There is approximately 70° of lateral rotation bilaterally.  Neurologic: facet loading produces axial neck pain, but no radicular symptoms. Manual muscle testing reveals 5 out of 5 strength throughout bilateral upper extremities. Tone is normal about the bilateral upper extremities. Reflexes are 2+ throughout bilateral upper extremities. Castro's reflex is absent bilaterally.  Gait: Normal    Imaging  MRI cervical spine 6/26/19  FINDINGS:  CORD: Normal size and signal.  No syrinx.  Cervicomedullary  junction is normal.    ALIGNMENT: Slight retrolisthesis C2 on C3 lateral masses of C1 and C2 are congruent.    BONES: Vertebral body heights are maintained.  Multilevel type 2 endplate changes.  No aggressive bone marrow signal.    PARASPINAL AREA: Normal.    CERVICAL DISC LEVELS:    C2-C3: Mild disc osteophyte complex.  Preserved ventral and dorsal CSF.  No significant foraminal stenosis.  C3-C4: Mild disc osteophyte complex.  Mild-moderate left facet hypertrophy.  Slight ventral cord flattening within sliver preserved ventral and preserved dorsal CSF.  Mild left foraminal stenosis.  C4-C5: Mild disc osteophyte complex.  Slight ligamentum flavum thickening.  Slight ventral cord flattening within sliver preserved ventral and preserved dorsal CSF.  No significant foraminal stenosis.  C5-C6: Mild disc osteophyte complex with left greater than right uncovertebral spurring.  Slight ventral cord flattening with preserved ventral and dorsal CSF.  Moderate left and mild right foraminal stenosis.  C6-C7: Mild-moderate disc osteophyte complex.  Slight ventral cord flattening with preserved ventral and dorsal CSF.  Mild right and minimal left foraminal stenosis.  C7-T1: Mild disc osteophyte complex.  Mild right facet hypertrophy.  Mild right and minimal left foraminal stenosis.     Xray cervical spine 5/30/19   FINDINGS:  Redemonstrated is marked disc space narrowing at the C5-6 and C6-7 levels with moderate disc space narrowing at the C7-T1 level.  There is endplate sclerosis and osteophyte formation at these levels.  There is no fracture.  There is stable trace retrolisthesis of C2 on C3 which is unchanged on extension but reduces to neutral position on flexion.  Otherwise, alignment is normal.  The facet joints maintain normal articulation.  The prevertebral soft tissues are normal.  The airway is patent.  Foraminal stenosis is suboptimally evaluated due to degree of obliquity.    Data Reviewed: X-ray, MRI    Supportive  Actions: Independent visualization of images or test specimens    ASSESSMENT:   1. Cervical spondylosis    2. Myofascial pain on right side      PLAN:     1. Time was spent reviewing the above diagnosis in depth with Jaren today, including acute management and rehabilitation.     2. We discussed that his pain is likely a combination of cervical spondylosis and subsequent myofascial pain on the right side.  He has received cervical epidural, physical therapy, and pharmacologic management in the past.  He wishes to avoid surgery if possible.  He lacks any signs or symptoms suggestive of neurologic compromise at this time.  We discussed that could offer him trigger point injections of the right side.  Wished to proceed.  See separate procedure note for 3 trigger point injections to the right side.    3. [facility] will [facility]     4. RTC as needed if symptoms persist.  We discussed he may reach consult with Dr. Mao to discuss other interventional options for his cervical spine.    The above note was completed, in part, with the aid of Dragon dictation software/hardware. Translation errors may be present.

## 2022-10-19 NOTE — PROCEDURES
Trigger Point Injection  Performed by: Gregory Gonzalez MD  Authorized by: Gregory Gonzalez MD     Rhomboid:  Right  Trapezus:  Right    Consent Done?:  Yes (Verbal)    Pre-Procedure:   Indications:  Pain  Site marked: the procedure site was marked     Timeout: prior to procedure the correct patient, procedure, and site was verified    Prep: patient was prepped and draped in usual sterile fashion     Local anesthesia used?: Yes    Anesthesia:  Local infiltration  Local anesthetic:  Lidocaine 1% without epinephrine  Anesthetic total (ml):  3    Parascapular:  Right

## 2022-11-16 ENCOUNTER — OFFICE VISIT (OUTPATIENT)
Dept: FAMILY MEDICINE | Facility: CLINIC | Age: 57
End: 2022-11-16
Payer: COMMERCIAL

## 2022-11-16 VITALS
HEART RATE: 80 BPM | DIASTOLIC BLOOD PRESSURE: 70 MMHG | WEIGHT: 205.94 LBS | OXYGEN SATURATION: 98 % | SYSTOLIC BLOOD PRESSURE: 138 MMHG | BODY MASS INDEX: 27.89 KG/M2 | HEIGHT: 72 IN

## 2022-11-16 DIAGNOSIS — M19.90 OSTEOARTHRITIS, UNSPECIFIED OSTEOARTHRITIS TYPE, UNSPECIFIED SITE: Primary | ICD-10-CM

## 2022-11-16 DIAGNOSIS — R42 DIZZINESS: ICD-10-CM

## 2022-11-16 PROCEDURE — 1159F PR MEDICATION LIST DOCUMENTED IN MEDICAL RECORD: ICD-10-PCS | Mod: CPTII,S$GLB,, | Performed by: NURSE PRACTITIONER

## 2022-11-16 PROCEDURE — 3078F DIAST BP <80 MM HG: CPT | Mod: CPTII,S$GLB,, | Performed by: NURSE PRACTITIONER

## 2022-11-16 PROCEDURE — 3008F BODY MASS INDEX DOCD: CPT | Mod: CPTII,S$GLB,, | Performed by: NURSE PRACTITIONER

## 2022-11-16 PROCEDURE — 1159F MED LIST DOCD IN RCRD: CPT | Mod: CPTII,S$GLB,, | Performed by: NURSE PRACTITIONER

## 2022-11-16 PROCEDURE — 3008F PR BODY MASS INDEX (BMI) DOCUMENTED: ICD-10-PCS | Mod: CPTII,S$GLB,, | Performed by: NURSE PRACTITIONER

## 2022-11-16 PROCEDURE — 3075F PR MOST RECENT SYSTOLIC BLOOD PRESS GE 130-139MM HG: ICD-10-PCS | Mod: CPTII,S$GLB,, | Performed by: NURSE PRACTITIONER

## 2022-11-16 PROCEDURE — 3078F PR MOST RECENT DIASTOLIC BLOOD PRESSURE < 80 MM HG: ICD-10-PCS | Mod: CPTII,S$GLB,, | Performed by: NURSE PRACTITIONER

## 2022-11-16 PROCEDURE — 99214 PR OFFICE/OUTPT VISIT, EST, LEVL IV, 30-39 MIN: ICD-10-PCS | Mod: S$GLB,,, | Performed by: NURSE PRACTITIONER

## 2022-11-16 PROCEDURE — 99214 OFFICE O/P EST MOD 30 MIN: CPT | Mod: S$GLB,,, | Performed by: NURSE PRACTITIONER

## 2022-11-16 PROCEDURE — 99999 PR PBB SHADOW E&M-EST. PATIENT-LVL IV: ICD-10-PCS | Mod: PBBFAC,,, | Performed by: NURSE PRACTITIONER

## 2022-11-16 PROCEDURE — 99999 PR PBB SHADOW E&M-EST. PATIENT-LVL IV: CPT | Mod: PBBFAC,,, | Performed by: NURSE PRACTITIONER

## 2022-11-16 PROCEDURE — 3075F SYST BP GE 130 - 139MM HG: CPT | Mod: CPTII,S$GLB,, | Performed by: NURSE PRACTITIONER

## 2022-11-16 RX ORDER — NABUMETONE 500 MG/1
500 TABLET, FILM COATED ORAL 2 TIMES DAILY PRN
Qty: 60 TABLET | Refills: 1 | Status: SHIPPED | OUTPATIENT
Start: 2022-11-16 | End: 2023-08-28

## 2022-11-16 RX ORDER — MECLIZINE HYDROCHLORIDE 25 MG/1
25 TABLET ORAL 3 TIMES DAILY PRN
Qty: 60 TABLET | Refills: 2 | Status: SHIPPED | OUTPATIENT
Start: 2022-11-16

## 2022-11-16 NOTE — PROGRESS NOTES
Subjective:       Patient ID: Jaren PICHARDO Cousin is a 56 y.o. male.    Chief Complaint: Follow-up (Possible vertigo started 3 weeks ago)    HPI  Patient presents to discuss medications  History of vertigo for which he is prescribed meclizine prn  Had flare up a few weeks ago--associates with weather change  Intermittent sinus inflammation. Taking antihistamine daily, doing nasal rinses. Does not feel he has a sinus infection at this time    Arthritis, cervical spondylosis: pain controlled on nabumetone. Has tried other Rx NSAIDs in the past without relief. Takes PPI prn       Vitals:    11/16/22 1009   BP: 138/70   Pulse: 80     Review of Systems   Constitutional:  Negative for activity change and unexpected weight change.   HENT:  Positive for congestion. Negative for hearing loss, rhinorrhea and trouble swallowing.    Eyes:  Negative for discharge and visual disturbance.   Respiratory:  Negative for chest tightness and wheezing.    Cardiovascular:  Negative for chest pain and palpitations.   Gastrointestinal:  Negative for blood in stool, constipation, diarrhea and vomiting.   Endocrine: Negative for polydipsia and polyuria.   Genitourinary:  Negative for difficulty urinating, hematuria and urgency.   Musculoskeletal:  Negative for arthralgias, joint swelling and neck pain.   Neurological:  Positive for light-headedness and headaches. Negative for weakness.   Psychiatric/Behavioral:  Negative for confusion and dysphoric mood.      Past Medical History:   Diagnosis Date    ALLERGIC RHINITIS     Asthma     as child    Cervicalgia     DDD (degenerative disc disease), lumbar     Fatty liver     Hemorrhoid     Hepatomegaly     High triglycerides     HTN (hypertension)     Liver hemangioma 2014    PTSD (post-traumatic stress disorder)     not currently [facility]    Shingles     lower back    Urticaria      Objective:      Physical Exam  Vitals and nursing note reviewed.   Constitutional:       General:  He is not in acute distress.     Appearance: He is not diaphoretic.   HENT:      Head: Normocephalic.      Right Ear: Tympanic membrane, ear canal and external ear normal.      Left Ear: Tympanic membrane, ear canal and external ear normal.      Mouth/Throat:      Pharynx: No oropharyngeal exudate.   Eyes:      General: Lids are normal.         Right eye: No discharge.         Left eye: No discharge.   Neck:      Trachea: No tracheal deviation.   Cardiovascular:      Rate and Rhythm: Normal rate.      Heart sounds: Normal heart sounds.   Pulmonary:      Effort: Pulmonary effort is normal.      Breath sounds: Normal breath sounds.   Skin:     Coloration: Skin is not pale.   Neurological:      Mental Status: He is alert and oriented to person, place, and time.   Psychiatric:         Speech: Speech normal.         Behavior: Behavior normal.         Thought Content: Thought content normal.         Judgment: Judgment normal.       Assessment:       1. Osteoarthritis, unspecified osteoarthritis type, unspecified site    2. Dizziness        Plan:       Osteoarthritis, unspecified osteoarthritis type, unspecified site  -     nabumetone (RELAFEN) 500 MG tablet; Take 1 tablet (500 mg total) by mouth 2 (two) times daily as needed for Pain.  Dispense: 60 tablet; Refill: 1    Dizziness  -     meclizine (ANTIVERT) 25 mg tablet; Take 1 tablet (25 mg total) by mouth 3 (three) times daily as needed for Dizziness (may cause drowsiness).  Dispense: 60 tablet; Refill: 2        Start PPI daily  education provided on supportive care, symptom monitoring and return precautions         Medication List with Changes/Refills   New Medications    NABUMETONE (RELAFEN) 500 MG TABLET    Take 1 tablet (500 mg total) by mouth 2 (two) times daily as needed for Pain.   Current Medications    ALLERGY RELIEF, LORATADINE, 10 MG TABLET    TAKE ONE TABLET BY MOUTH ONCE DAILY    AMLODIPINE (NORVASC) 10 MG TABLET    Take 1 tablet (10 mg total) by mouth once  daily.    BUSPIRONE (BUSPAR) 5 MG TAB    Take 1 tablet (5 mg total) by mouth 2 (two) times daily.    CETIRIZINE (ZYRTEC) 10 MG TABLET    TAKE ONE TABLET BY MOUTH ONCE DAILY AS NEEDED FOR ALLERGIES    CLONAZEPAM (KLONOPIN) 2 MG TAB    Take by mouth daily as needed.    CO-ENZYME Q-10 30 MG CAPSULE    Take 100 mg by mouth once daily.    COLCHICINE (COLCRYS) 0.6 MG TABLET    Take 1 tablet (0.6 mg total) by mouth 2 (two) times daily.    CYCLOBENZAPRINE (FLEXERIL) 10 MG TABLET    Take 10 mg by mouth 3 (three) times daily as needed. PRN    DOXYCYCLINE (MONODOX) 50 MG CAP    Take once daily with food    ESOMEPRAZOLE (NEXIUM) 40 MG CAPSULE    Take 1 capsule (40 mg total) by mouth daily as needed.    FAMOTIDINE (PEPCID) 20 MG TABLET    Take 1 tablet (20 mg total) by mouth 2 (two) times daily.    FENOFIBRATE (TRICOR) 145 MG TABLET    Take 1 tablet (145 mg total) by mouth once daily.    HYDROXYZINE HCL (ATARAX) 25 MG TABLET    Take 1 tablet (25 mg total) by mouth every 6 (six) hours as needed for Itching. Every 6 hours PRN    MOMETASONE (NASONEX) 50 MCG/ACTUATION NASAL SPRAY    Use 2 spray(s) in each nostril once daily    NEOMYCIN-POLYMYXIN-HYDROCORTISONE (CORTISPORIN) 3.5-10,000-1 MG/ML-UNIT/ML-% OTIC SUSPENSION    Place 3 drops into the right ear 3 (three) times daily.    NIACIN (SLO-NIACIN) 500 MG TABLET    TAKE 1 TABLET BY MOUTH ONCE DAILY AT NIGHT    PROPRANOLOL (INDERAL) 10 MG TABLET    Take 1 tablet (10 mg total) by mouth 3 (three) times daily.    SILDENAFIL (VIAGRA) 100 MG TABLET    TAKE ONE TABLET BY MOUTH EVERY WEEK AS NEEDED 30 TO 60 MINUTES BEFORE INTERCOURSE FOR BEST RESULTS TAKE ON EMPTY STOMACH    VALACYCLOVIR (VALTREX) 500 MG TABLET    Take 2 tablets (1,000 mg total) by mouth 2 (two) times daily.   Changed and/or Refilled Medications    Modified Medication Previous Medication    MECLIZINE (ANTIVERT) 25 MG TABLET meclizine (ANTIVERT) 25 mg tablet       Take 1 tablet (25 mg total) by mouth 3 (three) times daily as  needed for Dizziness (may cause drowsiness).    Take 1 tablet (25 mg total) by mouth 3 (three) times daily as needed for Dizziness (may cause drowsiness).

## 2022-12-05 ENCOUNTER — IMMUNIZATION (OUTPATIENT)
Dept: PHARMACY | Facility: CLINIC | Age: 57
End: 2022-12-05
Payer: COMMERCIAL

## 2022-12-05 DIAGNOSIS — Z23 NEED FOR VACCINATION: Primary | ICD-10-CM

## 2023-01-13 ENCOUNTER — TELEPHONE (OUTPATIENT)
Dept: FAMILY MEDICINE | Facility: CLINIC | Age: 58
End: 2023-01-13
Payer: COMMERCIAL

## 2023-01-26 DIAGNOSIS — M54.12 RADICULOPATHY, CERVICAL: Primary | ICD-10-CM

## 2023-02-15 ENCOUNTER — OFFICE VISIT (OUTPATIENT)
Dept: FAMILY MEDICINE | Facility: CLINIC | Age: 58
End: 2023-02-15
Payer: COMMERCIAL

## 2023-02-15 VITALS
SYSTOLIC BLOOD PRESSURE: 126 MMHG | BODY MASS INDEX: 28.25 KG/M2 | HEART RATE: 74 BPM | RESPIRATION RATE: 18 BRPM | TEMPERATURE: 99 F | DIASTOLIC BLOOD PRESSURE: 70 MMHG | HEIGHT: 72 IN | WEIGHT: 208.56 LBS | OXYGEN SATURATION: 99 %

## 2023-02-15 DIAGNOSIS — Z00.00 ROUTINE MEDICAL EXAM: Primary | ICD-10-CM

## 2023-02-15 DIAGNOSIS — I47.10 PAROXYSMAL SVT (SUPRAVENTRICULAR TACHYCARDIA): ICD-10-CM

## 2023-02-15 DIAGNOSIS — I10 ESSENTIAL HYPERTENSION: ICD-10-CM

## 2023-02-15 DIAGNOSIS — E78.1 HIGH TRIGLYCERIDES: ICD-10-CM

## 2023-02-15 DIAGNOSIS — R12 HEARTBURN: ICD-10-CM

## 2023-02-15 DIAGNOSIS — Z12.5 PROSTATE CANCER SCREENING: ICD-10-CM

## 2023-02-15 PROCEDURE — 99396 PR PREVENTIVE VISIT,EST,40-64: ICD-10-PCS | Mod: S$GLB,,, | Performed by: FAMILY MEDICINE

## 2023-02-15 PROCEDURE — 1160F RVW MEDS BY RX/DR IN RCRD: CPT | Mod: CPTII,S$GLB,, | Performed by: FAMILY MEDICINE

## 2023-02-15 PROCEDURE — 1159F PR MEDICATION LIST DOCUMENTED IN MEDICAL RECORD: ICD-10-PCS | Mod: CPTII,S$GLB,, | Performed by: FAMILY MEDICINE

## 2023-02-15 PROCEDURE — 3008F BODY MASS INDEX DOCD: CPT | Mod: CPTII,S$GLB,, | Performed by: FAMILY MEDICINE

## 2023-02-15 PROCEDURE — 1159F MED LIST DOCD IN RCRD: CPT | Mod: CPTII,S$GLB,, | Performed by: FAMILY MEDICINE

## 2023-02-15 PROCEDURE — 3078F DIAST BP <80 MM HG: CPT | Mod: CPTII,S$GLB,, | Performed by: FAMILY MEDICINE

## 2023-02-15 PROCEDURE — 3074F PR MOST RECENT SYSTOLIC BLOOD PRESSURE < 130 MM HG: ICD-10-PCS | Mod: CPTII,S$GLB,, | Performed by: FAMILY MEDICINE

## 2023-02-15 PROCEDURE — 99396 PREV VISIT EST AGE 40-64: CPT | Mod: S$GLB,,, | Performed by: FAMILY MEDICINE

## 2023-02-15 PROCEDURE — 3008F PR BODY MASS INDEX (BMI) DOCUMENTED: ICD-10-PCS | Mod: CPTII,S$GLB,, | Performed by: FAMILY MEDICINE

## 2023-02-15 PROCEDURE — 3078F PR MOST RECENT DIASTOLIC BLOOD PRESSURE < 80 MM HG: ICD-10-PCS | Mod: CPTII,S$GLB,, | Performed by: FAMILY MEDICINE

## 2023-02-15 PROCEDURE — 1160F PR REVIEW ALL MEDS BY PRESCRIBER/CLIN PHARMACIST DOCUMENTED: ICD-10-PCS | Mod: CPTII,S$GLB,, | Performed by: FAMILY MEDICINE

## 2023-02-15 PROCEDURE — 99999 PR PBB SHADOW E&M-EST. PATIENT-LVL IV: CPT | Mod: PBBFAC,,, | Performed by: FAMILY MEDICINE

## 2023-02-15 PROCEDURE — 99999 PR PBB SHADOW E&M-EST. PATIENT-LVL IV: ICD-10-PCS | Mod: PBBFAC,,, | Performed by: FAMILY MEDICINE

## 2023-02-15 PROCEDURE — 3074F SYST BP LT 130 MM HG: CPT | Mod: CPTII,S$GLB,, | Performed by: FAMILY MEDICINE

## 2023-02-15 RX ORDER — FENOFIBRATE 145 MG/1
145 TABLET, FILM COATED ORAL DAILY
Qty: 90 TABLET | Refills: 3 | Status: SHIPPED | OUTPATIENT
Start: 2023-02-15

## 2023-02-15 RX ORDER — VALACYCLOVIR HYDROCHLORIDE 500 MG/1
500 TABLET, FILM COATED ORAL DAILY
Qty: 90 TABLET | Refills: 3 | Status: SHIPPED | OUTPATIENT
Start: 2023-02-15 | End: 2024-02-05 | Stop reason: SDUPTHER

## 2023-02-15 RX ORDER — ESOMEPRAZOLE MAGNESIUM 40 MG/1
40 CAPSULE, DELAYED RELEASE ORAL DAILY PRN
Qty: 90 CAPSULE | Refills: 3 | Status: SHIPPED | OUTPATIENT
Start: 2023-02-15 | End: 2023-05-26 | Stop reason: SDUPTHER

## 2023-02-15 NOTE — PROGRESS NOTES
Subjective:       Patient ID: Jaren PICHARDO Cousin is a 57 y.o. male.    Chief Complaint: Annual Exam    HPI    Here for a check up     [facility] and [facility].       htn stable.      Hx of svt s/p ablation. Stable. On propranolol.      Takes fenofibrate and niacin for hld management.      gerd stable while on nexium and pepcid.     Has chronic right cervical radiculopathy. Planning to see the a specialist at the [facility].      Takes klonopin prn for anxiety. [facility].  Has generalized anxiety.        Review of Systems      Review of Systems   Constitutional: Negative for fever and chills.   HENT: Negative for hearing loss and neck stiffness.    Eyes: Negative for redness and itching.   Respiratory: Negative for cough and choking.    Cardiovascular: Negative for chest pain and leg swelling.  Abdomen: Negative for abdominal pain and blood in stool.   Genitourinary: Negative for dysuria and flank pain.   Musculoskeletal: Negative for back pain and gait problem.   Neurological: Negative for light-headedness and headaches.   Hematological: Negative for adenopathy.   Psychiatric/Behavioral: Negative for behavioral problems.     Objective:      Physical Exam  Constitutional:       Appearance: He is well-developed.   HENT:      Head: Normocephalic and atraumatic.   Eyes:      Conjunctiva/sclera: Conjunctivae normal.      Pupils: Pupils are equal, round, and reactive to light.   Cardiovascular:      Rate and Rhythm: Normal rate and regular rhythm.      Heart sounds: Normal heart sounds. No murmur heard.    No friction rub.   Pulmonary:      Effort: Pulmonary effort is normal.      Breath sounds: Normal breath sounds.   Abdominal:      General: Bowel sounds are normal.      Palpations: Abdomen is soft.      Tenderness: There is no abdominal tenderness.   Musculoskeletal:         General: Normal range of motion.      Cervical back: Normal range of  motion and neck supple.   Lymphadenopathy:      Cervical: No cervical adenopathy.   Skin:     General: Skin is warm and dry.   Neurological:      Mental Status: He is alert and oriented to person, place, and time.      Cranial Nerves: No cranial nerve deficit.      Coordination: Coordination normal.      Deep Tendon Reflexes: Reflexes are normal and symmetric.   Psychiatric:         Behavior: Behavior normal.       Assessment:       1. Routine medical exam    2. Paroxysmal SVT (supraventricular tachycardia)    3. Heartburn    4. Essential hypertension    5. High triglycerides    6. Prostate cancer screening        Plan:       Routine medical exam    Paroxysmal SVT (supraventricular tachycardia)    Heartburn  -     esomeprazole (NEXIUM) 40 MG capsule; Take 1 capsule (40 mg total) by mouth daily as needed.  Dispense: 90 capsule; Refill: 3    Essential hypertension  -     Comprehensive Metabolic Panel; Future  -     Lipid Panel; Future    High triglycerides  -     fenofibrate (TRICOR) 145 MG tablet; Take 1 tablet (145 mg total) by mouth once daily.  Dispense: 90 tablet; Refill: 3    Prostate cancer screening  -     PSA, Screening; Future    Other orders  -     valACYclovir (VALTREX) 500 MG tablet; Take 1 tablet (500 mg total) by mouth once daily.  Dispense: 90 tablet; Refill: 3            Plan:  Cont current meds  Fasting labs in 6 months        Medication List with Changes/Refills   Current Medications    ALLERGY RELIEF, LORATADINE, 10 MG TABLET    TAKE ONE TABLET BY MOUTH ONCE DAILY    AMLODIPINE (NORVASC) 10 MG TABLET    Take 1 tablet (10 mg total) by mouth once daily.    BUSPIRONE (BUSPAR) 5 MG TAB    Take 1 tablet (5 mg total) by mouth 2 (two) times daily.    CETIRIZINE (ZYRTEC) 10 MG TABLET    TAKE ONE TABLET BY MOUTH ONCE DAILY AS NEEDED FOR ALLERGIES    CLONAZEPAM (KLONOPIN) 2 MG TAB    Take by mouth daily as needed.    CO-ENZYME Q-10 30 MG CAPSULE    Take 100 mg by mouth once daily.    COLCHICINE (COLCRYS) 0.6  MG TABLET    Take 1 tablet (0.6 mg total) by mouth 2 (two) times daily.    CYCLOBENZAPRINE (FLEXERIL) 10 MG TABLET    Take 10 mg by mouth 3 (three) times daily as needed. PRN    DOXYCYCLINE (MONODOX) 50 MG CAP    Take once daily with food    FAMOTIDINE (PEPCID) 20 MG TABLET    Take 1 tablet (20 mg total) by mouth 2 (two) times daily.    MECLIZINE (ANTIVERT) 25 MG TABLET    Take 1 tablet (25 mg total) by mouth 3 (three) times daily as needed for Dizziness (may cause drowsiness).    MOMETASONE (NASONEX) 50 MCG/ACTUATION NASAL SPRAY    Use 2 spray(s) in each nostril once daily    NABUMETONE (RELAFEN) 500 MG TABLET    Take 1 tablet (500 mg total) by mouth 2 (two) times daily as needed for Pain.    NEOMYCIN-POLYMYXIN-HYDROCORTISONE (CORTISPORIN) 3.5-10,000-1 MG/ML-UNIT/ML-% OTIC SUSPENSION    Place 3 drops into the right ear 3 (three) times daily.    NIACIN (SLO-NIACIN) 500 MG TABLET    TAKE 1 TABLET BY MOUTH EVERY NIGHT AT BEDTIME    PROPRANOLOL (INDERAL) 10 MG TABLET    Take 1 tablet (10 mg total) by mouth 3 (three) times daily.    SILDENAFIL (VIAGRA) 100 MG TABLET    TAKE ONE TABLET BY MOUTH EVERY WEEK AS NEEDED 30 TO 60 MINUTES BEFORE INTERCOURSE FOR BEST RESULTS TAKE ON EMPTY STOMACH   Changed and/or Refilled Medications    Modified Medication Previous Medication    ESOMEPRAZOLE (NEXIUM) 40 MG CAPSULE esomeprazole (NEXIUM) 40 MG capsule       Take 1 capsule (40 mg total) by mouth daily as needed.    Take 1 capsule (40 mg total) by mouth daily as needed.    FENOFIBRATE (TRICOR) 145 MG TABLET fenofibrate (TRICOR) 145 MG tablet       Take 1 tablet (145 mg total) by mouth once daily.    Take 1 tablet (145 mg total) by mouth once daily.    VALACYCLOVIR (VALTREX) 500 MG TABLET valACYclovir (VALTREX) 500 MG tablet       Take 1 tablet (500 mg total) by mouth once daily.    Take 2 tablets (1,000 mg total) by mouth 2 (two) times daily.   Discontinued Medications    HYDROXYZINE HCL (ATARAX) 25 MG TABLET    Take 1 tablet (25 mg  total) by mouth every 6 (six) hours as needed for Itching. Every 6 hours PRN

## 2023-04-07 NOTE — TELEPHONE ENCOUNTER
----- Message from Judy Stanley sent at 3/11/2020  2:02 PM CDT -----  Contact: Self  Pt has additional question regarding his procedure would like to discuss. Thanks     [phone]  
Spoke with patient. He was just checking in to see if his procedure would be affected on 3/19/2020 with the recent declaration of Coronavirus being a pandemic. Advised that at this time, we are encouraging all of our patients to follow the CDC's recommendations but currently scheduled procedures have not been affected. He was also advised that this is a fluid situation and we will call him if there are any changes to current recommendations. He verbalized understanding.       
no

## 2023-04-11 ENCOUNTER — OFFICE VISIT (OUTPATIENT)
Dept: FAMILY MEDICINE | Facility: CLINIC | Age: 58
End: 2023-04-11
Payer: COMMERCIAL

## 2023-04-11 VITALS
WEIGHT: 210.13 LBS | SYSTOLIC BLOOD PRESSURE: 130 MMHG | TEMPERATURE: 98 F | HEIGHT: 72 IN | HEART RATE: 74 BPM | DIASTOLIC BLOOD PRESSURE: 74 MMHG | OXYGEN SATURATION: 99 % | BODY MASS INDEX: 28.46 KG/M2

## 2023-04-11 DIAGNOSIS — M10.041 ACUTE IDIOPATHIC GOUT OF RIGHT HAND: ICD-10-CM

## 2023-04-11 DIAGNOSIS — L08.9 SKIN INFECTION: Primary | ICD-10-CM

## 2023-04-11 PROCEDURE — 99999 PR PBB SHADOW E&M-EST. PATIENT-LVL IV: CPT | Mod: PBBFAC,,, | Performed by: INTERNAL MEDICINE

## 2023-04-11 PROCEDURE — 3008F PR BODY MASS INDEX (BMI) DOCUMENTED: ICD-10-PCS | Mod: CPTII,S$GLB,, | Performed by: INTERNAL MEDICINE

## 2023-04-11 PROCEDURE — 99214 PR OFFICE/OUTPT VISIT, EST, LEVL IV, 30-39 MIN: ICD-10-PCS | Mod: S$GLB,,, | Performed by: INTERNAL MEDICINE

## 2023-04-11 PROCEDURE — 3075F PR MOST RECENT SYSTOLIC BLOOD PRESS GE 130-139MM HG: ICD-10-PCS | Mod: CPTII,S$GLB,, | Performed by: INTERNAL MEDICINE

## 2023-04-11 PROCEDURE — 3008F BODY MASS INDEX DOCD: CPT | Mod: CPTII,S$GLB,, | Performed by: INTERNAL MEDICINE

## 2023-04-11 PROCEDURE — 3075F SYST BP GE 130 - 139MM HG: CPT | Mod: CPTII,S$GLB,, | Performed by: INTERNAL MEDICINE

## 2023-04-11 PROCEDURE — 1160F PR REVIEW ALL MEDS BY PRESCRIBER/CLIN PHARMACIST DOCUMENTED: ICD-10-PCS | Mod: CPTII,S$GLB,, | Performed by: INTERNAL MEDICINE

## 2023-04-11 PROCEDURE — 1160F RVW MEDS BY RX/DR IN RCRD: CPT | Mod: CPTII,S$GLB,, | Performed by: INTERNAL MEDICINE

## 2023-04-11 PROCEDURE — 99999 PR PBB SHADOW E&M-EST. PATIENT-LVL IV: ICD-10-PCS | Mod: PBBFAC,,, | Performed by: INTERNAL MEDICINE

## 2023-04-11 PROCEDURE — 99214 OFFICE O/P EST MOD 30 MIN: CPT | Mod: S$GLB,,, | Performed by: INTERNAL MEDICINE

## 2023-04-11 PROCEDURE — 3078F DIAST BP <80 MM HG: CPT | Mod: CPTII,S$GLB,, | Performed by: INTERNAL MEDICINE

## 2023-04-11 PROCEDURE — 3078F PR MOST RECENT DIASTOLIC BLOOD PRESSURE < 80 MM HG: ICD-10-PCS | Mod: CPTII,S$GLB,, | Performed by: INTERNAL MEDICINE

## 2023-04-11 PROCEDURE — 1159F MED LIST DOCD IN RCRD: CPT | Mod: CPTII,S$GLB,, | Performed by: INTERNAL MEDICINE

## 2023-04-11 PROCEDURE — 1159F PR MEDICATION LIST DOCUMENTED IN MEDICAL RECORD: ICD-10-PCS | Mod: CPTII,S$GLB,, | Performed by: INTERNAL MEDICINE

## 2023-04-11 RX ORDER — DOXYCYCLINE HYCLATE 100 MG
100 TABLET ORAL 2 TIMES DAILY
Qty: 20 TABLET | Refills: 0 | Status: SHIPPED | OUTPATIENT
Start: 2023-04-11 | End: 2023-04-20

## 2023-04-11 NOTE — PROGRESS NOTES
Subjective:       Patient ID: Jaren PICHARDO Cousin is a 57 y.o. male.  Chief Complaint: Hand Pain     HPI    Complains of pricking his hand with a toni thorn 2 weeks ago.  + redness, swelling, and warmth to touch.  + pain to touch.  Improved in 1 week, but then got worse again.  Started on gout treatment - prednisone and colchicine yesterday.  Feels it has improved today.  No f/c    Gout - previously only occurred in feet.    Assessment:       1. Skin infection    2. Acute idiopathic gout of right hand        Plan:       Skin infection  -     doxycycline (VIBRA-TABS) 100 MG tablet; Take 1 tablet (100 mg total) by mouth 2 (two) times daily. for 10 days  Dispense: 20 tablet; Refill: 0    Acute idiopathic gout of right hand            If worsens, rtc.    Continue current management and monitor.  Other diagnoses were reviewed and found stable and will continue to monitor.  Counseled on regular exercise, maintenance of a healthy weight, balanced diet rich in fruits/vegetables and lean protein, and avoidance of unhealthy habits like smoking and excessive alcohol intake.   Also, counseled on importance of being compliant with medication, health appointments, diet and exercise.     Follow up if symptoms worsen or fail to improve.      Medication List with Changes/Refills   New Medications    DOXYCYCLINE (VIBRA-TABS) 100 MG TABLET    Take 1 tablet (100 mg total) by mouth 2 (two) times daily. for 10 days   Current Medications    ALLERGY RELIEF, LORATADINE, 10 MG TABLET    TAKE ONE TABLET BY MOUTH ONCE DAILY    AMLODIPINE (NORVASC) 10 MG TABLET    Take 1 tablet (10 mg total) by mouth once daily.    BUSPIRONE (BUSPAR) 5 MG TAB    Take 1 tablet (5 mg total) by mouth 2 (two) times daily.    CETIRIZINE (ZYRTEC) 10 MG TABLET    TAKE ONE TABLET BY MOUTH ONCE DAILY AS NEEDED FOR ALLERGIES    CO-ENZYME Q-10 30 MG CAPSULE    Take 100 mg by mouth once daily.    COLCHICINE (COLCRYS) 0.6 MG TABLET    TAKE 1 TABLET BY MOUTH 2 TIMES DAILY.     CYCLOBENZAPRINE (FLEXERIL) 10 MG TABLET    Take 10 mg by mouth 3 (three) times daily as needed. PRN    DOXYCYCLINE (MONODOX) 50 MG CAP    Take once daily with food    ESOMEPRAZOLE (NEXIUM) 40 MG CAPSULE    Take 1 capsule (40 mg total) by mouth daily as needed.    FAMOTIDINE (PEPCID) 20 MG TABLET    Take 1 tablet (20 mg total) by mouth 2 (two) times daily.    FENOFIBRATE (TRICOR) 145 MG TABLET    Take 1 tablet (145 mg total) by mouth once daily.    MECLIZINE (ANTIVERT) 25 MG TABLET    Take 1 tablet (25 mg total) by mouth 3 (three) times daily as needed for Dizziness (may cause drowsiness).    MOMETASONE (NASONEX) 50 MCG/ACTUATION NASAL SPRAY    Use 2 spray(s) in each nostril once daily    NABUMETONE (RELAFEN) 500 MG TABLET    Take 1 tablet (500 mg total) by mouth 2 (two) times daily as needed for Pain.    NEOMYCIN-POLYMYXIN-HYDROCORTISONE (CORTISPORIN) 3.5-10,000-1 MG/ML-UNIT/ML-% OTIC SUSPENSION    Place 3 drops into the right ear 3 (three) times daily.    NIACIN (SLO-NIACIN) 500 MG TABLET    TAKE 1 TABLET BY MOUTH EVERY NIGHT AT BEDTIME    PROPRANOLOL (INDERAL) 10 MG TABLET    Take 1 tablet (10 mg total) by mouth 3 (three) times daily.    SILDENAFIL (VIAGRA) 100 MG TABLET    TAKE ONE TABLET BY MOUTH EVERY WEEK AS NEEDED 30 TO 60 MINUTES BEFORE INTERCOURSE FOR BEST RESULTS TAKE ON EMPTY STOMACH    VALACYCLOVIR (VALTREX) 500 MG TABLET    Take 1 tablet (500 mg total) by mouth once daily.       BP Readings from Last 3 Encounters:   04/11/23 130/74   02/15/23 126/70   11/16/22 138/70     Hemoglobin A1C   Date Value Ref Range Status   09/12/2014 5.4 4.5 - 6.2 % Final     Lab Results   Component Value Date    TSH 2.140 01/25/2020     Lab Results   Component Value Date    LDLCALC 131.4 07/20/2022    LDLCALC 139.8 07/28/2021    LDLCALC 135.2 08/26/2020     Lab Results   Component Value Date    TRIG 93 07/20/2022    TRIG 136 07/28/2021    TRIG 154 (H) 08/26/2020     Wt Readings from Last 3 Encounters:   04/11/23 95.3 kg (210  lb 1.6 oz)   02/15/23 94.6 kg (208 lb 8.9 oz)   11/16/22 93.4 kg (205 lb 14.6 oz)     Lab Results   Component Value Date    HGB 14.0 07/28/2021    HCT 43.7 07/28/2021    WBC 6.80 07/28/2021    ALT 17 07/20/2022    AST 17 07/20/2022     07/20/2022    K 5.1 07/20/2022    CREATININE 1.3 07/20/2022    PSA 1.4 07/20/2022           Review of Systems        Objective:      Vitals:    04/11/23 0749   BP: 130/74   Pulse: 74   Temp: 97.8 °F (36.6 °C)     Physical Exam  Skin:     Comments: Right hand swelling between 2nd and 3rd MCP and extending back with mild TTP in this area.  Mild swelling on dorsum of hand.    Mild erythema.  No warmth

## 2023-04-20 ENCOUNTER — OFFICE VISIT (OUTPATIENT)
Dept: OPTOMETRY | Facility: CLINIC | Age: 58
End: 2023-04-20
Payer: COMMERCIAL

## 2023-04-20 DIAGNOSIS — G51.4 EYELID MYOKYMIA: ICD-10-CM

## 2023-04-20 DIAGNOSIS — H52.03 HYPEROPIA WITH ASTIGMATISM AND PRESBYOPIA, BILATERAL: ICD-10-CM

## 2023-04-20 DIAGNOSIS — H25.13 AGE-RELATED NUCLEAR CATARACT, BILATERAL: Primary | ICD-10-CM

## 2023-04-20 DIAGNOSIS — H52.203 HYPEROPIA WITH ASTIGMATISM AND PRESBYOPIA, BILATERAL: ICD-10-CM

## 2023-04-20 DIAGNOSIS — H10.13 ALLERGIC CONJUNCTIVITIS OF BOTH EYES: ICD-10-CM

## 2023-04-20 DIAGNOSIS — H52.4 HYPEROPIA WITH ASTIGMATISM AND PRESBYOPIA, BILATERAL: ICD-10-CM

## 2023-04-20 PROCEDURE — 1159F PR MEDICATION LIST DOCUMENTED IN MEDICAL RECORD: ICD-10-PCS | Mod: CPTII,S$GLB,,

## 2023-04-20 PROCEDURE — 92004 COMPRE OPH EXAM NEW PT 1/>: CPT | Mod: S$GLB,,,

## 2023-04-20 PROCEDURE — 92004 PR EYE EXAM, NEW PATIENT,COMPREHESV: ICD-10-PCS | Mod: S$GLB,,,

## 2023-04-20 PROCEDURE — 99999 PR PBB SHADOW E&M-EST. PATIENT-LVL III: CPT | Mod: PBBFAC,,,

## 2023-04-20 PROCEDURE — 1159F MED LIST DOCD IN RCRD: CPT | Mod: CPTII,S$GLB,,

## 2023-04-20 PROCEDURE — 99999 PR PBB SHADOW E&M-EST. PATIENT-LVL III: ICD-10-PCS | Mod: PBBFAC,,,

## 2023-04-20 PROCEDURE — 92015 DETERMINE REFRACTIVE STATE: CPT | Mod: S$GLB,,,

## 2023-04-20 PROCEDURE — 92015 PR REFRACTION: ICD-10-PCS | Mod: S$GLB,,,

## 2023-04-20 NOTE — PROGRESS NOTES
"HPI    New pt here for eye exam. [facility]    To using reading glasses PRN. Pt sts DVA is stable. Pt having muscle spasm   OS recently.  Pt having irritation OD inner corner, pt using Refresh and   Systane PRN. Pt denies flashes/floaters/pain.     Last edited by All Lebron, OD on 4/20/2023  3:56 PM.            Assessment /Plan     For exam results, see Encounter Report.    Age-related nuclear cataract, bilateral    Eyelid myokymia    Allergic conjunctivitis of both eyes    Hyperopia with astigmatism and presbyopia, bilateral      Mild, not yet visually significant. Surgery not recommended at this time. Ed pt on symptoms of worsening cataracts including reduced BCVA and glare. Monitor yearly for changes.  Pt states OS often has "spasms." Ed pt that spasms likely secondary to stress, caffeine intake, strain, or lack of sleep. Ed pt that there's no treatment required but to monitor potential triggers and call or message if symptoms increase in frequency.  Mild allergic conjunctivitis signs and symptoms. Recommended OTC Pataday to use 1x/day as needed, especially during allergy season.   Discussed spectacle options with pt and released final spec rx. Pt interested in PAL - ed thoroughly on adaptation process and to allow a full 2 weeks to adjust. Wrote spec rx for both PAL and SVN.     RTC: 1 year for comprehensive exam or sooner prn                 "

## 2023-05-03 ENCOUNTER — OFFICE VISIT (OUTPATIENT)
Dept: DERMATOLOGY | Facility: CLINIC | Age: 58
End: 2023-05-03
Payer: COMMERCIAL

## 2023-05-03 DIAGNOSIS — L71.9 ACNE ROSACEA: ICD-10-CM

## 2023-05-03 DIAGNOSIS — L71.9 ROSACEA: Primary | ICD-10-CM

## 2023-05-03 PROCEDURE — 99214 PR OFFICE/OUTPT VISIT, EST, LEVL IV, 30-39 MIN: ICD-10-PCS | Mod: 95,,, | Performed by: DERMATOLOGY

## 2023-05-03 PROCEDURE — 99214 OFFICE O/P EST MOD 30 MIN: CPT | Mod: 95,,, | Performed by: DERMATOLOGY

## 2023-05-03 RX ORDER — DOXYCYCLINE 40 MG/1
CAPSULE ORAL
Qty: 30 CAPSULE | Refills: 5 | Status: SHIPPED | OUTPATIENT
Start: 2023-05-03 | End: 2023-09-21

## 2023-05-03 RX ORDER — DOXYCYCLINE 50 MG/1
CAPSULE ORAL
Qty: 90 CAPSULE | Refills: 1 | Status: SHIPPED | OUTPATIENT
Start: 2023-05-03 | End: 2023-08-28

## 2023-05-03 RX ORDER — METRONIDAZOLE 7.5 MG/G
GEL TOPICAL 2 TIMES DAILY
Qty: 45 G | Refills: 5 | Status: SHIPPED | OUTPATIENT
Start: 2023-05-03 | End: 2023-08-28

## 2023-05-03 NOTE — PROGRESS NOTES
Subjective:      Patient ID:  Jaren PICHARDO Cousin is a 57 y.o. male who presents for No chief complaint on file.    Hx of acne rosacea and SK, last seen on 7/6/22.  He states he dc'd niacin and found that flushing and rosacea intensity has improved. + continued flares of the cheeks.  Currently on doxycycline 50 mg qD (less upset stomach) and prescribed soolantra (not covered).    Prior treatments: clindamycin soln, minocycline x 5-10 days, ketoconazole shampoo, clindamycin foam, differin gel, doxycycline (upset stomach)      Review of Systems   Constitutional:  Negative for fever and chills.   Gastrointestinal:  Negative for nausea and vomiting.   Skin:  Positive for activity-related sunscreen use. Negative for daily sunscreen use and recent sunburn.   Hematologic/Lymphatic: Does not bruise/bleed easily.     Objective:   Physical Exam   Constitutional: He appears well-developed and well-nourished. No distress.   Neurological: He is alert and oriented to person, place, and time. He is not disoriented.   Psychiatric: He has a normal mood and affect.   Skin:   Areas Examined (abnormalities noted in diagram):   Head / Face Inspection Performed  Neck Inspection Performed  Chest / Axilla Inspection Performed  RUE Inspected  LUE Inspection Performed  Nails and Digits Inspection Performed          Diagram Legend     Open and closed comedones      Inflammatory papules and pustules     Assessment / Plan:        Rosacea  Acne rosacea  -     metroNIDAZOLE (METROGEL) 0.75 % gel; Apply topically 2 (two) times daily. For rosacea/redness  Dispense: 45 g; Refill: 5  -     doxycycline (MONODOX) 50 MG Cap; Take once daily with food  Dispense: 90 capsule; Refill: 1  -     doxycycline (ORACEA) 40 mg capsule; Take 1 po qday  Dispense: 30 capsule; Refill: 5  -     few areas of flares of the chin and neck area.  Overall improvement.  Will continue doxy vs. Switch to oracea (if covered by insurance).  Will add metrogel (soolantra not  covered). Discussed importance of daily sunscreen.          Follow up in about 1 year (around 5/3/2024).

## 2023-05-05 ENCOUNTER — HOSPITAL ENCOUNTER (OUTPATIENT)
Dept: RADIOLOGY | Facility: HOSPITAL | Age: 58
Discharge: HOME | End: 2023-05-05
Attending: INTERNAL MEDICINE
Payer: COMMERCIAL

## 2023-05-05 ENCOUNTER — OFFICE VISIT (OUTPATIENT)
Dept: RHEUMATOLOGY | Facility: CLINIC | Age: 58
End: 2023-05-05
Payer: COMMERCIAL

## 2023-05-05 VITALS
SYSTOLIC BLOOD PRESSURE: 124 MMHG | HEIGHT: 71 IN | BODY MASS INDEX: 29.29 KG/M2 | DIASTOLIC BLOOD PRESSURE: 75 MMHG | WEIGHT: 209.19 LBS | HEART RATE: 66 BPM

## 2023-05-05 DIAGNOSIS — M25.50 POLYARTHRALGIA: ICD-10-CM

## 2023-05-05 DIAGNOSIS — M25.50 POLYARTHRALGIA: Primary | ICD-10-CM

## 2023-05-05 PROCEDURE — 3008F BODY MASS INDEX DOCD: CPT | Mod: CPTII,S$GLB,, | Performed by: INTERNAL MEDICINE

## 2023-05-05 PROCEDURE — 77077 XR ARTHRITIS SURVEY: ICD-10-PCS | Mod: 26,,, | Performed by: RADIOLOGY

## 2023-05-05 PROCEDURE — 1159F MED LIST DOCD IN RCRD: CPT | Mod: CPTII,S$GLB,, | Performed by: INTERNAL MEDICINE

## 2023-05-05 PROCEDURE — 1159F PR MEDICATION LIST DOCUMENTED IN MEDICAL RECORD: ICD-10-PCS | Mod: CPTII,S$GLB,, | Performed by: INTERNAL MEDICINE

## 2023-05-05 PROCEDURE — 3008F PR BODY MASS INDEX (BMI) DOCUMENTED: ICD-10-PCS | Mod: CPTII,S$GLB,, | Performed by: INTERNAL MEDICINE

## 2023-05-05 PROCEDURE — 77077 JOINT SURVEY SINGLE VIEW: CPT | Mod: TC

## 2023-05-05 PROCEDURE — 99999 PR PBB SHADOW E&M-EST. PATIENT-LVL IV: CPT | Mod: PBBFAC,,, | Performed by: INTERNAL MEDICINE

## 2023-05-05 PROCEDURE — 3074F PR MOST RECENT SYSTOLIC BLOOD PRESSURE < 130 MM HG: ICD-10-PCS | Mod: CPTII,S$GLB,, | Performed by: INTERNAL MEDICINE

## 2023-05-05 PROCEDURE — 3078F DIAST BP <80 MM HG: CPT | Mod: CPTII,S$GLB,, | Performed by: INTERNAL MEDICINE

## 2023-05-05 PROCEDURE — 3078F PR MOST RECENT DIASTOLIC BLOOD PRESSURE < 80 MM HG: ICD-10-PCS | Mod: CPTII,S$GLB,, | Performed by: INTERNAL MEDICINE

## 2023-05-05 PROCEDURE — 77077 JOINT SURVEY SINGLE VIEW: CPT | Mod: 26,,, | Performed by: RADIOLOGY

## 2023-05-05 PROCEDURE — 99205 PR OFFICE/OUTPT VISIT, NEW, LEVL V, 60-74 MIN: ICD-10-PCS | Mod: S$GLB,,, | Performed by: INTERNAL MEDICINE

## 2023-05-05 PROCEDURE — 99205 OFFICE O/P NEW HI 60 MIN: CPT | Mod: S$GLB,,, | Performed by: INTERNAL MEDICINE

## 2023-05-05 PROCEDURE — 99999 PR PBB SHADOW E&M-EST. PATIENT-LVL IV: ICD-10-PCS | Mod: PBBFAC,,, | Performed by: INTERNAL MEDICINE

## 2023-05-05 PROCEDURE — 3074F SYST BP LT 130 MM HG: CPT | Mod: CPTII,S$GLB,, | Performed by: INTERNAL MEDICINE

## 2023-05-05 RX ORDER — METHYLPREDNISOLONE 16 MG/1
48 TABLET ORAL DAILY
Qty: 30 TABLET | Refills: 0 | Status: SHIPPED | OUTPATIENT
Start: 2023-05-05 | End: 2023-05-15

## 2023-05-05 NOTE — PROGRESS NOTES
"Subjective:      Patient ID: Jaren PICHARDO Cousin is a 57 y.o. male.    Chief Complaint: Disease Management    HPI 57 year old M with HL, HTN, KITCHEN, PTSD, Lumbar surgery in 2007, right knee meniscal tear s/p arthroscopic surgery, shingles, urticaria, rosacea,SVT s/p ablation here for evaluation.  He reports he started to have right ankle posterior swelling, pain and erythema about a year and half ago.  He was put on colchicine as needed. He has had 5 episodes in last year.  He drinks 6 pack  a day 6 days since he was 17.He eats redmeat and crawfish.  Colchicine resolve his symptoms. A month ago, he was poked by a toni thorn. It improved with doxycycline but then it came back in 2 days.  He was eventually put on steroids He also took doxycycline without improvement. He continues to have  pain in right second mcp and swelling.  Pain level is 2/10.  He has pain in right knee and neck. He has had rosacea for 10 years.  Denies oral ulcers, photosensitivity,hair loss, or fevers.  He was exposed to smoking a kid.      Family hx: negative for psoriasis, RA  Dad and brother: gout      Past Medical History:   Diagnosis Date    ALLERGIC RHINITIS     Asthma     as child    Cervicalgia     DDD (degenerative disc disease), lumbar     Fatty liver     Hemorrhoid     Hepatomegaly     High triglycerides     HTN (hypertension)     Liver hemangioma 2014    PTSD (post-traumatic stress disorder)     not [facility]    Shingles     lower back    Urticaria        Review of Systems see HPI      Objective:   /75   Pulse 66   Ht 5' 11" (1.803 m)   Wt 94.9 kg (209 lb 3.5 oz)   BMI 29.18 kg/m²   Physical Exam   Constitutional: normal appearance.  Non-toxic appearance. No distress. He does not appear ill.   HENT:   Head: Normocephalic and atraumatic.   Right Ear: Tympanic membrane normal.   Left Ear: Tympanic membrane normal.   Nose: Nose normal. No rhinorrhea or nasal congestion.   Mouth/Throat: Mucous " membranes are dry. No oropharyngeal exudate or posterior oropharyngeal erythema. Oropharynx is clear.   Eyes: Pupils are equal, round, and reactive to light. Conjunctivae are normal. Right eye exhibits no discharge. Left eye exhibits no discharge. No scleral icterus.   Cardiovascular: Normal rate and regular rhythm.   Pulmonary/Chest: Effort normal and breath sounds normal. No stridor. No respiratory distress.   Abdominal: Soft. Bowel sounds are normal. He exhibits no distension and no mass. There is no abdominal tenderness.   Neurological: He is alert.         No data to display     Assessment:   57 year old M with HL, HTN, KITCHEN, PTSD, Lumbar surgery in 2007, right knee meniscal tear s/p arthroscopic surgery, shingles, urticaria, rosacea,SVT s/p ablation here for evaluation.   No diagnosis found.      Plan:     Problem List Items Addressed This Visit    None    ***

## 2023-05-08 NOTE — PROGRESS NOTES
"     Patient ID: Jaren PICHARDO Cousin is a 57 y.o. male.     Chief Complaint: Disease Management     HPI 57 year old M with HL, HTN, KITCHEN, PTSD, Lumbar surgery in 2007, right knee meniscal tear s/p arthroscopic surgery, shingles, urticaria, rosacea,SVT s/p ablation here for evaluation.  He reports he started to have right ankle posterior swelling, pain and erythema about a year and half ago.  He was put on colchicine as needed. He has had 5 episodes in last year.  He drinks 6 pack  a day 6 days since he was 17.He eats redmeat and crawfish.  Colchicine resolve his symptoms. A month ago, he was poked by a toni thorn. It improved with doxycycline but then it came back in 2 days.  He was eventually put on steroids He also took doxycycline without improvement. He continues to have  pain in right second mcp and swelling.  Pain level is 2/10.  He has pain in right knee and neck. He has had rosacea for 10 years.  Denies oral ulcers, photosensitivity,hair loss, or fevers.  He was exposed to smoking a kid.       Family hx: negative for psoriasis, RA  Dad and brother: gout             Past Medical History:   Diagnosis Date    ALLERGIC RHINITIS      Asthma       as child    Cervicalgia      DDD (degenerative disc disease), lumbar      Fatty liver      Hemorrhoid      Hepatomegaly      High triglycerides      HTN (hypertension)      Liver hemangioma 2014    PTSD (post-traumatic stress disorder)       not [facility]    Shingles       lower back    Urticaria           Review of Systems see HPI        Objective:   /75   Pulse 66   Ht 5' 11" (1.803 m)   Wt 94.9 kg (209 lb 3.5 oz)   BMI 29.18 kg/m²   Physical Exam   Constitutional: normal appearance.  Non-toxic appearance. No distress. He does not appear ill.   HENT:   Head: Normocephalic and atraumatic.   Right Ear: Tympanic membrane normal.   Left Ear: Tympanic membrane normal.   Nose: Nose normal. No rhinorrhea or nasal congestion. "   Mouth/Throat: Mucous membranes are dry. No oropharyngeal exudate or posterior oropharyngeal erythema. Oropharynx is clear.   Eyes: Pupils are equal, round, and reactive to light. Conjunctivae are normal. Right eye exhibits no discharge. Left eye exhibits no discharge. No scleral icterus.   Cardiovascular: Normal rate and regular rhythm.   Pulmonary/Chest: Effort normal and breath sounds normal. No stridor. No respiratory distress.   Abdominal: Soft. Bowel sounds are normal. He exhibits no distension and no mass. There is no abdominal tenderness.   Neurological: He is alert.            No data to display     Assessment:   57 year old M with HL, HTN, KITCHEN, PTSD, Lumbar surgery in 2007, right knee meniscal tear s/p arthroscopic surgery, shingles, urticaria, rosacea,SVT s/p ablation here for evaluation of right hand pain.  He was poked by toni banerjee prior to event. He reports taking course of steroids with improvement but then pain came back.  I told him I will treat him as inflammatory arthritis given his history of gout but if no improvement, will need to get MRI of hand to rule out infection.  He denies any fevers. I told him if pain and or swelling get worse, he needs to go to ED.    No diagnosis found.        Plan:      Problem List Items Addressed This Visit    None     Labs  Xrays  Consider MRI of right hand  Start medrol 48mg  a day for 10 days (side effects of high dose steroids discussed including GI bleed,mood disturbance, weight gain).    60 * minutes of total time spent on the encounter, which includes face to face time and non-face to face time preparing to see the patient (eg, review of tests), Obtaining and/or reviewing separately obtained history, Documenting clinical information in the electronic or other health record, Independently interpreting results (not separately reported) and communicating results to the patient/family/caregiver, or Care coordination (not separately reported).

## 2023-05-11 ENCOUNTER — TELEPHONE (OUTPATIENT)
Dept: RHEUMATOLOGY | Facility: CLINIC | Age: 58
End: 2023-05-11
Payer: COMMERCIAL
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
## 2023-05-11 DIAGNOSIS — M65.88: ICD-10-CM

## 2023-05-11 DIAGNOSIS — M79.641 PAIN OF RIGHT HAND: Primary | ICD-10-CM

## 2023-05-11 DIAGNOSIS — M10.9 GOUTY ARTHRITIS: ICD-10-CM

## 2023-05-11 DIAGNOSIS — M79.89 SWELLING OF RIGHT HAND: ICD-10-CM

## 2023-05-11 NOTE — TELEPHONE ENCOUNTER
Called patient.  He is doing much better with steroids.  If pain comes back, I will pursue MRI of right hand.

## 2023-05-24 ENCOUNTER — OFFICE VISIT (OUTPATIENT)
Dept: FAMILY MEDICINE | Facility: CLINIC | Age: 58
End: 2023-05-24
Payer: COMMERCIAL

## 2023-05-24 VITALS
TEMPERATURE: 99 F | BODY MASS INDEX: 28.89 KG/M2 | SYSTOLIC BLOOD PRESSURE: 126 MMHG | HEIGHT: 71 IN | WEIGHT: 206.38 LBS | OXYGEN SATURATION: 98 % | RESPIRATION RATE: 18 BRPM | DIASTOLIC BLOOD PRESSURE: 70 MMHG | HEART RATE: 75 BPM

## 2023-05-24 DIAGNOSIS — Z78.9 ALCOHOL USE: ICD-10-CM

## 2023-05-24 DIAGNOSIS — E78.5 HYPERLIPIDEMIA, UNSPECIFIED HYPERLIPIDEMIA TYPE: ICD-10-CM

## 2023-05-24 DIAGNOSIS — I10 ESSENTIAL HYPERTENSION: Primary | ICD-10-CM

## 2023-05-24 DIAGNOSIS — K29.70 GASTRITIS, PRESENCE OF BLEEDING UNSPECIFIED, UNSPECIFIED CHRONICITY, UNSPECIFIED GASTRITIS TYPE: ICD-10-CM

## 2023-05-24 DIAGNOSIS — F41.9 ANXIETY: ICD-10-CM

## 2023-05-24 PROCEDURE — 3008F BODY MASS INDEX DOCD: CPT | Mod: CPTII,S$GLB,, | Performed by: FAMILY MEDICINE

## 2023-05-24 PROCEDURE — 99214 PR OFFICE/OUTPT VISIT, EST, LEVL IV, 30-39 MIN: ICD-10-PCS | Mod: S$GLB,,, | Performed by: FAMILY MEDICINE

## 2023-05-24 PROCEDURE — 3074F PR MOST RECENT SYSTOLIC BLOOD PRESSURE < 130 MM HG: ICD-10-PCS | Mod: CPTII,S$GLB,, | Performed by: FAMILY MEDICINE

## 2023-05-24 PROCEDURE — 3078F PR MOST RECENT DIASTOLIC BLOOD PRESSURE < 80 MM HG: ICD-10-PCS | Mod: CPTII,S$GLB,, | Performed by: FAMILY MEDICINE

## 2023-05-24 PROCEDURE — 99999 PR PBB SHADOW E&M-EST. PATIENT-LVL V: ICD-10-PCS | Mod: PBBFAC,,, | Performed by: FAMILY MEDICINE

## 2023-05-24 PROCEDURE — 3074F SYST BP LT 130 MM HG: CPT | Mod: CPTII,S$GLB,, | Performed by: FAMILY MEDICINE

## 2023-05-24 PROCEDURE — 3008F PR BODY MASS INDEX (BMI) DOCUMENTED: ICD-10-PCS | Mod: CPTII,S$GLB,, | Performed by: FAMILY MEDICINE

## 2023-05-24 PROCEDURE — 1159F PR MEDICATION LIST DOCUMENTED IN MEDICAL RECORD: ICD-10-PCS | Mod: CPTII,S$GLB,, | Performed by: FAMILY MEDICINE

## 2023-05-24 PROCEDURE — 1159F MED LIST DOCD IN RCRD: CPT | Mod: CPTII,S$GLB,, | Performed by: FAMILY MEDICINE

## 2023-05-24 PROCEDURE — 99214 OFFICE O/P EST MOD 30 MIN: CPT | Mod: S$GLB,,, | Performed by: FAMILY MEDICINE

## 2023-05-24 PROCEDURE — 99999 PR PBB SHADOW E&M-EST. PATIENT-LVL V: CPT | Mod: PBBFAC,,, | Performed by: FAMILY MEDICINE

## 2023-05-24 PROCEDURE — 3078F DIAST BP <80 MM HG: CPT | Mod: CPTII,S$GLB,, | Performed by: FAMILY MEDICINE

## 2023-05-24 RX ORDER — SUCRALFATE 1 G/1
1 TABLET ORAL
Qty: 90 TABLET | Refills: 1 | Status: SHIPPED | OUTPATIENT
Start: 2023-05-24 | End: 2023-08-28

## 2023-05-24 RX ORDER — CIDER VINEGAR 300 MG
250 TABLET ORAL NIGHTLY
Qty: 90 TABLET | Refills: 3 | Status: SHIPPED | OUTPATIENT
Start: 2023-05-24 | End: 2024-05-23

## 2023-05-24 RX ORDER — BUSPIRONE HYDROCHLORIDE 5 MG/1
5 TABLET ORAL 3 TIMES DAILY
Qty: 90 TABLET | Refills: 11 | Status: SHIPPED | OUTPATIENT
Start: 2023-05-24 | End: 2024-05-23

## 2023-05-24 NOTE — PROGRESS NOTES
Subjective:       Patient ID: Jaren PICHARDO Cousin is a 57 y.o. male.    Chief Complaint: Follow-up (Gout)    HPI    Here for a f/u    Here with his wife.    [facility] and [facility] pain.       htn stable.      Hx of svt s/p ablation. Stable. On propranolol.      Takes fenofibrate and niacin for hld management. requesting lower dosage of niacin.      takes nexium and pepcid prn. Reports epigastric abdominal burning with eating over the past 3-4 weeks after taking course of prednisone     Takes buspar daily prn for anxiety. Reports that he drinks Thursday to Sunday and on occasion, drinks a lot I.e. 6-10 beers.        Objective:      Physical Exam  HENT:      Head: Atraumatic.   Eyes:      Conjunctiva/sclera: Conjunctivae normal.      Pupils: Pupils are equal, round, and reactive to light.   Cardiovascular:      Rate and Rhythm: Normal rate and regular rhythm.      Heart sounds: No murmur heard.  Pulmonary:      Effort: Pulmonary effort is normal.      Breath sounds: Normal breath sounds. No wheezing.   Musculoskeletal:      Cervical back: Normal range of motion.   Lymphadenopathy:      Cervical: No cervical adenopathy.       Assessment:       1. Essential hypertension    2. Hyperlipidemia, unspecified hyperlipidemia type    3. Anxiety    4. Gastritis, presence of bleeding unspecified, unspecified chronicity, unspecified gastritis type    5. Alcohol use        Plan:       Essential hypertension    Hyperlipidemia, unspecified hyperlipidemia type    Anxiety    Gastritis, presence of bleeding unspecified, unspecified chronicity, unspecified gastritis type    Alcohol use    Other orders  -     sucralfate (CARAFATE) 1 gram tablet; Take 1 tablet (1 g total) by mouth 3 (three) times daily before meals.  Dispense: 90 tablet; Refill: 1  -     busPIRone (BUSPAR) 5 MG Tab; Take 1 tablet (5 mg total) by mouth 3 (three) times daily.  Dispense: 90 tablet; Refill: 11  -     niacin  (SLO-NIACIN) 250 mg TbSR; Take 250 mg by mouth every evening.  Dispense: 90 tablet; Refill: 3              Plan:  Add carafate. Take nexium and pepcid daily.   Increase buspar to tid for anxiety.   Abstain from etoh  Cont all other meds  F/u in 3 months        Medication List with Changes/Refills   New Medications    NIACIN (SLO-NIACIN) 250 MG TBSR    Take 250 mg by mouth every evening.    SUCRALFATE (CARAFATE) 1 GRAM TABLET    Take 1 tablet (1 g total) by mouth 3 (three) times daily before meals.   Current Medications    ALLERGY RELIEF, LORATADINE, 10 MG TABLET    TAKE ONE TABLET BY MOUTH ONCE DAILY    AMLODIPINE (NORVASC) 10 MG TABLET    Take 1 tablet (10 mg total) by mouth once daily.    CETIRIZINE (ZYRTEC) 10 MG TABLET    TAKE ONE TABLET BY MOUTH ONCE DAILY AS NEEDED FOR ALLERGIES    CO-ENZYME Q-10 30 MG CAPSULE    Take 100 mg by mouth once daily.    COLCHICINE (COLCRYS) 0.6 MG TABLET    TAKE 1 TABLET BY MOUTH 2 TIMES DAILY.    CYCLOBENZAPRINE (FLEXERIL) 10 MG TABLET    Take 10 mg by mouth 3 (three) times daily as needed. PRN    DOXYCYCLINE (MONODOX) 50 MG CAP    Take once daily with food    DOXYCYCLINE (ORACEA) 40 MG CAPSULE    Take 1 po qday    ESOMEPRAZOLE (NEXIUM) 40 MG CAPSULE    Take 1 capsule (40 mg total) by mouth daily as needed.    FAMOTIDINE (PEPCID) 20 MG TABLET    Take 1 tablet (20 mg total) by mouth 2 (two) times daily.    FENOFIBRATE (TRICOR) 145 MG TABLET    Take 1 tablet (145 mg total) by mouth once daily.    MECLIZINE (ANTIVERT) 25 MG TABLET    Take 1 tablet (25 mg total) by mouth 3 (three) times daily as needed for Dizziness (may cause drowsiness).    METRONIDAZOLE (METROGEL) 0.75 % GEL    Apply topically 2 (two) times daily. For rosacea/redness    MOMETASONE (NASONEX) 50 MCG/ACTUATION NASAL SPRAY    Use 2 spray(s) in each nostril once daily    NABUMETONE (RELAFEN) 500 MG TABLET    Take 1 tablet (500 mg total) by mouth 2 (two) times daily as needed for Pain.     NEOMYCIN-POLYMYXIN-HYDROCORTISONE (CORTISPORIN) 3.5-10,000-1 MG/ML-UNIT/ML-% OTIC SUSPENSION    Place 3 drops into the right ear 3 (three) times daily.    PROPRANOLOL (INDERAL) 10 MG TABLET    Take 1 tablet (10 mg total) by mouth 3 (three) times daily.    SILDENAFIL (VIAGRA) 100 MG TABLET    TAKE ONE TABLET BY MOUTH EVERY WEEK AS NEEDED 30 TO 60 MINUTES BEFORE INTERCOURSE FOR BEST RESULTS TAKE ON EMPTY STOMACH    VALACYCLOVIR (VALTREX) 500 MG TABLET    Take 1 tablet (500 mg total) by mouth once daily.   Changed and/or Refilled Medications    Modified Medication Previous Medication    BUSPIRONE (BUSPAR) 5 MG TAB busPIRone (BUSPAR) 5 MG Tab       Take 1 tablet (5 mg total) by mouth 3 (three) times daily.    Take 1 tablet (5 mg total) by mouth 2 (two) times daily.   Discontinued Medications    NIACIN (SLO-NIACIN) 500 MG TABLET    TAKE 1 TABLET BY MOUTH EVERY NIGHT AT BEDTIME           Medication List with Changes/Refills   New Medications    NIACIN (SLO-NIACIN) 250 MG TBSR    Take 250 mg by mouth every evening.    SUCRALFATE (CARAFATE) 1 GRAM TABLET    Take 1 tablet (1 g total) by mouth 3 (three) times daily before meals.   Current Medications    ALLERGY RELIEF, LORATADINE, 10 MG TABLET    TAKE ONE TABLET BY MOUTH ONCE DAILY    AMLODIPINE (NORVASC) 10 MG TABLET    Take 1 tablet (10 mg total) by mouth once daily.    CETIRIZINE (ZYRTEC) 10 MG TABLET    TAKE ONE TABLET BY MOUTH ONCE DAILY AS NEEDED FOR ALLERGIES    CO-ENZYME Q-10 30 MG CAPSULE    Take 100 mg by mouth once daily.    COLCHICINE (COLCRYS) 0.6 MG TABLET    TAKE 1 TABLET BY MOUTH 2 TIMES DAILY.    CYCLOBENZAPRINE (FLEXERIL) 10 MG TABLET    Take 10 mg by mouth 3 (three) times daily as needed. PRN    DOXYCYCLINE (MONODOX) 50 MG CAP    Take once daily with food    DOXYCYCLINE (ORACEA) 40 MG CAPSULE    Take 1 po qday    ESOMEPRAZOLE (NEXIUM) 40 MG CAPSULE    Take 1 capsule (40 mg total) by mouth daily as needed.    FAMOTIDINE (PEPCID) 20 MG TABLET    Take 1 tablet  (20 mg total) by mouth 2 (two) times daily.    FENOFIBRATE (TRICOR) 145 MG TABLET    Take 1 tablet (145 mg total) by mouth once daily.    MECLIZINE (ANTIVERT) 25 MG TABLET    Take 1 tablet (25 mg total) by mouth 3 (three) times daily as needed for Dizziness (may cause drowsiness).    METRONIDAZOLE (METROGEL) 0.75 % GEL    Apply topically 2 (two) times daily. For rosacea/redness    MOMETASONE (NASONEX) 50 MCG/ACTUATION NASAL SPRAY    Use 2 spray(s) in each nostril once daily    NABUMETONE (RELAFEN) 500 MG TABLET    Take 1 tablet (500 mg total) by mouth 2 (two) times daily as needed for Pain.    NEOMYCIN-POLYMYXIN-HYDROCORTISONE (CORTISPORIN) 3.5-10,000-1 MG/ML-UNIT/ML-% OTIC SUSPENSION    Place 3 drops into the right ear 3 (three) times daily.    PROPRANOLOL (INDERAL) 10 MG TABLET    Take 1 tablet (10 mg total) by mouth 3 (three) times daily.    SILDENAFIL (VIAGRA) 100 MG TABLET    TAKE ONE TABLET BY MOUTH EVERY WEEK AS NEEDED 30 TO 60 MINUTES BEFORE INTERCOURSE FOR BEST RESULTS TAKE ON EMPTY STOMACH    VALACYCLOVIR (VALTREX) 500 MG TABLET    Take 1 tablet (500 mg total) by mouth once daily.   Changed and/or Refilled Medications    Modified Medication Previous Medication    BUSPIRONE (BUSPAR) 5 MG TAB busPIRone (BUSPAR) 5 MG Tab       Take 1 tablet (5 mg total) by mouth 3 (three) times daily.    Take 1 tablet (5 mg total) by mouth 2 (two) times daily.   Discontinued Medications    NIACIN (SLO-NIACIN) 500 MG TABLET    TAKE 1 TABLET BY MOUTH EVERY NIGHT AT BEDTIME

## 2023-05-26 DIAGNOSIS — R12 HEARTBURN: ICD-10-CM

## 2023-05-26 NOTE — TELEPHONE ENCOUNTER
Care Due:                  Date            Visit Type   Department     Provider  --------------------------------------------------------------------------------                                EP -                              [facility]  Last Visit: 05-      CARE ([facility])   CARLOS ALBERTO HARDY                               -                              [facility]  Next Visit: 08-      CARE ([facility])   CARLOS ALBERTO HARDY                                                            Last  Test          Frequency    Reason                     Performed    Due Date  --------------------------------------------------------------------------------    Lipid Panel.  12 months..  fenofibrate..............  07- 07-    [facility] Embedded Care Due Messages. Reference number: [id number].   5/26/2023 11:35:06 AM CDT

## 2023-05-26 NOTE — TELEPHONE ENCOUNTER
----- Message from Tamy Fonseca sent at 5/26/2023 10:14 AM CDT -----  Regarding: pharmacy  Contact: Renard YANES  Type:  Pharmacy Calling to Clarify an RX    Name of Caller:  Renard  Pharmacy Name:  [facility]  Prescription Name:  esomeprazole (NEXIUM) 40 MG capsule  What do they need to clarify?:  prior auth  Best Call Back Number:  [phone]  Additional Information:  Prior auth requested.  Please call Renard to advise.  Thanks!

## 2023-05-27 RX ORDER — ESOMEPRAZOLE MAGNESIUM 40 MG/1
40 CAPSULE, DELAYED RELEASE ORAL DAILY PRN
Qty: 90 CAPSULE | Refills: 3 | Status: SHIPPED | OUTPATIENT
Start: 2023-05-27 | End: 2024-05-26

## 2023-05-31 ENCOUNTER — PATIENT MESSAGE (OUTPATIENT)
Dept: FAMILY MEDICINE | Facility: CLINIC | Age: 58
End: 2023-05-31
Payer: COMMERCIAL

## 2023-06-16 RX ORDER — SUCRALFATE 1 G/1
TABLET ORAL
Qty: 90 TABLET | Refills: 1 | OUTPATIENT
Start: 2023-06-16

## 2023-07-28 RX ORDER — FAMOTIDINE 20 MG/1
20 TABLET, FILM COATED ORAL 2 TIMES DAILY
Qty: 180 TABLET | Refills: 0 | Status: SHIPPED | OUTPATIENT
Start: 2023-07-28

## 2023-08-03 ENCOUNTER — PATIENT MESSAGE (OUTPATIENT)
Dept: ELECTROPHYSIOLOGY | Facility: CLINIC | Age: 58
End: 2023-08-03
Payer: COMMERCIAL

## 2023-08-09 ENCOUNTER — PATIENT MESSAGE (OUTPATIENT)
Dept: ADMINISTRATIVE | Facility: HOSPITAL | Age: 58
End: 2023-08-09
Payer: COMMERCIAL

## 2023-08-10 ENCOUNTER — LAB VISIT (OUTPATIENT)
Dept: LAB | Facility: HOSPITAL | Age: 58
End: 2023-08-10
Attending: FAMILY MEDICINE
Payer: COMMERCIAL

## 2023-08-10 DIAGNOSIS — Z12.5 PROSTATE CANCER SCREENING: ICD-10-CM

## 2023-08-10 DIAGNOSIS — I10 ESSENTIAL HYPERTENSION: ICD-10-CM

## 2023-08-10 LAB
ALBUMIN SERPL BCP-MCNC: 4.4 G/DL (ref 3.5–5.2)
ALP SERPL-CCNC: 34 U/L (ref 55–135)
ALT SERPL W/O P-5'-P-CCNC: 19 U/L (ref 10–44)
ANION GAP SERPL CALC-SCNC: 9 MMOL/L (ref 8–16)
AST SERPL-CCNC: 20 U/L (ref 10–40)
BILIRUB SERPL-MCNC: 0.5 MG/DL (ref 0.1–1)
BUN SERPL-MCNC: 14 MG/DL (ref 6–20)
CALCIUM SERPL-MCNC: 9.8 MG/DL (ref 8.7–10.5)
CHLORIDE SERPL-SCNC: 108 MMOL/L (ref 95–110)
CHOLEST SERPL-MCNC: 216 MG/DL (ref 120–199)
CHOLEST/HDLC SERPL: 4.7 {RATIO} (ref 2–5)
CO2 SERPL-SCNC: 25 MMOL/L (ref 23–29)
COMPLEXED PSA SERPL-MCNC: 1.4 NG/ML (ref 0–4)
CREAT SERPL-MCNC: 1 MG/DL (ref 0.5–1.4)
EST. GFR  (NO RACE VARIABLE): >60 ML/MIN/1.73 M^2
GLUCOSE SERPL-MCNC: 108 MG/DL (ref 70–110)
HDLC SERPL-MCNC: 46 MG/DL (ref 40–75)
HDLC SERPL: 21.3 % (ref 20–50)
LDLC SERPL CALC-MCNC: 147.2 MG/DL (ref 63–159)
NONHDLC SERPL-MCNC: 170 MG/DL
POTASSIUM SERPL-SCNC: 4.8 MMOL/L (ref 3.5–5.1)
PROT SERPL-MCNC: 7.2 G/DL (ref 6–8.4)
SODIUM SERPL-SCNC: 142 MMOL/L (ref 136–145)
TRIGL SERPL-MCNC: 114 MG/DL (ref 30–150)

## 2023-08-10 PROCEDURE — 84153 ASSAY OF PSA TOTAL: CPT | Performed by: FAMILY MEDICINE

## 2023-08-10 PROCEDURE — 80053 COMPREHEN METABOLIC PANEL: CPT | Performed by: FAMILY MEDICINE

## 2023-08-10 PROCEDURE — 36415 COLL VENOUS BLD VENIPUNCTURE: CPT | Mod: PN | Performed by: FAMILY MEDICINE

## 2023-08-10 PROCEDURE — 80061 LIPID PANEL: CPT | Performed by: FAMILY MEDICINE

## 2023-08-14 PROBLEM — R94.39 ABNORMAL STRESS TEST: Status: RESOLVED | Noted: 2019-10-23 | Resolved: 2023-08-14

## 2023-08-14 PROBLEM — R94.39 ABNORMAL CARDIOVASCULAR STRESS TEST: Status: RESOLVED | Noted: 2019-10-10 | Resolved: 2023-08-14

## 2023-08-14 NOTE — PROGRESS NOTES
Subjective:    Patient ID:  Jaren PICHARDO Cousin is a 57 y.o. male who presents for evaluation of No chief complaint on file.      Problem List Items Addressed This Visit          Cardiac/Vascular    History of radiofrequency ablation (RFA) procedure for cardiac arrhythmia    Overview     5/2020 - Rajan         Mixed hyperlipidemia    Primary hypertension    SVT (supraventricular tachycardia)     Other Visit Diagnoses       Pre-operative cardiovascular examination    -  Primary            HPI    Presents today for preoperative cardiovascular assessment prior to knee surgery later in September.    The patient states that he feels well overall.    No chest pain.  No shortness of breath.  Recurrence of SVT - None    Rides bike for activity, goes about a mile, no issues    Personal history of heart attack or stroke - None that he is aware of  Family history of heart disease - Dad with CABG x 3 in 1983, heavy smoker, mom with strokes    Past Medical History:   Diagnosis Date    ALLERGIC RHINITIS     Asthma     as child    Cervicalgia     DDD (degenerative disc disease), lumbar     Fatty liver     Hemorrhoid     Hepatomegaly     High triglycerides     HTN (hypertension)     Liver hemangioma 2014    PTSD (post-traumatic stress disorder)     not [facility]    Shingles     lower back    Urticaria        Past Surgical History:   Procedure Laterality Date    BACK SURGERY      ruptured disc L5-S1    COLONOSCOPY  ~2010  ([facility])    Hemorrhoids    COLONOSCOPY N/A 07/12/2022    Procedure: COLONOSCOPY;  Surgeon: Andrew Caba MD;  Location: [facility] ENDO;  Service: Endoscopy;  Laterality: N/A;    CORONARY ANGIOGRAPHY N/A 10/23/2019    Procedure: ANGIOGRAM, CORONARY ARTERY;  Surgeon: Can Masters MD;  Location: [facility] CATH;  Service: Cardiology;  Laterality: N/A;    ELBOW SURGERY      right    EPIDURAL STEROID INJECTION INTO CERVICAL SPINE N/A 10/15/2019    Procedure:  Injection-steroid-epidural-cervical C7/T1;  Surgeon: Blaise Mao MD;  Location: [facility] OR;  Service: Pain Management;  Laterality: N/A;    ESOPHAGOGASTRODUODENOSCOPY N/A 01/29/2019    Procedure: EGD (ESOPHAGOGASTRODUODENOSCOPY);  Surgeon: Balwinder Dexter Jr., MD;  Location: [facility] ENDO;  Service: Endoscopy;  Laterality: N/A;    KNEE SURGERY      right    LEFT HEART CATHETERIZATION N/A 10/23/2019    Procedure: Left heart cath;  Surgeon: Can Masters MD;  Location: [facility] CATH;  Service: Cardiology;  Laterality: N/A;    MAGNETIC RESONANCE IMAGING N/A 06/26/2019    Procedure: MRI (Magnetic Resonance Imagine) needs anesthesia;  Surgeon: Maurice Fonseca MD;  Location: [facility] CATH;  Service: Anesthesiology;  Laterality: N/A;       Family History   Problem Relation Age of Onset    Diverticulitis Mother     Hypertension Father     COPD Father     Allergic rhinitis Daughter     Asthma Daughter     Allergic rhinitis Daughter     Allergic rhinitis Son     Angioedema Neg Hx     Eczema Neg Hx     Immunodeficiency Neg Hx     Urticaria Neg Hx     Lupus Neg Hx     Psoriasis Neg Hx     Melanoma Neg Hx     Acne Neg Hx     Colon cancer Neg Hx     Colon polyps Neg Hx     Crohn's disease Neg Hx     Ulcerative colitis Neg Hx     Esophageal cancer Neg Hx     Stomach cancer Neg Hx     Glaucoma Neg Hx     Macular degeneration Neg Hx        Social History     Socioeconomic History    Marital status:    Occupational History    Occupation: retired   Tobacco Use    Smoking status: Never    Smokeless tobacco: Never   Substance and Sexual Activity    Alcohol use: Yes     Alcohol/week: 21.0 standard drinks of alcohol     Types: 21 Cans of beer per week     Comment: 3 beers daily    Drug use: No    Sexual activity: Yes     Social Determinants of Health     Financial Resource Strain: Low Risk  (5/24/2023)    Overall Financial Resource Strain (CARDIA)     Difficulty of Paying Living Expenses: Not very hard   Food Insecurity: No  Food Insecurity (5/24/2023)    Hunger Vital Sign     Worried About Running Out of Food in the Last Year: Never true     Ran Out of Food in the Last Year: Never true   Transportation Needs: No Transportation Needs (5/24/2023)    PRAPARE - Transportation     Lack of Transportation (Medical): No     Lack of Transportation (Non-Medical): No   Physical Activity: Insufficiently Active (5/24/2023)    Exercise Vital Sign     Days of Exercise per Week: 1 day     Minutes of Exercise per Session: 10 min   Stress: Stress Concern Present (5/24/2023)    [employer] [place] [employer] - Occupational Stress Questionnaire     Feeling of Stress : Rather much   Social Connections: Unknown (5/24/2023)    Social Connection and Isolation Panel [NHANES]     Frequency of Communication with Friends and Family: More than three times a week     Frequency of Social Gatherings with Friends and Family: Once a week     Active Member of Clubs or Organizations: No     Attends Club or Organization Meetings: Never     Marital Status:    Housing Stability: Low Risk  (5/24/2023)    Housing Stability Vital Sign     Unable to Pay for Housing in the Last Year: No     Number of Places Lived in the Last Year: 1     Unstable Housing in the Last Year: No       Review of patient's allergies indicates:   Allergen Reactions    Ciprofloxacin Hives       Review of Systems   Constitutional: Negative for decreased appetite, fever and malaise/fatigue.   Eyes:  Negative for blurred vision.   Cardiovascular:  Negative for chest pain, dyspnea on exertion, irregular heartbeat and leg swelling.   Respiratory:  Negative for cough, hemoptysis, shortness of breath and wheezing.    Endocrine: Negative for cold intolerance and heat intolerance.   Hematologic/Lymphatic: Negative for bleeding problem.   Musculoskeletal:  Negative for muscle weakness and myalgias.   Gastrointestinal:  Negative for abdominal pain, constipation and diarrhea.   Genitourinary:   "Negative for bladder incontinence.   Neurological:  Negative for dizziness and weakness.   Psychiatric/Behavioral:  Negative for depression.         Objective:     Vitals:    08/17/23 1311   BP: 129/77   Pulse: 74   Weight: 95.3 kg (210 lb 1.6 oz)   Height: 5' 11" (1.803 m)       BP Readings from Last 5 Encounters:   08/17/23 129/77   05/24/23 126/70   05/05/23 124/75   04/20/23 (!) 140/78   04/11/23 130/74        Physical Exam  Constitutional:       Appearance: He is well-developed.   HENT:      Head: Normocephalic and atraumatic.   Neck:      Vascular: No JVD.   Cardiovascular:      Rate and Rhythm: Normal rate and regular rhythm.      Heart sounds: Normal heart sounds. No murmur heard.     No friction rub. No gallop.   Pulmonary:      Effort: Pulmonary effort is normal. No respiratory distress.      Breath sounds: Normal breath sounds. No wheezing or rales.   Abdominal:      General: Bowel sounds are normal.      Palpations: Abdomen is soft.      Tenderness: There is no abdominal tenderness. There is no guarding or rebound.   Musculoskeletal:      Cervical back: Normal range of motion and neck supple.   Skin:     General: Skin is warm and dry.   Neurological:      Mental Status: He is alert and oriented to person, place, and time.   Psychiatric:         Behavior: Behavior normal.             Current Outpatient Medications   Medication Instructions    ALLERGY RELIEF, LORATADINE, 10 mg tablet TAKE ONE TABLET BY MOUTH ONCE DAILY    amLODIPine (NORVASC) 10 mg, Oral, Daily    busPIRone (BUSPAR) 5 mg, Oral, 3 times daily    cetirizine (ZYRTEC) 10 MG tablet TAKE ONE TABLET BY MOUTH ONCE DAILY AS NEEDED FOR ALLERGIES    co-enzyme Q-10 100 mg, Oral, Daily    colchicine (COLCRYS) 0.6 mg tablet TAKE 1 TABLET BY MOUTH 2 TIMES DAILY.    cyclobenzaprine (FLEXERIL) 10 mg, Oral, 3 times daily PRN, PRN    doxycycline (MONODOX) 50 MG Cap Take once daily with food    doxycycline (ORACEA) 40 mg capsule Take 1 po qday    esomeprazole " (NEXIUM) 40 mg, Oral, Daily PRN    famotidine (PEPCID) 20 mg, Oral, 2 times daily    fenofibrate (TRICOR) 145 mg, Oral, Daily    meclizine (ANTIVERT) 25 mg, Oral, 3 times daily PRN    metroNIDAZOLE (METROGEL) 0.75 % gel Topical (Top), 2 times daily, For rosacea/redness    mometasone (NASONEX) 50 mcg/actuation nasal spray Use 2 spray(s) in each nostril once daily    nabumetone (RELAFEN) 500 mg, Oral, 2 times daily PRN    neomycin-polymyxin-hydrocortisone (CORTISPORIN) 3.5-10,000-1 mg/mL-unit/mL-% otic suspension 3 drops, Right Ear, 3 times daily    niacin (SLO-NIACIN) 250 mg, Oral, Nightly    propranoloL (INDERAL) 10 mg, Oral, 3 times daily    sildenafiL (VIAGRA) 100 MG tablet TAKE ONE TABLET BY MOUTH EVERY WEEK AS NEEDED 30 TO 60 MINUTES BEFORE INTERCOURSE FOR BEST RESULTS TAKE ON EMPTY STOMACH    sucralfate (CARAFATE) 1 g, Oral, 3 times daily before meals    valACYclovir (VALTREX) 500 mg, Oral, Daily       Lipid Panel:   Lab Results   Component Value Date    CHOL 216 (H) 08/10/2023    HDL 46 08/10/2023    LDLCALC 147.2 08/10/2023    TRIG 114 08/10/2023    CHOLHDL 21.3 08/10/2023         The 10-year ASCVD risk score (Shawn OBRIEN, et al., 2019) is: 9.9%    Values used to calculate the score:      Age: 57 years      Sex: Male      Is Non- : No      Diabetic: No      Tobacco smoker: No      Systolic Blood Pressure: 129 mmHg      Is BP treated: Yes      HDL Cholesterol: 46 mg/dL      Total Cholesterol: 216 mg/dL    All pertinent labs, imaging, and EKGs reviewed.  Patient's most recent EKG tracing was personally interpreted by this provider.    Most Recent EKG Results  Results for orders placed or performed during the hospital encounter of 05/08/20   EKG 12-lead    Collection Time: 05/08/20 10:22 AM    Narrative    Test Reason : Z98.890,Z86.79,    Vent. Rate : 081 BPM     Atrial Rate : 081 BPM     P-R Int : 142 ms          QRS Dur : 094 ms      QT Int : 402 ms       P-R-T Axes : 965 -74 -39  degrees     QTc Int : 466 ms    Normal sinus rhythm  Left axis deviation  Nonspecific ST abnormality  Abnormal ECG  When compared with ECG of 08-MAY-2020 07:01,  No significant change was found  Confirmed by ILIANA CLINTON MD ([id number]) on 5/8/2020 12:47:58 PM    Referred By: NOEMI IZQUIERDO           Confirmed By:ILIANA CLINTON MD       Most Recent Echocardiogram Results  Results for orders placed in visit on 10/01/19    Stress Echo    Interpretation Summary  · The stress echo portion of this study is positive for myocardial ischemia.  · Normal left ventricular systolic function. The estimated ejection fraction is 55%  · Eccentric left ventricular hypertrophy.  · Indeterminate left ventricular diastolic function.  · Normal right ventricular systolic function.      Most Recent Nuclear Stress Test Results  No results found for this or any previous visit.      Most Recent Cardiac PET Stress Test Results  No results found for this or any previous visit.      Most Recent Cardiovascular Angiogram results  Results for orders placed during the hospital encounter of 10/23/19    Cardiac catheterization    Conclusion  · Normal coronary arteries.  · The ejection fraction is greater than 55% by visual estimate.  · LV end diastolic pressure is normal.    I certify that I was present for catheter insertion, catheter manipulation, angiography, and angiographic interpretation of this patient.    Procedure Log documented by Documenter: Donna Thorne and verified by Can Masters MD.    Date: 10/23/2019  Time: 9:40 AM      Other Most Recent Cardiology Results  Results for orders placed during the hospital encounter of 05/08/20    CARDIOLOGY REPORT        Assessment:       1. Pre-operative cardiovascular examination    2. Primary hypertension    3. Mixed hyperlipidemia    4. History of radiofrequency ablation (RFA) procedure for cardiac arrhythmia    5. SVT (supraventricular tachycardia)         Plan:     Symptoms OK today  BP/Pulse OK  today  Most recent echocardiogram reviewed personally   METs > 4  No indication for stress testing    Echocardiogram    Continue amlodipine 10 mg PO Daily   Continue propranolol 10 mg PO t.i.d.     Continue other cardiac medications  Mediterranean Diet/Cardiovascular Exercise Program    As long as the above studies are without acute issues, with the above recommendations, the patient is optimized for the above mentioned procedure.     Patient queried and all questions were answered.    F/u in 1 year to reassess      Signed:    Roque Ferrell MD  8/17/2023 3:19 PM

## 2023-08-17 ENCOUNTER — OFFICE VISIT (OUTPATIENT)
Dept: CARDIOLOGY | Facility: CLINIC | Age: 58
End: 2023-08-17
Payer: COMMERCIAL

## 2023-08-17 VITALS
WEIGHT: 210.13 LBS | HEART RATE: 74 BPM | HEIGHT: 71 IN | BODY MASS INDEX: 29.42 KG/M2 | DIASTOLIC BLOOD PRESSURE: 77 MMHG | SYSTOLIC BLOOD PRESSURE: 129 MMHG

## 2023-08-17 DIAGNOSIS — E78.2 MIXED HYPERLIPIDEMIA: ICD-10-CM

## 2023-08-17 DIAGNOSIS — I10 PRIMARY HYPERTENSION: ICD-10-CM

## 2023-08-17 DIAGNOSIS — Z98.890 HISTORY OF RADIOFREQUENCY ABLATION (RFA) PROCEDURE FOR CARDIAC ARRHYTHMIA: ICD-10-CM

## 2023-08-17 DIAGNOSIS — Z01.810 PRE-OPERATIVE CARDIOVASCULAR EXAMINATION: Primary | ICD-10-CM

## 2023-08-17 DIAGNOSIS — I47.10 SVT (SUPRAVENTRICULAR TACHYCARDIA): ICD-10-CM

## 2023-08-17 PROCEDURE — 93010 ELECTROCARDIOGRAM REPORT: CPT | Mod: S$GLB,,, | Performed by: INTERNAL MEDICINE

## 2023-08-17 PROCEDURE — 93010 EKG 12-LEAD: ICD-10-PCS | Mod: S$GLB,,, | Performed by: INTERNAL MEDICINE

## 2023-08-17 PROCEDURE — 99999 PR PBB SHADOW E&M-EST. PATIENT-LVL IV: CPT | Mod: PBBFAC,,, | Performed by: INTERNAL MEDICINE

## 2023-08-17 PROCEDURE — 3074F PR MOST RECENT SYSTOLIC BLOOD PRESSURE < 130 MM HG: ICD-10-PCS | Mod: CPTII,S$GLB,, | Performed by: INTERNAL MEDICINE

## 2023-08-17 PROCEDURE — 99204 PR OFFICE/OUTPT VISIT, NEW, LEVL IV, 45-59 MIN: ICD-10-PCS | Mod: 25,S$GLB,, | Performed by: INTERNAL MEDICINE

## 2023-08-17 PROCEDURE — 1159F MED LIST DOCD IN RCRD: CPT | Mod: CPTII,S$GLB,, | Performed by: INTERNAL MEDICINE

## 2023-08-17 PROCEDURE — 1160F PR REVIEW ALL MEDS BY PRESCRIBER/CLIN PHARMACIST DOCUMENTED: ICD-10-PCS | Mod: CPTII,S$GLB,, | Performed by: INTERNAL MEDICINE

## 2023-08-17 PROCEDURE — 3078F DIAST BP <80 MM HG: CPT | Mod: CPTII,S$GLB,, | Performed by: INTERNAL MEDICINE

## 2023-08-17 PROCEDURE — 3074F SYST BP LT 130 MM HG: CPT | Mod: CPTII,S$GLB,, | Performed by: INTERNAL MEDICINE

## 2023-08-17 PROCEDURE — 1159F PR MEDICATION LIST DOCUMENTED IN MEDICAL RECORD: ICD-10-PCS | Mod: CPTII,S$GLB,, | Performed by: INTERNAL MEDICINE

## 2023-08-17 PROCEDURE — 3078F PR MOST RECENT DIASTOLIC BLOOD PRESSURE < 80 MM HG: ICD-10-PCS | Mod: CPTII,S$GLB,, | Performed by: INTERNAL MEDICINE

## 2023-08-17 PROCEDURE — 99999 PR PBB SHADOW E&M-EST. PATIENT-LVL IV: ICD-10-PCS | Mod: PBBFAC,,, | Performed by: INTERNAL MEDICINE

## 2023-08-17 PROCEDURE — 93005 ELECTROCARDIOGRAM TRACING: CPT | Mod: PO

## 2023-08-17 PROCEDURE — 1160F RVW MEDS BY RX/DR IN RCRD: CPT | Mod: CPTII,S$GLB,, | Performed by: INTERNAL MEDICINE

## 2023-08-17 PROCEDURE — 99204 OFFICE O/P NEW MOD 45 MIN: CPT | Mod: 25,S$GLB,, | Performed by: INTERNAL MEDICINE

## 2023-08-17 PROCEDURE — 3008F PR BODY MASS INDEX (BMI) DOCUMENTED: ICD-10-PCS | Mod: CPTII,S$GLB,, | Performed by: INTERNAL MEDICINE

## 2023-08-17 PROCEDURE — 3008F BODY MASS INDEX DOCD: CPT | Mod: CPTII,S$GLB,, | Performed by: INTERNAL MEDICINE

## 2023-08-18 DIAGNOSIS — I47.10 SVT (SUPRAVENTRICULAR TACHYCARDIA): Primary | ICD-10-CM

## 2023-08-22 ENCOUNTER — CLINICAL SUPPORT (OUTPATIENT)
Dept: CARDIOLOGY | Facility: HOSPITAL | Age: 58
End: 2023-08-22
Attending: INTERNAL MEDICINE
Payer: COMMERCIAL

## 2023-08-22 VITALS — WEIGHT: 210 LBS | HEIGHT: 71 IN | BODY MASS INDEX: 29.4 KG/M2

## 2023-08-22 DIAGNOSIS — I47.10 SVT (SUPRAVENTRICULAR TACHYCARDIA): ICD-10-CM

## 2023-08-22 DIAGNOSIS — Z01.810 PRE-OPERATIVE CARDIOVASCULAR EXAMINATION: ICD-10-CM

## 2023-08-22 LAB
ASCENDING AORTA: 3.02 CM
AV INDEX (PROSTH): 0.8
AV MEAN GRADIENT: 3 MMHG
AV PEAK GRADIENT: 6 MMHG
AV VALVE AREA BY VELOCITY RATIO: 4.03 CM²
AV VALVE AREA: 3.76 CM²
AV VELOCITY RATIO: 0.86
BSA FOR ECHO PROCEDURE: 2.18 M2
CV ECHO LV RWT: 0.44 CM
DOP CALC AO PEAK VEL: 1.24 M/S
DOP CALC AO VTI: 25.1 CM
DOP CALC LVOT AREA: 4.7 CM2
DOP CALC LVOT DIAMETER: 2.44 CM
DOP CALC LVOT PEAK VEL: 1.07 M/S
DOP CALC LVOT STROKE VOLUME: 94.41 CM3
DOP CALCLVOT PEAK VEL VTI: 20.2 CM
E WAVE DECELERATION TIME: 214.41 MSEC
E/A RATIO: 0.76
E/E' RATIO: 9.38 M/S
ECHO LV POSTERIOR WALL: 1.09 CM (ref 0.6–1.1)
FRACTIONAL SHORTENING: 28 % (ref 28–44)
INTERVENTRICULAR SEPTUM: 1.25 CM (ref 0.6–1.1)
LA MAJOR: 4.79 CM
LA MINOR: 5.69 CM
LA WIDTH: 3.6 CM
LEFT ATRIUM SIZE: 3.49 CM
LEFT ATRIUM VOLUME INDEX: 25.8 ML/M2
LEFT ATRIUM VOLUME: 55.55 CM3
LEFT INTERNAL DIMENSION IN SYSTOLE: 3.6 CM (ref 2.1–4)
LEFT VENTRICLE DIASTOLIC VOLUME INDEX: 54.25 ML/M2
LEFT VENTRICLE DIASTOLIC VOLUME: 116.64 ML
LEFT VENTRICLE MASS INDEX: 104 G/M2
LEFT VENTRICLE SYSTOLIC VOLUME INDEX: 25.4 ML/M2
LEFT VENTRICLE SYSTOLIC VOLUME: 54.56 ML
LEFT VENTRICULAR INTERNAL DIMENSION IN DIASTOLE: 4.97 CM (ref 3.5–6)
LEFT VENTRICULAR MASS: 223.45 G
LV LATERAL E/E' RATIO: 8.71 M/S
LV SEPTAL E/E' RATIO: 10.17 M/S
LVOT MG: 2.35 MMHG
LVOT MV: 0.72 CM/S
MV PEAK A VEL: 0.8 M/S
MV PEAK E VEL: 0.61 M/S
PISA TR MAX VEL: 2.27 M/S
PULM VEIN S/D RATIO: 1.2
PV PEAK D VEL: 0.46 M/S
PV PEAK S VEL: 0.55 M/S
RA MAJOR: 4.62 CM
RA PRESSURE ESTIMATED: 3 MMHG
RIGHT VENTRICULAR END-DIASTOLIC DIMENSION: 3.39 CM
RIGHT VENTRICULAR LENGTH IN DIASTOLE (APICAL 4-CHAMBER VIEW): 6.89 CM
RV MID DIAMA: 2.21 CM
RV TB RVSP: 5 MMHG
RV TISSUE DOPPLER FREE WALL SYSTOLIC VELOCITY 1 (APICAL 4 CHAMBER VIEW): 19.17 CM/S
SINUS: 3.66 CM
STJ: 2.98 CM
TDI LATERAL: 0.07 M/S
TDI SEPTAL: 0.06 M/S
TDI: 0.07 M/S
TR MAX PG: 21 MMHG
TRICUSPID ANNULAR PLANE SYSTOLIC EXCURSION: 2.27 CM
TV REST PULMONARY ARTERY PRESSURE: 24 MMHG
Z-SCORE OF LEFT VENTRICULAR DIMENSION IN END DIASTOLE: -3.39
Z-SCORE OF LEFT VENTRICULAR DIMENSION IN END SYSTOLE: -1.32

## 2023-08-22 PROCEDURE — 93306 TTE W/DOPPLER COMPLETE: CPT | Mod: PO

## 2023-08-22 PROCEDURE — 93306 ECHO (CUPID ONLY): ICD-10-PCS | Mod: 26,,, | Performed by: INTERNAL MEDICINE

## 2023-08-22 PROCEDURE — 93306 TTE W/DOPPLER COMPLETE: CPT | Mod: 26,,, | Performed by: INTERNAL MEDICINE

## 2023-08-28 ENCOUNTER — OFFICE VISIT (OUTPATIENT)
Dept: FAMILY MEDICINE | Facility: CLINIC | Age: 58
End: 2023-08-28
Payer: COMMERCIAL

## 2023-08-28 VITALS
BODY MASS INDEX: 29.42 KG/M2 | WEIGHT: 210.13 LBS | SYSTOLIC BLOOD PRESSURE: 140 MMHG | DIASTOLIC BLOOD PRESSURE: 86 MMHG | HEIGHT: 71 IN | OXYGEN SATURATION: 97 % | HEART RATE: 70 BPM

## 2023-08-28 DIAGNOSIS — E78.2 MIXED HYPERLIPIDEMIA: ICD-10-CM

## 2023-08-28 DIAGNOSIS — K21.9 GASTROESOPHAGEAL REFLUX DISEASE WITHOUT ESOPHAGITIS: ICD-10-CM

## 2023-08-28 DIAGNOSIS — I10 PRIMARY HYPERTENSION: Primary | ICD-10-CM

## 2023-08-28 PROBLEM — L73.9 FOLLICULITIS: Status: RESOLVED | Noted: 2018-02-18 | Resolved: 2023-08-28

## 2023-08-28 PROBLEM — M54.2 NECK PAIN: Status: RESOLVED | Noted: 2019-06-26 | Resolved: 2023-08-28

## 2023-08-28 PROBLEM — I47.10 SVT (SUPRAVENTRICULAR TACHYCARDIA): Status: RESOLVED | Noted: 2020-01-28 | Resolved: 2023-08-28

## 2023-08-28 PROBLEM — Z98.890 HISTORY OF RADIOFREQUENCY ABLATION (RFA) PROCEDURE FOR CARDIAC ARRHYTHMIA: Status: RESOLVED | Noted: 2020-08-25 | Resolved: 2023-08-28

## 2023-08-28 PROBLEM — R10.11 RUQ PAIN: Status: RESOLVED | Noted: 2019-01-29 | Resolved: 2023-08-28

## 2023-08-28 PROCEDURE — 3077F PR MOST RECENT SYSTOLIC BLOOD PRESSURE >= 140 MM HG: ICD-10-PCS | Mod: CPTII,S$GLB,, | Performed by: STUDENT IN AN ORGANIZED HEALTH CARE EDUCATION/TRAINING PROGRAM

## 2023-08-28 PROCEDURE — 3079F DIAST BP 80-89 MM HG: CPT | Mod: CPTII,S$GLB,, | Performed by: STUDENT IN AN ORGANIZED HEALTH CARE EDUCATION/TRAINING PROGRAM

## 2023-08-28 PROCEDURE — 3008F PR BODY MASS INDEX (BMI) DOCUMENTED: ICD-10-PCS | Mod: CPTII,S$GLB,, | Performed by: STUDENT IN AN ORGANIZED HEALTH CARE EDUCATION/TRAINING PROGRAM

## 2023-08-28 PROCEDURE — 3077F SYST BP >= 140 MM HG: CPT | Mod: CPTII,S$GLB,, | Performed by: STUDENT IN AN ORGANIZED HEALTH CARE EDUCATION/TRAINING PROGRAM

## 2023-08-28 PROCEDURE — 99214 PR OFFICE/OUTPT VISIT, EST, LEVL IV, 30-39 MIN: ICD-10-PCS | Mod: S$GLB,,, | Performed by: STUDENT IN AN ORGANIZED HEALTH CARE EDUCATION/TRAINING PROGRAM

## 2023-08-28 PROCEDURE — 3008F BODY MASS INDEX DOCD: CPT | Mod: CPTII,S$GLB,, | Performed by: STUDENT IN AN ORGANIZED HEALTH CARE EDUCATION/TRAINING PROGRAM

## 2023-08-28 PROCEDURE — 1159F MED LIST DOCD IN RCRD: CPT | Mod: CPTII,S$GLB,, | Performed by: STUDENT IN AN ORGANIZED HEALTH CARE EDUCATION/TRAINING PROGRAM

## 2023-08-28 PROCEDURE — 1159F PR MEDICATION LIST DOCUMENTED IN MEDICAL RECORD: ICD-10-PCS | Mod: CPTII,S$GLB,, | Performed by: STUDENT IN AN ORGANIZED HEALTH CARE EDUCATION/TRAINING PROGRAM

## 2023-08-28 PROCEDURE — 99214 OFFICE O/P EST MOD 30 MIN: CPT | Mod: S$GLB,,, | Performed by: STUDENT IN AN ORGANIZED HEALTH CARE EDUCATION/TRAINING PROGRAM

## 2023-08-28 PROCEDURE — 99999 PR PBB SHADOW E&M-EST. PATIENT-LVL V: CPT | Mod: PBBFAC,,, | Performed by: STUDENT IN AN ORGANIZED HEALTH CARE EDUCATION/TRAINING PROGRAM

## 2023-08-28 PROCEDURE — 99999 PR PBB SHADOW E&M-EST. PATIENT-LVL V: ICD-10-PCS | Mod: PBBFAC,,, | Performed by: STUDENT IN AN ORGANIZED HEALTH CARE EDUCATION/TRAINING PROGRAM

## 2023-08-28 PROCEDURE — 3079F PR MOST RECENT DIASTOLIC BLOOD PRESSURE 80-89 MM HG: ICD-10-PCS | Mod: CPTII,S$GLB,, | Performed by: STUDENT IN AN ORGANIZED HEALTH CARE EDUCATION/TRAINING PROGRAM

## 2023-08-28 NOTE — ASSESSMENT & PLAN NOTE
Variably controlled. Stop sucralfate - I do not think there is an ulcer to treat. Patient can instead make dietary changes or simply take Nexium BID on weekends instead of just once daily on weekends. Continue Nexium once daily all other days.

## 2023-08-28 NOTE — ASSESSMENT & PLAN NOTE
Recent lab work from the last few weeks show relatively controlled cholesterol levels - total is 216 and LDL-C is 147 with no history of major vascular event. Discussed lifestyle changes to lower cholesterol levels.

## 2023-08-28 NOTE — PROGRESS NOTES
Name: Jaren Hillsin  MRN: [id number]  : 1965  PCP: Patrick Varghese MD    HPI  Patient presents to establish care. Previously under the care of Dr. Sandhu. At last visit, patient had GERD/peptic complaints. Edson prescribed sucralfate. Patient had been taking BID but has since run out and was asking about refills. Symptoms are generally worse on the weekends after he has had a few beers.    [facility] Patient is to have knee surgery on right knee.    Review of Systems   Respiratory:  Negative for shortness of breath.    Cardiovascular:  Negative for chest pain.       Patient Active Problem List   Diagnosis    Primary hypertension    Urticaria    Allergic rhinitis    Mixed hyperlipidemia    DDD (degenerative disc disease), lumbar    Gastroesophageal reflux disease without esophagitis    Decreased range of motion of neck    Cervical radiculitis    Cervical spondylosis    Fatty liver    Right ankle pain    Tendonitis, Achilles, right    Acute medial meniscus tear of right knee    Genu varum of both lower extremities    Post-traumatic osteoarthritis of right knee       Vitals:    23 1427   BP: (!) 140/86   Pulse: 70       Physical Exam  Constitutional:       General: He is not in acute distress.     Appearance: Normal appearance. He is well-developed.   HENT:      Head: Normocephalic and atraumatic.      Right Ear: External ear normal.      Left Ear: External ear normal.   Eyes:      Conjunctiva/sclera: Conjunctivae normal.   Cardiovascular:      Rate and Rhythm: Normal rate and regular rhythm.      Heart sounds: No murmur heard.     No friction rub. No gallop.   Pulmonary:      Effort: Pulmonary effort is normal. No respiratory distress.      Breath sounds: No wheezing, rhonchi or rales.   Abdominal:      General: Abdomen is flat. There is no distension.   Musculoskeletal:         General: No swelling or deformity.      Right lower leg: No edema.      Left  lower leg: No edema.   Skin:     General: Skin is warm and dry.      Coloration: Skin is not jaundiced.   Neurological:      Mental Status: He is alert and oriented to person, place, and time. Mental status is at baseline.   Psychiatric:         Attention and Perception: Attention and perception normal.         Mood and Affect: Mood normal.         Speech: Speech normal.         Behavior: Behavior normal. Behavior is cooperative.         Thought Content: Thought content normal.         Cognition and Memory: Cognition normal.         Judgment: Judgment normal.         1. Primary hypertension  Assessment & Plan:  Elevated today. However, prior BP readings have been normal. Discussed lifestyle changes. Continue current anti-hypertensive medications.      2. Mixed hyperlipidemia  Assessment & Plan:  Recent lab work from the last few weeks show relatively controlled cholesterol levels - total is 216 and LDL-C is 147 with no history of major vascular event. Discussed lifestyle changes to lower cholesterol levels.      3. Gastroesophageal reflux disease without esophagitis  Assessment & Plan:  Variably controlled. Stop sucralfate - I do not think there is an ulcer to treat. Patient can instead make dietary changes or simply take Nexium BID on weekends instead of just once daily on weekends. Continue Nexium once daily all other days.           Follow up in 6 months. I spent 37 minutes pre-charting, interviewing patient, performing exam, formulating and discussing plan, placing orders, and documenting.      Patrick Varghese MD  08/28/2023

## 2023-08-28 NOTE — ASSESSMENT & PLAN NOTE
Elevated today. However, prior BP readings have been normal. Discussed lifestyle changes. Continue current anti-hypertensive medications.

## 2023-08-28 NOTE — PATIENT INSTRUCTIONS
For evidence-based information on weight loss through diet, visit the following website:  https://[employer].net/FatLoss/WeightLossTips    To estimate the amount of calories you need in a day to maintain or lose weight:  https://www.calculator.net/calorie-calculator.html

## 2023-09-15 ENCOUNTER — PATIENT MESSAGE (OUTPATIENT)
Dept: ADMINISTRATIVE | Facility: HOSPITAL | Age: 58
End: 2023-09-15
Payer: COMMERCIAL

## 2023-09-15 ENCOUNTER — PATIENT OUTREACH (OUTPATIENT)
Dept: ADMINISTRATIVE | Facility: HOSPITAL | Age: 58
End: 2023-09-15
Payer: COMMERCIAL

## 2023-09-15 NOTE — PROGRESS NOTES
Uncontrolled BP REPORT  BP Readings from Last 3 Encounters:   08/28/23 (!) 140/86   08/17/23 129/77   05/24/23 126/70       Non-compliant report chart audits for HYPERTENSION MANAGEMENT     Outreach to patient in reference to hypertension management       NEED REMOTE HOME BP READING DOCUMENTED   OR  BP FOLLOW UP WITH NURSE VISIT OR CARE TEAM MEMBER

## 2023-09-17 NOTE — TELEPHONE ENCOUNTER
No care due was identified.  [facility] Embedded Care Due Messages. Reference number: [id number].   9/17/2023 1:26:24 PM CDT

## 2023-09-18 RX ORDER — PROPRANOLOL HYDROCHLORIDE 10 MG/1
10 TABLET ORAL 3 TIMES DAILY
Qty: 270 TABLET | Refills: 3 | Status: SHIPPED | OUTPATIENT
Start: 2023-09-18

## 2023-09-18 NOTE — TELEPHONE ENCOUNTER
Refill Routing Note   Medication(s) are not appropriate for processing by [facility] for the following reason(s):      Required vitals abnormal  No active prescription written by provider    [facility] action(s):  Defer Care Due:  None identified            Appointments  past 12m or future 3m with PCP    Date Provider   Last Visit   8/28/2023 Patrick Varghese MD   Next Visit   2/29/2024 Patrick Varghese MD   ED visits in past 90 days: 0        Note composed:12:29 PM 09/18/2023

## 2024-01-01 NOTE — LETTER
January 12, 2017      Kerri Linares NP  [address] [facility] [address] [place] [address]           [facility]  [address] [facility] [address] [place] [address]  Phone: [phone]  Fax: [phone]          Patient: Jaren Quiroz   MR Number: [id number]   YOB: 1965   Date of Visit: 1/12/2017       Dear Kerri Linares:    Thank you for referring Jaren Quiroz to me for evaluation. Attached you will find relevant portions of my assessment and plan of care.    If you have questions, please do not hesitate to call me. I look forward to following Jaren Quiroz along with you.    Sincerely,    Jaclyn Muñoz MD    Enclosure  CC:  No Recipients    If you would like to receive this communication electronically, please contact [email or web address] or [phone] to request more information on [employer] Link access.    For providers and/or their staff who would like to refer a patient to [facility], please contact us through our one-stop-shop provider referral line, [facility], at [phone].    If you feel you have received this communication in error or would no longer like to receive these types of communications, please e-mail [email or web address]         
-Lay baby on back to sleep: firm mattress, no bumpers, pillows or things other than a blanket in crib.

## 2024-01-03 ENCOUNTER — TELEPHONE (OUTPATIENT)
Dept: FAMILY MEDICINE | Facility: CLINIC | Age: 59
End: 2024-01-03
Payer: COMMERCIAL

## 2024-01-03 ENCOUNTER — OFFICE VISIT (OUTPATIENT)
Dept: URGENT CARE | Facility: CLINIC | Age: 59
End: 2024-01-03
Payer: COMMERCIAL

## 2024-01-03 VITALS
HEART RATE: 89 BPM | OXYGEN SATURATION: 100 % | SYSTOLIC BLOOD PRESSURE: 126 MMHG | HEIGHT: 71 IN | BODY MASS INDEX: 29.4 KG/M2 | DIASTOLIC BLOOD PRESSURE: 83 MMHG | WEIGHT: 210 LBS | RESPIRATION RATE: 15 BRPM | TEMPERATURE: 98 F

## 2024-01-03 DIAGNOSIS — J02.9 PHARYNGITIS, UNSPECIFIED ETIOLOGY: Primary | ICD-10-CM

## 2024-01-03 DIAGNOSIS — J02.9 SORE THROAT: ICD-10-CM

## 2024-01-03 LAB
CTP QC/QA: YES
MOLECULAR STREP A: NEGATIVE

## 2024-01-03 PROCEDURE — 87651 STREP A DNA AMP PROBE: CPT | Mod: QW,S$GLB,, | Performed by: INTERNAL MEDICINE

## 2024-01-03 PROCEDURE — 99203 OFFICE O/P NEW LOW 30 MIN: CPT | Mod: S$GLB,,, | Performed by: INTERNAL MEDICINE

## 2024-01-03 RX ORDER — AZITHROMYCIN 250 MG/1
TABLET, FILM COATED ORAL
Qty: 6 TABLET | Refills: 0 | Status: SHIPPED | OUTPATIENT
Start: 2024-01-03

## 2024-01-03 NOTE — TELEPHONE ENCOUNTER
----- Message from Monica Gonzalez sent at 1/3/2024  9:18 AM CST -----  Type:  Same Day Appointment Request    Caller is requesting a same day appointment.  Caller declined first available appointment listed below.      Name of Caller:  pt  When is the first available appointment?  none  Symptoms:  congestion/bug  Best Call Back Number:  [phone]   Additional Information:   please call and advise--thank you

## 2024-01-03 NOTE — PROGRESS NOTES
"Subjective:      Patient ID: Jaren PICHARDO Cousin is a 58 y.o. male.    Vitals:  height is 5' 11" (1.803 m) and weight is 95.3 kg (210 lb). His temperature is 98.3 °F (36.8 °C). His blood pressure is 126/83 and his pulse is 89. His respiration is 15 and oxygen saturation is 100%.     Chief Complaint: Sore Throat    Pt reports to clinic with sinus congestion and pressure and sore throat onset 1-2 weeks. Pt using OTC cold medication to help with symptoms, no known exposure. Pian scale rate 5/10    ROS   Objective:     Physical Exam   Constitutional: He is oriented to person, place, and time. He appears well-developed. He is cooperative.  Non-toxic appearance. He does not appear ill. No distress.   HENT:   Head: Normocephalic and atraumatic.   Ears:   Right Ear: Hearing, tympanic membrane, external ear and ear canal normal.   Left Ear: Hearing, tympanic membrane, external ear and ear canal normal.   Nose: Congestion present. No mucosal edema, rhinorrhea or nasal deformity. No epistaxis. Right sinus exhibits no maxillary sinus tenderness and no frontal sinus tenderness. Left sinus exhibits no maxillary sinus tenderness and no frontal sinus tenderness.   Mouth/Throat: Uvula is midline and mucous membranes are normal. No trismus in the jaw. Normal dentition. No uvula swelling. Oropharyngeal exudate and posterior oropharyngeal erythema present. No posterior oropharyngeal edema.   Eyes: Conjunctivae and lids are normal. No scleral icterus.   Neck: Trachea normal and phonation normal. Neck supple. No edema present. No erythema present. No neck rigidity present.   Cardiovascular: Normal rate, regular rhythm, normal heart sounds and normal pulses.   Pulmonary/Chest: Effort normal and breath sounds normal. No respiratory distress. He has no decreased breath sounds. He has no rhonchi.   Abdominal: Normal appearance.   Musculoskeletal: Normal range of motion.         General: No deformity. Normal range of motion.   Neurological: He " is alert and oriented to person, place, and time. He exhibits normal muscle tone. Coordination normal.   Skin: Skin is warm, dry, intact, not diaphoretic and not pale.   Psychiatric: His speech is normal and behavior is normal. Judgment and thought content normal.   Nursing note and vitals reviewed.      Assessment:     1. Pharyngitis, unspecified etiology    2. Sore throat        Plan:       Pharyngitis, unspecified etiology  -     azithromycin (Z-LEE) 250 MG tablet; Take 2 tablets by mouth on day 1; Take 1 tablet by mouth on days 2-5  Dispense: 6 tablet; Refill: 0    Sore throat  -     POCT Strep A, Molecular             Patient Instructions   If your condition worsens we recommend that you receive another evaluation at the emergency room immediately or contact your primary medical clinics after hours call service to discuss your concerns. You must understand that you've received an Urgent Care treatment only and that you may be released before all of your medical problems are known or treated. You, the patient, will arrange for follow up care as instructed.  Drink plenty of Fluids  Wash hands frequently using mild antibacterial soap lathering for at least 15 seconds then rinse  Get plenty of Rest  Salt water gargles  Follow up in 1-2 weeks with Primary Care physician if not significantly better.   If you are not allergic please Tylenol every 4-6 hours as needed and/or Ibuprofen every 6-8 hours as needed, over the counter for pain or fever.  Take OTC Cough/Congestion medicine as needed. Talk to your pharmacist about the best option for you.     My notes were dictated with [employer] Fluency Software. Any misspellings or nonsensical grammar should be attributed to its use and allowances made for errors and typographic syntactical error(s).

## 2024-01-09 NOTE — PROGRESS NOTES
[facility] Consultation Note    Reason for Consult:  Gastroesophageal reflux    PCP: Patrick Varghese     Referring MD:   Victoriano Cherry Md  [address] [place] [address]    HPI:  This is a 58 y.o. male here for evaluation of gastroesophageal reflux.  Worse after knee surgery and 10 lb weight loss. Was on pain meds for 2 months    Typical Symptoms (high pretest probability=impedance on meds):  Pyrosis - yes  Regurgitation/Water Brash -  yes  Upright/Nocturnal symptoms - at night  Dysphagia/Odynophagia - no  PPI usage/history/response - nexium and pepcid 20 bid    Atypical Symptoms (low pretest probability=bravo off meds):  Hoarseness/Cough - mucus  Throat clearing - no  Chest pain - no  Asthma - no    Late Meals - avoiding   Food Triggers -  Caffeine intake -   Alcohol intake - over the holidays daily  NSAID usage - no    ROS:  Constitutional: No fevers, chills, No weight loss  ENT: No allergies  CV: No chest pain  Pulm: No cough, No shortness of breath  Ophtho: No vision changes  GI: see HPI  Derm: No rash  Heme: No lymphadenopathy, No bruising  MSK: No arthritis  : No dysuria, No hematuria  Endo: No hot or cold intolerance  Neuro: No syncope, No seizure  Psych: No anxiety, No depression    Medical History:  has a past medical history of Asthma, Hemorrhoid, Liver hemangioma (2014), PTSD (post-traumatic stress disorder), Shingles, and SVT (supraventricular tachycardia) (1/28/2020).    Surgical History:  has a past surgical history that includes Back surgery; Knee surgery; Elbow surgery; Colonoscopy (~2010  ([facility])); Esophagogastroduodenoscopy (N/A, 01/29/2019); Magnetic resonance imaging (N/A, 06/26/2019); Epidural steroid injection into cervical spine (N/A, 10/15/2019); Left heart catheterization (N/A, 10/23/2019); Coronary angiography (N/A, 10/23/2019); Colonoscopy (N/A, 07/12/2022); Ablation of arrhythmogenic focus for supraventricular tachycardia with  electrophysiology study; and Total knee arthroplasty (Right, 09/28/2023).    Family History: family history includes Allergic rhinitis in his daughter, daughter, and son; Asthma in his daughter; COPD in his father; Diverticulitis in his mother; Hypertension in his father..     Social History:  reports that he has never smoked. He has never used smokeless tobacco. He reports current alcohol use of about 21.0 standard drinks of alcohol per week. He reports current drug use. Drug: Marijuana.    Review of patient's allergies indicates:   Allergen Reactions    Ciprofloxacin Hives         Medication List with Changes/Refills   New Medications    PANTOPRAZOLE (PROTONIX) 40 MG TABLET    Take 1 tablet (40 mg total) by mouth once daily.   Current Medications    ALLERGY RELIEF, LORATADINE, 10 MG TABLET    TAKE ONE TABLET BY MOUTH ONCE DAILY    AMLODIPINE (NORVASC) 10 MG TABLET    Take 1 tablet (10 mg total) by mouth once daily.    AZITHROMYCIN (Z-LEE) 250 MG TABLET    Take 2 tablets by mouth on day 1; Take 1 tablet by mouth on days 2-5    BUSPIRONE (BUSPAR) 5 MG TAB    Take 1 tablet (5 mg total) by mouth 3 (three) times daily.    CETIRIZINE (ZYRTEC) 10 MG TABLET    TAKE ONE TABLET BY MOUTH ONCE DAILY AS NEEDED FOR ALLERGIES    CO-ENZYME Q-10 30 MG CAPSULE    Take 100 mg by mouth once daily.    COLCHICINE (COLCRYS) 0.6 MG TABLET    TAKE 1 TABLET BY MOUTH 2 TIMES DAILY.    CYCLOBENZAPRINE (FLEXERIL) 10 MG TABLET    Take 10 mg by mouth 3 (three) times daily as needed. PRN    DICLOFENAC SODIUM (VOLTAREN) 1 % GEL    APPLY 2 GRAMS TOPICALLY FOUR TIMES A DAY AS NEEDED FOR PAIN    DOXYCYCLINE (ORACEA) 40 MG CAPSULE    TAKE 1 CAPSULE BY MOUTH ONCE DAILY    ESOMEPRAZOLE (NEXIUM) 40 MG CAPSULE    Take 1 capsule (40 mg total) by mouth daily as needed.    FAMOTIDINE (PEPCID) 20 MG TABLET    Take 1 tablet (20 mg total) by mouth 2 (two) times daily.    FENOFIBRATE (TRICOR) 145 MG TABLET    Take 1 tablet (145 mg total) by mouth once daily.     "LORATADINE (CLARITIN) 10 MG TABLET    Take 10 mg by mouth daily as needed.    MECLIZINE (ANTIVERT) 25 MG TABLET    Take 1 tablet (25 mg total) by mouth 3 (three) times daily as needed for Dizziness (may cause drowsiness).    METRONIDAZOLE (METROGEL) 0.75 % GEL    Apply 1 application  topically every evening.    MOMETASONE (NASONEX) 50 MCG/ACTUATION NASAL SPRAY    Use 2 spray(s) in each nostril once daily    NIACIN (SLO-NIACIN) 250 MG TBSR    Take 250 mg by mouth every evening.    PROPRANOLOL (INDERAL) 10 MG TABLET    TAKE 1 TABLET BY MOUTH THREE TIMES A DAY    SILDENAFIL (VIAGRA) 100 MG TABLET    TAKE ONE TABLET BY MOUTH EVERY WEEK AS NEEDED 30 TO 60 MINUTES BEFORE INTERCOURSE FOR BEST RESULTS TAKE ON EMPTY STOMACH    SILVER SULFADIAZINE 1% (SILVADENE) 1 % CREAM    Apply topically 2 (two) times daily.    TRAMADOL (ULTRAM) 50 MG TABLET    Take 50 mg by mouth.    VALACYCLOVIR (VALTREX) 500 MG TABLET    Take 1 tablet (500 mg total) by mouth once daily.         Objective Findings:    Vital Signs:  /82   Pulse 72   Ht 5' 11" (1.803 m)   Wt 89.2 kg (196 lb 10.4 oz)   BMI 27.43 kg/m²   Body mass index is 27.43 kg/m².    Physical Exam:  General Appearance: Well appearing in no acute distress  Head:   Normocephalic, without obvious abnormality  Eyes:    No scleral icterus, EOMI  ENT: Neck supple, Lips, mucosa, and tongue normal; teeth and gums normal  Lungs: CTA bilaterally in anterior and posterior fields, no wheezes, no crackles.  Heart:  Regular rate and rhythm, S1, S2 normal, no murmurs heard  Abdomen: Soft, non tender, non distended with positive bowel sounds in all four quadrants. No hepatosplenomegaly, ascites, or mass  Extremities: 2+ pulses, no clubbing, cyanosis or edema  Skin: No rash  Neurologic: CN II-XII intact      Labs:  Lab Results   Component Value Date    WBC 5.29 05/05/2023    HGB 13.9 (L) 05/05/2023    HCT 44.7 05/05/2023     (H) 05/05/2023    CHOL 216 (H) 08/10/2023    TRIG 114 " 08/10/2023    HDL 46 08/10/2023    ALT 19 08/10/2023    AST 20 08/10/2023     09/26/2023    K 3.8 09/26/2023     08/10/2023    CREATININE 0.89 (L) 09/26/2023    BUN 17 09/26/2023    CO2 26 09/26/2023    TSH 2.140 01/25/2020    PSA 1.4 08/10/2023    INR 1.0 05/06/2020    HGBA1C 5.4 09/12/2014         Imaging:    Endoscopy:    EGD 2019 - wnl - normal bx  Colon 7/22 - wnl    Assessment:  1. Gastroesophageal reflux disease without esophagitis    2. Abdominal bloating           Recommendations:  1. Change ppi to protonix  2. Treat for 8 weeks, if not better then repeat EGD. Likely from recent pain meds, stress  3. Low fodmap diet, digestive enzymes    Follow up in about 4 months (around 5/10/2024).      Order summary:  Orders Placed This Encounter    pantoprazole (PROTONIX) 40 MG tablet         Thank you so much for allowing me to participate in the care of Jaren Cherry MD

## 2024-01-10 ENCOUNTER — OFFICE VISIT (OUTPATIENT)
Dept: GASTROENTEROLOGY | Facility: CLINIC | Age: 59
End: 2024-01-10
Payer: COMMERCIAL

## 2024-01-10 VITALS
DIASTOLIC BLOOD PRESSURE: 82 MMHG | HEIGHT: 71 IN | WEIGHT: 196.63 LBS | SYSTOLIC BLOOD PRESSURE: 134 MMHG | BODY MASS INDEX: 27.53 KG/M2 | HEART RATE: 72 BPM

## 2024-01-10 DIAGNOSIS — R14.0 ABDOMINAL BLOATING: ICD-10-CM

## 2024-01-10 DIAGNOSIS — K21.9 GASTROESOPHAGEAL REFLUX DISEASE WITHOUT ESOPHAGITIS: Primary | ICD-10-CM

## 2024-01-10 PROCEDURE — 99999 PR PBB SHADOW E&M-EST. PATIENT-LVL IV: CPT | Mod: PBBFAC,,, | Performed by: INTERNAL MEDICINE

## 2024-01-10 PROCEDURE — 1159F MED LIST DOCD IN RCRD: CPT | Mod: CPTII,S$GLB,, | Performed by: INTERNAL MEDICINE

## 2024-01-10 PROCEDURE — 3075F SYST BP GE 130 - 139MM HG: CPT | Mod: CPTII,S$GLB,, | Performed by: INTERNAL MEDICINE

## 2024-01-10 PROCEDURE — 3008F BODY MASS INDEX DOCD: CPT | Mod: CPTII,S$GLB,, | Performed by: INTERNAL MEDICINE

## 2024-01-10 PROCEDURE — 3079F DIAST BP 80-89 MM HG: CPT | Mod: CPTII,S$GLB,, | Performed by: INTERNAL MEDICINE

## 2024-01-10 PROCEDURE — 99204 OFFICE O/P NEW MOD 45 MIN: CPT | Mod: S$GLB,,, | Performed by: INTERNAL MEDICINE

## 2024-01-10 RX ORDER — TRAMADOL HYDROCHLORIDE 50 MG/1
50 TABLET ORAL
COMMUNITY
Start: 2023-11-07

## 2024-01-10 RX ORDER — LANSOPRAZOLE 30 MG/1
30 CAPSULE, DELAYED RELEASE ORAL DAILY
Qty: 30 CAPSULE | Refills: 3 | Status: SHIPPED | OUTPATIENT
Start: 2024-01-10 | End: 2024-01-12 | Stop reason: ALTCHOICE

## 2024-01-10 RX ORDER — DICLOFENAC SODIUM 10 MG/G
GEL TOPICAL
COMMUNITY
Start: 2023-07-26

## 2024-01-10 RX ORDER — PANTOPRAZOLE SODIUM 40 MG/1
40 TABLET, DELAYED RELEASE ORAL DAILY
Qty: 30 TABLET | Refills: 3 | Status: SHIPPED | OUTPATIENT
Start: 2024-01-10 | End: 2024-01-10

## 2024-01-10 RX ORDER — LORATADINE 10 MG/1
10 TABLET ORAL DAILY PRN
COMMUNITY

## 2024-01-10 RX ORDER — SILVER SULFADIAZINE 10 G/1000G
CREAM TOPICAL 2 TIMES DAILY
COMMUNITY
Start: 2023-10-25

## 2024-01-10 RX ORDER — METRONIDAZOLE 7.5 MG/G
1 GEL TOPICAL NIGHTLY
COMMUNITY
Start: 2024-01-04

## 2024-01-10 NOTE — PATIENT INSTRUCTIONS
DGL - licorice  https://www.amazon.com/Natural-Factors-Licorice-Supports-Integrity/dp/D12U62I8JU      - Enzymedica Digest Gold  https://www.Taquilla/s?k=enzymedica+digest+basic&ref=nb_sb_noss_1  Containes  ATPro: A blend of ATP, magnesium citrate, phytase, and CoQ10  Amylase Thera Blend  Thera protease mix  Glucoamylase  Alpha galactosidase  Cellulase Thera blend  Lipase Thera blend  Lactase  Beta Glucanase  Maltase    - Fodzyme  https://www.fodzyme.com/  Contains  Fructan hydrolase: Breaks down fructan and inulin, which are found in garlic, onions, and wheat  Alpha-galactosidase: Breaks down GOS, which is found in beans and legumes  Lactase: Breaks down lactose, which is found in dairy  Dextrin: Stabilizes the enzymes  Dextrose: Stabilizes the enzymes  Maltodextrin: Stabilizes the enzymes            Low Fodmap Diet    1) Completeley restrict FODMAPS on diet for 6 weeks  2) After that reintroduce one food for 3 days at a time to see which foods you can tolerate  3) Space out meals and maintain a balanced diet     Purchase - The Complete Low Fodmap Diet: A Revolutionary Plan for Managing IBS and other Digestive Disorders by Amanda Cisneros PhD, Pedro Edmondson MD, Ge Marquez MD   ($13.25 on Amazon.com)    Purchase - [employer] Low Fodmap Joy for smartphones    Watch: Mandi Baumann Youtube channel for advice    Website: For more detailed info  https://DipJar/fodmaps.html        Fodmaps (fructo,oligo,mono saccharides and polyols) are contained in certain foods and can cause significant bloating in some people. Reducing these foods can reduce bloating. Start off by cutting out anything with high fructose corn syrup in the ingredients.        Foods suitable on a low-fodmap diet  fruit  banana, blueberry, boysenberry, canteloupe, cranberry, durian, grape,  grapefruit, honeydew melon, kiwifruit, lemon,lime, mandarin, orange,  passionfruit, pawpaw, raspberry, rhubarb, rockmelon, star anise,  strawberry,  tangelo  vegetables  alfalfa, artichoke, bamboo shoots, bean shoots, bok ruchi,  carrot, celery, choko, ruchi sum, endive, osman, green beans,  lettuce, olives, parsnip, potato, pumpkin, red capsicum (bell pepper),  silver beet, spinach, summer squash (yellow), swede, sweet potato, taro, tomato,  turnip, yam, zucchini   herbs  basil, chili, coriander, osman, lemongrass,   marjoram, mint, oregano, parsley, rosemary, thyme  milk  lactose-free milk, oat milk*, rice milk, soy milk*  cheeses  hard cheeses, and brie and camembert  yoghurt  lactose-free varieties  ice-cream substitutes  gelati, sorbet  butter substitutes  olive oilgrain foods  cereals  gluten-free bread or cereal products  bread  100% spelt bread  Rice, oats, polenta, other  arrowroot, millet, psyllium, quinoa, sorgum, tapioca  sweeteners  sugar* (sucrose), glucose, artificial sweeteners not ending in -ol  honey substitutes  catherine syrup*, maple syrup*, molasses, treacle  *small quantities  *check for additives  Note: if fruit is dried, eat in small quantities    excess fructose lactose fructans galactans polyols      Eliminate foods containing fodmaps  fruit  apple, apricot, avocado, blackberry, cherry, lychee, nashi, nectarine, peach, pear, plum,  prune, watermelon, ramon, tinned fruit in natural juice,   sweeteners  sorbitol (420)  mannitol (421)  isomalt (953)  maltitol (965)  xylitol (967)  legumes  baked beans, chickpeas, kidney beans, lentils  vegetables  asparagus, beetroot, broccoli, brussels sprouts, cabbage, eggplant, fennel, garlic,  aurelia, okra, onion (all), shallots, spring onion, cauliflower, green capsicum (bell pepper), mushroom, sweet corn  cereals  wheat and rye, in large amounts eg. Bread, crackers, cookies, couscous, pasta  fruit  custard apple, persimmon, watermelon  miscellaneous  chicory, dandelion, inulin  sweeteners  fructose, high fructose corn syrup  large total fructose dose  concentrated fruit sources, large servings of  fruit, dried fruit, fruit juice  honey  corn syrup, fruisana  milk  milk from cows, goats or sheep, custard, ice cream, yoghurt  cheeses  soft unripened cheeses eg. cottage, cream, mascarpone    Additional FODMAPs Diet Reading List    IBS-Free At Last!: Second Edition: Change Your Carbs, Change Your Life with the FODMAP Elimination Diet, 2 nd edition by Venita Pavon MS, RD     2012 copyright; [employer].   ISBN: 951-0-5497422-2-1    The Complete Idiots Guide To Living Well With IBS by Mandi Baumann RD, LDN  [facility], 2010    The FODMAPs Approach:--Minimize Consumption of Fermentable Carbs to Manage Functional Gut Disorder Symptoms  by Mandi Baumann RD, LDN article found in  Todays Dietitian, vol. 12, no. 8, p. 30    The Inside Tract: Your Good Gut Guide To Great Digestive Health by Julio Loza MD and Isa Unger, MS, RD, LDN  2011 copyright, [employer]   ISBN: 083-4-51968-264-9    List assembled by: Catarina Perales, MS, RD, LDN, E  [facility]  Last Updated: 7/13/12

## 2024-01-10 NOTE — PROGRESS NOTES
"GENERAL GI PATIENT INTAKE:    COVID symptoms in the last 7 days (runny nose, sore throat, congestion, cough, fever): No  PCP: Patrick Varghese  If not PCP-  number given to establish [phone]: No    ALLERGIES REVIEWED:  Yes    CHIEF COMPLAINT:    Chief Complaint   Patient presents with    Initial Visit    Dysphagia    Gastroesophageal Reflux       VITAL SIGNS:  /82   Pulse 72   Ht 5' 11" (1.803 m)   Wt 89.2 kg (196 lb 10.4 oz)   BMI 27.43 kg/m²      Change in medical, surgical, family or social history: No      REVIEWED MEDICATION LIST RECONCILED INCLUDING ABOVE MEDS:  Yes     "

## 2024-01-11 RX ORDER — PANTOPRAZOLE SODIUM 40 MG/1
TABLET, DELAYED RELEASE ORAL
Refills: 0 | OUTPATIENT
Start: 2024-01-11

## 2024-01-12 ENCOUNTER — PATIENT MESSAGE (OUTPATIENT)
Dept: GASTROENTEROLOGY | Facility: CLINIC | Age: 59
End: 2024-01-12
Payer: COMMERCIAL

## 2024-01-12 RX ORDER — PANTOPRAZOLE SODIUM 40 MG/1
40 TABLET, DELAYED RELEASE ORAL DAILY
Qty: 30 TABLET | Refills: 11 | Status: SHIPPED | OUTPATIENT
Start: 2024-01-12 | End: 2025-01-11

## 2024-01-12 NOTE — TELEPHONE ENCOUNTER
----- Message from Vanessa Enriquez sent at 1/12/2024  2:26 PM CST -----  Regarding: Needs new prescription  Name of Who is Calling: SHANNA MCMANUS [[id number]] Licha YANES        What is the request in detail: Pharmacy called states new prescription needed for Pantoprazole 40 mg. Please advise        Can the clinic reply by [facility]: No        What Number to Call Back [facility] [phone]    [facility] [id number] [facility] [employer] - [address]  [address] [place] [address] 2  [facility] [address]  Phone: [phone] Fax: [phone]

## 2024-01-19 ENCOUNTER — OFFICE VISIT (OUTPATIENT)
Dept: FAMILY MEDICINE | Facility: CLINIC | Age: 59
End: 2024-01-19
Payer: COMMERCIAL

## 2024-01-19 DIAGNOSIS — J01.91 ACUTE RECURRENT SINUSITIS, UNSPECIFIED LOCATION: Primary | ICD-10-CM

## 2024-01-19 PROCEDURE — 99213 OFFICE O/P EST LOW 20 MIN: CPT | Mod: 95,,,

## 2024-01-19 RX ORDER — PREDNISONE 10 MG/1
10 TABLET ORAL DAILY
Qty: 5 TABLET | Refills: 0 | Status: SHIPPED | OUTPATIENT
Start: 2024-01-19 | End: 2024-01-24

## 2024-01-19 RX ORDER — AMOXICILLIN 500 MG/1
500 CAPSULE ORAL EVERY 12 HOURS
Qty: 14 CAPSULE | Refills: 0 | Status: SHIPPED | OUTPATIENT
Start: 2024-01-19 | End: 2024-01-26

## 2024-01-19 NOTE — PROGRESS NOTES
The patient location is: [place]  The chief complaint leading to consultation is: sinus congestion    Visit type: audiovisual     Face to Face time with patient: 7  20 minutes of total time spent on the encounter, which includes face to face time and non-face to face time preparing to see the patient (eg, review of tests), Obtaining and/or reviewing separately obtained history, Documenting clinical information in the electronic or other health record, Independently interpreting results (not separately reported) and communicating results to the patient/family/caregiver, or Care coordination (not separately reported).         Each patient to whom he or she provides medical services by telemedicine is:  (1) informed of the relationship between the physician and patient and the respective role of any other health care provider with respect to management of the patient; and (2) notified that he or she may decline to receive medical services by telemedicine and may withdraw from such care at any time.    Notes:       Name: Jaren Quiroz  MRN: [id number]  : 1965  PCP: Patrick Varghese MD    HPI    Patient follows with Dr. Varghese, new to me. Complains of sinus congestion, sore throat since . Went to urgent care and was given z-pack. Symptoms have persisted since completing antibiotic. Complains of sinus congestion, sore throat, nasal drip, cough and occasional SOB. Denies any fevers, SOB, or sinus pain. Takes antihistamine, nasal spray, and nasal wash outs daily. Tried OTC allergy/cold medication with no relief in symptoms.     Review of Systems   HENT:  Positive for congestion and sore throat. Negative for drooling, ear discharge, ear pain and trouble swallowing.    Respiratory:  Positive for cough, shortness of breath and stridor.    Gastrointestinal:  Negative for abdominal pain, diarrhea and vomiting.   Musculoskeletal:  Positive for neck pain.   Neurological:  Positive for headaches.       Patient Active  Problem List   Diagnosis    Primary hypertension    Urticaria    Allergic rhinitis    Mixed hyperlipidemia    DDD (degenerative disc disease), lumbar    Gastroesophageal reflux disease without esophagitis    Decreased range of motion of neck    Cervical radiculitis    Cervical spondylosis    Fatty liver    Right ankle pain    Tendonitis, Achilles, right    Acute medial meniscus tear of right knee    Genu varum of both lower extremities    Post-traumatic osteoarthritis of right knee    Pharyngitis       There were no vitals filed for this visit.    Physical Exam  Constitutional:       General: He is not in acute distress.     Appearance: Normal appearance.   HENT:      Head: Normocephalic and atraumatic.      Right Ear: External ear normal.      Left Ear: External ear normal.      Nose: Nose normal.   Eyes:      Conjunctiva/sclera: Conjunctivae normal.      Pupils: Pupils are equal, round, and reactive to light.   Skin:     Coloration: Skin is not jaundiced or pale.   Neurological:      Mental Status: He is alert.   Psychiatric:         Mood and Affect: Mood normal.         1. Acute recurrent sinusitis, unspecified location  -     amoxicillin (AMOXIL) 500 MG capsule; Take 1 capsule (500 mg total) by mouth every 12 (twelve) hours. for 7 days  Dispense: 14 capsule; Refill: 0  -     predniSONE (DELTASONE) 10 MG tablet; Take 1 tablet (10 mg total) by mouth once daily. for 5 days  Dispense: 5 tablet; Refill: 0        Follow up as needed or if symptoms fail to improve       AYANNA Dubose  01/19/2024      Answers submitted by the patient for this visit:  Sore Throat Questionnaire (Submitted on 1/19/2024)  Chief Complaint: Sore throat  Chronicity: new  Onset: 1 to 4 weeks ago  Progression since onset: rapidly worsening  Pain worse on: neither  Fever: no fever  Pain - numeric: 5/10  hoarse voice: Yes  plugged ear sensation: No  swollen glands: Yes  strep: No  mono: No  Treatments tried: NSAIDs, acetaminophen, cool liquids,  gargles, oral narcotic analgesics  Improvement on treatment: no relief  Pain severity: moderate

## 2024-02-05 RX ORDER — VALACYCLOVIR HYDROCHLORIDE 500 MG/1
500 TABLET, FILM COATED ORAL DAILY
Qty: 90 TABLET | Refills: 3 | Status: SHIPPED | OUTPATIENT
Start: 2024-02-05

## 2024-02-05 NOTE — TELEPHONE ENCOUNTER
No care due was identified.  [facility] Embedded Care Due Messages. Reference number: [id number].   2/05/2024 1:38:32 PM CST

## 2024-02-29 ENCOUNTER — OFFICE VISIT (OUTPATIENT)
Dept: FAMILY MEDICINE | Facility: CLINIC | Age: 59
End: 2024-02-29
Payer: COMMERCIAL

## 2024-02-29 ENCOUNTER — LAB VISIT (OUTPATIENT)
Dept: LAB | Facility: HOSPITAL | Age: 59
End: 2024-02-29
Attending: STUDENT IN AN ORGANIZED HEALTH CARE EDUCATION/TRAINING PROGRAM
Payer: COMMERCIAL

## 2024-02-29 VITALS
BODY MASS INDEX: 27.78 KG/M2 | DIASTOLIC BLOOD PRESSURE: 86 MMHG | HEIGHT: 71 IN | HEART RATE: 73 BPM | OXYGEN SATURATION: 98 % | SYSTOLIC BLOOD PRESSURE: 132 MMHG | WEIGHT: 198.44 LBS

## 2024-02-29 DIAGNOSIS — I47.10 SUPRAVENTRICULAR TACHYCARDIA: ICD-10-CM

## 2024-02-29 DIAGNOSIS — R63.4 UNINTENDED WEIGHT LOSS: ICD-10-CM

## 2024-02-29 DIAGNOSIS — K21.9 GASTROESOPHAGEAL REFLUX DISEASE WITHOUT ESOPHAGITIS: Primary | ICD-10-CM

## 2024-02-29 DIAGNOSIS — E78.2 MIXED HYPERLIPIDEMIA: ICD-10-CM

## 2024-02-29 DIAGNOSIS — I10 PRIMARY HYPERTENSION: ICD-10-CM

## 2024-02-29 DIAGNOSIS — M17.31 POST-TRAUMATIC OSTEOARTHRITIS OF RIGHT KNEE: ICD-10-CM

## 2024-02-29 LAB
ALBUMIN SERPL BCP-MCNC: 4.3 G/DL (ref 3.5–5.2)
ALP SERPL-CCNC: 40 U/L (ref 55–135)
ALT SERPL W/O P-5'-P-CCNC: 12 U/L (ref 10–44)
ANION GAP SERPL CALC-SCNC: 8 MMOL/L (ref 8–16)
AST SERPL-CCNC: 16 U/L (ref 10–40)
BILIRUB SERPL-MCNC: 0.8 MG/DL (ref 0.1–1)
BUN SERPL-MCNC: 18 MG/DL (ref 6–20)
CALCIUM SERPL-MCNC: 9.6 MG/DL (ref 8.7–10.5)
CHLORIDE SERPL-SCNC: 106 MMOL/L (ref 95–110)
CHOLEST SERPL-MCNC: 203 MG/DL (ref 120–199)
CHOLEST/HDLC SERPL: 4.2 {RATIO} (ref 2–5)
CO2 SERPL-SCNC: 25 MMOL/L (ref 23–29)
CREAT SERPL-MCNC: 1 MG/DL (ref 0.5–1.4)
ERYTHROCYTE [DISTWIDTH] IN BLOOD BY AUTOMATED COUNT: 15.3 % (ref 11.5–14.5)
EST. GFR  (NO RACE VARIABLE): >60 ML/MIN/1.73 M^2
GLUCOSE SERPL-MCNC: 109 MG/DL (ref 70–110)
HCT VFR BLD AUTO: 41.6 % (ref 40–54)
HDLC SERPL-MCNC: 48 MG/DL (ref 40–75)
HDLC SERPL: 23.6 % (ref 20–50)
HGB BLD-MCNC: 13 G/DL (ref 14–18)
LDLC SERPL CALC-MCNC: 132 MG/DL (ref 63–159)
MCH RBC QN AUTO: 26.9 PG (ref 27–31)
MCHC RBC AUTO-ENTMCNC: 31.3 G/DL (ref 32–36)
MCV RBC AUTO: 86 FL (ref 82–98)
NONHDLC SERPL-MCNC: 155 MG/DL
PLATELET # BLD AUTO: 422 K/UL (ref 150–450)
PMV BLD AUTO: 10.3 FL (ref 9.2–12.9)
POTASSIUM SERPL-SCNC: 4 MMOL/L (ref 3.5–5.1)
PROT SERPL-MCNC: 7.1 G/DL (ref 6–8.4)
RBC # BLD AUTO: 4.84 M/UL (ref 4.6–6.2)
SODIUM SERPL-SCNC: 139 MMOL/L (ref 136–145)
TRIGL SERPL-MCNC: 115 MG/DL (ref 30–150)
WBC # BLD AUTO: 6.23 K/UL (ref 3.9–12.7)

## 2024-02-29 PROCEDURE — 80061 LIPID PANEL: CPT | Performed by: STUDENT IN AN ORGANIZED HEALTH CARE EDUCATION/TRAINING PROGRAM

## 2024-02-29 PROCEDURE — 99214 OFFICE O/P EST MOD 30 MIN: CPT | Mod: S$GLB,,, | Performed by: STUDENT IN AN ORGANIZED HEALTH CARE EDUCATION/TRAINING PROGRAM

## 2024-02-29 PROCEDURE — 1159F MED LIST DOCD IN RCRD: CPT | Mod: CPTII,S$GLB,, | Performed by: STUDENT IN AN ORGANIZED HEALTH CARE EDUCATION/TRAINING PROGRAM

## 2024-02-29 PROCEDURE — 36415 COLL VENOUS BLD VENIPUNCTURE: CPT | Mod: PO | Performed by: STUDENT IN AN ORGANIZED HEALTH CARE EDUCATION/TRAINING PROGRAM

## 2024-02-29 PROCEDURE — 3079F DIAST BP 80-89 MM HG: CPT | Mod: CPTII,S$GLB,, | Performed by: STUDENT IN AN ORGANIZED HEALTH CARE EDUCATION/TRAINING PROGRAM

## 2024-02-29 PROCEDURE — 3075F SYST BP GE 130 - 139MM HG: CPT | Mod: CPTII,S$GLB,, | Performed by: STUDENT IN AN ORGANIZED HEALTH CARE EDUCATION/TRAINING PROGRAM

## 2024-02-29 PROCEDURE — 3008F BODY MASS INDEX DOCD: CPT | Mod: CPTII,S$GLB,, | Performed by: STUDENT IN AN ORGANIZED HEALTH CARE EDUCATION/TRAINING PROGRAM

## 2024-02-29 PROCEDURE — 99999 PR PBB SHADOW E&M-EST. PATIENT-LVL IV: CPT | Mod: PBBFAC,,, | Performed by: STUDENT IN AN ORGANIZED HEALTH CARE EDUCATION/TRAINING PROGRAM

## 2024-02-29 PROCEDURE — 85027 COMPLETE CBC AUTOMATED: CPT | Performed by: STUDENT IN AN ORGANIZED HEALTH CARE EDUCATION/TRAINING PROGRAM

## 2024-02-29 PROCEDURE — 80053 COMPREHEN METABOLIC PANEL: CPT | Performed by: STUDENT IN AN ORGANIZED HEALTH CARE EDUCATION/TRAINING PROGRAM

## 2024-02-29 NOTE — PROGRESS NOTES
Name: Jaren Hillsin  MRN: [id number]  : 1965  PCP: Patrick Varghese MD    Subjective   Patient presents for regular follow up.    Review of Systems   Constitutional:  Positive for unexpected weight change.   Respiratory:  Negative for chest tightness.    Cardiovascular:  Positive for chest pain (acid reflux). Negative for palpitations and leg swelling.   Gastrointestinal:  Negative for nausea and vomiting.   Neurological:  Negative for dizziness and light-headedness.       Patient Active Problem List   Diagnosis    Primary hypertension    Urticaria    Allergic rhinitis    Mixed hyperlipidemia    DDD (degenerative disc disease), lumbar    Gastroesophageal reflux disease without esophagitis    Decreased range of motion of neck    Cervical radiculitis    Cervical spondylosis    Supraventricular tachycardia    Fatty liver    Right ankle pain    Tendonitis, Achilles, right    Acute medial meniscus tear of right knee    Genu varum of both lower extremities    Post-traumatic osteoarthritis of right knee    Pharyngitis       Health Maintenance Due   Topic Date Due    Shingles Vaccine (2 of 3) 2015       Objective   Vitals:    24 0957   BP: 132/86   Pulse: 73       Physical Exam  Constitutional:       General: He is not in acute distress.     Appearance: Normal appearance. He is well-developed.   HENT:      Head: Normocephalic and atraumatic.      Right Ear: External ear normal.      Left Ear: External ear normal.   Eyes:      Conjunctiva/sclera: Conjunctivae normal.   Pulmonary:      Effort: Pulmonary effort is normal. No respiratory distress.   Abdominal:      General: Abdomen is flat. There is no distension.   Musculoskeletal:         General: No swelling or deformity.      Right lower leg: No edema.      Left lower leg: No edema.   Skin:     General: Skin is warm and dry.      Coloration: Skin is not jaundiced.   Neurological:      Mental Status: He is alert and oriented to person, place, and  time. Mental status is at baseline.   Psychiatric:         Attention and Perception: Attention and perception normal.         Mood and Affect: Mood normal.         Speech: Speech normal.         Behavior: Behavior normal. Behavior is cooperative.         Thought Content: Thought content normal.         Cognition and Memory: Cognition normal.         Judgment: Judgment normal.         Assessment & Plan   1. Gastroesophageal reflux disease without esophagitis  Assessment & Plan:  Not at goal. He saw Dr. Cherry earlier this year about reflux symptoms (patient thought a chronic cough might be related). Dilip switched patient from Nexium to Protonix and said to consider EGD if no improvement in 8 weeks. Protonix doesn't seem to work as well as Nexium did and it has been about 8 weeks. I recommend moving forward with the EGD.    Patient also had unexplained weight loss that has leveled off now - about 11 lbs total. Labs as listed in case an ulcer might be present.      2. Unintended weight loss  -     CBC Without Differential; Future; Expected date: 02/29/2024    3. Primary hypertension  Assessment & Plan:  Controlled. Continue amlodipine. And propranolol.    Orders:  -     Comprehensive Metabolic Panel; Future; Expected date: 02/29/2024    4. Supraventricular tachycardia  Assessment & Plan:  Controlled with propranolol. Continue such.      5. Mixed hyperlipidemia  -     Lipid Panel; Future; Expected date: 02/29/2024    6. Post-traumatic osteoarthritis of right knee  Assessment & Plan:  S/p surgical replacement of knee. Undergoing physical therapy and he feels he is improving. Follow up with orthopedic surgery.          Follow up in 6 months.     Patrick Varghese MD  02/29/2024

## 2024-02-29 NOTE — ASSESSMENT & PLAN NOTE
S/p surgical replacement of knee. Undergoing physical therapy and he feels he is improving. Follow up with orthopedic surgery.

## 2024-02-29 NOTE — ASSESSMENT & PLAN NOTE
Controlled with propranolol. Continue such.   Phone call to pt today with pre-op instructions given including NPO after MN, medications to take/hold the morning of surgery, arrival procedure and location, shower with antibacterial soap. Pts questions answered and concerns addressed; informed pt about lab (PT/INR), POC, Dr Parrish appointments to be scheduled and that he will be contacted by POC ; pt verbalized understanding.

## 2024-02-29 NOTE — ASSESSMENT & PLAN NOTE
Not at goal. He saw Dr. Cherry earlier this year about reflux symptoms (patient thought a chronic cough might be related). Dilip switched patient from Nexium to Protonix and said to consider EGD if no improvement in 8 weeks. Protonix doesn't seem to work as well as Nexium did and it has been about 8 weeks. I recommend moving forward with the EGD.    Patient also had unexplained weight loss that has leveled off now - about 11 lbs total. Labs as listed in case an ulcer might be present.

## 2024-03-04 ENCOUNTER — PATIENT MESSAGE (OUTPATIENT)
Dept: GASTROENTEROLOGY | Facility: CLINIC | Age: 59
End: 2024-03-04
Payer: COMMERCIAL

## 2024-03-07 ENCOUNTER — TELEPHONE (OUTPATIENT)
Dept: ENDOSCOPY | Facility: HOSPITAL | Age: 59
End: 2024-03-07
Payer: COMMERCIAL

## 2024-03-07 DIAGNOSIS — K21.9 GASTROESOPHAGEAL REFLUX DISEASE, UNSPECIFIED WHETHER ESOPHAGITIS PRESENT: Primary | ICD-10-CM

## 2024-03-07 NOTE — TELEPHONE ENCOUNTER
"Spoke to pt to schedule procedure(s) Upper Endoscopy (EGD)       Physician to perform procedure(s) Dr. RALPH Mcleod  Date of Procedure (s) 3/13/24  Arrival Time 9:55 AM  Time of Procedure(s) 10:55 AM   Location of Procedure(s) [address]  Type of Rx Prep sent to patient: N/A  Instructions provided to patient via [facility]    Patient was informed on the following information and verbalized understanding. Screening questionnaire reviewed with patient and complete. If procedure requires anesthesia, a responsible adult needs to be present to accompany the patient home, patient cannot drive after receiving anesthesia. Appointment details are tentative, especially check-in time. Patient will receive a prep-op call 7 days prior to confirm check-in time for procedure. If applicable the patient should contact their pharmacy to verify Rx for procedure prep is ready for pick-up. Patient was advised to call the scheduling department at [phone] if pharmacy states no Rx is available. Patient was advised to call the endoscopy scheduling department if any questions or concerns arise.       Endoscopy Scheduling Department      From: Victoriano Cherry MD   Sent: 3/5/2024  12:51 PM CST   To: [facility]     Procedure: EGD     Diagnosis: GERD     Procedure Timin-4 weeks     *If within 4 weeks selected, please tano as high priority*     *If greater than 12 weeks, please select "4-12 weeks" and delay sending until 2 months prior to requested date*     Provider: Any provider     Location: No Preference     Additional Scheduling Information: No scheduling concerns     Prep Specifications:N/A     Have you attached a patient to this message: yes         "

## 2024-03-13 ENCOUNTER — ANESTHESIA (OUTPATIENT)
Dept: ENDOSCOPY | Facility: HOSPITAL | Age: 59
End: 2024-03-13
Payer: COMMERCIAL

## 2024-03-13 ENCOUNTER — HOSPITAL ENCOUNTER (OUTPATIENT)
Facility: HOSPITAL | Age: 59
Discharge: HOME | End: 2024-03-13
Attending: INTERNAL MEDICINE | Admitting: INTERNAL MEDICINE
Payer: COMMERCIAL

## 2024-03-13 ENCOUNTER — ANESTHESIA EVENT (OUTPATIENT)
Dept: ENDOSCOPY | Facility: HOSPITAL | Age: 59
End: 2024-03-13
Payer: COMMERCIAL

## 2024-03-13 VITALS
DIASTOLIC BLOOD PRESSURE: 80 MMHG | BODY MASS INDEX: 26.74 KG/M2 | TEMPERATURE: 98 F | RESPIRATION RATE: 17 BRPM | SYSTOLIC BLOOD PRESSURE: 123 MMHG | WEIGHT: 191 LBS | HEART RATE: 59 BPM | HEIGHT: 71 IN | OXYGEN SATURATION: 100 %

## 2024-03-13 DIAGNOSIS — K21.9 GERD (GASTROESOPHAGEAL REFLUX DISEASE): ICD-10-CM

## 2024-03-13 PROCEDURE — 25000003 PHARM REV CODE 250: Performed by: NURSE ANESTHETIST, CERTIFIED REGISTERED

## 2024-03-13 PROCEDURE — 37000009 HC ANESTHESIA EA ADD 15 MINS: Performed by: INTERNAL MEDICINE

## 2024-03-13 PROCEDURE — 37000008 HC ANESTHESIA 1ST 15 MINUTES: Performed by: INTERNAL MEDICINE

## 2024-03-13 PROCEDURE — E9220 PRA ENDO ANESTHESIA: HCPCS | Mod: ,,, | Performed by: NURSE ANESTHETIST, CERTIFIED REGISTERED

## 2024-03-13 PROCEDURE — 43235 EGD DIAGNOSTIC BRUSH WASH: CPT | Mod: ,,, | Performed by: INTERNAL MEDICINE

## 2024-03-13 PROCEDURE — 43235 EGD DIAGNOSTIC BRUSH WASH: CPT | Performed by: INTERNAL MEDICINE

## 2024-03-13 PROCEDURE — 63600175 PHARM REV CODE 636 W HCPCS: Performed by: NURSE ANESTHETIST, CERTIFIED REGISTERED

## 2024-03-13 RX ORDER — PROPOFOL 10 MG/ML
VIAL (ML) INTRAVENOUS
Status: DISCONTINUED | OUTPATIENT
Start: 2024-03-13 | End: 2024-03-13

## 2024-03-13 RX ORDER — LIDOCAINE HYDROCHLORIDE 20 MG/ML
INJECTION INTRAVENOUS
Status: DISCONTINUED | OUTPATIENT
Start: 2024-03-13 | End: 2024-03-13

## 2024-03-13 RX ORDER — SODIUM CHLORIDE 9 MG/ML
INJECTION, SOLUTION INTRAVENOUS CONTINUOUS
Status: DISCONTINUED | OUTPATIENT
Start: 2024-03-13 | End: 2024-03-13 | Stop reason: HOSPADM

## 2024-03-13 RX ADMIN — SODIUM CHLORIDE: 0.9 INJECTION, SOLUTION INTRAVENOUS at 09:03

## 2024-03-13 RX ADMIN — PROPOFOL 100 MG: 10 INJECTION, EMULSION INTRAVENOUS at 09:03

## 2024-03-13 RX ADMIN — LIDOCAINE HYDROCHLORIDE 100 MG: 20 INJECTION INTRAVENOUS at 09:03

## 2024-03-13 NOTE — ANESTHESIA POSTPROCEDURE EVALUATION
Anesthesia Post Evaluation    Patient: Jaren Quiroz    Procedure(s) Performed: Procedure(s) (LRB):  EGD (ESOPHAGOGASTRODUODENOSCOPY) (N/A)    Final Anesthesia Type: general      Patient location during evaluation: PACU  Patient participation: Yes- Able to Participate  Level of consciousness: awake and alert  Post-procedure vital signs: reviewed and stable  Pain management: adequate  Airway patency: patent    PONV status at discharge: No PONV  Anesthetic complications: no      Cardiovascular status: blood pressure returned to baseline  Respiratory status: unassisted  Follow-up not needed.              Vitals Value Taken Time   /80 03/13/24 1015   Temp 36.9 °C (98.4 °F) 03/13/24 0945   Pulse 59 03/13/24 1015   Resp 17 03/13/24 1015   SpO2 100 % 03/13/24 1015         Event Time   Out of Recovery 10:15:32         Pain/Pepe Score: Pepe Score: 9 (3/13/2024  9:47 AM)

## 2024-03-13 NOTE — PROVATION PATIENT INSTRUCTIONS
Discharge Summary/Instructions after an Endoscopic Procedure  Patient Name: Jaren Hillsin  Patient MRN: [id number]  Patient YOB: 1965 Wednesday, March 13, 2024  Fadi Mcleod MD  Dear patient,  As a result of recent federal legislation (The Federal Cures Act), you may   receive lab or pathology results from your procedure in your [facility]   account before your physician is able to contact you. Your physician or   their representative will relay the results to you with their   recommendations at their soonest availability.  Thank you,  RESTRICTIONS:  During your procedure today, you received medications for sedation.  These   medications may affect your judgment, balance and coordination.  Therefore,   for 24 hours, you have the following restrictions:   - DO NOT drive a car, operate machinery, make legal/financial decisions,   sign important papers or drink alcohol.    ACTIVITY:  Today: no heavy lifting, straining or running due to procedural   sedation/anesthesia.  The following day: return to full activity including work.  DIET:  Eat and drink normally unless instructed otherwise.     TREATMENT FOR COMMON SIDE EFFECTS:  - Mild abdominal pain, nausea, belching, bloating or excessive gas:  rest,   eat lightly and use a heating pad.  - Sore Throat: treat with throat lozenges and/or gargle with warm salt   water.  - Because air was used during the procedure, expelling large amounts of air   from your rectum or belching is normal.  - If a bowel prep was taken, you may not have a bowel movement for 1-3 days.    This is normal.  SYMPTOMS TO WATCH FOR AND REPORT TO YOUR PHYSICIAN:  1. Abdominal pain or bloating, other than gas cramps.  2. Chest pain.  3. Back pain.  4. Signs of infection such as: chills or fever occurring within 24 hours   after the procedure.  5. Rectal bleeding, which would show as bright red, maroon, or black stools.   (A tablespoon of blood from the rectum is not serious, especially  if   hemorrhoids are present.)  6. Vomiting.  7. Weakness or dizziness.  GO DIRECTLY TO THE NEAREST EMERGENCY ROOM IF YOU HAVE ANY OF THE FOLLOWING:      Difficulty breathing              Chills and/or fever over 101 F   Persistent vomiting and/or vomiting blood   Severe abdominal pain   Severe chest pain   Black, tarry stools   Bleeding- more than one tablespoon   Any other symptom or condition that you feel may need urgent attention  Your doctor recommends these additional instructions:  If any biopsies were taken, your doctors clinic will contact you in 1 to 2   weeks with any results.  - Discharge patient to home.   - Resume previous diet today.   - Continue present medications.   - Return to referring physician as previously scheduled.  For questions, problems or results please call your physician - Fadi Mcleod MD at Work:  [phone].  [facility], EMERGENCY ROOM PHONE NUMBER: [phone]  IF A COMPLICATION OR EMERGENCY SITUATION ARISES AND YOU ARE UNABLE TO REACH   YOUR PHYSICIAN - GO DIRECTLY TO THE EMERGENCY ROOM.  Fadi Mcleod MD  3/13/2024 9:40:52 AM  This report has been verified and signed electronically.  Dear patient,  As a result of recent federal legislation (The Federal Cures Act), you may   receive lab or pathology results from your procedure in your [facility]   account before your physician is able to contact you. Your physician or   their representative will relay the results to you with their   recommendations at their soonest availability.  Thank you,  PROVATION

## 2024-03-13 NOTE — TRANSFER OF CARE
"Anesthesia Transfer of Care Note    Patient: Jaren Quiroz    Procedure(s) Performed: Procedure(s) (LRB):  EGD (ESOPHAGOGASTRODUODENOSCOPY) (N/A)    Patient location: GI    Anesthesia Type: general    Transport from OR: Transported from OR on room air with adequate spontaneous ventilation    Post pain: adequate analgesia    Post assessment: no apparent anesthetic complications    Post vital signs: stable    Level of consciousness: awake    Nausea/Vomiting: no nausea/vomiting    Complications: none    Transfer of care protocol was followed      Last vitals: Visit Vitals  /66   Pulse 69   Temp 36.8 °C (98.2 °F)   Resp 16   Ht 5' 11" (1.803 m)   Wt 86.6 kg (191 lb)   SpO2 99%   BMI 26.64 kg/m²     "

## 2024-03-13 NOTE — ANESTHESIA PREPROCEDURE EVALUATION
03/13/2024  Jaren PICHARDO Cousin is a 58 y.o., male.    Past Medical History:   Diagnosis Date    Asthma     as child    Hemorrhoid     Liver hemangioma 2014    PTSD (post-traumatic stress disorder)     not currently taking [facility]s per [facility]    Shingles     lower back    SVT (supraventricular tachycardia) 1/28/2020     Past Surgical History:   Procedure Laterality Date    ABLATION OF ARRHYTHMOGENIC FOCUS FOR SUPRAVENTRICULAR TACHYCARDIA WITH ELECTROPHYSIOLOGY STUDY      BACK SURGERY      ruptured disc L5-S1    COLONOSCOPY  ~2010  ([facility])    Hemorrhoids    COLONOSCOPY N/A 07/12/2022    Procedure: COLONOSCOPY;  Surgeon: Andrew Caba MD;  Location: [facility] ENDO;  Service: Endoscopy;  Laterality: N/A;    CORONARY ANGIOGRAPHY N/A 10/23/2019    Procedure: ANGIOGRAM, CORONARY ARTERY;  Surgeon: Can Masters MD;  Location: [facility] CATH;  Service: Cardiology;  Laterality: N/A;    ELBOW SURGERY      right    EPIDURAL STEROID INJECTION INTO CERVICAL SPINE N/A 10/15/2019    Procedure: Injection-steroid-epidural-cervical C7/T1;  Surgeon: Blaise Mao MD;  Location: [facility] OR;  Service: Pain Management;  Laterality: N/A;    ESOPHAGOGASTRODUODENOSCOPY N/A 01/29/2019    Procedure: EGD (ESOPHAGOGASTRODUODENOSCOPY);  Surgeon: Balwinder Dexter Jr., MD;  Location: [facility] ENDO;  Service: Endoscopy;  Laterality: N/A;    KNEE SURGERY      right    LEFT HEART CATHETERIZATION N/A 10/23/2019    Procedure: Left heart cath;  Surgeon: Can Masters MD;  Location: [facility] CATH;  Service: Cardiology;  Laterality: N/A;    MAGNETIC RESONANCE IMAGING N/A 06/26/2019    Procedure: MRI (Magnetic Resonance Imagine) needs anesthesia;  Surgeon: Maurice Fonseca MD;  Location: [facility] CATH;  Service: Anesthesiology;  Laterality: N/A;    TOTAL KNEE ARTHROPLASTY Right 09/28/2023     Echo 08/23  Left Ventricle: The left ventricle is  normal in size. Normal wall thickness. There is concentric remodeling. Normal wall motion. There is normal systolic function with a visually estimated ejection fraction of 60 - 65%. There is diastolic dysfunction.    Right Ventricle: Normal right ventricular cavity size. Wall thickness is normal. Right ventricle wall motion  is normal. Systolic function is normal.    Tricuspid Valve: There is mild regurgitation.    Pulmonary Artery: The estimated pulmonary artery systolic pressure is 24 mmHg.    IVC/SVC: Normal venous pressure at 3 mmHg.     Pre-op Assessment    I have reviewed the Patient Summary Reports.     I have reviewed the Nursing Notes. I have reviewed the NPO Status.   I have reviewed the Medications.     Review of Systems  Anesthesia Hx:  No problems with previous Anesthesia             Denies Family Hx of Anesthesia complications.    Denies Personal Hx of Anesthesia complications.                    Hematology/Oncology:  Hematology Normal   Oncology Normal                                   EENT/Dental:  EENT/Dental Normal           Cardiovascular:     Hypertension    Dysrhythmias           Pulmonary htn                         Pulmonary:    Asthma                    Renal/:  Renal/ Normal                 Hepatic/GI:     GERD Liver Disease,            Musculoskeletal:  Arthritis               Neurological:    Neuromuscular Disease,                                   Endocrine:  Endocrine Normal            Dermatological:  Skin Normal    Psych:  Psychiatric History                  Physical Exam  General: Well nourished    Airway:  Mallampati: II   Mouth Opening: Normal  TM Distance: Normal  Tongue: Normal  Neck ROM: Normal ROM    Dental:  Intact        Anesthesia Plan  Type of Anesthesia, risks & benefits discussed:    Anesthesia Type: Gen Natural Airway  Intra-op Monitoring Plan: Standard ASA Monitors  Informed Consent: Informed consent signed with the Patient and all parties understand the risks and  agree with anesthesia plan.  All questions answered.   ASA Score: 3  Day of Surgery Review of History & Physical: H&P Update referred to the surgeon/provider.I have interviewed and examined the patient. I have reviewed the patient's H&P dated: There are no significant changes.     Ready For Surgery From Anesthesia Perspective.     .

## 2024-03-13 NOTE — H&P
Short Stay Endoscopy History and Physical    PCP - Patrick Varghese MD    Procedure - EGD  ASA - 2  Mallampati - per anesthesia  History of Anesthesia problems - no  Family history Anesthesia problems -  no     HPI:  This is a 58 y.o. male here for evaluation of :     Reflux - yes  Dysphagia - no  Abdominal pain - no  Diarrhea - no  Anemia - no  GI bleeding - no    ROS:  CONSTITUTIONAL: Denies weight change,  fatigue, fevers, chills, night sweats.  CARDIOVASCULAR: Denies chest pain, shortness of breath, orthopnea and edema.  RESPIRATORY: Denies cough, hemoptysis, dyspnea, and wheezing.  GI: See HPI.    Medical History:   Past Medical History:   Diagnosis Date    Asthma     as child    Hemorrhoid     Liver hemangioma 2014    PTSD (post-traumatic stress disorder)     not currently taking prescribed meds per [facility]     lower back    SVT (supraventricular tachycardia) 1/28/2020       Surgical History:   Past Surgical History:   Procedure Laterality Date    ABLATION OF ARRHYTHMOGENIC FOCUS FOR SUPRAVENTRICULAR TACHYCARDIA WITH ELECTROPHYSIOLOGY STUDY      BACK SURGERY      ruptured disc L5-S1    COLONOSCOPY  ~2010  ([facility])    Hemorrhoids    COLONOSCOPY N/A 07/12/2022    Procedure: COLONOSCOPY;  Surgeon: Andrew Caba MD;  Location: [facility] ENDO;  Service: Endoscopy;  Laterality: N/A;    CORONARY ANGIOGRAPHY N/A 10/23/2019    Procedure: ANGIOGRAM, CORONARY ARTERY;  Surgeon: Can Masters MD;  Location: [facility] CATH;  Service: Cardiology;  Laterality: N/A;    ELBOW SURGERY      right    EPIDURAL STEROID INJECTION INTO CERVICAL SPINE N/A 10/15/2019    Procedure: Injection-steroid-epidural-cervical C7/T1;  Surgeon: Blaise Mao MD;  Location: [facility] OR;  Service: Pain Management;  Laterality: N/A;    ESOPHAGOGASTRODUODENOSCOPY N/A 01/29/2019    Procedure: EGD (ESOPHAGOGASTRODUODENOSCOPY);  Surgeon: Balwinder Dexter Jr., MD;  Location: [facility] ENDO;  Service: Endoscopy;  Laterality: N/A;    KNEE  SURGERY      right    LEFT HEART CATHETERIZATION N/A 10/23/2019    Procedure: Left heart cath;  Surgeon: Can Masters MD;  Location: [facility] CATH;  Service: Cardiology;  Laterality: N/A;    MAGNETIC RESONANCE IMAGING N/A 06/26/2019    Procedure: MRI (Magnetic Resonance Imagine) needs anesthesia;  Surgeon: Maurice Fonseca MD;  Location: [facility] CATH;  Service: Anesthesiology;  Laterality: N/A;    TOTAL KNEE ARTHROPLASTY Right 09/28/2023       Family History:  Family History   Problem Relation Age of Onset    Diverticulitis Mother     Hypertension Father     COPD Father     Allergic rhinitis Daughter     Asthma Daughter         Cousin    Allergic rhinitis Daughter     Allergic rhinitis Son     Angioedema Neg Hx     Eczema Neg Hx     Immunodeficiency Neg Hx     Urticaria Neg Hx     Lupus Neg Hx     Psoriasis Neg Hx     Melanoma Neg Hx     Acne Neg Hx     Colon cancer Neg Hx     Colon polyps Neg Hx     Crohn's disease Neg Hx     Ulcerative colitis Neg Hx     Esophageal cancer Neg Hx     Stomach cancer Neg Hx     Glaucoma Neg Hx     Macular degeneration Neg Hx        Social History:   Social History     Tobacco Use    Smoking status: Never    Smokeless tobacco: Never   Substance Use Topics    Alcohol use: Yes     Alcohol/week: 21.0 standard drinks of alcohol     Types: 21 Cans of beer per week     Comment: 3 beers daily    Drug use: Yes     Frequency: 2.0 times per week     Types: Marijuana     Comment: Uses for PTSD, gummies       Allergies: Reviewed    Medications:   Current Facility-Administered Medications on File Prior to Encounter   Medication Dose Route Frequency Provider Last Rate Last Admin    0.9%  NaCl infusion   Intravenous Continuous Jennifer Majano NP        lactated ringers infusion   Intravenous Continuous Luiz Aquino MD   New Bag at 07/12/22 0833    lidocaine (PF) 10 mg/ml (1%) injection 10 mg  1 mL Intradermal Once Luiz Aquino MD         Current Outpatient Medications on File  Prior to Encounter   Medication Sig Dispense Refill    amLODIPine (NORVASC) 10 MG tablet Take 1 tablet (10 mg total) by mouth once daily. 30 tablet 5    busPIRone (BUSPAR) 5 MG Tab Take 1 tablet (5 mg total) by mouth 3 (three) times daily. 90 tablet 11    famotidine (PEPCID) 20 MG tablet Take 1 tablet (20 mg total) by mouth 2 (two) times daily. 180 tablet 0    pantoprazole (PROTONIX) 40 MG tablet Take 1 tablet (40 mg total) by mouth once daily. 30 tablet 11    propranoloL (INDERAL) 10 MG tablet TAKE 1 TABLET BY MOUTH THREE TIMES A  tablet 3    ALLERGY RELIEF, LORATADINE, 10 mg tablet TAKE ONE TABLET BY MOUTH ONCE DAILY 30 tablet 11    cetirizine (ZYRTEC) 10 MG tablet TAKE ONE TABLET BY MOUTH ONCE DAILY AS NEEDED FOR ALLERGIES      co-enzyme Q-10 30 mg capsule Take 100 mg by mouth once daily.      colchicine (COLCRYS) 0.6 mg tablet TAKE 1 TABLET BY MOUTH 2 TIMES DAILY. (Patient taking differently: daily as needed.) 180 tablet 1    cyclobenzaprine (FLEXERIL) 10 MG tablet Take 10 mg by mouth 3 (three) times daily as needed. PRN      diclofenac sodium (VOLTAREN) 1 % Gel APPLY 2 GRAMS TOPICALLY FOUR TIMES A DAY AS NEEDED FOR PAIN      doxycycline (ORACEA) 40 mg capsule TAKE 1 CAPSULE BY MOUTH ONCE DAILY 30 capsule 5    esomeprazole (NEXIUM) 40 MG capsule Take 1 capsule (40 mg total) by mouth daily as needed. 90 capsule 3    fenofibrate (TRICOR) 145 MG tablet Take 1 tablet (145 mg total) by mouth once daily. 90 tablet 3    meclizine (ANTIVERT) 25 mg tablet Take 1 tablet (25 mg total) by mouth 3 (three) times daily as needed for Dizziness (may cause drowsiness). 60 tablet 2    metroNIDAZOLE (METROGEL) 0.75 % gel Apply 1 application  topically every evening.      mometasone (NASONEX) 50 mcg/actuation nasal spray Use 2 spray(s) in each nostril once daily 51 g 2    niacin (SLO-NIACIN) 250 mg TbSR Take 250 mg by mouth every evening. 90 tablet 3    sildenafiL (VIAGRA) 100 MG tablet TAKE ONE TABLET BY MOUTH EVERY WEEK AS  NEEDED 30 TO 60 MINUTES BEFORE INTERCOURSE FOR BEST RESULTS TAKE ON EMPTY STOMACH      silver sulfADIAZINE 1% (SILVADENE) 1 % cream Apply topically 2 (two) times daily.      traMADoL (ULTRAM) 50 mg tablet Take 50 mg by mouth.      valACYclovir (VALTREX) 500 MG tablet Take 1 tablet (500 mg total) by mouth once daily. 90 tablet 3       Physical Exam:  Vital Signs:   Vitals:    03/13/24 0917   BP: (!) 162/85   Pulse: 69   Resp: 16   Temp: 98.2 °F (36.8 °C)     General Appearance: Well appearing in no acute distress  ENT: OP clear  Chest: CTA B  CV: RRR, no m/r/g  Abd: s/nt/nd/nabs  Ext: no edema    Labs:  Reviewed    Plan:  I have explained the risks and benefits of endoscopy procedures to the patient including but not limited to bleeding, perforation, infection, and death. The patient wishes to proceed.

## 2024-04-15 ENCOUNTER — PATIENT MESSAGE (OUTPATIENT)
Dept: GASTROENTEROLOGY | Facility: CLINIC | Age: 59
End: 2024-04-15
Payer: COMMERCIAL

## 2024-04-15 DIAGNOSIS — R12 HEARTBURN: ICD-10-CM

## 2024-04-15 RX ORDER — ESOMEPRAZOLE MAGNESIUM 40 MG/1
40 CAPSULE, DELAYED RELEASE ORAL DAILY PRN
Qty: 90 CAPSULE | Refills: 3 | Status: SHIPPED | OUTPATIENT
Start: 2024-04-15 | End: 2025-04-15

## 2024-05-10 ENCOUNTER — PATIENT MESSAGE (OUTPATIENT)
Dept: GASTROENTEROLOGY | Facility: CLINIC | Age: 59
End: 2024-05-10

## 2024-05-10 ENCOUNTER — OFFICE VISIT (OUTPATIENT)
Dept: GASTROENTEROLOGY | Facility: CLINIC | Age: 59
End: 2024-05-10
Payer: COMMERCIAL

## 2024-05-10 ENCOUNTER — PATIENT MESSAGE (OUTPATIENT)
Dept: FAMILY MEDICINE | Facility: CLINIC | Age: 59
End: 2024-05-10
Payer: COMMERCIAL

## 2024-05-10 VITALS — HEIGHT: 71 IN | WEIGHT: 204.13 LBS | BODY MASS INDEX: 28.58 KG/M2

## 2024-05-10 DIAGNOSIS — E78.1 HIGH TRIGLYCERIDES: ICD-10-CM

## 2024-05-10 DIAGNOSIS — K21.9 GASTROESOPHAGEAL REFLUX DISEASE WITHOUT ESOPHAGITIS: Primary | ICD-10-CM

## 2024-05-10 PROCEDURE — 3008F BODY MASS INDEX DOCD: CPT | Mod: CPTII,S$GLB,, | Performed by: INTERNAL MEDICINE

## 2024-05-10 PROCEDURE — 99213 OFFICE O/P EST LOW 20 MIN: CPT | Mod: S$GLB,,, | Performed by: INTERNAL MEDICINE

## 2024-05-10 PROCEDURE — 1159F MED LIST DOCD IN RCRD: CPT | Mod: CPTII,S$GLB,, | Performed by: INTERNAL MEDICINE

## 2024-05-10 PROCEDURE — 99999 PR PBB SHADOW E&M-EST. PATIENT-LVL III: CPT | Mod: PBBFAC,,, | Performed by: INTERNAL MEDICINE

## 2024-05-10 RX ORDER — CIDER VINEGAR 300 MG
250 TABLET ORAL NIGHTLY
Qty: 90 TABLET | Refills: 3 | Status: SHIPPED | OUTPATIENT
Start: 2024-05-10 | End: 2024-05-21

## 2024-05-10 RX ORDER — FENOFIBRATE 145 MG/1
145 TABLET, FILM COATED ORAL DAILY
Qty: 90 TABLET | Refills: 3 | Status: SHIPPED | OUTPATIENT
Start: 2024-05-10

## 2024-05-10 RX ORDER — FAMOTIDINE 20 MG/1
20 TABLET, FILM COATED ORAL 2 TIMES DAILY
Qty: 180 TABLET | Refills: 1 | Status: SHIPPED | OUTPATIENT
Start: 2024-05-10

## 2024-05-10 NOTE — TELEPHONE ENCOUNTER
Refill Routing Note   Medication(s) are not appropriate for processing by [facility] for the following reason(s):        Outside of protocol  No active prescription written by provider: not yet signed by current PCP    [facility] action(s):  Defer  Route      Medication Therapy Plan:         Appointments  past 12m or future 3m with PCP    Date Provider   Last Visit   2/29/2024 Patrick Varghese MD   Next Visit   8/30/2024 Patrick Varghese MD   ED visits in past 90 days: 0        Note composed:1:29 PM 05/10/2024

## 2024-05-10 NOTE — PROGRESS NOTES
"GENERAL GI PATIENT INTAKE:    COVID symptoms in the last 7 days (runny nose, sore throat, congestion, cough, fever): No  PCP: Patrick Varghese  If not PCP-  number given to establish [phone]: No    ALLERGIES REVIEWED:  Yes    CHIEF COMPLAINT:    Chief Complaint   Patient presents with    Follow-up    Gastroesophageal Reflux       VITAL SIGNS:  Ht 5' 11" (1.803 m)   Wt 92.6 kg (204 lb 2.3 oz)   BMI 28.47 kg/m²      Change in medical, surgical, family or social history: No      REVIEWED MEDICATION LIST RECONCILED INCLUDING ABOVE MEDS:  Yes     "

## 2024-05-10 NOTE — PROGRESS NOTES
[facility] Consultation Note    Reason for Consult:  Gastroesophageal reflux    PCP: Patrick Varghese       Referring MD:   No referring provider defined for this encounter.    HPI:  This is a 58 y.o. male here for evaluation of gastroesophageal reflux.  Worse after knee surgery and 10 lb weight loss. Was on pain meds for 2 months    Typical Symptoms (high pretest probability=impedance on meds):  Pyrosis - yes  Regurgitation/Water Brash -  yes  Upright/Nocturnal symptoms - at night  Dysphagia/Odynophagia - no  PPI usage/history/response - nexium and pepcid 20 bid    Atypical Symptoms (low pretest probability=bravo off meds):  Hoarseness/Cough - mucus  Throat clearing - no  Chest pain - no  Asthma - no    Late Meals - avoiding   Food Triggers -  Caffeine intake -   Alcohol intake - over the holidays daily  NSAID usage - no    Interval History 05/10/2024:  EGD was normal  Taking PPI and famotidine bid    ROS:  Constitutional: No fevers, chills, No weight loss  ENT: No allergies  CV: No chest pain  Pulm: No cough, No shortness of breath  Ophtho: No vision changes  GI: see HPI  Derm: No rash  Heme: No lymphadenopathy, No bruising  MSK: No arthritis  : No dysuria, No hematuria  Endo: No hot or cold intolerance  Neuro: No syncope, No seizure  Psych: No anxiety, No depression    Medical History:  has a past medical history of Asthma, Hemorrhoid, Liver hemangioma (2014), PTSD (post-traumatic stress disorder), Shingles, and SVT (supraventricular tachycardia) (1/28/2020).    Surgical History:  has a past surgical history that includes Back surgery; Knee surgery; Elbow surgery; Colonoscopy (~2010  ([facility] Esophagogastroduodenoscopy (N/A, 01/29/2019); Magnetic resonance imaging (N/A, 06/26/2019); Epidural steroid injection into cervical spine (N/A, 10/15/2019); Left heart catheterization (N/A, 10/23/2019); Coronary angiography (N/A, 10/23/2019); Colonoscopy (N/A, 07/12/2022); Ablation of  arrhythmogenic focus for supraventricular tachycardia with electrophysiology study; Total knee arthroplasty (Right, 09/28/2023); and Esophagogastroduodenoscopy (N/A, 3/13/2024).    Review of patient's allergies indicates:   Allergen Reactions    Ciprofloxacin Hives         Medication List with Changes/Refills   Current Medications    ALLERGY RELIEF, LORATADINE, 10 MG TABLET    TAKE ONE TABLET BY MOUTH ONCE DAILY    AMLODIPINE (NORVASC) 10 MG TABLET    Take 1 tablet (10 mg total) by mouth once daily.    BUSPIRONE (BUSPAR) 5 MG TAB    Take 1 tablet (5 mg total) by mouth 3 (three) times daily.    CETIRIZINE (ZYRTEC) 10 MG TABLET    TAKE ONE TABLET BY MOUTH ONCE DAILY AS NEEDED FOR ALLERGIES    CO-ENZYME Q-10 30 MG CAPSULE    Take 100 mg by mouth once daily.    COLCHICINE (COLCRYS) 0.6 MG TABLET    TAKE 1 TABLET BY MOUTH 2 TIMES DAILY.    CYCLOBENZAPRINE (FLEXERIL) 10 MG TABLET    Take 10 mg by mouth 3 (three) times daily as needed. PRN    DICLOFENAC SODIUM (VOLTAREN) 1 % GEL    APPLY 2 GRAMS TOPICALLY FOUR TIMES A DAY AS NEEDED FOR PAIN    DOXYCYCLINE (ORACEA) 40 MG CAPSULE    TAKE 1 CAPSULE BY MOUTH ONCE DAILY    ESOMEPRAZOLE (NEXIUM) 40 MG CAPSULE    Take 1 capsule (40 mg total) by mouth daily as needed (heartburn).    FENOFIBRATE (TRICOR) 145 MG TABLET    Take 1 tablet (145 mg total) by mouth once daily.    MECLIZINE (ANTIVERT) 25 MG TABLET    Take 1 tablet (25 mg total) by mouth 3 (three) times daily as needed for Dizziness (may cause drowsiness).    METRONIDAZOLE (METROGEL) 0.75 % GEL    Apply 1 application  topically every evening.    MOMETASONE (NASONEX) 50 MCG/ACTUATION NASAL SPRAY    Use 2 spray(s) in each nostril once daily    NIACIN (SLO-NIACIN) 250 MG TBSR    Take 250 mg by mouth every evening.    PANTOPRAZOLE (PROTONIX) 40 MG TABLET    Take 1 tablet (40 mg total) by mouth once daily.    PROPRANOLOL (INDERAL) 10 MG TABLET    TAKE 1 TABLET BY MOUTH THREE TIMES A DAY    SILDENAFIL (VIAGRA) 100 MG TABLET     "TAKE ONE TABLET BY MOUTH EVERY WEEK AS NEEDED 30 TO 60 MINUTES BEFORE INTERCOURSE FOR BEST RESULTS TAKE ON EMPTY STOMACH    SILVER SULFADIAZINE 1% (SILVADENE) 1 % CREAM    Apply topically 2 (two) times daily.    TRAMADOL (ULTRAM) 50 MG TABLET    Take 50 mg by mouth.    VALACYCLOVIR (VALTREX) 500 MG TABLET    Take 1 tablet (500 mg total) by mouth once daily.   Changed and/or Refilled Medications    Modified Medication Previous Medication    FAMOTIDINE (PEPCID) 20 MG TABLET famotidine (PEPCID) 20 MG tablet       Take 1 tablet (20 mg total) by mouth 2 (two) times daily.    Take 1 tablet (20 mg total) by mouth 2 (two) times daily.         Objective Findings:    Vital Signs:  Ht 5' 11" (1.803 m)   Wt 92.6 kg (204 lb 2.3 oz)   BMI 28.47 kg/m²   Body mass index is 28.47 kg/m².    Physical Exam:  General Appearance: Well appearing in no acute distress  Head:   Normocephalic, without obvious abnormality  Eyes:    No scleral icterus, EOMI  ENT: Neck supple, Lips, mucosa, and tongue normal; teeth and gums normal  Lungs: CTA bilaterally in anterior and posterior fields, no wheezes, no crackles.  Heart:  Regular rate and rhythm, S1, S2 normal, no murmurs heard  Abdomen: Soft, non tender, non distended with positive bowel sounds in all four quadrants. No hepatosplenomegaly, ascites, or mass  Extremities: 2+ pulses, no clubbing, cyanosis or edema  Skin: No rash  Neurologic: CN II-XII intact      Labs:  Lab Results   Component Value Date    WBC 6.23 02/29/2024    HGB 13.0 (L) 02/29/2024    HCT 41.6 02/29/2024     02/29/2024    CHOL 203 (H) 02/29/2024    TRIG 115 02/29/2024    HDL 48 02/29/2024    ALT 12 02/29/2024    AST 16 02/29/2024     02/29/2024    K 4.0 02/29/2024     02/29/2024    CREATININE 1.0 02/29/2024    BUN 18 02/29/2024    CO2 25 02/29/2024    TSH 2.140 01/25/2020    PSA 1.4 08/10/2023    INR 1.0 05/06/2020    HGBA1C 5.4 09/12/2014         Imaging:    Endoscopy:    EGD 2019 - wnl - normal bx  Colon " 7/22 - wnl    Assessment:  1. Gastroesophageal reflux disease without esophagitis             Recommendations:  1. Continue ppi to nexium, try to wean down off H2 blocker  2. Continue working on diet, reducing alcohol    Follow up in about 1 year (around 5/10/2025).      Order summary:  Orders Placed This Encounter    famotidine (PEPCID) 20 MG tablet           Thank you so much for allowing me to participate in the care of Jaren Cherry MD

## 2024-05-10 NOTE — TELEPHONE ENCOUNTER
No care due was identified.  [facility] Embedded Care Due Messages. Reference number: [id number].   5/10/2024 1:22:39 PM CDT

## 2024-05-20 NOTE — TELEPHONE ENCOUNTER
No care due was identified.  [facility] Embedded Care Due Messages. Reference number: [id number].   5/20/2024 2:14:31 PM CDT

## 2024-05-21 RX ORDER — NIACIN 500 MG/1
500 TABLET, EXTENDED RELEASE ORAL NIGHTLY
Qty: 90 TABLET | Refills: 3 | Status: SHIPPED | OUTPATIENT
Start: 2024-05-21

## 2024-05-21 RX ORDER — NIACIN 500 MG/1
500 TABLET, EXTENDED RELEASE ORAL NIGHTLY
Qty: 90 TABLET | Refills: 3 | Status: SHIPPED | OUTPATIENT
Start: 2024-05-21 | End: 2024-05-21

## 2024-06-27 RX ORDER — BUSPIRONE HYDROCHLORIDE 5 MG/1
5 TABLET ORAL 3 TIMES DAILY
Qty: 270 TABLET | Refills: 3 | Status: SHIPPED | OUTPATIENT
Start: 2024-06-27

## 2024-06-27 NOTE — TELEPHONE ENCOUNTER
No care due was identified.  [facility] Embedded Care Due Messages. Reference number: [id number].   6/27/2024 9:18:28 AM CDT

## 2024-07-23 DIAGNOSIS — R12 HEARTBURN: ICD-10-CM

## 2024-07-23 NOTE — TELEPHONE ENCOUNTER
No care due was identified.  [facility] Embedded Care Due Messages. Reference number: [id number].   7/23/2024 3:52:29 PM CDT

## 2024-07-24 RX ORDER — PROPRANOLOL HYDROCHLORIDE 10 MG/1
10 TABLET ORAL 3 TIMES DAILY
Qty: 270 TABLET | Refills: 3 | Status: SHIPPED | OUTPATIENT
Start: 2024-07-24 | End: 2024-07-24

## 2024-07-24 RX ORDER — PROPRANOLOL HYDROCHLORIDE 10 MG/1
10 TABLET ORAL 3 TIMES DAILY
Qty: 270 TABLET | Refills: 2 | Status: SHIPPED | OUTPATIENT
Start: 2024-07-24

## 2024-07-24 RX ORDER — FAMOTIDINE 20 MG/1
20 TABLET, FILM COATED ORAL 2 TIMES DAILY
Qty: 180 TABLET | Refills: 1 | Status: SHIPPED | OUTPATIENT
Start: 2024-07-24

## 2024-07-24 RX ORDER — ESOMEPRAZOLE MAGNESIUM 40 MG/1
40 CAPSULE, DELAYED RELEASE ORAL DAILY PRN
Qty: 90 CAPSULE | Refills: 3 | Status: SHIPPED | OUTPATIENT
Start: 2024-07-24 | End: 2025-07-24

## 2024-08-20 ENCOUNTER — OFFICE VISIT (OUTPATIENT)
Dept: CARDIOLOGY | Facility: CLINIC | Age: 59
End: 2024-08-20
Payer: COMMERCIAL

## 2024-08-20 VITALS
DIASTOLIC BLOOD PRESSURE: 80 MMHG | BODY MASS INDEX: 28.49 KG/M2 | SYSTOLIC BLOOD PRESSURE: 146 MMHG | WEIGHT: 203.5 LBS | HEIGHT: 71 IN | HEART RATE: 71 BPM

## 2024-08-20 DIAGNOSIS — I47.10 SUPRAVENTRICULAR TACHYCARDIA: ICD-10-CM

## 2024-08-20 DIAGNOSIS — Z98.890 HISTORY OF RADIOFREQUENCY ABLATION (RFA) PROCEDURE FOR CARDIAC ARRHYTHMIA: ICD-10-CM

## 2024-08-20 DIAGNOSIS — I10 PRIMARY HYPERTENSION: Primary | ICD-10-CM

## 2024-08-20 DIAGNOSIS — E78.2 MIXED HYPERLIPIDEMIA: ICD-10-CM

## 2024-08-20 PROCEDURE — 3077F SYST BP >= 140 MM HG: CPT | Mod: CPTII,S$GLB,,

## 2024-08-20 PROCEDURE — 1159F MED LIST DOCD IN RCRD: CPT | Mod: CPTII,S$GLB,,

## 2024-08-20 PROCEDURE — 99214 OFFICE O/P EST MOD 30 MIN: CPT | Mod: S$GLB,,,

## 2024-08-20 PROCEDURE — 99999 PR PBB SHADOW E&M-EST. PATIENT-LVL IV: CPT | Mod: PBBFAC,,,

## 2024-08-20 PROCEDURE — 3008F BODY MASS INDEX DOCD: CPT | Mod: CPTII,S$GLB,,

## 2024-08-20 PROCEDURE — 3079F DIAST BP 80-89 MM HG: CPT | Mod: CPTII,S$GLB,,

## 2024-08-20 NOTE — PROGRESS NOTES
[facility]  Date: 8/20/24    Patient: Jaren Quiroz, 1965, [id number]  Primary Care Provider: Patrick Varghese MD     Chief Complaint: Follow up     Subjective:       Jaren Hillsin is a 58 y.o. male who presents for follow up. They follow with Dr. Ferrell.    CMP, lipid panel stable. Hgb/Hct slightly reduced at 13/41.6. At last visit, patient without cardiac complaints and requesting clearance for R knee surgery. Screening echo was within normal limits and cardiac meds were continued as is at that time.     Since then, patient notes infrequent episodes of palpitations which resolve spontaneously within a few seconds. Unsure of average SBP at home. Denies chest discomfort, dyspnea, palpitations, dizziness, syncope, orthopnea, PND, edema.    Focused Past History includes:  Hypertension  Stress echo 10/2019: LVEF 55%, mild LAE, mild TR, positive for ischemia  LHC 10/2019: normal coronary arteries   TTE 8/2023: LVEF 60-65%, mild TR, PASP 24  Hyperlipidemia   Supraventricular tachycardia s/p RFA 5/2020    Current Outpatient Medications   Medication Sig    ALLERGY RELIEF, LORATADINE, 10 mg tablet TAKE ONE TABLET BY MOUTH ONCE DAILY    amLODIPine (NORVASC) 10 MG tablet Take 1 tablet (10 mg total) by mouth once daily.    busPIRone (BUSPAR) 5 MG Tab TAKE 1 TABLET BY MOUTH THREE TIMES A DAY    cetirizine (ZYRTEC) 10 MG tablet TAKE ONE TABLET BY MOUTH ONCE DAILY AS NEEDED FOR ALLERGIES    co-enzyme Q-10 30 mg capsule Take 100 mg by mouth once daily.    colchicine (COLCRYS) 0.6 mg tablet TAKE 1 TABLET BY MOUTH 2 TIMES DAILY.    cyclobenzaprine (FLEXERIL) 10 MG tablet Take 10 mg by mouth 3 (three) times daily as needed. PRN    diclofenac sodium (VOLTAREN) 1 % Gel APPLY 2 GRAMS TOPICALLY FOUR TIMES A DAY AS NEEDED FOR PAIN    doxycycline (ORACEA) 40 mg capsule TAKE 1 CAPSULE BY MOUTH ONCE DAILY    esomeprazole (NEXIUM) 40 MG capsule Take 1 capsule (40 mg total) by mouth daily as needed  (heartburn).    famotidine (PEPCID) 20 MG tablet Take 1 tablet (20 mg total) by mouth 2 (two) times daily.    fenofibrate (TRICOR) 145 MG tablet Take 1 tablet (145 mg total) by mouth once daily.    meclizine (ANTIVERT) 25 mg tablet Take 1 tablet (25 mg total) by mouth 3 (three) times daily as needed for Dizziness (may cause drowsiness).    metroNIDAZOLE (METROGEL) 0.75 % gel Apply 1 application  topically every evening.    mometasone (NASONEX) 50 mcg/actuation nasal spray Use 2 spray(s) in each nostril once daily    niacin (SLO-NIACIN) 500 mg tablet Take 1 tablet (500 mg total) by mouth every evening.    propranoloL (INDERAL) 10 MG tablet Take 1 tablet (10 mg total) by mouth 3 (three) times daily.    sildenafiL (VIAGRA) 100 MG tablet TAKE ONE TABLET BY MOUTH EVERY WEEK AS NEEDED 30 TO 60 MINUTES BEFORE INTERCOURSE FOR BEST RESULTS TAKE ON EMPTY STOMACH    silver sulfADIAZINE 1% (SILVADENE) 1 % cream Apply topically 2 (two) times daily.    traMADoL (ULTRAM) 50 mg tablet Take 50 mg by mouth.    valACYclovir (VALTREX) 500 MG tablet Take 1 tablet (500 mg total) by mouth once daily.    pantoprazole (PROTONIX) 40 MG tablet Take 1 tablet (40 mg total) by mouth once daily. (Patient not taking: Reported on 8/20/2024)     No current facility-administered medications for this visit.     Facility-Administered Medications Ordered in Other Visits   Medication    0.9%  NaCl infusion    lactated ringers infusion    lidocaine (PF) 10 mg/ml (1%) injection 10 mg            Objective       Review of Systems  Constitutional: negative for fevers, night sweats, and weight loss  Eyes: negative for visual disturbance, diplopia  Respiratory: negative for cough, hemoptysis, sputum, and wheezing  Cardiovascular: see HPI  Gastrointestinal: negative for abdominal pain, bright red blood per rectum, change in bowel habits, dysphagia, melena, and reflux symptoms  Genitourinary:negative for dysuria, frequency, and hematuria  Hematologic/lymphatic:  negative for bleeding, easy bruising, and lymphadenopathy  Musculoskeletal:negative for arthralgias, back pain, and myalgias  Neurological: negative for gait problems, paresthesia, speech problems, vertigo, and weakness  Behavioral/Psych: negative for excessive alcohol consumption, illegal drug usage, and sleep disturbance    -------------------------------------    Asthma    as child    Hemorrhoid    Liver hemangioma    PTSD (post-traumatic stress disorder)    not currently taking prescribed meds per [facility]    Shingles    lower back    SVT (supraventricular tachycardia)     ----------------------------    Ablation of arrhythmogenic focus for supraventricular tachycardia with electrophysiology study    Back surgery    ruptured disc L5-S1    Colonoscopy    Hemorrhoids    Colonoscopy    Procedure: COLONOSCOPY;  Surgeon: Andrew Caba MD;  Location: [facility] ENDO;  Service: Endoscopy;  Laterality: N/A;    Coronary angiography    Procedure: ANGIOGRAM, CORONARY ARTERY;  Surgeon: Can Masters MD;  Location: [facility] CATH;  Service: Cardiology;  Laterality: N/A;    Elbow surgery    right    Epidural steroid injection into cervical spine    Procedure: Injection-steroid-epidural-cervical C7/T1;  Surgeon: Blaise Mao MD;  Location: [facility] OR;  Service: Pain Management;  Laterality: N/A;    Esophagogastroduodenoscopy    Procedure: EGD (ESOPHAGOGASTRODUODENOSCOPY);  Surgeon: Balwinder Dexter Jr., MD;  Location: [facility] ENDO;  Service: Endoscopy;  Laterality: N/A;    Esophagogastroduodenoscopy    Procedure: EGD (ESOPHAGOGASTRODUODENOSCOPY);  Surgeon: Fadi Mcleod MD;  Location: [facility] ENDO (4TH FLR);  Service: Endoscopy;  Laterality: N/A;  3/7/24-Ref Dr. Cherry, 1-4wks, instr portal-DS  3/7/24- precall complete - ERW    Knee surgery    right    Left heart catheterization    Procedure: Left heart cath;  Surgeon: Can Masters MD;  Location: [facility] CATH;  Service: Cardiology;  Laterality: N/A;    Magnetic resonance  "imaging    Procedure: MRI (Magnetic Resonance Imagine) needs anesthesia;  Surgeon: Maurice Fonseca MD;  Location: [facility];  Service: Anesthesiology;  Laterality: N/A;    Total knee arthroplasty        Family History   Problem Relation Name Age of Onset    Diverticulitis Mother      Hypertension Father Cleveland Cousin II     COPD Father Cleveland Cousin II     Allergic rhinitis Daughter Celese     Asthma Daughter Celese         Cousin    Allergic rhinitis Daughter      Allergic rhinitis Son      Angioedema Neg Hx      Eczema Neg Hx      Immunodeficiency Neg Hx      Urticaria Neg Hx      Lupus Neg Hx      Psoriasis Neg Hx      Melanoma Neg Hx      Acne Neg Hx      Colon cancer Neg Hx      Colon polyps Neg Hx      Crohn's disease Neg Hx      Ulcerative colitis Neg Hx      Esophageal cancer Neg Hx      Stomach cancer Neg Hx      Glaucoma Neg Hx      Macular degeneration Neg Hx       Social History     Tobacco Use    Smoking status: Never    Smokeless tobacco: Never   Substance Use Topics    Alcohol use: Yes     Alcohol/week: 21.0 standard drinks of alcohol     Types: 21 Cans of beer per week     Comment: 3 beers daily    Drug use: Yes     Frequency: 2.0 times per week     Types: Marijuana     Comment: Uses for PTSD, gummies       Physical Exam  BP (!) 146/80 (BP Location: Left arm, Patient Position: Sitting, BP Method: Medium (Automatic))   Pulse 71   Ht 5' 11" (1.803 m)   Wt 92.3 kg (203 lb 7.8 oz)   BMI 28.38 kg/m²   Body surface area is 2.15 meters squared.  Body mass index is 28.38 kg/m².    General appearance: alert, appears stated age, cooperative, and no distress  Head: Normocephalic, without obvious abnormality, atraumatic  Neck: no carotid bruit, no JVD, and supple, symmetrical, trachea midline  Lungs: clear to auscultation bilaterally  Heart: regular rate and rhythm; S1, S2 normal, no murmur, click, rub or gallop  Abdomen: soft, non-tender, no distended  Extremities: extremities atraumatic, no pitting " edema  Skin: warm, no cyanosis, no pathologic ecchymosis in exposed portions  Neurologic: Grossly normal. A&O x3      Lab Review   Lab Results   Component Value Date    WBC 6.23 02/29/2024    HGB 13.0 (L) 02/29/2024    HCT 41.6 02/29/2024    MCV 86 02/29/2024     02/29/2024         BMP  Lab Results   Component Value Date     02/29/2024    K 4.0 02/29/2024     02/29/2024    CO2 25 02/29/2024    BUN 18 02/29/2024    CREATININE 1.0 02/29/2024    CALCIUM 9.6 02/29/2024    ANIONGAP 8 02/29/2024    ESTGFRAFRICA >60.0 07/20/2022    EGFRNONAA >60.0 07/20/2022       Lab Results   Component Value Date    LABPROT 10.7 05/06/2020    ALBUMIN 4.3 02/29/2024       Lab Results   Component Value Date    ALT 12 02/29/2024    AST 16 02/29/2024    ALKPHOS 40 (L) 02/29/2024    BILITOT 0.8 02/29/2024       Lab Results   Component Value Date    TSH 2.140 01/25/2020       Lab Results   Component Value Date    CHOL 203 (H) 02/29/2024    CHOL 216 (H) 08/10/2023    CHOL 193 07/20/2022     Lab Results   Component Value Date    HDL 48 02/29/2024    HDL 46 08/10/2023    HDL 43 07/20/2022     Lab Results   Component Value Date    LDLCALC 132.0 02/29/2024    LDLCALC 147.2 08/10/2023    LDLCALC 131.4 07/20/2022     Lab Results   Component Value Date    TRIG 115 02/29/2024    TRIG 114 08/10/2023    TRIG 93 07/20/2022     Lab Results   Component Value Date    CHOLHDL 23.6 02/29/2024    CHOLHDL 21.3 08/10/2023    CHOLHDL 22.3 07/20/2022                Assessment & Plan:     This is a 58 y.o. male with HTN, HLD, SVT s/p RFA. Reports infrequent episodes of palpitations which are stable over time.    1. Primary hypertension  - Borderline   - Continue propranolol 10 mg TID  - Continue amlodipine 10 mg qd  - Maintain BP log at home. If BP persistently >140/90, may consider increasing propranolol     2. Mixed hyperlipidemia  - Stable  - Continue fenofibrate 145 mg qd  - Continue niacin 500 mg qd     3. Supraventricular tachycardia s/p RFA  5/2020  - Stable symptoms without significant change  - Continue propranolol 10 mg TID   - If symptoms persist/worsen, may need further evaluation with holter monitor    Emphasized the importance of modifying lifestyle related risk factors including limiting alcohol intake, substance abuse cessation, exercise, diet most resembling a Mediterranean diet.    Please follow up in 1 year or sooner if needed.      Siena Purcell PA-C  [facility]  Office: [phone]

## 2024-08-27 ENCOUNTER — PATIENT MESSAGE (OUTPATIENT)
Dept: FAMILY MEDICINE | Facility: CLINIC | Age: 59
End: 2024-08-27
Payer: COMMERCIAL

## 2024-08-30 ENCOUNTER — LAB VISIT (OUTPATIENT)
Dept: LAB | Facility: HOSPITAL | Age: 59
End: 2024-08-30
Attending: STUDENT IN AN ORGANIZED HEALTH CARE EDUCATION/TRAINING PROGRAM
Payer: COMMERCIAL

## 2024-08-30 ENCOUNTER — OFFICE VISIT (OUTPATIENT)
Dept: FAMILY MEDICINE | Facility: CLINIC | Age: 59
End: 2024-08-30
Payer: COMMERCIAL

## 2024-08-30 VITALS
HEIGHT: 71 IN | DIASTOLIC BLOOD PRESSURE: 82 MMHG | OXYGEN SATURATION: 99 % | SYSTOLIC BLOOD PRESSURE: 130 MMHG | WEIGHT: 202.06 LBS | HEART RATE: 71 BPM | BODY MASS INDEX: 28.29 KG/M2

## 2024-08-30 DIAGNOSIS — K21.9 GASTROESOPHAGEAL REFLUX DISEASE WITHOUT ESOPHAGITIS: ICD-10-CM

## 2024-08-30 DIAGNOSIS — D64.9 NORMOCYTIC ANEMIA: ICD-10-CM

## 2024-08-30 DIAGNOSIS — Z00.00 HEALTHCARE MAINTENANCE: ICD-10-CM

## 2024-08-30 DIAGNOSIS — I10 PRIMARY HYPERTENSION: ICD-10-CM

## 2024-08-30 DIAGNOSIS — Z00.00 HEALTHCARE MAINTENANCE: Primary | ICD-10-CM

## 2024-08-30 DIAGNOSIS — K58.1 IRRITABLE BOWEL SYNDROME WITH CONSTIPATION: ICD-10-CM

## 2024-08-30 LAB
ALBUMIN SERPL BCP-MCNC: 4.6 G/DL (ref 3.5–5.2)
ALP SERPL-CCNC: 38 U/L (ref 55–135)
ALT SERPL W/O P-5'-P-CCNC: 18 U/L (ref 10–44)
ANION GAP SERPL CALC-SCNC: 9 MMOL/L (ref 8–16)
AST SERPL-CCNC: 17 U/L (ref 10–40)
BILIRUB SERPL-MCNC: 0.7 MG/DL (ref 0.1–1)
BUN SERPL-MCNC: 18 MG/DL (ref 6–20)
CALCIUM SERPL-MCNC: 9.8 MG/DL (ref 8.7–10.5)
CHLORIDE SERPL-SCNC: 108 MMOL/L (ref 95–110)
CO2 SERPL-SCNC: 24 MMOL/L (ref 23–29)
CREAT SERPL-MCNC: 1.1 MG/DL (ref 0.5–1.4)
ERYTHROCYTE [DISTWIDTH] IN BLOOD BY AUTOMATED COUNT: 14.7 % (ref 11.5–14.5)
EST. GFR  (NO RACE VARIABLE): >60 ML/MIN/1.73 M^2
GLUCOSE SERPL-MCNC: 108 MG/DL (ref 70–110)
HCT VFR BLD AUTO: 40.8 % (ref 40–54)
HGB BLD-MCNC: 13.4 G/DL (ref 14–18)
MCH RBC QN AUTO: 27.2 PG (ref 27–31)
MCHC RBC AUTO-ENTMCNC: 32.8 G/DL (ref 32–36)
MCV RBC AUTO: 83 FL (ref 82–98)
PLATELET # BLD AUTO: 361 K/UL (ref 150–450)
PMV BLD AUTO: 9.4 FL (ref 9.2–12.9)
POTASSIUM SERPL-SCNC: 4.3 MMOL/L (ref 3.5–5.1)
PROT SERPL-MCNC: 7.3 G/DL (ref 6–8.4)
RBC # BLD AUTO: 4.93 M/UL (ref 4.6–6.2)
SODIUM SERPL-SCNC: 141 MMOL/L (ref 136–145)
WBC # BLD AUTO: 6.89 K/UL (ref 3.9–12.7)

## 2024-08-30 PROCEDURE — 80053 COMPREHEN METABOLIC PANEL: CPT | Mod: PO | Performed by: STUDENT IN AN ORGANIZED HEALTH CARE EDUCATION/TRAINING PROGRAM

## 2024-08-30 PROCEDURE — 99999 PR PBB SHADOW E&M-EST. PATIENT-LVL V: CPT | Mod: PBBFAC,,, | Performed by: STUDENT IN AN ORGANIZED HEALTH CARE EDUCATION/TRAINING PROGRAM

## 2024-08-30 PROCEDURE — 36415 COLL VENOUS BLD VENIPUNCTURE: CPT | Mod: PO | Performed by: STUDENT IN AN ORGANIZED HEALTH CARE EDUCATION/TRAINING PROGRAM

## 2024-08-30 PROCEDURE — 85027 COMPLETE CBC AUTOMATED: CPT | Mod: PO | Performed by: STUDENT IN AN ORGANIZED HEALTH CARE EDUCATION/TRAINING PROGRAM

## 2024-08-30 NOTE — ASSESSMENT & PLAN NOTE
Currently controlled. Continue amlodipine and propranolol.   HPI:   Ron is a 14 year old male who presents to the office for evaluation of left ear pain.  He says the left ear has been painful for the past week.  He has noticed drainage from the ear.  He wears hearing aids. He has a history of otitis externa in the past.  He recently was swimming.         Current Outpatient Medications   Medication Sig Dispense Refill   • cetirizine (ZYRTEC) 1 MG/ML solution Take 10 mLs by mouth daily. 300 mL 1   • Vimpat 50 MG Tab Take 1 and a HALF tablets by mouth 2 times daily. 270 tablet 0   • nitrofurantoin (MACRODANTIN) 50 MG capsule Take 1 capsule by mouth daily. 30 capsule 11   • Solosite Wound Gel (gel) gel Apply to wound topically daily. 85 g 1   • fluticasone (FLONASE) 50 MCG/ACT nasal spray Spray 2 sprays in each nostril daily. 16 g 12   • Midazolam (NAYZILAM) 5 MG/0.1ML nasal spray Spray 2 sprays (10 mg total) by Nasal route as needed. For seizures greater than 5 minutes. Call 911. 2 each 0   • traZODone (DESYREL) 50 MG tablet Take 1.5 tablets by mouth at bedtime. 138 tablet 2   • sulfamethoxazole-trimethoprim (Bactrim DS) 800-160 MG per tablet Take 1 tablet by mouth 2 times daily. 20 tablet 0   • oxybutynin (DITROPAN-XL) 10 MG 24 hr tablet Take 2 tablets by mouth daily. 60 tablet 11   • EPINEPHrine 0.3 MG/0.3ML auto-injector Inject 0.3 mLs into the muscle 1 time as needed for Anaphylaxis. 4 each 3   • clonazepam (KlonoPIN) 1 MG TABLET DISPERSIBLE Take 1 tablet by mouth daily as needed for any seizure. 15 tablet 0   • Midazolam HCl 10 MG/2ML Solution Give 1.5 mls for a seizure longer than 3 minutes or 3 seizures in one hour. Give 0.75 ml in each nostril.  Use only one time in 24 hours 2 mL 0   • clonazepam (KLONOPIN) 1 MG TABLET DISPERSIBLE Take 1 tablet by mouth as needed for any seizures. 15 tablet 0   • ondansetron (ZOFRAN ODT) 4 MG disintegrating tablet Dissolve 1 tablet in mouth every 8 hours as needed for Nausea (Or vomiting). 12 tablet 0   • ciprofloxacin-dexamethasone  (CIPRODEX) otic suspension Place 4 drops into left ear 2 times daily for 7 days. 7.5 mL 0     No current facility-administered medications for this visit.      ALLERGIES:   Allergen Reactions   • Latex   (Environmental)       Physical Exam-     GEN- Appears well and in no apparent distress. Alert and oriented to person, place and self. Answered all questions appropriately. Voice quality is strong.   Head: Atraumatic, normocephalic. Eyes: PERRL, EOM intact. Ears: There is no preauricular or mastoid tenderness or inflammation bilaterally. Pinnae are unremarkable.  Right ear canal is clear.  TM intact.  Left ear canal with mild erythema and edema.  Yellow drainage noted within the canal.  Using a binocular microscope and suction this was removed.  TM intact.    A/P:   1.  Left otitis externa    Patient's left ear canal was cleaned.  He will be placed on Ciprodex ear drops 4 drops to left ear b.i.d. for 1 week.  He is to keep water and his hearing aid out of his left ear.  He will follow-up if no better.      My supervision physician was Dr. Wilfred Lieberman

## 2024-08-30 NOTE — PROGRESS NOTES
Name: Jaren PICHARDO Cousin  MRN: [id number]  : 1965  PCP: Patrick Varghese MD    Subjective   Patient presents for regular visit.     Review of Systems   Cardiovascular:  Negative for palpitations and leg swelling.   Gastrointestinal:  Positive for abdominal pain (lower abdominal mostly, sometimes upper abdomen; cramping pain; feels as if he has to use the bathroom but nothing is there), constipation and diarrhea (switches between constipation and loose stools). Negative for blood in stool.   Neurological:  Negative for dizziness.       Patient Active Problem List   Diagnosis    Primary hypertension    Urticaria    Allergic rhinitis    Mixed hyperlipidemia    DDD (degenerative disc disease), lumbar    Gastroesophageal reflux disease without esophagitis    Decreased range of motion of neck    Cervical radiculitis    Cervical spondylosis    Supraventricular tachycardia    Fatty liver    Right ankle pain    Tendonitis, Achilles, right    Acute medial meniscus tear of right knee    Genu varum of both lower extremities    Post-traumatic osteoarthritis of right knee    Pharyngitis    Irritable bowel syndrome with constipation       Health Maintenance Due   Topic Date Due    Shingles Vaccine (2 of 3) 2015       Objective   Vitals:    24 0959   BP: 130/82   Pulse: 71       Physical Exam  Constitutional:       General: He is not in acute distress.     Appearance: Normal appearance. He is well-developed.   HENT:      Head: Normocephalic and atraumatic.      Right Ear: External ear normal.      Left Ear: External ear normal.   Eyes:      Conjunctiva/sclera: Conjunctivae normal.   Pulmonary:      Effort: Pulmonary effort is normal. No respiratory distress.   Abdominal:      General: Abdomen is flat. There is no distension.   Musculoskeletal:         General: No swelling or deformity.      Right lower leg: No edema.      Left lower leg: No edema.   Skin:     General: Skin is warm and dry.      Coloration: Skin  is not jaundiced.   Neurological:      Mental Status: He is alert and oriented to person, place, and time. Mental status is at baseline.   Psychiatric:         Attention and Perception: Attention and perception normal.         Mood and Affect: Mood normal.         Speech: Speech normal.         Behavior: Behavior normal. Behavior is cooperative.         Thought Content: Thought content normal.         Cognition and Memory: Cognition normal.         Judgment: Judgment normal.         Assessment & Plan   1. Healthcare maintenance  -     Comprehensive Metabolic Panel; Future; Expected date: 08/30/2024  -     COMPREHENSIVE METABOLIC PANEL; Future; Expected date: 02/28/2025    2. Normocytic anemia  -     CBC Without Differential; Future; Expected date: 08/30/2024    3. Gastroesophageal reflux disease without esophagitis  Assessment & Plan:  Patient had EGD in the interval that was normal. Continue as needed GERD medications.       4. Irritable bowel syndrome with constipation  Assessment & Plan:  Discussed FODMAP diet and psyllium husk fiber supplement for current symptoms. Can consider neuromodulators such as tricyclic antidepressants if symptoms continue to be bothersome despite the above recommendations.      5. Primary hypertension  Assessment & Plan:  Currently controlled. Continue amlodipine and propranolol.          Follow up in 6 months.     Patrick Varghese MD  08/30/2024

## 2024-08-30 NOTE — ASSESSMENT & PLAN NOTE
Discussed FODMAP diet and psyllium husk fiber supplement for current symptoms. Can consider neuromodulators such as tricyclic antidepressants if symptoms continue to be bothersome despite the above recommendations.

## 2024-09-14 DIAGNOSIS — R12 HEARTBURN: ICD-10-CM

## 2024-09-16 RX ORDER — ESOMEPRAZOLE MAGNESIUM 40 MG/1
CAPSULE, DELAYED RELEASE ORAL
Qty: 90 CAPSULE | Refills: 3 | Status: SHIPPED | OUTPATIENT
Start: 2024-09-16

## 2024-09-29 DIAGNOSIS — R12 HEARTBURN: ICD-10-CM

## 2024-09-30 RX ORDER — OMEPRAZOLE 40 MG/1
40 CAPSULE, DELAYED RELEASE ORAL EVERY MORNING
Qty: 90 CAPSULE | Refills: 3 | Status: SHIPPED | OUTPATIENT
Start: 2024-09-30 | End: 2024-10-02 | Stop reason: ALTCHOICE

## 2024-10-01 DIAGNOSIS — R12 HEARTBURN: ICD-10-CM

## 2024-10-01 RX ORDER — OMEPRAZOLE 40 MG/1
40 CAPSULE, DELAYED RELEASE ORAL EVERY MORNING
Qty: 90 CAPSULE | Refills: 3 | Status: CANCELLED | OUTPATIENT
Start: 2024-10-01

## 2024-10-02 RX ORDER — ESOMEPRAZOLE MAGNESIUM 40 MG/1
40 CAPSULE, DELAYED RELEASE ORAL
Qty: 30 CAPSULE | Refills: 11 | Status: SHIPPED | OUTPATIENT
Start: 2024-10-02 | End: 2025-10-02

## 2024-10-02 NOTE — TELEPHONE ENCOUNTER
----- Message from Janette sent at 10/1/2024  3:35 PM CDT -----  Regarding: Rx Change        Name Of Caller:   Lyric webber/ [facility]      Contact Preference:   [phone]      Nature of call:   Pt is requesting a script for esomeprazole (Nexium) instead of omeprazole (Prilosec). Please send to the pharmacy below.      [facility] [address] [facility] [employer] - [address] [address]  Phone: [phone] Fax: [phone]

## 2024-10-08 ENCOUNTER — TELEPHONE (OUTPATIENT)
Dept: DERMATOLOGY | Facility: CLINIC | Age: 59
End: 2024-10-08
Payer: COMMERCIAL

## 2024-10-08 NOTE — TELEPHONE ENCOUNTER
Called pt regarding r/s appointment with Dr.Kelli Ravi on nov 6 . Pt successfully scheduled for next available. Pt verbalized understanding.

## 2025-01-31 ENCOUNTER — OFFICE VISIT (OUTPATIENT)
Dept: FAMILY MEDICINE | Facility: CLINIC | Age: 60
End: 2025-01-31
Payer: COMMERCIAL

## 2025-01-31 ENCOUNTER — LAB VISIT (OUTPATIENT)
Dept: LAB | Facility: HOSPITAL | Age: 60
End: 2025-01-31
Payer: COMMERCIAL

## 2025-01-31 VITALS
HEIGHT: 71 IN | SYSTOLIC BLOOD PRESSURE: 130 MMHG | BODY MASS INDEX: 28.37 KG/M2 | OXYGEN SATURATION: 98 % | TEMPERATURE: 99 F | DIASTOLIC BLOOD PRESSURE: 80 MMHG | WEIGHT: 202.63 LBS | HEART RATE: 79 BPM

## 2025-01-31 DIAGNOSIS — Z79.899 LONG-TERM USE OF HIGH-RISK MEDICATION: ICD-10-CM

## 2025-01-31 DIAGNOSIS — Z80.1 FAMILY HISTORY OF LUNG CANCER: ICD-10-CM

## 2025-01-31 DIAGNOSIS — Z80.51 FAMILY HISTORY OF CANCER OF THE KIDNEY: ICD-10-CM

## 2025-01-31 DIAGNOSIS — Z12.5 SCREENING FOR MALIGNANT NEOPLASM OF PROSTATE: ICD-10-CM

## 2025-01-31 DIAGNOSIS — R11.2 NAUSEA AND VOMITING, UNSPECIFIED VOMITING TYPE: ICD-10-CM

## 2025-01-31 DIAGNOSIS — K76.89 LIVER NODULE: ICD-10-CM

## 2025-01-31 DIAGNOSIS — R10.13 EPIGASTRIC PAIN: ICD-10-CM

## 2025-01-31 DIAGNOSIS — R10.13 EPIGASTRIC PAIN: Primary | ICD-10-CM

## 2025-01-31 PROCEDURE — 99999 PR PBB SHADOW E&M-EST. PATIENT-LVL V: CPT | Mod: PBBFAC,,, | Performed by: NURSE PRACTITIONER

## 2025-01-31 PROCEDURE — 80053 COMPREHEN METABOLIC PANEL: CPT | Performed by: NURSE PRACTITIONER

## 2025-01-31 PROCEDURE — 84153 ASSAY OF PSA TOTAL: CPT | Performed by: NURSE PRACTITIONER

## 2025-01-31 PROCEDURE — 99214 OFFICE O/P EST MOD 30 MIN: CPT | Mod: S$GLB,,, | Performed by: NURSE PRACTITIONER

## 2025-01-31 PROCEDURE — 80061 LIPID PANEL: CPT | Performed by: NURSE PRACTITIONER

## 2025-01-31 PROCEDURE — 36415 COLL VENOUS BLD VENIPUNCTURE: CPT | Mod: PO | Performed by: NURSE PRACTITIONER

## 2025-01-31 PROCEDURE — 83735 ASSAY OF MAGNESIUM: CPT | Performed by: NURSE PRACTITIONER

## 2025-01-31 PROCEDURE — 99215 OFFICE O/P EST HI 40 MIN: CPT | Mod: PBBFAC,PO | Performed by: NURSE PRACTITIONER

## 2025-01-31 NOTE — PROGRESS NOTES
Patient ID: Jaren PICHARDO Cousin is a 59 y.o. male.    Chief Complaint: GI Problem (Started in November.)  Last seen in primary care by PCP Abimael on 08/30/2024  He is accompanied by his wife today    History of Present Illness    CHIEF COMPLAINT:  Patient presents today for abdominal problems    HISTORY OF PRESENT ILLNESS:  He reports abdominal pain in both lower and upper regions since early November. Pain radiates to the back on the right side near the spine and is described as intermittent with varying intensity. He experiences nausea and pain with eating but denies emesis. He reports constipation for the past week with abnormal bowel movements, increased gas, and audible stomach noises. He denies hematochezia.    MEDICAL HISTORY:  He has a history of diverticulosis in the sigmoidal colon and fatty liver. He has  service history with burn pit exposure.    FAMILY HISTORY:  His brother has renal cancer and his mother has lung cancer, recently underwent treatment.    MEDICATIONS:  He takes Tricor (fenofibrate) daily.    SOCIAL HISTORY:  He reports daily alcohol consumption, primarily beer. He acknowledges significant alcohol use but reports recently reducing intake.      ROS:  General: -fever, -chills, -fatigue, -weight gain, -weight loss  Eyes: -vision changes, -redness, -discharge  ENT: -ear pain, -nasal congestion, -sore throat  Cardiovascular: -chest pain, -palpitations, -lower extremity edema  Respiratory: -cough, -shortness of breath  Gastrointestinal: +abdominal pain, +nausea, -vomiting, -diarrhea, +constipation, -blood in stool  Genitourinary: -dysuria, -hematuria, -frequency  Musculoskeletal: -joint pain, -muscle pain  Skin: -rash, -lesion  Neurological: -headache, -dizziness, -numbness, -tingling  Psychiatric: -anxiety, -depression, -sleep difficulty         Physical Exam    General: No acute distress. Well-developed. Well-nourished.  Eyes: EOMI. Sclerae anicteric.  HENT: Normocephalic. Atraumatic.  Nares patent. Moist oral mucosa.  Ears: Bilateral TMs clear. Bilateral EACs clear.  Cardiovascular: Regular rate. Regular rhythm. No murmurs. No rubs. No gallops. Normal S1, S2.  Respiratory: Normal respiratory effort. Clear to auscultation bilaterally. No rales. No rhonchi. No wheezing.  Abdomen: Soft. Non-tender. Non-distended. Normoactive bowel sounds.  Musculoskeletal: No  obvious deformity.  Extremities: No lower extremity edema.  Neurological: Alert & oriented x3. No slurred speech. Normal gait.  Psychiatric: Normal mood. Normal affect. Good insight. Good judgment.  Skin: Warm. Dry. No rash.         Assessment & Plan    IMPRESSION:  - Assess for potential diverticulitis recurrence  - Consider gallbladder involvement due to pain radiating to the back and association with eating  - Evaluate for possible liver complications, given history of fatty liver and alcohol consumption  - Investigate potential abdominal malignancy, considering family history of cancer and  background with burn pit exposure    ABDOMINAL PAIN AND GASTROINTESTINAL SYMPTOMS:  - Noted the patient's reports of abdominal pain, constipation, and gas since early November, with symptoms varying and fluctuating frequently.  - Planned a workup to determine the cause of current symptoms.  - Noted patient's reports of lower abdominal pain, sometimes extending to the upper abdomen and back, ongoing since early November.  - Evaluated pain location on the right side, almost to the center near the spine, with no pain on palpation.  - Considered possible causes of abdominal pain, including diverticulitis and potential gallbladder issues.  - Noted patient's reports of constipation for about a week, which is atypical for the patient.  - Noted patient's reports of occasional nausea and queasiness, especially when eating.  - Noted patient's reports of gas and audible stomach noises.  - Ordered abdominal imaging to assess liver, pancreas, and  gallbladder.    DIVERTICULOSIS:  - Reviewed previous colonoscopy from 2022 showing diverticula in the sigmoid colon, confirming diverticulosis.  - Acknowledged the history of diverticulosis and explained the condition to the patient.  - Noted the patient's history of previous treatment with antibiotics for diverticulitis.    FATTY LIVER:  - Continued Tricor (fenofibrate) and niacin for fatty liver management.  - Ordered abdominal imaging to assess liver condition.  - Noted the patient's history of fatty liver diagnosed years ago.  - Reviewed previous CT from 2018 showing hepatic steatosis (fatty liver).  - Noted recent liver function tests from 5 months ago were not significantly abnormal.  - Planned another workup to reassess the liver condition.    OTHER INSTRUCTIONS:  - Noted patient's reports of drinking beer daily but has recently reduced consumption.          He had an Upper GI on 03/13/2024  Had colonoscopy on 07/12/2022  He is taking magnesium to help sleep    Medication List with Changes/Refills   Current Medications    ALLERGY RELIEF, LORATADINE, 10 MG TABLET    TAKE ONE TABLET BY MOUTH ONCE DAILY    AMLODIPINE (NORVASC) 10 MG TABLET    Take 1 tablet (10 mg total) by mouth once daily.    BUSPIRONE (BUSPAR) 5 MG TAB    TAKE 1 TABLET BY MOUTH THREE TIMES A DAY    CETIRIZINE (ZYRTEC) 10 MG TABLET    TAKE ONE TABLET BY MOUTH ONCE DAILY AS NEEDED FOR ALLERGIES    CO-ENZYME Q-10 30 MG CAPSULE    Take 100 mg by mouth once daily.    COLCHICINE (COLCRYS) 0.6 MG TABLET    TAKE 1 TABLET BY MOUTH 2 TIMES DAILY.    CYCLOBENZAPRINE (FLEXERIL) 10 MG TABLET    Take 10 mg by mouth 3 (three) times daily as needed. PRN    DICLOFENAC SODIUM (VOLTAREN) 1 % GEL    APPLY 2 GRAMS TOPICALLY FOUR TIMES A DAY AS NEEDED FOR PAIN    ESOMEPRAZOLE (NEXIUM) 40 MG CAPSULE    Take 1 capsule (40 mg total) by mouth before breakfast.    FAMOTIDINE (PEPCID) 20 MG TABLET    Take 1 tablet (20 mg total) by mouth 2 (two) times daily.     FENOFIBRATE (TRICOR) 145 MG TABLET    Take 1 tablet (145 mg total) by mouth once daily.    MECLIZINE (ANTIVERT) 25 MG TABLET    Take 1 tablet (25 mg total) by mouth 3 (three) times daily as needed for Dizziness (may cause drowsiness).    METRONIDAZOLE (METROGEL) 0.75 % GEL    Apply 1 application  topically every evening.    MOMETASONE (NASONEX) 50 MCG/ACTUATION NASAL SPRAY    Use 2 spray(s) in each nostril once daily    NIACIN (SLO-NIACIN) 500 MG TABLET    Take 1 tablet (500 mg total) by mouth every evening.    PROPRANOLOL (INDERAL) 10 MG TABLET    Take 1 tablet (10 mg total) by mouth 3 (three) times daily.    SILDENAFIL (VIAGRA) 100 MG TABLET    TAKE ONE TABLET BY MOUTH EVERY WEEK AS NEEDED 30 TO 60 MINUTES BEFORE INTERCOURSE FOR BEST RESULTS TAKE ON EMPTY STOMACH    SILVER SULFADIAZINE 1% (SILVADENE) 1 % CREAM    Apply topically 2 (two) times daily.    VALACYCLOVIR (VALTREX) 500 MG TABLET    Take 1 tablet (500 mg total) by mouth once daily.        No follow-ups on file.    This note was generated with the assistance of ambient listening technology. Verbal consent was obtained by the patient and accompanying visitor(s) for the recording of patient appointment to facilitate this note. I attest to having reviewed and edited the generated note for accuracy, though some syntax or spelling errors may persist. Please contact the author of this note for any clarification.

## 2025-02-01 LAB
ALBUMIN SERPL BCP-MCNC: 4.5 G/DL (ref 3.5–5.2)
ALP SERPL-CCNC: 37 U/L (ref 40–150)
ALT SERPL W/O P-5'-P-CCNC: 18 U/L (ref 10–44)
ANION GAP SERPL CALC-SCNC: 7 MMOL/L (ref 8–16)
AST SERPL-CCNC: 21 U/L (ref 10–40)
BILIRUB SERPL-MCNC: 0.7 MG/DL (ref 0.1–1)
BUN SERPL-MCNC: 15 MG/DL (ref 6–20)
CALCIUM SERPL-MCNC: 9.6 MG/DL (ref 8.7–10.5)
CHLORIDE SERPL-SCNC: 108 MMOL/L (ref 95–110)
CHOLEST SERPL-MCNC: 209 MG/DL (ref 120–199)
CHOLEST/HDLC SERPL: 4.9 {RATIO} (ref 2–5)
CO2 SERPL-SCNC: 24 MMOL/L (ref 23–29)
COMPLEXED PSA SERPL-MCNC: 1.4 NG/ML (ref 0–4)
CREAT SERPL-MCNC: 1 MG/DL (ref 0.5–1.4)
EST. GFR  (NO RACE VARIABLE): >60 ML/MIN/1.73 M^2
GLUCOSE SERPL-MCNC: 95 MG/DL (ref 70–110)
HDLC SERPL-MCNC: 43 MG/DL (ref 40–75)
HDLC SERPL: 20.6 % (ref 20–50)
LDLC SERPL CALC-MCNC: 140.2 MG/DL (ref 63–159)
MAGNESIUM SERPL-MCNC: 2.1 MG/DL (ref 1.6–2.6)
NONHDLC SERPL-MCNC: 166 MG/DL
POTASSIUM SERPL-SCNC: 4.5 MMOL/L (ref 3.5–5.1)
PROT SERPL-MCNC: 7.5 G/DL (ref 6–8.4)
SODIUM SERPL-SCNC: 139 MMOL/L (ref 136–145)
TRIGL SERPL-MCNC: 129 MG/DL (ref 30–150)

## 2025-02-06 ENCOUNTER — OFFICE VISIT (OUTPATIENT)
Dept: DERMATOLOGY | Facility: CLINIC | Age: 60
End: 2025-02-06
Payer: COMMERCIAL

## 2025-02-06 DIAGNOSIS — L82.1 SEBORRHEIC KERATOSIS: ICD-10-CM

## 2025-02-06 DIAGNOSIS — L71.9 ROSACEA: Primary | ICD-10-CM

## 2025-02-06 DIAGNOSIS — L71.9 ACNE ROSACEA: ICD-10-CM

## 2025-02-06 PROCEDURE — 99213 OFFICE O/P EST LOW 20 MIN: CPT | Mod: S$GLB,,, | Performed by: DERMATOLOGY

## 2025-02-06 PROCEDURE — 99999 PR PBB SHADOW E&M-EST. PATIENT-LVL III: CPT | Mod: PBBFAC,,, | Performed by: DERMATOLOGY

## 2025-02-06 PROCEDURE — G2211 COMPLEX E/M VISIT ADD ON: HCPCS | Mod: S$PBB,,, | Performed by: DERMATOLOGY

## 2025-02-06 PROCEDURE — 99213 OFFICE O/P EST LOW 20 MIN: CPT | Mod: PBBFAC | Performed by: DERMATOLOGY

## 2025-02-06 RX ORDER — DOXYCYCLINE 50 MG/1
CAPSULE ORAL
Qty: 30 CAPSULE | Refills: 11 | Status: SHIPPED | OUTPATIENT
Start: 2025-02-06

## 2025-02-06 NOTE — PROGRESS NOTES
Subjective:      Patient ID:  Jaren PICHARDO Cousin is a 59 y.o. male who presents for   Chief Complaint   Patient presents with    Medication Refill     Refill on doxycycline. F/u on rosacea     Spot     Spot on left cheek.      Hx of rosacea and SK, last seen on 5/3/23.  Currently using prosacea with sulfa OTC and improves. He would like to be restarted on doxy.    He c/o lesion of the left cheek, wishes to have removed.     Prior tx: metrogel and doxy 50 mg qD    Medication Refill    Spot        Review of Systems   Constitutional:  Negative for fever and chills.   Gastrointestinal:  Negative for nausea and vomiting.   Skin:  Positive for activity-related sunscreen use. Negative for daily sunscreen use and recent sunburn.   Hematologic/Lymphatic: Does not bruise/bleed easily.       Objective:   Physical Exam   Constitutional: He appears well-developed and well-nourished. No distress.   Neurological: He is alert and oriented to person, place, and time. He is not disoriented.   Psychiatric: He has a normal mood and affect.   Skin:   Areas Examined (abnormalities noted in diagram):   Head / Face Inspection Performed  Neck Inspection Performed  RUE Inspected  LUE Inspection Performed  Nails and Digits Inspection Performed            Diagram Legend        Pigmented verrucoid papule/plaque c/w seborrheic keratosis       Assessment / Plan:        Rosacea  Acne rosacea  -     doxycycline (MONODOX) 50 MG Cap; Take once daily with food. May cause upset stomach.  Dispense: 30 capsule; Refill: 11  -     patient defers metronidazole.  We will refill above medication.    Side effect profile of doxy reviewed including increased sun sensitivity and upset stomach.    Seborrheic keratosis  Of left cheek.    Risks, benefits and alternatives discussed including, pain, infection, allergic reaction to anesthesia, scarring, dyschromia, recurrence and infection.  Written and verbal consent obtained. 1 lesion cleaned with alcohol and  anesthetized with 1% lidocaine. Areas then lightly hyfrecated and curetted to remove gross lesion. No complications. Areas dressed with vaseline ointment and bandage. Wound care discussed and AVS given.             Follow up in about 1 year (around 2/6/2026).

## 2025-02-13 ENCOUNTER — HOSPITAL ENCOUNTER (OUTPATIENT)
Dept: RADIOLOGY | Facility: HOSPITAL | Age: 60
Discharge: HOME | End: 2025-02-13
Attending: NURSE PRACTITIONER
Payer: COMMERCIAL

## 2025-02-13 DIAGNOSIS — K76.89 LIVER NODULE: ICD-10-CM

## 2025-02-13 DIAGNOSIS — R10.13 EPIGASTRIC PAIN: ICD-10-CM

## 2025-02-13 DIAGNOSIS — R11.2 NAUSEA AND VOMITING, UNSPECIFIED VOMITING TYPE: ICD-10-CM

## 2025-02-13 PROCEDURE — 74176 CT ABD & PELVIS W/O CONTRAST: CPT | Mod: TC,PO

## 2025-02-13 PROCEDURE — 25500020 PHARM REV CODE 255: Mod: PO | Performed by: NURSE PRACTITIONER

## 2025-02-13 PROCEDURE — A9698 NON-RAD CONTRAST MATERIALNOC: HCPCS | Mod: PO | Performed by: NURSE PRACTITIONER

## 2025-02-13 PROCEDURE — 74176 CT ABD & PELVIS W/O CONTRAST: CPT | Mod: 26,,, | Performed by: RADIOLOGY

## 2025-02-13 RX ADMIN — BARIUM SULFATE 900 ML: 20 SUSPENSION ORAL at 04:02

## 2025-02-19 ENCOUNTER — OFFICE VISIT (OUTPATIENT)
Dept: FAMILY MEDICINE | Facility: CLINIC | Age: 60
End: 2025-02-19
Payer: COMMERCIAL

## 2025-02-19 VITALS
HEART RATE: 70 BPM | TEMPERATURE: 98 F | HEIGHT: 71 IN | OXYGEN SATURATION: 99 % | BODY MASS INDEX: 28.89 KG/M2 | SYSTOLIC BLOOD PRESSURE: 140 MMHG | DIASTOLIC BLOOD PRESSURE: 76 MMHG | WEIGHT: 206.38 LBS

## 2025-02-19 DIAGNOSIS — K82.8 SLUDGE IN GALLBLADDER: ICD-10-CM

## 2025-02-19 DIAGNOSIS — R10.11 ABDOMINAL DISCOMFORT IN RIGHT UPPER QUADRANT: ICD-10-CM

## 2025-02-19 DIAGNOSIS — K76.0 FATTY LIVER: ICD-10-CM

## 2025-02-19 DIAGNOSIS — Z71.2 ENCOUNTER TO DISCUSS TEST RESULTS: Primary | ICD-10-CM

## 2025-02-19 DIAGNOSIS — N20.0 RENAL STONES: ICD-10-CM

## 2025-02-19 NOTE — PATIENT INSTRUCTIONS
Mesenteric panniculitis is a chronic disorder that affects fat cells in the mesentery. The mesentery is a fold of tissue in the abdomen that attaches the intestine to the abdominal wall to hold it in place.  Mesenteric panniculitis causes persistent inflammation, which can damage or destroy fat cells in the mesentery. This can lead to scar tissue and other symptoms.  There are three stages of mesenteric panniculitis. These are:  Mesenteric lipodystrophy, which is when the fat cells start to be replaced by cells from the immune system.  Mesenteric panniculitis, when more immune system cells enter the mesentery and cause inflammation.  Retractile mesenteritis, when inflammation worsens, and scar tissue begins forming in the mesentery.  While mesenteric panniculitis is usually not life-threatening, symptoms can make daily life difficult. It may also be a sign of an underlying condition that could be more serious.

## 2025-02-19 NOTE — PROGRESS NOTES
Patient ID: Jaren PICHARDO Cousin is a 59 y.o. male.    Chief Complaint: Results (CT Scan), Low-back Pain, and Abdominal Pain (Right side yesterday.)  Last seen in primary care by me on 01/31/2025  History of Present Illness    CHIEF COMPLAINT:  Patient presents today for follow up of CT results    CURRENT SYMPTOMS:  He experienced right upper quadrant pain yesterday following attendance at a weekend celebration involving food and alcohol consumption at in-laws' 50th anniversary.    IMAGING:  CT showed fatty liver with an old left hepatic cyst that has decreased in size. Multiple renal calculi were identified, with the largest measuring 4mm. No hydronephrosis was observed. The gallbladder showed possible stones or sludge. 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 The spleen, pancreas, and adrenal glands appeared normal.    LABS:  Labs from January 31st showed normal PSA, magnesium, and kidney and liver function tests. Total cholesterol was slightly elevated at 209 mg/dL with LDL of 142 mg/dL.    MEDICAL HISTORY:  History includes EGD in March of previous year and knee surgery with associated 12-pound weight loss.    DIET:  He has been consuming Ensure as a breakfast substitute for approximately one year, which was initiated following knee surgery and associated weight loss. He has lactose intolerance with regular milk but tolerates ultra-filtered milk products well.         Physical Exam    General: No acute distress. Well-developed. Well-nourished.  Eyes: EOMI. Sclerae anicteric.  HENT: Normocephalic. Atraumatic. Nares patent. Moist oral mucosa.  Ears: Bilateral TMs clear. Bilateral EACs clear.  Cardiovascular: Regular rate. Regular rhythm. No murmurs. No rubs. No gallops. Normal S1, S2. Normal heart sounds.  Respiratory: Normal respiratory effort. Clear to auscultation bilaterally. No rales. No rhonchi. No wheezing. Normal lung sounds.  Abdomen: Soft, RUQ  tenderness with deep palpation today. Non-distended. Normoactive bowel sounds.  Musculoskeletal: No  obvious deformity.  Extremities: No lower extremity edema.  Neurological: Alert & oriented x3. No slurred speech. Normal gait.  Psychiatric: Normal mood. Normal affect. Good insight. Good judgment.  Skin: Warm. Dry. No rash.         Assessment & Plan    IMPRESSION:  - Reviewed CT results: fatty liver, old left hepatic cyst (decreased in size), multiple renal calculi (largest 4mm, no hydronephrosis), gallbladder sludge/stones, and mesenteric panniculitis  - Considered gallbladder sludge as potential cause of patient's right upper quadrant pain  - Assessed mesenteric panniculitis as chronic inflammation affecting fat cells in mesentery, not life-threatening but causing problematic symptoms  - Evaluated lab results: normal PSA, magnesium, kidney and liver function; slightly elevated cholesterol (209) and LDL (142)  - Determined GI consult necessary to further evaluate gallbladder issues and mesenteric panniculitis    FATTY LIVER (STEATOSIS):  - CT shows fatty liver (steatosis).  - Evaluated liver function based on comprehensive lab results, which were good.  - Referred the patient to a gastroenterologist for evaluation of liver and gallbladder issues.  - Recommend dietary modifications, including avoiding fried and heavy foods.  - Noted an old left hepatic cyst, which has decreased in size.    KIDNEY STONES (RENAL CALCULI):  - CT shows multiple renal calculi, with the largest being 4 mm.  - Evaluated kidney function based on comprehensive lab results, which were good.  - Explained to the patient that some kidney stones may pass spontaneously.  - Adopted a watchful waiting approach for kidney stones.  - Instructed the patient to go to the hospital if severe pain or inability to urinate occurs.    GALLBLADDER ISSUES:  - CT shows possible gallbladder stones or sludge.  - Patient reports pain in the right upper quadrant.  -  Physical exam reveals a soft right upper quadrant.  - Explained gallbladder sludge and its potential symptoms to the patient.  - Discussed potential symptoms of gallbladder issues, including pain radiating to the back.  - Educated the patient on foods that may exacerbate gallbladder symptoms, such as fried and heavy foods.  - Referred the patient to a gastroenterologist for evaluation of gallbladder issues.  - Recommend dietary modifications, including a low-fat diet and avoiding fried foods.  - Advised the patient to track food intake and symptoms.    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  - Explained mesenteric panniculitis to the patient: chronic inflammation in the mesentery, affecting fat cells and potentially leading to scar tissue.  - Discussed the stages and potential symptoms of mesenteric panniculitis.  - Referred the patient to a gastroenterologist for evaluation of mesenteric panniculitis.  - Recommend dietary modifications to manage symptoms.    HYPERLIPIDEMIA:  - Lab results show slightly elevated cholesterol levels: total cholesterol 209 mg/dL (normal <200 mg/dL),  mg/dL.  - Evaluated cholesterol levels as slightly high but not overly concerning.  - Plan to monitor cholesterol levels annually.    ABDOMINAL PAIN:  - Patient reports pain in the right upper quadrant and epigastric area.  - Physical exam reveals a soft right upper quadrant.  - Associated pain with possible gallbladder issues and mesenteric inflammation.  - Referred the patient to a gastroenterologist for evaluation of pain and associated conditions.  - Recommend dietary modifications and tracking food intake.  - Advised the patient to track food intake and symptoms to identify potential trigger foods.    GASTROINTESTINAL ISSUES:  - Patient mentions previous esophagogastroduodenoscopy (EGD).  - Acknowledged previous upper GI exam.  - Referred the patient to a  gastroenterologist for evaluation of GI issues.  - Recommend dietary modifications to manage symptoms.  - Recommend reducing consumption   of fried and heavy foods, implementing low-fat diet, and considering reduction in alcohol intake, particularly beer.  - Discontinued Ensure for a few weeks to assess any changes in symptoms.    FOLLOW UP:  - Referred to Gastroenterology for evaluation of gallbladder sludge/stones and mesenteric panniculitis.  - Follow up on March 6th as scheduled with Dr. Richards.  - Contact the office to assist in scheduling gastroenterology appointment, preferably before March 6th.          Medication List with Changes/Refills   Current Medications    ALLERGY RELIEF, LORATADINE, 10 MG TABLET    TAKE ONE TABLET BY MOUTH ONCE DAILY    AMLODIPINE (NORVASC) 10 MG TABLET    Take 1 tablet (10 mg total) by mouth once daily.    BUSPIRONE (BUSPAR) 5 MG TAB    TAKE 1 TABLET BY MOUTH THREE TIMES A DAY    CETIRIZINE (ZYRTEC) 10 MG TABLET    TAKE ONE TABLET BY MOUTH ONCE DAILY AS NEEDED FOR ALLERGIES    CO-ENZYME Q-10 30 MG CAPSULE    Take 100 mg by mouth once daily.    COLCHICINE (COLCRYS) 0.6 MG TABLET    TAKE 1 TABLET BY MOUTH 2 TIMES DAILY.    CYCLOBENZAPRINE (FLEXERIL) 10 MG TABLET    Take 10 mg by mouth 3 (three) times daily as needed. PRN    DICLOFENAC SODIUM (VOLTAREN) 1 % GEL    APPLY 2 GRAMS TOPICALLY FOUR TIMES A DAY AS NEEDED FOR PAIN    DOXYCYCLINE (MONODOX) 50 MG CAP    Take once daily with food. May cause upset stomach.    ESOMEPRAZOLE (NEXIUM) 40 MG CAPSULE    Take 1 capsule (40 mg total) by mouth before breakfast.    FAMOTIDINE (PEPCID) 20 MG TABLET    Take 1 tablet (20 mg total) by mouth 2 (two) times daily.    FENOFIBRATE (TRICOR) 145 MG TABLET    Take 1 tablet (145 mg total) by mouth once daily.    MECLIZINE (ANTIVERT) 25 MG TABLET    Take 1 tablet (25 mg total) by mouth 3 (three) times daily as needed for Dizziness (may cause drowsiness).    METRONIDAZOLE (METROGEL) 0.75 % GEL    Apply  1 application  topically every evening.    MOMETASONE (NASONEX) 50 MCG/ACTUATION NASAL SPRAY    Use 2 spray(s) in each nostril once daily    NIACIN (SLO-NIACIN) 500 MG TABLET    Take 1 tablet (500 mg total) by mouth every evening.    PROPRANOLOL (INDERAL) 10 MG TABLET    Take 1 tablet (10 mg total) by mouth 3 (three) times daily.    SILDENAFIL (VIAGRA) 100 MG TABLET    TAKE ONE TABLET BY MOUTH EVERY WEEK AS NEEDED 30 TO 60 MINUTES BEFORE INTERCOURSE FOR BEST RESULTS TAKE ON EMPTY STOMACH    SILVER SULFADIAZINE 1% (SILVADENE) 1 % CREAM    Apply topically 2 (two) times daily.    VALACYCLOVIR (VALTREX) 500 MG TABLET    Take 1 tablet (500 mg total) by mouth once daily.        No follow-ups on file.    This note was generated with the assistance of ambient listening technology. Verbal consent was obtained by the patient and accompanying visitor(s) for the recording of patient appointment to facilitate this note. I attest to having reviewed and edited the generated note for accuracy, though some syntax or spelling errors may persist. Please contact the author of this note for any clarification.

## 2025-02-20 ENCOUNTER — OFFICE VISIT (OUTPATIENT)
Dept: SURGERY | Facility: CLINIC | Age: 60
End: 2025-02-20
Payer: COMMERCIAL

## 2025-02-20 VITALS
SYSTOLIC BLOOD PRESSURE: 147 MMHG | BODY MASS INDEX: 29.1 KG/M2 | TEMPERATURE: 98 F | WEIGHT: 207.88 LBS | HEART RATE: 66 BPM | HEIGHT: 71 IN | DIASTOLIC BLOOD PRESSURE: 82 MMHG

## 2025-02-20 DIAGNOSIS — K82.8 SLUDGE IN GALLBLADDER: ICD-10-CM

## 2025-02-20 DIAGNOSIS — R10.11 ABDOMINAL DISCOMFORT IN RIGHT UPPER QUADRANT: ICD-10-CM

## 2025-02-20 RX ORDER — DICYCLOMINE HYDROCHLORIDE 20 MG/1
10 TABLET ORAL
COMMUNITY
Start: 2024-09-25

## 2025-02-20 NOTE — PROGRESS NOTES
Subjective     Patient ID: Jaren PICHARDO Cousin is a 59 y.o. male.    Chief Complaint: Consult (Gallbladder sludge)    HPI  pleasant 59-year-old gentleman who was referred to me for evaluation of biliary sludge versus cholelithiasis.  Patient notes he has been having periodic pains in the upper abdomen and right upper quadrant.  He notes these episodes of pain are typically associated with weekends in which he will eat more unhealthy foods and drink more alcohol.  Notes when he stays on a more balanced diet in his rare for him to have these events.  He had a recent CT scan demonstrating sludge versus cholelithiasis in the gallbladder.  Patient is otherwise without complaint.  No significant past medical or surgical history.  He presents for discussion of biliary colic  Review of Systems   Constitutional:  Negative for activity change and appetite change.   Cardiovascular:  Negative for chest pain.   Gastrointestinal:  Negative for abdominal distention and abdominal pain.   Musculoskeletal:  Negative for arthralgias.   Hematological:  Negative for adenopathy.   Psychiatric/Behavioral:  Negative for agitation.           Objective     Physical Exam  Vitals reviewed.   Cardiovascular:      Rate and Rhythm: Normal rate.   Pulmonary:      Effort: Pulmonary effort is normal.   Abdominal:      General: Abdomen is flat. There is no distension.      Tenderness: There is no abdominal tenderness.      Hernia: No hernia is present.   Musculoskeletal:      Cervical back: Normal range of motion.   Neurological:      General: No focal deficit present.      Mental Status: He is alert.   Psychiatric:         Mood and Affect: Mood normal.         Behavior: Behavior normal.     CT scan reviewed.  Gallbladder sludge noted.  Panniculitis noted       Assessment and Plan     1. Sludge in gallbladder  -     Ambulatory referral/consult to General Surgery    2. Abdominal discomfort in right upper quadrant  -     Ambulatory referral/consult to  General Surgery        Discussion with the patient.  Explained to him that some of his symptoms may be related biliary colic.  If so cholecystectomy would help relieve some of his symptoms.  If symptoms could also be managed with diet.  As such he expresses a desire to try dietary modifications.  Should the symptoms become more pronounced he will follow up to consider surgical excision         No follow-ups on file.

## 2025-03-06 ENCOUNTER — OFFICE VISIT (OUTPATIENT)
Dept: FAMILY MEDICINE | Facility: CLINIC | Age: 60
End: 2025-03-06
Payer: COMMERCIAL

## 2025-03-06 VITALS
WEIGHT: 200.38 LBS | HEART RATE: 62 BPM | BODY MASS INDEX: 27.95 KG/M2 | SYSTOLIC BLOOD PRESSURE: 130 MMHG | OXYGEN SATURATION: 98 % | DIASTOLIC BLOOD PRESSURE: 74 MMHG

## 2025-03-06 DIAGNOSIS — N20.0 NEPHROLITHIASIS: ICD-10-CM

## 2025-03-06 DIAGNOSIS — E78.2 MIXED HYPERLIPIDEMIA: ICD-10-CM

## 2025-03-06 DIAGNOSIS — Z29.11 NEED FOR RSV IMMUNIZATION: ICD-10-CM

## 2025-03-06 DIAGNOSIS — I47.10 SUPRAVENTRICULAR TACHYCARDIA: ICD-10-CM

## 2025-03-06 DIAGNOSIS — I10 PRIMARY HYPERTENSION: ICD-10-CM

## 2025-03-06 DIAGNOSIS — K80.50 BILIARY COLIC: Primary | ICD-10-CM

## 2025-03-06 PROCEDURE — 3078F DIAST BP <80 MM HG: CPT | Mod: CPTII,S$GLB,, | Performed by: STUDENT IN AN ORGANIZED HEALTH CARE EDUCATION/TRAINING PROGRAM

## 2025-03-06 PROCEDURE — 1159F MED LIST DOCD IN RCRD: CPT | Mod: CPTII,S$GLB,, | Performed by: STUDENT IN AN ORGANIZED HEALTH CARE EDUCATION/TRAINING PROGRAM

## 2025-03-06 PROCEDURE — 99999 PR PBB SHADOW E&M-EST. PATIENT-LVL IV: CPT | Mod: PBBFAC,,, | Performed by: STUDENT IN AN ORGANIZED HEALTH CARE EDUCATION/TRAINING PROGRAM

## 2025-03-06 PROCEDURE — 3008F BODY MASS INDEX DOCD: CPT | Mod: CPTII,S$GLB,, | Performed by: STUDENT IN AN ORGANIZED HEALTH CARE EDUCATION/TRAINING PROGRAM

## 2025-03-06 PROCEDURE — 3075F SYST BP GE 130 - 139MM HG: CPT | Mod: CPTII,S$GLB,, | Performed by: STUDENT IN AN ORGANIZED HEALTH CARE EDUCATION/TRAINING PROGRAM

## 2025-03-06 PROCEDURE — 99214 OFFICE O/P EST MOD 30 MIN: CPT | Mod: S$GLB,,, | Performed by: STUDENT IN AN ORGANIZED HEALTH CARE EDUCATION/TRAINING PROGRAM

## 2025-03-06 RX ORDER — COLCHICINE 0.6 MG/1
TABLET ORAL
Qty: 30 TABLET | Refills: 11 | Status: SHIPPED | OUTPATIENT
Start: 2025-03-06

## 2025-03-06 RX ORDER — RESPIRATORY SYNCYTIAL VISUS VACCINE RECOMBINANT, ADJUVANTED 120MCG/0.5
KIT INTRAMUSCULAR ONCE
Qty: 1 EACH | Refills: 0 | Status: SHIPPED | OUTPATIENT
Start: 2025-03-06 | End: 2025-03-06

## 2025-03-06 RX ORDER — VALACYCLOVIR HYDROCHLORIDE 500 MG/1
500 TABLET, FILM COATED ORAL DAILY
Qty: 90 TABLET | Refills: 3 | Status: SHIPPED | OUTPATIENT
Start: 2025-03-06

## 2025-03-06 NOTE — PROGRESS NOTES
Name: Jaren PICHARDO Cousin  MRN: [id number]  : 1965    Subjective   HPI  Patient presents for regular check up. He did see Kerri Linares recently to discuss imaging and lab work for abdominal pain. Imaging showed biliary sludge. He has since seen general surgery. Imaging also showed bilateral nephrolithiasis, largest stone being 4mm.     Review of Systems   Respiratory:  Negative for shortness of breath.    Cardiovascular:  Positive for palpitations (very rare). Negative for chest pain.   Neurological:  Negative for dizziness and light-headedness.        Problem List[1]    Medications Ordered Prior to Encounter[2]    Past Medical History:   Diagnosis Date    Asthma     as child    Bursitis     GERD (gastroesophageal reflux disease)     Hemorrhoid     Hypertension     Liver hemangioma     Osteoarthritis     PTSD (post-traumatic stress disorder)     not currently taking prescribed meds per [facility]    Seasonal allergies     Shingles     lower back    SVT (supraventricular tachycardia) 2020       Past Surgical History:   Procedure Laterality Date    ABLATION OF ARRHYTHMOGENIC FOCUS FOR SUPRAVENTRICULAR TACHYCARDIA WITH ELECTROPHYSIOLOGY STUDY      BACK SURGERY      ruptured disc L5-S1    COLONOSCOPY  ~  ([facility])    Hemorrhoids    COLONOSCOPY N/A 2022    Procedure: COLONOSCOPY;  Surgeon: Andrew Caba MD;  Location: [facility] ENDO;  Service: Endoscopy;  Laterality: N/A;    CORONARY ANGIOGRAPHY N/A 10/23/2019    Procedure: ANGIOGRAM, CORONARY ARTERY;  Surgeon: Can Masters MD;  Location: [facility] CATH;  Service: Cardiology;  Laterality: N/A;    ELBOW SURGERY      right    EPIDURAL STEROID INJECTION INTO CERVICAL SPINE N/A 10/15/2019    Procedure: Injection-steroid-epidural-cervical C7/T1;  Surgeon: Blaise Mao MD;  Location: [facility] OR;  Service: Pain Management;  Laterality: N/A;    ESOPHAGOGASTRODUODENOSCOPY N/A 2019    Procedure: EGD (ESOPHAGOGASTRODUODENOSCOPY);   Surgeon: Balwinder Dexter Jr., MD;  Location: [facility] ENDO;  Service: Endoscopy;  Laterality: N/A;    ESOPHAGOGASTRODUODENOSCOPY N/A 03/13/2024    Procedure: EGD (ESOPHAGOGASTRODUODENOSCOPY);  Surgeon: Fadi Mcleod MD;  Location: [facility] ENDO (4TH FLR);  Service: Endoscopy;  Laterality: N/A;  3/7/24-Ref Dr. Cherry, 1-4wks, instr portal-DS  3/7/24- precall complete - ERW    KNEE ARTHROSCOPY      KNEE SURGERY      right    LAMINECTOMY      LEFT HEART CATHETERIZATION N/A 10/23/2019    Procedure: Left heart cath;  Surgeon: Can Masters MD;  Location: [facility] CATH;  Service: Cardiology;  Laterality: N/A;    MAGNETIC RESONANCE IMAGING N/A 06/26/2019    Procedure: MRI (Magnetic Resonance Imagine) needs anesthesia;  Surgeon: Maurice Fonseca MD;  Location: [facility] CATH;  Service: Anesthesiology;  Laterality: N/A;    SPINE SURGERY  2007    TOTAL KNEE ARTHROPLASTY Right 09/28/2023        Family History   Problem Relation Name Age of Onset    Diverticulitis Mother Jesika Cousin     Stroke Mother Jesika Cousin     Arthritis Mother Jesika Cousin     Hypertension Father Cleveland Cousin II     COPD Father Cleveland Cousin II     Arthritis Father Cleveland Cousin II     Depression Father Cleveland Cousin II     Allergic rhinitis Daughter Celese     Asthma Daughter Celese         Cousin    Allergic rhinitis Daughter      Allergic rhinitis Son      Cancer Brother Cleveland Cousin III     Diabetes Brother Cleveland Cousin III     Diabetes Brother Ortega Cousin     Angioedema Neg Hx      Eczema Neg Hx      Immunodeficiency Neg Hx      Urticaria Neg Hx      Lupus Neg Hx      Psoriasis Neg Hx      Melanoma Neg Hx      Acne Neg Hx      Colon cancer Neg Hx      Colon polyps Neg Hx      Crohn's disease Neg Hx      Ulcerative colitis Neg Hx      Esophageal cancer Neg Hx      Stomach cancer Neg Hx      Glaucoma Neg Hx      Macular degeneration Neg Hx         Social History[3]    Review of patient's allergies indicates:   Allergen Reactions    Ciprofloxacin Hives         Health Maintenance Due   Topic Date Due    RSV Vaccine (Age 60+ and Pregnant patients) (1 - Risk 60-74 years 1-dose series) Never done       Objective   Vitals:    03/06/25 0924   BP: 130/74   Pulse: 62        Physical Exam  Constitutional:       General: He is not in acute distress.     Appearance: Normal appearance. He is well-developed.   HENT:      Head: Normocephalic and atraumatic.      Right Ear: External ear normal.      Left Ear: External ear normal.   Eyes:      Conjunctiva/sclera: Conjunctivae normal.   Cardiovascular:      Rate and Rhythm: Normal rate and regular rhythm.      Heart sounds: No murmur heard.     No friction rub. No gallop.   Pulmonary:      Effort: Pulmonary effort is normal. No respiratory distress.      Breath sounds: No wheezing, rhonchi or rales.   Abdominal:      General: Abdomen is flat. There is no distension.   Musculoskeletal:         General: No swelling or deformity.      Right lower leg: No edema.      Left lower leg: No edema.   Skin:     General: Skin is warm and dry.      Coloration: Skin is not jaundiced.   Neurological:      Mental Status: He is alert and oriented to person, place, and time. Mental status is at baseline.   Psychiatric:         Attention and Perception: Attention and perception normal.         Mood and Affect: Mood normal.         Speech: Speech normal.         Behavior: Behavior normal. Behavior is cooperative.         Thought Content: Thought content normal.         Cognition and Memory: Cognition normal.         Judgment: Judgment normal.          Assessment & Plan  Biliary colic  Currently controlled. Patient has decreased his dietary fat intake and has cut alcohol intake. Continue such. He has established with Dr. Caba who discussed surgery with the patient. Surgery will likely happen if the symptoms can't be controlled through lifestyle.          Primary hypertension  Nephrolithiasis  Controlled. Continue amlodipine (obtained through [facility]). Can  consider switching to thiazide diuretic if kidney stones become an issue (assuming they are calcium based stones.          Mixed hyperlipidemia  Mild elevation in cholesterol levels. Continue fenofibrate.          Supraventricular tachycardia  Palpitations controlled. Continue propranolol.          Need for RSV immunization  Orders:    RSVPreF3 antigen-AS01E, PF, (AREXVY, PF,) 120 mcg/0.5 mL SusR vaccine; Inject into the muscle once. for 1 dose      Follow up in 6 months. I spent 34 minutes pre-charting, interviewing patient, performing exam, formulating and discussing plan, placing orders, and documenting.     Patrick Varghese MD  03/06/2025          [1]   Patient Active Problem List  Diagnosis    Primary hypertension    Urticaria    Allergic rhinitis    Mixed hyperlipidemia    DDD (degenerative disc disease), lumbar    Gastroesophageal reflux disease without esophagitis    Decreased range of motion of neck    Cervical radiculitis    Cervical spondylosis    Supraventricular tachycardia    Fatty liver    Right ankle pain    Tendonitis, Achilles, right    Acute medial meniscus tear of right knee    Genu varum of both lower extremities    Post-traumatic osteoarthritis of right knee    Pharyngitis    Irritable bowel syndrome with constipation    Sludge in gallbladder    Renal stones   [2]   Current Outpatient Medications on File Prior to Visit   Medication Sig Dispense Refill    ALLERGY RELIEF, LORATADINE, 10 mg tablet TAKE ONE TABLET BY MOUTH ONCE DAILY 30 tablet 11    amLODIPine (NORVASC) 10 MG tablet Take 1 tablet (10 mg total) by mouth once daily. 30 tablet 5    busPIRone (BUSPAR) 5 MG Tab TAKE 1 TABLET BY MOUTH THREE TIMES A  tablet 3    co-enzyme Q-10 30 mg capsule Take 100 mg by mouth once daily.      cyclobenzaprine (FLEXERIL) 10 MG tablet Take 10 mg by mouth 3 (three) times daily as needed. PRN      diclofenac sodium (VOLTAREN) 1 % Gel APPLY 2 GRAMS TOPICALLY FOUR TIMES A DAY AS NEEDED FOR PAIN       dicyclomine (BENTYL) 20 mg tablet Take 10 mg by mouth as needed.      doxycycline (MONODOX) 50 MG Cap Take once daily with food. May cause upset stomach. 30 capsule 11    esomeprazole (NEXIUM) 40 MG capsule Take 1 capsule (40 mg total) by mouth before breakfast. 30 capsule 11    famotidine (PEPCID) 20 MG tablet Take 1 tablet (20 mg total) by mouth 2 (two) times daily. 180 tablet 1    fenofibrate (TRICOR) 145 MG tablet Take 1 tablet (145 mg total) by mouth once daily. 90 tablet 3    meclizine (ANTIVERT) 25 mg tablet Take 1 tablet (25 mg total) by mouth 3 (three) times daily as needed for Dizziness (may cause drowsiness). 60 tablet 2    metroNIDAZOLE (METROGEL) 0.75 % gel Apply 1 application  topically every evening.      mometasone (NASONEX) 50 mcg/actuation nasal spray Use 2 spray(s) in each nostril once daily 51 g 2    niacin (SLO-NIACIN) 500 mg tablet Take 1 tablet (500 mg total) by mouth every evening. 90 tablet 3    propranoloL (INDERAL) 10 MG tablet Take 1 tablet (10 mg total) by mouth 3 (three) times daily. 270 tablet 2    sildenafiL (VIAGRA) 100 MG tablet       silver sulfADIAZINE 1% (SILVADENE) 1 % cream Apply topically 2 (two) times daily.      [DISCONTINUED] colchicine (COLCRYS) 0.6 mg tablet TAKE 1 TABLET BY MOUTH 2 TIMES DAILY. 180 tablet 1    [DISCONTINUED] valACYclovir (VALTREX) 500 MG tablet Take 1 tablet (500 mg total) by mouth once daily. 90 tablet 3    [DISCONTINUED] cetirizine (ZYRTEC) 10 MG tablet TAKE ONE TABLET BY MOUTH ONCE DAILY AS NEEDED FOR ALLERGIES (Patient not taking: Reported on 3/6/2025)       Current Facility-Administered Medications on File Prior to Visit   Medication Dose Route Frequency Provider Last Rate Last Admin    0.9%  NaCl infusion   Intravenous Continuous Jennifer Majano NP        lactated ringers infusion   Intravenous Continuous Luiz Aquino MD   New Bag at 07/12/22 0833    lidocaine (PF) 10 mg/ml (1%) injection 10 mg  1 mL Intradermal Once Luiz Aquino  MD       [3]   Social History  Socioeconomic History    Marital status:    Occupational History    Occupation: retired   Tobacco Use    Smoking status: Never    Smokeless tobacco: Never   Substance and Sexual Activity    Alcohol use: Yes     Alcohol/week: 21.0 standard drinks of alcohol     Types: 21 Cans of beer per week     Comment: 3 beers daily    Drug use: Yes     Frequency: 2.0 times per week     Types: Marijuana     Comment: Uses for PTSD, gummies    Sexual activity: Yes     Partners: Female     Birth control/protection: None   Social History Narrative    Lives with wife. One dog.      Social Drivers of Health     Financial Resource Strain: Low Risk  (1/19/2024)    Overall Financial Resource Strain (CARDIA)     Difficulty of Paying Living Expenses: Not hard at all   Food Insecurity: No Food Insecurity (1/19/2024)    Hunger Vital Sign     Worried About Running Out of Food in the Last Year: Never true     Ran Out of Food in the Last Year: Never true   Transportation Needs: No Transportation Needs (1/19/2024)    PRAPARE - Transportation     Lack of Transportation (Medical): No     Lack of Transportation (Non-Medical): No   Physical Activity: Insufficiently Active (1/19/2024)    Exercise Vital Sign     Days of Exercise per Week: 5 days     Minutes of Exercise per Session: 20 min   Stress: Stress Concern Present (1/19/2024)    [place] [employer] - Occupational Stress Questionnaire     Feeling of Stress : Very much   Housing Stability: Low Risk  (1/19/2024)    Housing Stability Vital Sign     Unable to Pay for Housing in the Last Year: No     Number of Places Lived in the Last Year: 1     Unstable Housing in the Last Year: No

## 2025-03-06 NOTE — ASSESSMENT & PLAN NOTE
Controlled. Continue amlodipine (obtained through [facility]). Can consider switching to thiazide diuretic if kidney stones become an issue (assuming they are calcium based stones.

## 2025-05-22 ENCOUNTER — TELEPHONE (OUTPATIENT)
Dept: CARDIOLOGY | Facility: CLINIC | Age: 60
End: 2025-05-22
Payer: COMMERCIAL

## 2025-05-22 NOTE — TELEPHONE ENCOUNTER
----- Message from Sasha sent at 5/22/2025  3:00 PM CDT -----  Name of Who is Calling:SHANNA MCMANUS [[id number]]  What is the request in detail: pt is calling to get an appointment at the new location in [place] in September please advise thank you   Can the clinic reply by [facility]: call back   What Number to [facility] Back [facility]: Telephone Information:Mobile          [phone]

## 2025-06-18 ENCOUNTER — OFFICE VISIT (OUTPATIENT)
Dept: FAMILY MEDICINE | Facility: CLINIC | Age: 60
End: 2025-06-18
Payer: COMMERCIAL

## 2025-06-18 VITALS
BODY MASS INDEX: 27.84 KG/M2 | SYSTOLIC BLOOD PRESSURE: 128 MMHG | DIASTOLIC BLOOD PRESSURE: 82 MMHG | HEIGHT: 71 IN | HEART RATE: 69 BPM | OXYGEN SATURATION: 99 % | WEIGHT: 198.88 LBS

## 2025-06-18 DIAGNOSIS — R09.89 CHRONIC SINUS COMPLAINTS: ICD-10-CM

## 2025-06-18 DIAGNOSIS — I10 PRIMARY HYPERTENSION: ICD-10-CM

## 2025-06-18 DIAGNOSIS — Z74.09 IMPAIRED MOBILITY AND ACTIVITIES OF DAILY LIVING: ICD-10-CM

## 2025-06-18 DIAGNOSIS — Z78.9 IMPAIRED MOBILITY AND ACTIVITIES OF DAILY LIVING: ICD-10-CM

## 2025-06-18 DIAGNOSIS — F43.12 CHRONIC POST-TRAUMATIC STRESS DISORDER (PTSD): ICD-10-CM

## 2025-06-18 DIAGNOSIS — R29.898 DECREASED RANGE OF MOTION OF NECK: ICD-10-CM

## 2025-06-18 DIAGNOSIS — I47.10 SUPRAVENTRICULAR TACHYCARDIA: ICD-10-CM

## 2025-06-18 DIAGNOSIS — M51.361 DEGENERATION OF INTERVERTEBRAL DISC OF LUMBAR REGION WITH LOWER EXTREMITY PAIN: Primary | ICD-10-CM

## 2025-06-18 DIAGNOSIS — M17.31 POST-TRAUMATIC OSTEOARTHRITIS OF RIGHT KNEE: ICD-10-CM

## 2025-06-18 PROCEDURE — 1159F MED LIST DOCD IN RCRD: CPT | Mod: CPTII,S$GLB,, | Performed by: NURSE PRACTITIONER

## 2025-06-18 PROCEDURE — 99999 PR PBB SHADOW E&M-EST. PATIENT-LVL IV: CPT | Mod: PBBFAC,,, | Performed by: NURSE PRACTITIONER

## 2025-06-18 PROCEDURE — 99214 OFFICE O/P EST MOD 30 MIN: CPT | Mod: S$GLB,,, | Performed by: NURSE PRACTITIONER

## 2025-06-18 PROCEDURE — 3008F BODY MASS INDEX DOCD: CPT | Mod: CPTII,S$GLB,, | Performed by: NURSE PRACTITIONER

## 2025-06-18 PROCEDURE — 1160F RVW MEDS BY RX/DR IN RCRD: CPT | Mod: CPTII,S$GLB,, | Performed by: NURSE PRACTITIONER

## 2025-06-18 PROCEDURE — 3074F SYST BP LT 130 MM HG: CPT | Mod: CPTII,S$GLB,, | Performed by: NURSE PRACTITIONER

## 2025-06-18 PROCEDURE — 3079F DIAST BP 80-89 MM HG: CPT | Mod: CPTII,S$GLB,, | Performed by: NURSE PRACTITIONER

## 2025-06-18 RX ORDER — TRAMADOL HYDROCHLORIDE 100 MG/1
100 TABLET, EXTENDED RELEASE ORAL DAILY PRN
COMMUNITY

## 2025-06-18 RX ORDER — MOMETASONE FUROATE MONOHYDRATE 50 UG/1
SPRAY, METERED NASAL
Qty: 51 G | Refills: 2 | Status: SHIPPED | OUTPATIENT
Start: 2025-06-18

## 2025-06-18 NOTE — PROGRESS NOTES
Patient ID: Jaren PICHARDO Cousin is a 59 y.o. male.    Chief Complaint: Medication Refill, [facility] form completion requested  Last seen in primary care by PCP, Abimael on 03/06/2025  I last saw him on 02/19/2025  He is accompanied today by his wife.    History of Present Illness    CHIEF COMPLAINT:  Patient presents today for assistance with VA Aid application and refill    FUNCTIONAL STATUS:  Per wife he is 100% disabled through the [facility] and requires % assistance with activities of daily living. She states needs help periodically with dressing, bathing, showering, and transferring in/out of vehicles. He is unable to perform household tasks including meal preparation, housekeeping, laundry, shopping, lawn care, dishes, and taking out trash due to decreased range of motion.    CURRENT SYMPTOMS:  He presents with lumbar pain and very limited range of motion with radiation down the right buttock. He experiences vertigo and dizziness episodes several times weekly, accompanied by headaches, lightheadedness, and balance instability. He and wife reports significant short-term memory issues, including difficulty with name recognition. He also has chronic rhinitis with constant nasal drainage.    MEDICAL HISTORY:  History includes PTSD, alcoholism, herniated lumbar disk, right knee total arthroplasty, and cardiac ablation in 2021 for supraventricular tachycardia.    CURRENT MEDICATIONS:  Medications include Tricor for cholesterol, Propranolol 3 times daily, Norvasc for blood pressure, Buspar 5mg 3 times daily for anxiety, Flexeril for muscle relaxation, Tramadol 100mg as needed for pain (limited use due to drowsiness and memory issues), Meclizine 3 times daily as needed for dizziness, Voltaren gel for pain, and Mometasone for rhinitis.      ROS:  General: -fever, -chills, -fatigue, -weight gain, -weight loss  Eyes: -vision changes, -redness, -discharge  ENT: -ear pain, -nasal congestion, -sore throat, +runny nose, +nasal  discharge  Cardiovascular: -chest pain, -palpitations, -lower extremity edema, +feelings of fast heart rate  Respiratory: -cough, -shortness of breath  Gastrointestinal: -abdominal pain, -nausea, -vomiting, -diarrhea, -constipation, -blood in stool, +change in bowel habits, +dry mouth  Genitourinary: -dysuria, -hematuria, -frequency  Musculoskeletal: +joint pain, -muscle pain, +back pain, +pain with movement, +neck pain, +nerve pain  Skin: -rash, -lesion  Neurological: +headache, +dizziness, -numbness, -tingling, +memory loss, +memory problems, +balance issues, +lightheadedness  Psychiatric: +anxiety, +depression, -sleep difficulty, +irritability         Physical Exam    General: No acute distress. Well-developed. Well-nourished.  Eyes: EOMI. Sclerae anicteric.  HENT: Normocephalic. Atraumatic. Nares patent. Moist oral mucosa.  Ears: Bilateral TMs clear. Bilateral EACs clear.  Cardiovascular: Regular rate. Regular rhythm. No murmurs. No rubs. No gallops. Normal S1, S2.  Respiratory: Normal respiratory effort. Clear to auscultation bilaterally. No rales. No rhonchi. No wheezing.  Abdomen: Soft. Non-tender. Non-distended. Normoactive bowel sounds.  Musculoskeletal: No  obvious deformity. Normal fine motor  strength in upper extremities.  Extremities: No lower extremity edema.  Neurological: Alert & oriented x3. No slurred speech. Normal gait.  Psychiatric: Normal mood. Normal affect. Good insight. Good judgment. Wife states there are days when he is very irritable and has had irritability in past few days  Skin: Warm. Dry. No rash. Normal nail beds.  MSK: Knee - Right: Active knee extension limited (Right). Active knee flexion limited (Right). Surgical scar on right knee.  MSK: Spine - Lumbar: Limited forward flexion of lumbar spine with pain during assessment  Neck: No lymphadenopathy.         Assessment & Plan    F43.10 Post-traumatic stress disorder, unspecified  F10.20 Alcohol dependence, uncomplicated  M51.26  Other intervertebral disc displacement, lumbar region  M54.41 Lumbago with sciatica, right side  R42 Dizziness and giddiness  R51.9 Headache, unspecified  F41.1 Generalized anxiety disorder  I10 Essential (primary) hypertension  E78.5 Hyperlipidemia, unspecified  J31.0 Chronic rhinitis  I47.10 Supraventricular tachycardia, unspecified  Z96.651 Presence of right artificial knee joint  Z74.09 Other reduced mobility  Z74.1 Need for assistance with personal care    POST-TRAUMATIC STRESS DISORDER (PTSD):  - Reviewed VA disability status and current medical conditions.  - Patient has PTSD with history of alcoholism, chronic sleep impairment, depression, and unprovoked irritability.  - Patient is clean, neat, appropriately dressed, and able to answer questions.  - He displays some irritability but no current violence.  - Due to PTSD, agitation, and anger, patient does not manage a lot of  his own medical needs including managing appts and medications.  - States Currently engaged in an effective therapy system.    ALCOHOL DEPENDENCE:  - Documented patient's history of alcoholism.    LUMBAR DISC DISPLACEMENT AND SCIATICA:  - Reviewed VA disability status and current medical conditions.  - Patient has lumbar herniated disk with chronic pain affecting activities of daily living, including bathing, dressing, and ambulation.  - Evaluated limited range of motion in the lower back with pain radiating down the right side.  - Patient uses a cane 50% of the time due to lumbar pain and sciatic symptoms affecting mobility.  - Continued Flexeril for musculoskeletal pain management.    DIZZINESS AND VERTIGO:  - Patient experiences dizziness and vertigo approximately twice weekly, causing instability and affecting daily activities.  - Continued meclizine 3 times daily as needed.  - Noted potential relationship with chronic rhinitis symptoms.  - Concerns related to potential safety issues when he experiences    HEADACHE:  - Documented  patient's experiences of headaches and light-headedness.    MEMORY LOSS:  - Documented patient's short-term memory loss, including name recognition difficulties.     GENERALIZED ANXIETY DISORDER:  - Noted patient has anxiety and takes Buspar 5 mg 3 times daily.    HYPERTENSION:  - Continued Norvasc daily for blood pressure management.  BP Readings from Last 3 Encounters:   06/18/25 128/82   03/06/25 130/74   02/20/25 (!) 147/82      HYPERLIPIDEMIA:  - Continued Tricor daily for cholesterol management.    CHRONIC RHINITIS:  - Documented chronic allergy symptoms and nasal infections, which sometimes trigger vertigo.  - Patient has ongoing rhinitis symptoms including persistent rhinorrhea.  - Condition is managed with mometasone and saline nasal spray.  - Advised on proper use of nasal spray, recommending use of saline spray before medication to wash out allergens, especially after showering at night.  - Sent prescription refill for mometasone to [facility].    SUPRAVENTRICULAR TACHYCARDIA:  - Noted history of cardiac ablation performed in 2021.  - Patient continues to experience tachycardia, which impacts his ability to drive.    RIGHT KNEE ARTHROPLASTY:  - Patient has right knee total arthroplasty with limited range of motion, pain, and visible surgical scarring.  - Pain and limited mobility affect daily activities including standing prolonged for meal preparation, in and out of tub/shower    REDUCED MOBILITY AND NEED FOR ASSISTANCE:  - Patient requires assistance with ambulation within the home, bathing, showering, dressing, and hygiene needs due to lumbar pain and knee problems.  - Uses a cane 50% of the time.  - Patient's wife assists with medication management, ensuring daily intake of medications including Tricor, Inderal, Norvasc, Buspar, Flexeril, and Tramadol.    MEDICATION MANAGEMENT:  - Evaluated medication regimen, noting potential interactions between Tramadol and Cyclobenzaprine. Discussed safety need  with taking both at the same time.  - Educated patient on potential risks of this combination, including increased risk of seizures, serotonin syndrome, profound sedation, and suicidal ideation.    FOLLOW-UP:  - Will mail completed form to patient's home address at [address] [place] [address] in [place].  - Will include a copy of the dictated note with the mailed form by the end of the day.                          Medication List with Changes/Refills   Current Medications    ALLERGY RELIEF, LORATADINE, 10 MG TABLET    TAKE ONE TABLET BY MOUTH ONCE DAILY    AMLODIPINE (NORVASC) 10 MG TABLET    Take 1 tablet (10 mg total) by mouth once daily.    BUSPIRONE (BUSPAR) 5 MG TAB    TAKE 1 TABLET BY MOUTH THREE TIMES A DAY    CO-ENZYME Q-10 30 MG CAPSULE    Take 100 mg by mouth once daily.    COLCHICINE (COLCRYS) 0.6 MG TABLET    Within 24 hours of gout flare, take two tablets (1.2 mg) followed by one tablet (0.6 mg) an hour later. Then take one tablet (0.6 mg) twice daily until 24 hours after flare resolves.    CYCLOBENZAPRINE (FLEXERIL) 10 MG TABLET    Take 10 mg by mouth 3 (three) times daily as needed. PRN    DICLOFENAC SODIUM (VOLTAREN) 1 % GEL    APPLY 2 GRAMS TOPICALLY FOUR TIMES A DAY AS NEEDED FOR PAIN    DICYCLOMINE (BENTYL) 20 MG TABLET    Take 10 mg by mouth as needed.    DOXYCYCLINE (MONODOX) 50 MG CAP    Take once daily with food. May cause upset stomach.    ESOMEPRAZOLE (NEXIUM) 40 MG CAPSULE    Take 1 capsule (40 mg total) by mouth before breakfast.    FAMOTIDINE (PEPCID) 20 MG TABLET    Take 1 tablet (20 mg total) by mouth 2 (two) times daily.    FENOFIBRATE (TRICOR) 145 MG TABLET    Take 1 tablet (145 mg total) by mouth once daily.    MECLIZINE (ANTIVERT) 25 MG TABLET    Take 1 tablet (25 mg total) by mouth 3 (three) times daily as needed for Dizziness (may cause drowsiness).    METRONIDAZOLE (METROGEL) 0.75 % GEL    Apply 1 application  topically every evening.    NIACIN (SLO-NIACIN) 500 MG TABLET    Take 1  tablet (500 mg total) by mouth every evening.    PROPRANOLOL (INDERAL) 10 MG TABLET    Take 1 tablet (10 mg total) by mouth 3 (three) times daily.    SILDENAFIL (VIAGRA) 100 MG TABLET        SILVER SULFADIAZINE 1% (SILVADENE) 1 % CREAM    Apply topically 2 (two) times daily.    TRAMADOL (ULTRAM-ER) 100 MG TB24    Take 100 mg by mouth daily as needed (pain to lower back region).    VALACYCLOVIR (VALTREX) 500 MG TABLET    Take 1 tablet (500 mg total) by mouth once daily.   Changed and/or Refilled Medications    Modified Medication Previous Medication    MOMETASONE (NASONEX) 50 MCG/ACTUATION NASAL SPRAY mometasone (NASONEX) 50 mcg/actuation nasal spray       Use 2 spray(s) in each nostril once daily    Use 2 spray(s) in each nostril once daily        No follow-ups on file.    This note was generated with the assistance of ambient listening technology. Verbal consent was obtained by the patient and accompanying visitor(s) for the recording of patient appointment to facilitate this note. I attest to having reviewed and edited the generated note for accuracy, though some syntax or spelling errors may persist. Please contact the author of this note for any clarification.

## 2025-07-04 DIAGNOSIS — E78.1 HIGH TRIGLYCERIDES: ICD-10-CM

## 2025-07-04 NOTE — TELEPHONE ENCOUNTER
Care Due:                  Date            Visit Type   Department     Provider  --------------------------------------------------------------------------------                                EP -                              [facility]  Last Visit: 03-      CARE ([facility])   CARLOS ALBERTO Varghese                              EP -                              PRIMARY      [facility]  Next Visit: 09-      CARE ([facility])   MEDICINE       Patrick Varghese                                                            Last  Test          Frequency    Reason                     Performed    Due Date  --------------------------------------------------------------------------------    CBC.........  12 months..  fenofibrate, valACYclovir  08- 08-    Uric Acid...  12 months..  colchicine...............  Not Found    Overdue    [employer] Embedded Care Due Messages. Reference number: [id number].   7/04/2025 12:09:53 AM CDT

## 2025-07-06 RX ORDER — NIACIN 500 MG/1
500 TABLET, EXTENDED RELEASE ORAL NIGHTLY
Qty: 90 TABLET | Refills: 1 | Status: SHIPPED | OUTPATIENT
Start: 2025-07-06

## 2025-07-06 RX ORDER — FENOFIBRATE 145 MG/1
145 TABLET, FILM COATED ORAL
Qty: 90 TABLET | Refills: 0 | Status: SHIPPED | OUTPATIENT
Start: 2025-07-06

## 2025-07-06 NOTE — TELEPHONE ENCOUNTER
Provider Staff:  Action required for this patient    Requires labs CBC, URIC ACID     Please see care gap opportunities below in Care Due Message.    Thanks!  [facility]     Appointments      Date Provider   Last Visit   3/6/2025 Patrick Varghese MD   Next Visit   9/9/2025 Patrick Varghese MD     Refill Decision Note   Jaren Quiroz  is requesting a refill authorization.  Brief Assessment and Rationale for Refill:  Approve     Medication Therapy Plan:         Comments:     Note composed:10:36 AM 07/06/2025

## 2025-09-01 ENCOUNTER — PATIENT MESSAGE (OUTPATIENT)
Dept: FAMILY MEDICINE | Facility: CLINIC | Age: 60
End: 2025-09-01
Payer: COMMERCIAL

## 2025-09-01 DIAGNOSIS — E78.2 MIXED HYPERLIPIDEMIA: ICD-10-CM

## 2025-09-01 DIAGNOSIS — K76.0 FATTY LIVER: ICD-10-CM

## 2025-09-01 DIAGNOSIS — I10 PRIMARY HYPERTENSION: Primary | ICD-10-CM

## 2025-09-03 RX ORDER — PROPRANOLOL HYDROCHLORIDE 10 MG/1
10 TABLET ORAL
Qty: 270 TABLET | Refills: 1 | Status: SHIPPED | OUTPATIENT
Start: 2025-09-03

## (undated) DEVICE — SYR GLASS 5CC LUER LOK

## (undated) DEVICE — INTRO 8.5FR 63CM SRO

## (undated) DEVICE — KIT PROBE COVER WITH GEL

## (undated) DEVICE — ELECTRODE POLYHESIVEPRE-ATTACH

## (undated) DEVICE — INTRODUCER HEMOSTASIS 7.5F

## (undated) DEVICE — PACK EP DRAPE

## (undated) DEVICE — CATH SAFIRE 7FR 4CC LG SWEEP

## (undated) DEVICE — CATH LIVEWIRE DCAPLR 7FRX115CM

## (undated) DEVICE — APPLICATOR CHLORAPREP CLR 10.5

## (undated) DEVICE — PAD DEFIB CADENCE ADULT R2

## (undated) DEVICE — CATH SUPREME QPLR CRD-2 6F 120

## (undated) DEVICE — TRAY NERVE BLOCK

## (undated) DEVICE — SOL SOD CHLORIDE 0.9% 10ML

## (undated) DEVICE — CATH RESPONSE QPLR JSN 6F 120

## (undated) DEVICE — PATCH ENSITE PRECISION NAVX SE

## (undated) DEVICE — GLOVE 7.5 PROTEXIS PI MICRO